# Patient Record
Sex: FEMALE | Race: WHITE | NOT HISPANIC OR LATINO | Employment: FULL TIME | URBAN - METROPOLITAN AREA
[De-identification: names, ages, dates, MRNs, and addresses within clinical notes are randomized per-mention and may not be internally consistent; named-entity substitution may affect disease eponyms.]

---

## 2017-01-09 DIAGNOSIS — F43.22 ADJUSTMENT DISORDER WITH ANXIOUS MOOD: Primary | ICD-10-CM

## 2017-01-10 DIAGNOSIS — F43.22 ADJUSTMENT DISORDER WITH ANXIOUS MOOD: Primary | ICD-10-CM

## 2017-01-10 NOTE — TELEPHONE ENCOUNTER
Reason for Call:  Medication or medication refill:    Do you use a [facility]?  Name of the pharmacy and phone number for the current request:  [facility]    Name of the medication requested: Lexapro    Other request: last fill 9/9/16 # 120 with 1 refill    Can we leave a detailed message on this number? YES    Phone number patient can be reached at: Other phone number:  [phone]    Best Time: any    Call taken on 1/10/2017 at 7:48 AM by Anna Mcfadden

## 2017-01-13 NOTE — TELEPHONE ENCOUNTER
Routing refill request to provider for review/approval because:  Dose increase when would you like to patient to follow up     Thank you  Dominique LI RN

## 2017-01-15 RX ORDER — ESCITALOPRAM OXALATE 10 MG/1
20 TABLET ORAL DAILY
Qty: 120 TABLET | Refills: 1 | Status: SHIPPED | OUTPATIENT
Start: 2017-01-15 | End: 2017-06-09

## 2017-03-26 ENCOUNTER — HOSPITAL ENCOUNTER (EMERGENCY)
Facility: CLINIC | Age: 22
Discharge: HOME | End: 2017-03-27
Attending: EMERGENCY MEDICINE | Admitting: EMERGENCY MEDICINE
Payer: COMMERCIAL

## 2017-03-26 VITALS
HEART RATE: 65 BPM | TEMPERATURE: 97.6 F | OXYGEN SATURATION: 100 % | SYSTOLIC BLOOD PRESSURE: 142 MMHG | RESPIRATION RATE: 18 BRPM | DIASTOLIC BLOOD PRESSURE: 89 MMHG

## 2017-03-26 DIAGNOSIS — F41.9 ANXIETY: ICD-10-CM
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
## 2017-03-26 PROCEDURE — 99285 EMERGENCY DEPT VISIT HI MDM: CPT | Mod: 25

## 2017-03-26 PROCEDURE — 99285 EMERGENCY DEPT VISIT HI MDM: CPT

## 2017-03-26 PROCEDURE — 51000050 ZZH FORENSIC EXAM

## 2017-03-26 PROCEDURE — 99284 EMERGENCY DEPT VISIT MOD MDM: CPT | Performed by: EMERGENCY MEDICINE

## 2017-03-26 NOTE — LETTER
[facility] EMERGENCY DEPARTMENT  [address] [address]  [phone]    2017    Antonia Marc  [address] [place] [address]  [phone] (home)     : 1995      To Whom it may concern:    Antonia Marc was seen in our Emergency Department today, 2017.  I expect her condition to improve over the next 2-3 days.  She may return to work when improved.    Sincerely,        Ramakrishnamarsha Guy
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
## 2017-03-26 NOTE — ED AVS SNAPSHOT
[facility] Emergency Department    [address] [address]    Phone:  [phone]    Fax:  [phone]                                       Antonia Marc   MRN: [id number]    Department:  [facility] Emergency Department   Date of Visit:  3/26/2017           After Visit Summary Signature Page     I have received my discharge instructions, and my questions have been answered. I have discussed any challenges I see with this plan with the nurse or doctor.    ..........................................................................................................................................  Patient/Patient Representative Signature      ..........................................................................................................................................  Patient Representative Print Name and Relationship to Patient    ..................................................               ................................................  Date                                            Time    ..........................................................................................................................................  Reviewed by Signature/Title    ...................................................              ..............................................  Date                                                            Time

## 2017-03-27 PROCEDURE — 25000132 ZZH RX MED GY IP 250 OP 250 PS 637: Performed by: EMERGENCY MEDICINE

## 2017-03-27 RX ORDER — LORAZEPAM 0.5 MG/1
0.5 TABLET ORAL EVERY 8 HOURS PRN
Qty: 20 TABLET | Refills: 0 | Status: SHIPPED | OUTPATIENT
Start: 2017-03-27 | End: 2017-05-23

## 2017-03-27 RX ORDER — LORAZEPAM 0.5 MG/1
0.5 TABLET ORAL ONCE
Status: COMPLETED | OUTPATIENT
Start: 2017-03-27 | End: 2017-03-27

## 2017-03-27 RX ADMIN — LORAZEPAM 0.5 MG: 0.5 TABLET ORAL at 00:24

## 2017-03-27 NOTE — DISCHARGE INSTRUCTIONS
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
## 2017-05-23 ENCOUNTER — OFFICE VISIT (OUTPATIENT)
Dept: FAMILY MEDICINE | Facility: CLINIC | Age: 22
End: 2017-05-23
Payer: COMMERCIAL

## 2017-05-23 VITALS
BODY MASS INDEX: 33.04 KG/M2 | HEIGHT: 68 IN | WEIGHT: 218 LBS | DIASTOLIC BLOOD PRESSURE: 69 MMHG | SYSTOLIC BLOOD PRESSURE: 107 MMHG | HEART RATE: 64 BPM

## 2017-05-23 DIAGNOSIS — Z30.013 ENCOUNTER FOR INITIAL PRESCRIPTION OF INJECTABLE CONTRACEPTIVE: ICD-10-CM

## 2017-05-23 DIAGNOSIS — F33.40 RECURRENT MAJOR DEPRESSIVE DISORDER, IN REMISSION (H): ICD-10-CM

## 2017-05-23 DIAGNOSIS — F41.9 ANXIETY: Primary | ICD-10-CM

## 2017-05-23 LAB — BETA HCG QUAL IFA URINE: NEGATIVE

## 2017-05-23 PROCEDURE — 87491 CHLMYD TRACH DNA AMP PROBE: CPT | Performed by: FAMILY MEDICINE

## 2017-05-23 PROCEDURE — 84703 CHORIONIC GONADOTROPIN ASSAY: CPT | Performed by: FAMILY MEDICINE

## 2017-05-23 PROCEDURE — 87591 N.GONORRHOEAE DNA AMP PROB: CPT | Performed by: FAMILY MEDICINE

## 2017-05-23 PROCEDURE — 99214 OFFICE O/P EST MOD 30 MIN: CPT | Performed by: FAMILY MEDICINE

## 2017-05-23 RX ORDER — MEDROXYPROGESTERONE ACETATE 150 MG/ML
150 INJECTION, SUSPENSION INTRAMUSCULAR
Qty: 1 ML | Refills: 0 | OUTPATIENT
Start: 2017-05-23 | End: 2018-05-22

## 2017-05-23 ASSESSMENT — ANXIETY QUESTIONNAIRES
2. NOT BEING ABLE TO STOP OR CONTROL WORRYING: NEARLY EVERY DAY
3. WORRYING TOO MUCH ABOUT DIFFERENT THINGS: NEARLY EVERY DAY
IF YOU CHECKED OFF ANY PROBLEMS ON THIS QUESTIONNAIRE, HOW DIFFICULT HAVE THESE PROBLEMS MADE IT FOR YOU TO DO YOUR WORK, TAKE CARE OF THINGS AT HOME, OR GET ALONG WITH OTHER PEOPLE: VERY DIFFICULT
7. FEELING AFRAID AS IF SOMETHING AWFUL MIGHT HAPPEN: SEVERAL DAYS
GAD7 TOTAL SCORE: 15
1. FEELING NERVOUS, ANXIOUS, OR ON EDGE: NEARLY EVERY DAY
6. BECOMING EASILY ANNOYED OR IRRITABLE: NEARLY EVERY DAY
5. BEING SO RESTLESS THAT IT IS HARD TO SIT STILL: SEVERAL DAYS

## 2017-05-23 ASSESSMENT — PATIENT HEALTH QUESTIONNAIRE - PHQ9: 5. POOR APPETITE OR OVEREATING: SEVERAL DAYS

## 2017-05-23 NOTE — MR AVS SNAPSHOT
"              After Visit Summary   2017    Antonia Marc    MRN: [id number]           Patient Information     Date Of Birth          1995        Visit Information        Provider Department      2017 1:00 PM Carly Griffin MD [facility]        Today's Diagnoses     Anxiety    -  1    Recurrent major depressive disorder, in remission (H)        Encounter for initial prescription of injectable contraceptive           Follow-ups after your visit        Who to contact     If you have questions or need follow up information about today's clinic visit or your schedule please contact [facility] directly at [phone].  Normal or non-critical lab and imaging results will be communicated to you by MyChart, letter or phone within 4 business days after the clinic has received the results. If you do not hear from us within 7 days, please contact the clinic through vushaperhart or phone. If you have a critical or abnormal lab result, we will notify you by phone as soon as possible.  Submit refill requests through Familio or call your pharmacy and they will forward the refill request to us. Please allow 3 business days for your refill to be completed.          Additional Information About Your Visit        MyChart Information     Familio lets you send messages to your doctor, view your test results, renew your prescriptions, schedule appointments and more. To sign up, go to [email or web address] . Click on \"Log in\" on the left side of the screen, which will take you to the Welcome page. Then click on \"Sign up Now\" on the right side of the page.     You will be asked to enter the access code listed below, as well as some personal information. Please follow the directions to create your username and password.     Your access code is: [email or web address]  Expires: 2017 12:15 AM     Your access code will  in 90 days. If you need help or a new code, please call your [facility] " "clinic or [phone].        Care EveryWhere ID     This is your Care EveryWhere ID. This could be used by other organizations to access your [facility] medical records  [id number]        Your Vitals Were     Pulse Height Last Period BMI (Body Mass Index)          64 5' 8\" (1.727 m) 05/09/2017 33.15 kg/m2         Blood Pressure from Last 3 Encounters:   05/23/17 107/69   03/26/17 142/89   09/07/16 130/68    Weight from Last 3 Encounters:   05/23/17 218 lb (98.9 kg)   09/07/16 173 lb (78.5 kg) (93 %)*   08/24/16 175 lb 6.4 oz (79.6 kg) (93 %)*     * Growth percentiles are based on [facility] 2-20 Years data.              We Performed the Following     Beta HCG qual IFA urine     CHLAMYDIA TRACHOMATIS PCR     NEISSERIA GONORRHOEA PCR          Today's Medication Changes          These changes are accurate as of: 5/23/17  1:35 PM.  If you have any questions, ask your nurse or doctor.               Start taking these medicines.        Dose/Directions    FLUoxetine 20 MG capsule   Commonly known as:  PROzac   Used for:  Anxiety, Recurrent major depressive disorder, in remission (H)   Started by:  Carly Griffin MD        Dose:  20 mg   Take 1 capsule (20 mg) by mouth daily   Quantity:  90 capsule   Refills:  3       medroxyPROGESTERone 150 MG/ML injection   Commonly known as:  DEPO-PROVERA   Used for:  Encounter for initial prescription of injectable contraceptive   Started by:  Carly Griffin MD        Dose:  150 mg   Inject 1 mL (150 mg) into the muscle every 3 months   Quantity:  1 mL   Refills:  0            Where to get your medicines      Some of these will need a paper prescription and others can be bought over the counter.  Ask your nurse if you have questions.     Bring a paper prescription for each of these medications     FLUoxetine 20 MG capsule       You don't need a prescription for these medications     medroxyPROGESTERone 150 MG/ML injection                Primary Care Provider Office Phone # Fax # "    Carly Griffin [phone]       [facility] MED [address] [facility] [address] [address]        Thank you!     Thank you for choosing [facility]  for your care. Our goal is always to provide you with excellent care. Hearing back from our patients is one way we can continue to improve our services. Please take a few minutes to complete the written survey that you may receive in the mail after your visit with us. Thank you!             Your Updated Medication List - Protect others around you: Learn how to safely use, store and throw away your medicines at www.disposemymeds.org.          This list is accurate as of: 5/23/17  1:35 PM.  Always use your most recent med list.                   Brand Name Dispense Instructions for use    escitalopram 10 MG tablet    LEXAPRO    120 tablet    Take 2 tablets (20 mg) by mouth daily       FLUoxetine 20 MG capsule    PROzac    90 capsule    Take 1 capsule (20 mg) by mouth daily       IBUPROFEN 200 200 MG Tabs      Reported on 5/23/2017       medroxyPROGESTERone 150 MG/ML injection    DEPO-PROVERA    1 mL    Inject 1 mL (150 mg) into the muscle every 3 months

## 2017-05-23 NOTE — PROGRESS NOTES
"a  SUBJECTIVE:                                                    Antonia Marc is 21 year old female   Chief Complaint   Patient presents with     Recheck Medication     recheck on lexapro - doesn't think that it's working     Contraception     discuss going on birth control       Depression and Anxiety Follow-Up    Status since last visit: Worsened - both depression and anxiety are worsened    Other associated symptoms:trouble sleeping, possibly overeating    Complicating factors:     Significant life event: No     Current substance abuse: None    PHQ-9 SCORE 8/24/2016 5/23/2017   Total Score 6 17     JAZZY-7 SCORE 8/24/2016 5/23/2017   Total Score 21 15        PHQ-9  English      PHQ-9   Any Language     GAD7       Amount of exercise or physical activity: 1 day/week for an average of 30-45 minutes    Problems taking medications regularly: No    Medication side effects:  Weight gain    Diet: regular (no restrictions)    Concern - Discuss contraception       Description:   Patient would like to possible go back on birth control pills. She was on an oral contraceptive in the past but stopped taking it. States that her menstrual cycle's are very irregular.    LMP was \"2 weeks ago\"            Problem list and histories reviewed & adjusted, as indicated.  Additional history: as documented    Patient Active Problem List   Diagnosis     Flat feet     Irregular menstrual bleeding     History reviewed. No pertinent surgical history.    Social History   Substance Use Topics     Smoking status: Never Smoker     Smokeless tobacco: Never Used      Comment: no exporure     Alcohol use No     Family History   Problem Relation Age of Onset     DIABETES Mother      Hypertension Mother      DIABETES Father      Hypertension Father      Hypertension Maternal Grandmother      Anxiety Disorder Maternal Grandmother      Hypertension Maternal Grandfather      DIABETES Maternal Grandfather      Anxiety Disorder Maternal Grandfather      " "    Current Outpatient Prescriptions   Medication Sig Dispense Refill     medroxyPROGESTERone (DEPO-PROVERA) 150 MG/ML injection Inject 1 mL (150 mg) into the muscle every 3 months 1 mL 0     FLUoxetine (PROZAC) 20 MG capsule Take 1 capsule (20 mg) by mouth daily 90 capsule 3     escitalopram (LEXAPRO) 10 MG tablet Take 2 tablets (20 mg) by mouth daily 120 tablet 1     IBUPROFEN 200 200 MG OR TABS Reported on 5/23/2017       No Known Allergies  Recent Labs   Lab Test  04/02/12   1509   ALT  12   CR  0.72   GFRESTIMATED  GFR not calculated, patient <16 years old.   GFRESTBLACK  GFR not calculated, patient <16 years old.   POTASSIUM  4.5   TSH  1.26      BP Readings from Last 3 Encounters:   05/23/17 107/69   03/26/17 142/89   09/07/16 130/68    Wt Readings from Last 3 Encounters:   05/23/17 218 lb (98.9 kg)   09/07/16 173 lb (78.5 kg) (93 %)*   08/24/16 175 lb 6.4 oz (79.6 kg) (93 %)*     * Growth percentiles are based on CDC 2-20 Years data.         ROS:  Constitutional, HEENT, cardiovascular, pulmonary, gi and gu systems are negative, except as otherwise noted.    OBJECTIVE:                                                    /69 (BP Location: Right arm, Patient Position: Chair, Cuff Size: Adult Large)  Pulse 64  Ht 5' 8\" (1.727 m)  Wt 218 lb (98.9 kg)  LMP 05/09/2017  BMI 33.15 kg/m2  GENERAL APPEARANCE ADULT: Alert, no acute distress  PSYCH: mentation appears normal., affect and mood normal  Diagnostic Test Results:  PHQ-9 Interpretation:  0-9  Not Major Depression  10-19  Mild/Moderate Depression monthly contacts, meds and therapy  20-27  Severe Depression, weekly contacts, meds and therapy  PHQ-9 SCORE 8/24/2016 5/23/2017   Total Score 6 17   GAD7 score   Interpretation:    0-5  mild anxiety,   6-10 moderate anxiety   11-15 severe anxiety  Last 3 GAD7 scores on record =  JAZZY-7 SCORE 8/24/2016 5/23/2017   Total Score 21 15                  ASSESSMENT/PLAN:                                                "     1. Anxiety  Severe, not better on zoloft, will change to prozac, recheck in 2 weeks  - FLUoxetine (PROZAC) 20 MG capsule; Take 1 capsule (20 mg) by mouth daily  Dispense: 90 capsule; Refill: 3    2. Recurrent major depressive disorder, in remission (H)  as listed above  - FLUoxetine (PROZAC) 20 MG capsule; Take 1 capsule (20 mg) by mouth daily  Dispense: 90 capsule; Refill: 3    3. Encounter for initial prescription of injectable contraceptive  - medroxyPROGESTERone (DEPO-PROVERA) 150 MG/ML injection; Inject 1 mL (150 mg) into the muscle every 3 months  Dispense: 1 mL; Refill: 0  - NEISSERIA GONORRHOEA PCR  - CHLAMYDIA TRACHOMATIS PCR  - Beta HCG qual IFA urine    Carly Griffin MD  [facility]

## 2017-05-23 NOTE — NURSING NOTE
"Chief Complaint   Patient presents with     Recheck Medication     recheck on lexapro - doesn't think that it's working     Contraception     discuss going on birth control       Initial /69 (BP Location: Right arm, Patient Position: Chair, Cuff Size: Adult Large)  Pulse 64  Ht 5' 8\" (1.727 m)  Wt 218 lb (98.9 kg)  LMP 05/09/2017  BMI 33.15 kg/m2 Estimated body mass index is 33.15 kg/(m^2) as calculated from the following:    Height as of this encounter: 5' 8\" (1.727 m).    Weight as of this encounter: 218 lb (98.9 kg).  Medication Reconciliation: complete  "

## 2017-05-24 LAB
C TRACH DNA SPEC QL NAA+PROBE: NORMAL
N GONORRHOEA DNA SPEC QL NAA+PROBE: NORMAL
SPECIMEN SOURCE: NORMAL
SPECIMEN SOURCE: NORMAL

## 2017-05-24 ASSESSMENT — PATIENT HEALTH QUESTIONNAIRE - PHQ9: SUM OF ALL RESPONSES TO PHQ QUESTIONS 1-9: 17

## 2017-05-24 ASSESSMENT — ANXIETY QUESTIONNAIRES: GAD7 TOTAL SCORE: 15

## 2017-05-29 ENCOUNTER — APPOINTMENT (OUTPATIENT)
Dept: GENERAL RADIOLOGY | Facility: CLINIC | Age: 22
End: 2017-05-29
Attending: EMERGENCY MEDICINE
Payer: COMMERCIAL

## 2017-05-29 ENCOUNTER — HOSPITAL ENCOUNTER (EMERGENCY)
Facility: CLINIC | Age: 22
Discharge: HOME | End: 2017-05-29
Attending: EMERGENCY MEDICINE | Admitting: EMERGENCY MEDICINE
Payer: COMMERCIAL

## 2017-05-29 VITALS
HEART RATE: 71 BPM | BODY MASS INDEX: 28.89 KG/M2 | OXYGEN SATURATION: 100 % | SYSTOLIC BLOOD PRESSURE: 142 MMHG | RESPIRATION RATE: 16 BRPM | DIASTOLIC BLOOD PRESSURE: 79 MMHG | TEMPERATURE: 98.4 F | WEIGHT: 190 LBS

## 2017-05-29 DIAGNOSIS — R19.7 DIARRHEA, UNSPECIFIED TYPE: ICD-10-CM

## 2017-05-29 DIAGNOSIS — R04.2 HEMOPTYSIS: ICD-10-CM

## 2017-05-29 DIAGNOSIS — R11.0 NAUSEA: ICD-10-CM

## 2017-05-29 DIAGNOSIS — T78.40XA ALLERGIC REACTION TO DRUG, INITIAL ENCOUNTER: ICD-10-CM

## 2017-05-29 LAB
ALBUMIN SERPL-MCNC: 3.5 G/DL (ref 3.4–5)
ALBUMIN SERPL-MCNC: NORMAL G/DL (ref 3.4–5)
ALBUMIN UR-MCNC: NEGATIVE MG/DL
ALP SERPL-CCNC: 55 U/L (ref 40–150)
ALP SERPL-CCNC: NORMAL U/L (ref 40–150)
ALT SERPL W P-5'-P-CCNC: 17 U/L (ref 0–50)
ALT SERPL W P-5'-P-CCNC: NORMAL U/L (ref 0–50)
ANION GAP SERPL CALCULATED.3IONS-SCNC: 8 MMOL/L (ref 3–14)
ANION GAP SERPL CALCULATED.3IONS-SCNC: NORMAL MMOL/L (ref 6–17)
APPEARANCE UR: CLEAR
APTT PPP: 31 SEC (ref 22–37)
APTT PPP: NORMAL SEC (ref 22–37)
AST SERPL W P-5'-P-CCNC: 16 U/L (ref 0–45)
AST SERPL W P-5'-P-CCNC: NORMAL U/L (ref 0–45)
BASOPHILS # BLD AUTO: 0 10E9/L (ref 0–0.2)
BASOPHILS NFR BLD AUTO: 0.4 %
BILIRUB SERPL-MCNC: 1.1 MG/DL (ref 0.2–1.3)
BILIRUB SERPL-MCNC: NORMAL MG/DL (ref 0.2–1.3)
BILIRUB UR QL STRIP: NEGATIVE
BUN SERPL-MCNC: 11 MG/DL (ref 7–30)
BUN SERPL-MCNC: NORMAL MG/DL (ref 7–30)
CALCIUM SERPL-MCNC: 8.2 MG/DL (ref 8.5–10.1)
CALCIUM SERPL-MCNC: NORMAL MG/DL (ref 8.5–10.1)
CHLORIDE SERPL-SCNC: 111 MMOL/L (ref 94–109)
CHLORIDE SERPL-SCNC: NORMAL MMOL/L (ref 94–109)
CO2 SERPL-SCNC: 22 MMOL/L (ref 20–32)
CO2 SERPL-SCNC: NORMAL MMOL/L (ref 20–32)
COLOR UR AUTO: YELLOW
CREAT SERPL-MCNC: 0.79 MG/DL (ref 0.52–1.04)
CREAT SERPL-MCNC: NORMAL MG/DL (ref 0.52–1.04)
DIFFERENTIAL METHOD BLD: NORMAL
EOSINOPHIL # BLD AUTO: 0.2 10E9/L (ref 0–0.7)
EOSINOPHIL NFR BLD AUTO: 2.4 %
ERYTHROCYTE [DISTWIDTH] IN BLOOD BY AUTOMATED COUNT: 12.3 % (ref 10–15)
GFR SERPL CREATININE-BSD FRML MDRD: ABNORMAL ML/MIN/1.7M2
GFR SERPL CREATININE-BSD FRML MDRD: NORMAL ML/MIN/1.7M2
GLUCOSE SERPL-MCNC: 82 MG/DL (ref 70–99)
GLUCOSE SERPL-MCNC: NORMAL MG/DL (ref 70–99)
GLUCOSE UR STRIP-MCNC: NEGATIVE MG/DL
HCG UR QL: NEGATIVE
HCT VFR BLD AUTO: 42.7 % (ref 35–47)
HGB BLD-MCNC: 14.8 G/DL (ref 11.7–15.7)
HGB UR QL STRIP: NEGATIVE
IMM GRANULOCYTES # BLD: 0 10E9/L (ref 0–0.4)
IMM GRANULOCYTES NFR BLD: 0.4 %
INR PPP: 1.14 (ref 0.86–1.14)
INR PPP: NORMAL (ref 0.86–1.14)
KETONES UR STRIP-MCNC: NEGATIVE MG/DL
LEUKOCYTE ESTERASE UR QL STRIP: NEGATIVE
LIPASE SERPL-CCNC: 94 U/L (ref 73–393)
LIPASE SERPL-CCNC: NORMAL U/L (ref 73–393)
LYMPHOCYTES # BLD AUTO: 2.2 10E9/L (ref 0.8–5.3)
LYMPHOCYTES NFR BLD AUTO: 29.9 %
MCH RBC QN AUTO: 29.1 PG (ref 26.5–33)
MCHC RBC AUTO-ENTMCNC: 34.7 G/DL (ref 31.5–36.5)
MCV RBC AUTO: 84 FL (ref 78–100)
MONOCYTES # BLD AUTO: 0.5 10E9/L (ref 0–1.3)
MONOCYTES NFR BLD AUTO: 6.1 %
NEUTROPHILS # BLD AUTO: 4.5 10E9/L (ref 1.6–8.3)
NEUTROPHILS NFR BLD AUTO: 60.8 %
NITRATE UR QL: NEGATIVE
PH UR STRIP: 5 PH (ref 5–7)
PLATELET # BLD AUTO: 302 10E9/L (ref 150–450)
POTASSIUM SERPL-SCNC: 3.9 MMOL/L (ref 3.4–5.3)
POTASSIUM SERPL-SCNC: NORMAL MMOL/L (ref 3.4–5.3)
PROT SERPL-MCNC: 6.4 G/DL (ref 6.8–8.8)
PROT SERPL-MCNC: NORMAL G/DL (ref 6.8–8.8)
RBC # BLD AUTO: 5.09 10E12/L (ref 3.8–5.2)
SODIUM SERPL-SCNC: 141 MMOL/L (ref 133–144)
SODIUM SERPL-SCNC: NORMAL MMOL/L (ref 133–144)
SP GR UR STRIP: 1.01 (ref 1–1.03)
URN SPEC COLLECT METH UR: NORMAL
UROBILINOGEN UR STRIP-MCNC: NORMAL MG/DL (ref 0–2)
WBC # BLD AUTO: 7.4 10E9/L (ref 4–11)

## 2017-05-29 PROCEDURE — 80053 COMPREHEN METABOLIC PANEL: CPT | Performed by: EMERGENCY MEDICINE

## 2017-05-29 PROCEDURE — 85025 COMPLETE CBC W/AUTO DIFF WBC: CPT | Performed by: EMERGENCY MEDICINE

## 2017-05-29 PROCEDURE — 85610 PROTHROMBIN TIME: CPT | Performed by: EMERGENCY MEDICINE

## 2017-05-29 PROCEDURE — 36415 COLL VENOUS BLD VENIPUNCTURE: CPT | Performed by: EMERGENCY MEDICINE

## 2017-05-29 PROCEDURE — 71020 XR CHEST 2 VW: CPT

## 2017-05-29 PROCEDURE — 99284 EMERGENCY DEPT VISIT MOD MDM: CPT | Performed by: EMERGENCY MEDICINE

## 2017-05-29 PROCEDURE — 25000128 H RX IP 250 OP 636: Performed by: EMERGENCY MEDICINE

## 2017-05-29 PROCEDURE — 85730 THROMBOPLASTIN TIME PARTIAL: CPT | Performed by: EMERGENCY MEDICINE

## 2017-05-29 PROCEDURE — 96361 HYDRATE IV INFUSION ADD-ON: CPT

## 2017-05-29 PROCEDURE — 81003 URINALYSIS AUTO W/O SCOPE: CPT | Performed by: EMERGENCY MEDICINE

## 2017-05-29 PROCEDURE — 81025 URINE PREGNANCY TEST: CPT | Performed by: EMERGENCY MEDICINE

## 2017-05-29 PROCEDURE — 96374 THER/PROPH/DIAG INJ IV PUSH: CPT

## 2017-05-29 PROCEDURE — 83690 ASSAY OF LIPASE: CPT | Performed by: EMERGENCY MEDICINE

## 2017-05-29 PROCEDURE — 99284 EMERGENCY DEPT VISIT MOD MDM: CPT | Mod: 25

## 2017-05-29 RX ORDER — ONDANSETRON 4 MG/1
8 TABLET, ORALLY DISINTEGRATING ORAL EVERY 8 HOURS PRN
Qty: 10 TABLET | Refills: 0 | Status: SHIPPED | OUTPATIENT
Start: 2017-05-29 | End: 2017-06-01

## 2017-05-29 RX ORDER — SODIUM CHLORIDE 9 MG/ML
1000 INJECTION, SOLUTION INTRAVENOUS CONTINUOUS
Status: DISCONTINUED | OUTPATIENT
Start: 2017-05-29 | End: 2017-05-29 | Stop reason: HOSPADM

## 2017-05-29 RX ORDER — ONDANSETRON 2 MG/ML
4 INJECTION INTRAMUSCULAR; INTRAVENOUS ONCE
Status: COMPLETED | OUTPATIENT
Start: 2017-05-29 | End: 2017-05-29

## 2017-05-29 RX ADMIN — SODIUM CHLORIDE 500 ML: 9 INJECTION, SOLUTION INTRAVENOUS at 17:45

## 2017-05-29 RX ADMIN — ONDANSETRON 4 MG: 2 INJECTION INTRAMUSCULAR; INTRAVENOUS at 17:45

## 2017-05-29 ASSESSMENT — ENCOUNTER SYMPTOMS
ACTIVITY CHANGE: 1
APPETITE CHANGE: 1
NAUSEA: 1
ABDOMINAL PAIN: 1
VOMITING: 0
CHILLS: 0
DIARRHEA: 1
COUGH: 1
UNEXPECTED WEIGHT CHANGE: 1
FATIGUE: 1
FEVER: 0

## 2017-05-29 NOTE — LETTER
[facility] EMERGENCY DEPARTMENT  [address] [facility] [address]  [phone]    May 29, 2017    Antonia Marc  [address] [place] [address]  [phone] (home)     : 1995      To Whom it may concern:    Antonia Marc was seen in our Emergency Department today, May 29, 2017. She should return to work on May 31, 2017.    Sincerely,        Ethan Ramesh

## 2017-05-29 NOTE — ED PROVIDER NOTES
"  History     Chief Complaint   Patient presents with     Medication Reaction     started Prozac, and now \"everytime I eat I am nausous.\" Denies suicidal or homicidal ideation.      Hemoptysis     coughing up blood today.      HPI     Antonia Marc is a 21 year old female who presents to the ED today for evaluation of hemoptysis, diarrhea and abdominal pain. The patient states that for the past several months she has been experiencing mild hemoptysis and rectal bleeding. She takes ibuprofen frequently for headaches and reports after she quit taking it her rectal bleeding stopped. She admits to recent weight gain, decreased appetite and constantly feeling weak and fatigued. The patient notes that she was on Lexapro for 8 months which caused her to gain 40 lbs so she started Prozac a week ago. Since starting Prozac the patient states she feels nauseous and experiences multiple episodes of watery diarrhea x 12 daily. Today she reports while at work she started violently coughing causing her to almost lose consciousness and needed assistance to get out of the bathroom. Admits to right mid abdominal pain that waxes and wanes. She took ibuprofen for the abdominal pain and rectal pain with no relief.  Denies a fever and chills. No recent long travels or car rides.       Patient Active Problem List   Diagnosis     Flat feet     Irregular menstrual bleeding     Current Outpatient Prescriptions   Medication Sig Dispense Refill     medroxyPROGESTERone (DEPO-PROVERA) 150 MG/ML injection Inject 1 mL (150 mg) into the muscle every 3 months 1 mL 0     FLUoxetine (PROZAC) 20 MG capsule Take 1 capsule (20 mg) by mouth daily 90 capsule 3     escitalopram (LEXAPRO) 10 MG tablet Take 2 tablets (20 mg) by mouth daily 120 tablet 1     IBUPROFEN 200 200 MG OR TABS Reported on 5/23/2017       No Known Allergies    I have reviewed the Medications, Allergies, Past Medical and Surgical History, and Social History in the Epic " system.    Review of Systems   Constitutional: Positive for activity change, appetite change, fatigue and unexpected weight change. Negative for chills and fever.   HENT: Negative for congestion, sore throat and trouble swallowing.    Respiratory: Positive for cough. Negative for chest tightness and shortness of breath.    Cardiovascular: Negative for leg swelling.   Gastrointestinal: Positive for abdominal pain, diarrhea and nausea. Negative for vomiting.   Genitourinary: Negative for decreased urine volume and dysuria.   Musculoskeletal: Negative for back pain.   Skin: Negative for rash.   Neurological: Positive for light-headedness. Negative for dizziness, seizures, weakness, numbness and headaches.   Hematological: Does not bruise/bleed easily.       Physical Exam   BP: 141/83  Pulse: 71  Temp: 98.4  F (36.9  C)  Resp: 16  Weight: 86.2 kg (190 lb)  SpO2: 99 %  Physical Exam   Constitutional: She appears well-developed and well-nourished. No distress.   Psychiatric: She has a normal mood and affect.   Nursing note and vitals reviewed.    HENT: Oral mucosa moist. No lesions. Posterior pharynx no erythema edema. No blood noted.   Neck: Supple  Pulmonary/Chest: Lungs are clear to auscultation bilaterally.  Cardiovascular: Heart is regular rate and rhythm. No murmur.  Abdomen: Soft, non-distended,Mild tenderness to palpation Right mid abdomen. No guarding no rebound. Bowel sounds positive.   Musculoskeletal: Moving all extremities well. No peripheral edema.   Neurological: Alert. No focal neurologic deficit.   Skin: No rash.    ED Course     ED Course     Procedures             Critical Care time:  none               Labs Ordered and Resulted from Time of ED Arrival Up to the Time of Departure from the ED   COMPREHENSIVE METABOLIC PANEL - Abnormal; Notable for the following:        Result Value    Chloride 111 (*)     Calcium 8.2 (*)     Protein Total 6.4 (*)     All other components within normal limits   CBC WITH  PLATELETS DIFFERENTIAL   COMPREHENSIVE METABOLIC PANEL   LIPASE   URINE MACROSCOPIC WITH REFLEX TO MICRO   HCG QUALITATIVE URINE   INR   PARTIAL THROMBOPLASTIN TIME   LIPASE   INR   PARTIAL THROMBOPLASTIN TIME   ORTHOSTATIC BLOOD PRESSURE AND PULSE         Medications   0.9% sodium chloride BOLUS (0 mLs Intravenous Stopped 5/29/17 1927)   ondansetron (ZOFRAN) injection 4 mg (4 mg Intravenous Given 5/29/17 1745)     Results for orders placed or performed during the hospital encounter of 05/29/17   Chest XR,  PA & LAT    Narrative    CHEST TWO VIEW   5/29/2017 5:35 PM     HISTORY: Hemoptysis    COMPARISON: None.    FINDINGS:  The lungs are clear. No pleural effusions or pneumothorax.  Heart size and pulmonary vascularity are within normal limits. No  acute fracture.      Impression    IMPRESSION: No evidence of acute cardiopulmonary disease is seen. No  etiology for patient's hemoptysis is seen.    JEFFERY SHERMAN MD       5:01 PM Patient assessed.       Assessments & Plan (with Medical Decision Making)records were reviewed.Patient was given IVf's and zofran. Labs were obtained. Due to history of hemoptysis a CXR was obtained. White count was not elevated. Hgb was 14.8. No left shift. Comprehensive metabolic panel without acute abnormality. Pt/PTT without significant abnormality. UA is unremarkable . Pregnancy test is negative. Patient was unable to provide a stool sample. CXR without abnormality. Patient was reexamined and had no abdominal tenderness at this time. No episodes of hemoptysis. Findings discussed with patient and family. I feel her symptoms may be due to her new medication. She should discuss with her primary care about changing this med. She has not had hemoptysis while in the ED and her Hgb and vital signs are stable. She is unable to provide a stool sample. With no abdominal tenderness at this time I do not think further imaging studies are warranted. Family with follow up with primary care tomorrow .  They will return if any hemoptysis or other symptoms present. She will bring in a stool sample for further evaluation and care.      I have reviewed the nursing notes.    I have reviewed the findings, diagnosis, plan and need for follow up with the patient.    Discharge Medication List as of 5/29/2017  7:27 PM      START taking these medications    Details   ondansetron (ZOFRAN ODT) 4 MG ODT tab Take 2 tablets (8 mg) by mouth every 8 hours as needed for nausea, Disp-10 tablet, R-0, Local Print             Final diagnoses:   Nausea   Hemoptysis   Diarrhea, unspecified type   Allergic reaction to drug, initial encounter     This document serves as a record of the services and decisions personally performed and made by Ethan Ramesh MD. It was created on HIS/HER behalf by Brenda Urbina, a trained medical scribe. The creation of this document is based the provider's statements to the medical scribe.  Brenda Urbina 5:01 PM 5/29/2017    Provider:   The information in this document, created by the medical scribe for me, accurately reflects the services I personally performed and the decisions made by me. I have reviewed and approved this document for accuracy prior to leaving the patient care area.  Ethan Ramesh MD 5:01 PM 5/29/2017 5/29/2017   [facility] EMERGENCY DEPARTMENT     Ethan Ramesh MD  05/30/17 2602

## 2017-05-29 NOTE — ED AVS SNAPSHOT
[facility] Emergency Department    [address] [address]    Phone:  [phone]    Fax:  [phone]                                       Antonia Marc   MRN: [id number]    Department:  [facility] Emergency Department   Date of Visit:  5/29/2017           After Visit Summary Signature Page     I have received my discharge instructions, and my questions have been answered. I have discussed any challenges I see with this plan with the nurse or doctor.    ..........................................................................................................................................  Patient/Patient Representative Signature      ..........................................................................................................................................  Patient Representative Print Name and Relationship to Patient    ..................................................               ................................................  Date                                            Time    ..........................................................................................................................................  Reviewed by Signature/Title    ...................................................              ..............................................  Date                                                            Time

## 2017-05-29 NOTE — ED NOTES
Ortho static vitals  completed.    Lying-b/p 130/74 HR 61  Sitting- b/p 136/84  Standing- b/p 139/80 HR 73

## 2017-05-30 ENCOUNTER — TELEPHONE (OUTPATIENT)
Dept: FAMILY MEDICINE | Facility: CLINIC | Age: 22
End: 2017-05-30

## 2017-05-30 ENCOUNTER — OFFICE VISIT (OUTPATIENT)
Dept: FAMILY MEDICINE | Facility: CLINIC | Age: 22
End: 2017-05-30
Payer: COMMERCIAL

## 2017-05-30 VITALS
HEART RATE: 77 BPM | TEMPERATURE: 98.3 F | BODY MASS INDEX: 32.89 KG/M2 | SYSTOLIC BLOOD PRESSURE: 138 MMHG | HEIGHT: 68 IN | WEIGHT: 217 LBS | DIASTOLIC BLOOD PRESSURE: 69 MMHG

## 2017-05-30 DIAGNOSIS — F33.0 MILD EPISODE OF RECURRENT MAJOR DEPRESSIVE DISORDER (H): Primary | ICD-10-CM

## 2017-05-30 DIAGNOSIS — F41.1 GAD (GENERALIZED ANXIETY DISORDER): ICD-10-CM

## 2017-05-30 DIAGNOSIS — R19.7 DIARRHEA, UNSPECIFIED TYPE: ICD-10-CM

## 2017-05-30 DIAGNOSIS — R04.2 HEMOPTYSIS: ICD-10-CM

## 2017-05-30 PROBLEM — F33.40 RECURRENT MAJOR DEPRESSIVE DISORDER, IN REMISSION (H): Status: ACTIVE | Noted: 2017-05-30

## 2017-05-30 PROBLEM — F41.9 ANXIETY: Status: ACTIVE | Noted: 2017-05-30

## 2017-05-30 PROCEDURE — 99214 OFFICE O/P EST MOD 30 MIN: CPT | Performed by: INTERNAL MEDICINE

## 2017-05-30 RX ORDER — VENLAFAXINE HYDROCHLORIDE 37.5 MG/1
CAPSULE, EXTENDED RELEASE ORAL
Qty: 60 CAPSULE | Refills: 1 | Status: SHIPPED | OUTPATIENT
Start: 2017-05-30 | End: 2017-06-09

## 2017-05-30 ASSESSMENT — ENCOUNTER SYMPTOMS
HEADACHES: 0
CHEST TIGHTNESS: 0
WEAKNESS: 0
SORE THROAT: 0
SHORTNESS OF BREATH: 0
TROUBLE SWALLOWING: 0
BRUISES/BLEEDS EASILY: 0
SEIZURES: 0
DIZZINESS: 0
NUMBNESS: 0
BACK PAIN: 0
LIGHT-HEADEDNESS: 1
DYSURIA: 0

## 2017-05-30 NOTE — DISCHARGE INSTRUCTIONS
return if symptoms worsen or new symptoms develop.  Follow-up with primary care physician tomorrow to review your medications..  Drink plenty of fluids.  Again a stool sample for analysis.  Take Zofran as directed.  Push fluids.  If any further hemoptysis or abdominal pain please return for recheck.  Uncertain Causes of Diarrhea (Adult)    Diarrhea is when stools are loose and watery. This can be caused by:    Viral infections    Bacterial infections    Food poisoning    Parasites    Irritable bowel syndrome (IBS)    Inflammatory bowel diseases such as ulcerative colitis, Crohn's disease, and celiac disease    Food intolerance, such as to lactose, the sugar found in milk and milk products    Reaction to medicines like antibiotics, laxatives, cancer drugs, and antacids  Along with diarrhea, you may also have:    Abdominal pain and cramping    Nausea and vomiting    Loss of bowel control    Fever and chills    Bloody stools  In some cases, antibiotics may help to treat diarrhea. You may have a stool sample test. This is done to see what is causing your diarrhea, and if antibiotics will help treat it. The results of a stool sample test may take up to 2 days. The healthcare provider may not give you antibiotics until he or she has the stool test results.  Diarrhea can cause dehydration. This is the loss of too much water and other fluids from the body. When this occurs, body fluid must be replaced. This can be done with oral rehydration solutions. Oral rehydration solutions are available at drugstores and grocery stores without a prescription.  Home care  Follow all instructions given by your healthcare provider. Rest at home for the next 24 hours, or until you feel better. Avoid caffeine, tobacco, and alcohol. These can make diarrhea, cramping, and pain worse.  If taking medicines:    Don t take over-the-counter diarrhea or nausea medicines unless your healthcare provider tells you to.    You may use acetaminophen or  NSAID medicines like ibuprofen or naproxen to reduce pain and fever. Don t use these if you have chronic liver or kidney disease, or ever had a stomach ulcer or gastrointestinal bleeding. Don't use NSAID medicines if you are already taking one for another condition (like arthritis) or are on daily aspirin therapy (such as for heart disease or after a stroke). Talk with your healthcare provider first.    If antibiotics were prescribed, be sure you take them until they are finished. Don t stop taking them even when you feel better. Antibiotics must be taken as a full course.  To prevent the spread of illness:    Remember that washing with soap and water and using alcohol-based  is the best way to prevent the spread of infection.    Clean the toilet after each use.    Wash your hands before eating.    Wash your hands before and after preparing food. Keep in mind that people with diarrhea or vomiting should not prepare food for others.    Wash your hands after using cutting boards, countertops, and knives that have been in contact with raw foods.    Wash and then peel fruits and vegetables.    Keep uncooked meats away from cooked and ready-to-eat foods.    Use a food thermometer when cooking. Cook poultry to at least 165 F (74 C). Cook ground meat (beef, veal, pork, lamb) to at least 160 F (71 C). Cook fresh beef, veal, lamb, and pork to at least 145 F (63 C).    Don t eat raw or undercooked eggs (poached or rosalinda side up), poultry, meat, or unpasteurized milk and juices.  Food and drinks  The main goal while treating vomiting or diarrhea is to prevent dehydration. This is done by taking small amounts of liquids often.    Keep in mind that liquids are more important than food right now.    Drink only small amounts of liquids at a time.    Don t force yourself to eat, especially if you are having cramping, vomiting, or diarrhea. Don t eat large amounts at a time, even if you are hungry.    If you eat, avoid  fatty, greasy, spicy, or fried foods.    Don t eat dairy foods or drink milk if you have diarrhea. These can make diarrhea worse.  During the first 24 hours you can try:    Oral rehydration solutions. Do not use sports drinks. They have too much sugar and not enough electrolytes.    Soft drinks without caffeine    Ginger ale    Water (plain or flavored)    Decaf tea or coffee    Clear broth, consommé, or bouillon    Gelatin, popsicles, or frozen fruit juice bars  The second 24 hours, if you are feeling better, you can add:    Hot cereal, plain toast, bread, rolls, or crackers    Plain noodles, rice, mashed potatoes, chicken noodle soup, or rice soup    Unsweetened canned fruit (no pineapple)    Bananas  As you recover:    Limit fat intake to less than 15 grams per day. Don t eat margarine, butter, oils, mayonnaise, sauces, gravies, fried foods, peanut butter, meat, poultry, or fish.    Limit fiber. Don t eat raw or cooked vegetables, fresh fruits except bananas, or bran cereals.    Limit caffeine and chocolate.    Limit dairy.    Don t use spices or seasonings except salt.    Go back to your normal diet over time, as you feel better and your symptoms improve.    If the symptoms come back, go back to a simple diet or clear liquids.  Follow-up care  Follow up with your healthcare provider, or as advised. If a stool sample was taken or cultures were done, call the healthcare provider for the results as instructed.  Call 911  Call 911 if you have any of these symptoms:    Trouble breathing    Confusion    Extreme drowsiness or trouble walking    Loss of consciousness    Rapid heart rate    Chest pain    Stiff neck    Seizure  When to seek medical advice  Call your healthcare provider right away if any of these occur:    Abdominal pain that gets worse    Constant lower right abdominal pain    Continued vomiting and inability to keep liquids down    Diarrhea more than 5 times a day    Blood in vomit or stool    Dark urine  or no urine for 8 hours, dry mouth and tongue, tiredness, weakness, or dizziness    Drowsiness    New rash    You don t get better in 2 to 3 days    Fever of 100.4 F (38 C) or higher that doesn t get lower with medicine    4797-2027 [employer]. [address] [place] [address]. All rights reserved. This information is not intended as a substitute for professional medical care. Always follow your healthcare professional's instructions.          Hemoptysis    Hemoptysis is the medical term for coughing up blood. There are many causes for this, including minor illnesses like bronchitis. Hemoptysis can also be an early sign of a more serious illness, like a blood clot in the lung (pulmonary embolism), cancer, tuberculosis, or pneumonia.  Less common causes of hemoptysis can be hard to diagnose in an emergency department or a clinic. More testing will be needed if the symptoms continue.  Home care    Stay away from cigarette smoke. Smoke irritates the bronchial passages.    Unless you are taking daily aspirin to prevent stroke or heart attack, do not take aspirin or products that contain aspirin. Aspirin affects how readily the blood clots. Medicines that prevent clotting may make hemoptysis worse.    If you have a lung infection, drinking extra fluid will help loosen secretions in the lungs.    Over-the-counter cough medicines that contain dextromethorphan may help reduce coughing. Check with a medical provider before taking dextromethorphan if you have a chronic illness, are pregnant, or take daily medications.    If you were prescribed an antibiotic, take it until it is all gone. Take it even if you are feeling better after only a few days.  Follow-up care  Follow up with your health care provider, or as advised.  When to seek medical advice  Call your healthcare provider right away if any of these occur:    Fever of 100.4 F (38 C) or higher  Call 911, or get immediate medical care  Call  "emergency services right away if any of these occur:    Coughing up an increased amount of blood    Trouble breathing, wheezing, or pain with breathing    Chest pain or chest pressure    Fainting or losing consciousness    Rapid heartbeat    Weakness or dizziness    7830-5468 [employer]. [address] [place] [address]. All rights reserved. This information is not intended as a substitute for professional medical care. Always follow your healthcare professional's instructions.          Drug Reaction: Allergic  You are having an allergic reaction to a drug you have taken. This causes an itchy rash and sometimes swelling of various parts of the body. It may also cause trouble swallowing or breathing. The rash may take a few hours or up to two weeks to go away. In the future, remember to tell your doctor about your allergy to this drug so that drugs of this type won't be used again.  Home Care:  1) Throw the drug away and do not take it again. The next reaction may be much worse.  2) Avoid tight clothing and anything that heats up your skin (hot showers/baths, direct sunlight) since heat will make itching worse.  3) An ice pack (ice cubes in a plastic bag, wrapped in a towel) will relieve local areas of intense itching and redness. Lanacaine cream or Solarcaine spray (or other product containing \"benzocaine\") will reduce the itching.  4) Avoid scratching which may worsen the reaction, damage your skin and lead to an infection.  5) Oral Benadryl (diphenhydramine) is an antihistamine available at drug and grocery stores. Unless a prescription antihistamine was given, Benadryl may be used to reduce itching if large areas of the skin are involved. Use lower doses during the daytime and higher doses at bedtime since the drug may make you sleepy. [NOTE: Do not use Benadryl if you have glaucoma or if you are a man with trouble urinating due to an enlarged prostate.] Claritin (loratadine) is an " antihistamine that causes less drowsiness and is a good alternative for daytime use.  Follow Up  with your doctor or this facility in the next two days if your symptoms do not continue to improve.  Get Prompt Medical Attention  if any of the following occur:  -- Wheezing, shortness of breath or difficulty swallowing  -- Increased swelling in the face, eyelids, mouth, lips, tongue or throat  -- Dizziness, weakness or fainting    6801-1497 [employer]. [address] [place] [address], [place] [address]. All rights reserved. This information is not intended as a substitute for professional medical care. Always follow your healthcare professional's instructions.

## 2017-05-30 NOTE — TELEPHONE ENCOUNTER
Reason for Call:  Other prescription    Detailed comments: pt mother is calling because the pt was in the ER last night and is having trouble with her Prozac  She was coughing up blood having GI issues and loose stools and mom is wanting to know what to do      Phone Number Patient can be reached at: Other phone number:  [phone] Britany     Best Time: any     Can we leave a detailed message on this number? YES    Call taken on 5/30/2017 at 8:25 AM by Chio Flores

## 2017-05-30 NOTE — PATIENT INSTRUCTIONS
Thank you for choosing [facility].  You may be receiving a survey in the mail from [employer] regarding your visit today.  Please take a few minutes to complete and return the survey to let us know how we are doing.      If you have questions or concerns, please contact us via [employer] or you can contact your care team at [phone].    Our Clinic hours are:  Monday 6:40 am  to 7:00 pm  Tuesday -Friday 6:40 am to 5:00 pm    The [place] outpatient lab hours are:  Monday - Friday 6:10 am to 4:45 pm  Saturdays 7:00 am to 11:00 am  Appointments are required, call [phone]    If you have clinical questions after hours or would like to schedule an appointment,  call the clinic at [phone].      [facility]      1.  Continue to monitor the coughing up blood.  Likely due to NSAID (ibuprofen) use and hard/excessive coughing.  If shortness of breath, chest pain, increase in coughing of blood, be seen  2. The GI illness may be due to Prozac.  As long as symptoms continue to improve, no further treatment needed.  Slowly advance diet  3. Ok to stop prozac.    4. Start Effexor, 1 pill daily x 1 day, then 2 pills daily  5. See Dr. Griffin in 4 weeks to recheck on medication.

## 2017-05-30 NOTE — PROGRESS NOTES
SUBJECTIVE:                                                    nAtonia Marc is a 21 year old female who presents to clinic today for the following health issues:      ED/UC Followup:    Facility:  [facility]  Date of visit: 05/29/17  Reason for visit: throwing up blood and not eating  Current Status: better      Chief Complaint   Patient presents with     Hospital F/U     diarrhea and wondering if due to prozac and was coughing up blood, and hard to eat.  Had toast this morning and soup last night.  Feeling very depressed and anxious.    GI illness:  Diarrhea started 1 day after starting Prozac.  She was seen in ER, note is not complete.  Labs were normal.  No fevers.  Has abdominal cramping associated with diarrhea, but not overt abdominal pain.  Last dose of prozac 5/28.  She stopped on recommendation by ER.  She is feeling significantly better since being off the medication.  No bowel movement yesterday or today - used Kaopectate.  Appetite is improving.  Is eating more, slowly advancing diet.   She also got Depo Provera the day before GI illness started.      Hemoptysis:  Occurred last night x 1 episode.  She had previous episode about 2 months ago.  ER provider thought it was due to aggressive coughing.  Mother shows picture on cell phone of tissue with bright red blood.  Patient agrees that both episodes were due to excessive coughing.  No shortness of breath, no chest pain.  Does report metal taste in mouth for several months.  No history of seasonal allergies, although mother states otherwise.      Anxiety/Depression:  Reports gained 45 lbs on lexapro.  It did help her mood.  Had GI illness as above with Prozac.  Has not been on other medications.   Has never seen counselor before.          Current Outpatient Prescriptions   Medication Sig Dispense Refill     medroxyPROGESTERone (DEPO-PROVERA) 150 MG/ML injection Inject 1 mL (150 mg) into the muscle every 3 months 1 mL 0     ondansetron (ZOFRAN  "ODT) 4 MG ODT tab Take 2 tablets (8 mg) by mouth every 8 hours as needed for nausea 10 tablet 0     FLUoxetine (PROZAC) 20 MG capsule Take 1 capsule (20 mg) by mouth daily (Patient not taking: Reported on 5/30/2017) 90 capsule 3     escitalopram (LEXAPRO) 10 MG tablet Take 2 tablets (20 mg) by mouth daily (Patient not taking: Reported on 5/30/2017) 120 tablet 1     IBUPROFEN 200 200 MG OR TABS Reported on 5/23/2017         Reviewed and updated as needed this visit by clinical staff  Tobacco  Allergies  Meds  Problems       Reviewed and updated as needed this visit by Provider  Allergies  Meds  Problems         ROS:  Constitutional, HEENT, cardiovascular, pulmonary, gi and gu systems are negative, except as otherwise noted.    OBJECTIVE:                                                    /69 (BP Location: Right arm, Patient Position: Chair, Cuff Size: Adult Large)  Pulse 77  Temp 98.3  F (36.8  C) (Tympanic)  Ht 5' 8.25\" (1.734 m)  Wt 217 lb (98.4 kg)  LMP 05/09/2017  BMI 32.75 kg/m2  Body mass index is 32.75 kg/(m^2).  GENERAL APPEARANCE: healthy, alert and no distress  HENT: oral mucous membranes moist and oropharynx clear  NECK: no adenopathy, no asymmetry, masses, or scars and thyroid normal to palpation  RESP: lungs clear to auscultation - no rales, rhonchi or wheezes  CV: regular rates and rhythm, normal S1 S2, no S3 or S4 and no murmur, click or rub  ABDOMEN: soft, nontender, without hepatosplenomegaly or masses and bowel sounds normal  PSYCH: mentation appears normal, anxious and worried    Diagnostic Test Results:  Results for orders placed or performed during the hospital encounter of 05/29/17   Chest XR,  PA & LAT    Narrative    CHEST TWO VIEW   5/29/2017 5:35 PM     HISTORY: Hemoptysis    COMPARISON: None.    FINDINGS:  The lungs are clear. No pleural effusions or pneumothorax.  Heart size and pulmonary vascularity are within normal limits. No  acute fracture.      Impression    " IMPRESSION: No evidence of acute cardiopulmonary disease is seen. No  etiology for patient's hemoptysis is seen.    JEFFERY SHERMAN MD   CBC with platelets, differential   Result Value Ref Range    WBC 7.4 4.0 - 11.0 10e9/L    RBC Count 5.09 3.8 - 5.2 10e12/L    Hemoglobin 14.8 11.7 - 15.7 g/dL    Hematocrit 42.7 35.0 - 47.0 %    MCV 84 78 - 100 fl    MCH 29.1 26.5 - 33.0 pg    MCHC 34.7 31.5 - 36.5 g/dL    RDW 12.3 10.0 - 15.0 %    Platelet Count 302 150 - 450 10e9/L    Diff Method Automated Method     % Neutrophils 60.8 %    % Lymphocytes 29.9 %    % Monocytes 6.1 %    % Eosinophils 2.4 %    % Basophils 0.4 %    % Immature Granulocytes 0.4 %    Absolute Neutrophil 4.5 1.6 - 8.3 10e9/L    Absolute Lymphocytes 2.2 0.8 - 5.3 10e9/L    Absolute Monocytes 0.5 0.0 - 1.3 10e9/L    Absolute Eosinophils 0.2 0.0 - 0.7 10e9/L    Absolute Basophils 0.0 0.0 - 0.2 10e9/L    Abs Immature Granulocytes 0.0 0 - 0.4 10e9/L   Comprehensive metabolic panel   Result Value Ref Range    Sodium  133 - 144 mmol/L     Canceled, Test credited   Unsatisfactory specimen - hemolyzed  Notified Mariya [facility] 5/29/17 1756       Potassium  3.4 - 5.3 mmol/L     Canceled, Test credited   Unsatisfactory specimen - hemolyzed  Notified Mariya [facility] 5/29/17 1756       Chloride  94 - 109 mmol/L     Canceled, Test credited   Unsatisfactory specimen - hemolyzed  Notified Mariya, [place] 5/29/17 1756       Carbon Dioxide  20 - 32 mmol/L     Canceled, Test credited   Unsatisfactory specimen - hemolyzed  Notified Mariya [place] 5/29/17 1756       Anion Gap  6 - 17 mmol/L     Canceled, Test credited   Unsatisfactory specimen - hemolyzed  Notified Mariya [place] 5/29/17 1756       Glucose  70 - 99 mg/dL     Canceled, Test credited   Unsatisfactory specimen - hemolyzed  Notified HERMAN Meraz 5/29/17 1756 SOTERO      Urea Nitrogen  7 - 30 mg/dL     Canceled, Test credited   Unsatisfactory specimen - hemolyzed  Notified HERMAN Meraz 5/29/17 1756 SOTERO      Creatinine  0.52 -  1.04 mg/dL     Canceled, Test credited   Unsatisfactory specimen - hemolyzed  Notified Mariya, [facility] 5/29/17 1756       GFR Estimate  >60 mL/min/1.7m2     Canceled, Test credited   Unsatisfactory specimen - hemolyzed  Notified Mariya, [place] 5/29/17 1756       GFR Estimate If Black  >60 mL/min/1.7m2     Canceled, Test credited   Unsatisfactory specimen - hemolyzed  Notified Mariya, [place] 5/29/17 1756       Calcium  8.5 - 10.1 mg/dL     Canceled, Test credited   Unsatisfactory specimen - hemolyzed  Notified Mariya, [place] 5/29/17 1756       Bilirubin Total  0.2 - 1.3 mg/dL     Canceled, Test credited   Unsatisfactory specimen - hemolyzed  Notified Mariya, [facility] 5/29/17 1756       Albumin  3.4 - 5.0 g/dL     Canceled, Test credited   Unsatisfactory specimen - hemolyzed  Notified Mariya, [place] 5/29/17 1756       Protein Total  6.8 - 8.8 g/dL     Canceled, Test credited   Unsatisfactory specimen - hemolyzed  Notified Mairya, [place] 5/29/17 1756       Alkaline Phosphatase  40 - 150 U/L     Canceled, Test credited   Unsatisfactory specimen - hemolyzed  Notified Mariya, [place] 5/29/17 1756       ALT  0 - 50 U/L     Canceled, Test credited   Unsatisfactory specimen - hemolyzed  Notified Mariya, [place] 5/29/17 1756       AST  0 - 45 U/L     Canceled, Test credited   Unsatisfactory specimen - hemolyzed  Notified Mariya, [place] 5/29/17 1756      Lipase   Result Value Ref Range    Lipase  73 - 393 U/L     Canceled, Test credited   Unsatisfactory specimen - hemolyzed  Notified Mariya, [facility] 5/29/17 1756      UA reflex to Microscopic   Result Value Ref Range    Color Urine Yellow     Appearance Urine Clear     Glucose Urine Negative NEG mg/dL    Bilirubin Urine Negative NEG    Ketones Urine Negative NEG mg/dL    Specific Gravity Urine 1.014 1.003 - 1.035    Blood Urine Negative NEG    pH Urine 5.0 5.0 - 7.0 pH    Protein Albumin Urine Negative NEG mg/dL    Urobilinogen mg/dL Normal 0.0 - 2.0 mg/dL    Nitrite Urine Negative NEG     Leukocyte Esterase Urine Negative NEG    Source Midstream Urine    HCG qualitative urine   Result Value Ref Range    HCG Qual Urine Negative NEG   INR   Result Value Ref Range    INR  0.86 - 1.14     Canceled, Test credited   Unsatisfactory specimen - hemolyzed  Notified HERMAN Meraz 5/29/17 1756      PTT   Result Value Ref Range    PTT  22 - 37 sec     Canceled, Test credited   Unsatisfactory specimen - hemolyzed  Notified HERMAN Meraz 5/29/17 1756      Lipase   Result Value Ref Range    Lipase 94 73 - 393 U/L   INR   Result Value Ref Range    INR 1.14 0.86 - 1.14   PTT   Result Value Ref Range    PTT 31 22 - 37 sec   Comprehensive metabolic panel   Result Value Ref Range    Sodium 141 133 - 144 mmol/L    Potassium 3.9 3.4 - 5.3 mmol/L    Chloride 111 (H) 94 - 109 mmol/L    Carbon Dioxide 22 20 - 32 mmol/L    Anion Gap 8 3 - 14 mmol/L    Glucose 82 70 - 99 mg/dL    Urea Nitrogen 11 7 - 30 mg/dL    Creatinine 0.79 0.52 - 1.04 mg/dL    GFR Estimate >90  Non  GFR Calc   >60 mL/min/1.7m2    GFR Estimate If Black >90   GFR Calc   >60 mL/min/1.7m2    Calcium 8.2 (L) 8.5 - 10.1 mg/dL    Bilirubin Total 1.1 0.2 - 1.3 mg/dL    Albumin 3.5 3.4 - 5.0 g/dL    Protein Total 6.4 (L) 6.8 - 8.8 g/dL    Alkaline Phosphatase 55 40 - 150 U/L    ALT 17 0 - 50 U/L    AST 16 0 - 45 U/L        ASSESSMENT/PLAN:                                                      1. Mild episode of recurrent major depressive disorder (H) - start new med.  discussed counseling over med, patient declined.  See PCP In 4 weeks to check on dose, need for increase  - venlafaxine (EFFEXOR-XR) 37.5 MG 24 hr capsule; Take 1 capsule daily for 2 days, then take 75 mg once daily.  Dispense: 60 capsule; Refill: 1    2. JAZZY (generalized anxiety disorder)  - venlafaxine (EFFEXOR-XR) 37.5 MG 24 hr capsule; Take 1 capsule daily for 2 days, then take 75 mg once daily.  Dispense: 60 capsule; Refill: 1    3. Diarrhea, unspecified type -  likely due to prozac.  Vs viral gastroenteritis.  Timing with starting prozac and improving off med.  Symptoms resolving, so monitor, no further intervention needed    4. Hemoptysis - due to excessive coughing and NSAID use.  No other pulmonary or heme symptoms.  monitor    1.  Continue to monitor the coughing up blood.  Likely due to NSAID (ibuprofen) use and hard/excessive coughing.  If shortness of breath, chest pain, increase in coughing of blood, be seen  2. The GI illness may be due to Prozac.  As long as symptoms continue to improve, no further treatment needed.  Slowly advance diet  3. Ok to stop prozac.    4. Start Effexor, 1 pill daily x 1 day, then 2 pills daily  5. See Dr. Griffin in 4 weeks to recheck on medication.     Jewell Mckeon,   [facility]

## 2017-05-30 NOTE — NURSING NOTE
"Chief Complaint   Patient presents with     Hospital F/U     diarrhea and wondering if due to prozac and was coughing up blood, and hard to eat.  Had toast this morning and soup last night.  Feeling very depressed and anxious.        Initial /69 (BP Location: Right arm, Patient Position: Chair, Cuff Size: Adult Large)  Pulse 77  Temp 98.3  F (36.8  C) (Tympanic)  Ht 5' 8.25\" (1.734 m)  Wt 217 lb (98.4 kg)  LMP 05/09/2017  BMI 32.75 kg/m2 Estimated body mass index is 32.75 kg/(m^2) as calculated from the following:    Height as of this encounter: 5' 8.25\" (1.734 m).    Weight as of this encounter: 217 lb (98.4 kg).  Medication Reconciliation: complete  "

## 2017-05-30 NOTE — MR AVS SNAPSHOT
After Visit Summary   5/30/2017    Antonia Marc    MRN: [id number]           Patient Information     Date Of Birth          1995        Visit Information        Provider Department      5/30/2017 2:40 PM Jewell Mckeon, DO [facility]        Today's Diagnoses     Mild episode of recurrent major depressive disorder (H)    -  1    JAZZY (generalized anxiety disorder)          Care Instructions          Thank you for choosing [facility].  You may be receiving a survey in the mail from [employer] regarding your visit today.  Please take a few minutes to complete and return the survey to let us know how we are doing.      If you have questions or concerns, please contact us via [employer] or you can contact your care team at [phone].    Our Clinic hours are:  Monday 6:40 am  to 7:00 pm  Tuesday -Friday 6:40 am to 5:00 pm    The [place] outpatient lab hours are:  Monday - Friday 6:10 am to 4:45 pm  Saturdays 7:00 am to 11:00 am  Appointments are required, call [phone]    If you have clinical questions after hours or would like to schedule an appointment,  call the clinic at [phone].      [facility]      1.  Continue to monitor the coughing up blood.  Likely due to NSAID (ibuprofen) use and hard/excessive coughing.  If shortness of breath, chest pain, increase in coughing of blood, be seen  2. The GI illness may be due to Prozac.  As long as symptoms continue to improve, no further treatment needed.  Slowly advance diet  3. Ok to stop prozac.    4. Start Effexor, 1 pill daily x 1 day, then 2 pills daily  5. See Dr. Griffin in 4 weeks to recheck on medication.           Follow-ups after your visit        Future tests that were ordered for you today     Open Future Orders        Priority Expected Expires Ordered    Enteric Bacteria and Virus Panel by SHANTAL Stool Routine  5/29/2018 5/29/2017            Who to contact     If you have questions or need follow up  "information about today's clinic visit or your schedule please contact [facility] directly at [phone].  Normal or non-critical lab and imaging results will be communicated to you by [employer], letter or phone within 4 business days after the clinic has received the results. If you do not hear from us within 7 days, please contact the clinic through [employer] or phone. If you have a critical or abnormal lab result, we will notify you by phone as soon as possible.  Submit refill requests through [employer] or call your pharmacy and they will forward the refill request to us. Please allow 3 business days for your refill to be completed.          Additional Information About Your Visit        [employer] Information     [employer] lets you send messages to your doctor, view your test results, renew your prescriptions, schedule appointments and more. To sign up, go to [email or web address] . Click on \"Log in\" on the left side of the screen, which will take you to the Welcome page. Then click on \"Sign up Now\" on the right side of the page.     You will be asked to enter the access code listed below, as well as some personal information. Please follow the directions to create your username and password.     Your access code is: [email or web address]  Expires: 2017 12:15 AM     Your access code will  in 90 days. If you need help or a new code, please call your [place] clinic or [phone].        Care EveryWhere ID     This is your Care EveryWhere ID. This could be used by other organizations to access your [place] medical records  [id number]        Your Vitals Were     Pulse Temperature Height Last Period BMI (Body Mass Index)       77 98.3  F (36.8  C) (Tympanic) 5' 8.25\" (1.734 m) 2017 32.75 kg/m2        Blood Pressure from Last 3 Encounters:   17 138/69   17 142/79   17 107/69    Weight from Last 3 Encounters:   17 217 lb (98.4 kg)   17 190 lb (86.2 kg)   17 218 " lb (98.9 kg)              Today, you had the following     No orders found for display         Today's Medication Changes          These changes are accurate as of: 5/30/17  3:06 PM.  If you have any questions, ask your nurse or doctor.               Start taking these medicines.        Dose/Directions    venlafaxine 37.5 MG 24 hr capsule   Commonly known as:  EFFEXOR-XR   Used for:  Mild episode of recurrent major depressive disorder (H), JAZZY (generalized anxiety disorder)   Started by:  Jewell Mckeon, DO        Take 1 capsule daily for 2 days, then take 75 mg once daily.   Quantity:  60 capsule   Refills:  1            Where to get your medicines      These medications were sent to [facility] [address]  [address] [facility] [address]     Phone:  [phone]     venlafaxine 37.5 MG 24 hr capsule                Primary Care Provider Office Phone # Fax #    Carly Griffin [phone]       [facility] [address] [address]        Thank you!     Thank you for choosing [facility]  for your care. Our goal is always to provide you with excellent care. Hearing back from our patients is one way we can continue to improve our services. Please take a few minutes to complete the written survey that you may receive in the mail after your visit with us. Thank you!             Your Updated Medication List - Protect others around you: Learn how to safely use, store and throw away your medicines at www.disposemymeds.org.          This list is accurate as of: 5/30/17  3:06 PM.  Always use your most recent med list.                   Brand Name Dispense Instructions for use    escitalopram 10 MG tablet    LEXAPRO    120 tablet    Take 2 tablets (20 mg) by mouth daily       FLUoxetine 20 MG capsule    PROzac    90 capsule    Take 1 capsule (20 mg) by mouth daily       IBUPROFEN 200 200 MG Tabs      Reported on 5/23/2017        medroxyPROGESTERone 150 MG/ML injection    DEPO-PROVERA    1 mL    Inject 1 mL (150 mg) into the muscle every 3 months       ondansetron 4 MG ODT tab    ZOFRAN ODT    10 tablet    Take 2 tablets (8 mg) by mouth every 8 hours as needed for nausea       venlafaxine 37.5 MG 24 hr capsule    EFFEXOR-XR    60 capsule    Take 1 capsule daily for 2 days, then take 75 mg once daily.

## 2017-05-30 NOTE — TELEPHONE ENCOUNTER
No signed release to talk to mom or dad. Spoke to patient. Patient states things are not well since starting Prozac. States that she stopped prozac all together. We scheduled her an appt today to follow up in clinic with a different provider    Sara Covarrubias RN

## 2017-05-31 ENCOUNTER — TELEPHONE (OUTPATIENT)
Dept: FAMILY MEDICINE | Facility: CLINIC | Age: 22
End: 2017-05-31

## 2017-05-31 NOTE — TELEPHONE ENCOUNTER
Reason for call:  Patient reporting a symptom    Symptom or request: diarrhea /stomach pain /  No appetite     Duration (how long have symptoms been present):  May 23  After appointment with Dr Griffin     Have you been treated for this before? No    Additional comments: patient was seen in clinic  05/23/17 Dr Griffin  And Dr Mckeon  05/31/17     And ED on 05/29/17  Still having pain and diarrhea / no appetite   Phone Number patient can be reached at:  Cell number on file:    Telephone Information:   Mobile [phone]       Best Time:  Any     Can we leave a detailed message on this number:  YES   Valentina Carlson- [facility]      Call taken on 5/31/2017 at 11:20 AM by Valentina Carlson

## 2017-05-31 NOTE — TELEPHONE ENCOUNTER
Diarrhea is not listed as side effect from depo.  She can try Imodium.  She should see DR. Griffin at her next available.

## 2017-06-09 ENCOUNTER — HOSPITAL ENCOUNTER (EMERGENCY)
Facility: CLINIC | Age: 22
Discharge: HOME | End: 2017-06-09
Attending: FAMILY MEDICINE | Admitting: FAMILY MEDICINE
Payer: COMMERCIAL

## 2017-06-09 VITALS
TEMPERATURE: 98.5 F | BODY MASS INDEX: 33.34 KG/M2 | HEIGHT: 68 IN | SYSTOLIC BLOOD PRESSURE: 146 MMHG | DIASTOLIC BLOOD PRESSURE: 89 MMHG | OXYGEN SATURATION: 98 % | WEIGHT: 220 LBS | RESPIRATION RATE: 16 BRPM | HEART RATE: 79 BPM

## 2017-06-09 DIAGNOSIS — F32.A DEPRESSION, UNSPECIFIED DEPRESSION TYPE: ICD-10-CM

## 2017-06-09 PROCEDURE — 99285 EMERGENCY DEPT VISIT HI MDM: CPT | Mod: 25

## 2017-06-09 PROCEDURE — 90791 PSYCH DIAGNOSTIC EVALUATION: CPT

## 2017-06-09 PROCEDURE — 99284 EMERGENCY DEPT VISIT MOD MDM: CPT | Performed by: FAMILY MEDICINE

## 2017-06-09 RX ORDER — BUPROPION HYDROCHLORIDE 100 MG/1
100 TABLET, EXTENDED RELEASE ORAL 2 TIMES DAILY
Qty: 60 TABLET | Refills: 0 | Status: SHIPPED | OUTPATIENT
Start: 2017-06-09 | End: 2017-09-14

## 2017-06-09 NOTE — LETTER
[facility] EMERGENCY DEPARTMENT  [address] [address]  [phone]    2017    Antonia Marc  [address] [place] [id number]  [phone] (home)     : 1995      To Whom it may concern:    Antonia Marc was seen in our Emergency Department today, 2017.  Please excuse her from work due to an acute medical problem.    Sincerely,        Jamison Escamilla MD

## 2017-06-09 NOTE — ED AVS SNAPSHOT
[facility] Emergency Department    [address] [address]    Phone:  [phone]    Fax:  [phone]                                       Antonia Marc   MRN: [id number]    Department:  [facility] Emergency Department   Date of Visit:  6/9/2017           Patient Information     Date Of Birth          1995        Your diagnoses for this visit were:     Depression, unspecified depression type        You were seen by Jamison Escamilla MD.      Follow-up Information     Follow up with Jewell Mckeon DO. Call in 2 weeks.    Specialty:  Internal Medicine    Contact information:    [facility]  [address] [id number] [phone]          Discharge Instructions       Schedule follow-up with Dr. Mckeon to report on response to medication in 2-3 weeks.  Discontinue Effexor.  Begin bupropion 100 mg.  Take initially once daily in the morning for 1 week and if tolerated then increased to twice daily.  Review response to treatment and further adjustments such her follow-up visit.  Follow-up Tuesday as planned for counseling.  Take vitamin D3 4000 units daily or het your level measured and get your level above 40 ng/dL.    24 Hour Appointment Hotline       To make an appointment at any [facility], call [phone] ([phone]). If you don't have a family doctor or clinic, we will help you find one. [facility] are conveniently located to serve the needs of you and your family.             Review of your medicines      START taking        Dose / Directions Last dose taken    buPROPion 100 MG 12 hr tablet   Commonly known as:  WELLBUTRIN SR   Dose:  100 mg   Quantity:  60 tablet        Take 1 tablet (100 mg) by mouth 2 times daily   Refills:  0          Our records show that you are taking the medicines listed below. If these are incorrect, please call your family doctor or clinic.        Dose / Directions Last dose taken    IBUPROFEN 200  200 MG Tabs        Reported on 5/23/2017   Refills:  0        medroxyPROGESTERone 150 MG/ML injection   Commonly known as:  DEPO-PROVERA   Dose:  150 mg   Quantity:  1 mL        Inject 1 mL (150 mg) into the muscle every 3 months   Refills:  0          STOP taking        Dose Reason for stopping Comments    escitalopram 10 MG tablet   Commonly known as:  LEXAPRO              FLUoxetine 20 MG capsule   Commonly known as:  PROzac              venlafaxine 37.5 MG 24 hr capsule   Commonly known as:  EFFEXOR-XR                      Prescriptions were sent or printed at these locations (1 Prescription)                   [facility] [place], [place] - [address]   [address] [facility] [address]    Telephone:  [phone]   Fax:  [phone]   Hours:                  E-Prescribed (1 of 1)         buPROPion (WELLBUTRIN SR) 100 MG 12 hr tablet                Orders Needing Specimen Collection     None      Pending Results     No orders found from 6/7/2017 to 6/10/2017.            Pending Culture Results     No orders found from 6/7/2017 to 6/10/2017.            Pending Results Instructions     If you had any lab results that were not finalized at the time of your Discharge, you can call the ED Lab Result RN at [phone]. You will be contacted by this team for any positive Lab results or changes in treatment. The nurses are available 7 days a week from 10A to 6:30P.  You can leave a message 24 hours per day and they will return your call.        Test Results From Your Hospital Stay               Thank you for choosing [facility]       Thank you for choosing [facility] for your care. Our goal is always to provide you with excellent care. Hearing back from our patients is one way we can continue to improve our services. Please take a few minutes to complete the written survey that you may receive in the mail after you visit with us. Thank you!        [employer] Information     [employer] lets you send  "messages to your doctor, view your test results, renew your prescriptions, schedule appointments and more. To sign up, go to [email or web address] . Click on \"Log in\" on the left side of the screen, which will take you to the Welcome page. Then click on \"Sign up Now\" on the right side of the page.     You will be asked to enter the access code listed below, as well as some personal information. Please follow the directions to create your username and password.     Your access code is: [email or web address]  Expires: 2017 12:15 AM     Your access code will  in 90 days. If you need help or a new code, please call your [facility] clinic or [phone].        Care EveryWhere ID     This is your Care EveryWhere ID. This could be used by other organizations to access your [facility] medical records  [id number]        After Visit Summary       This is your record. Keep this with you and show to your community pharmacist(s) and doctor(s) at your next visit.                  "

## 2017-06-09 NOTE — ED AVS SNAPSHOT
[facility] Emergency Department    [address] [address]    Phone:  [phone]    Fax:  [phone]                                       Antonia Marc   MRN: [id number]    Department:  [facility] Emergency Department   Date of Visit:  6/9/2017           After Visit Summary Signature Page     I have received my discharge instructions, and my questions have been answered. I have discussed any challenges I see with this plan with the nurse or doctor.    ..........................................................................................................................................  Patient/Patient Representative Signature      ..........................................................................................................................................  Patient Representative Print Name and Relationship to Patient    ..................................................               ................................................  Date                                            Time    ..........................................................................................................................................  Reviewed by Signature/Title    ...................................................              ..............................................  Date                                                            Time
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
## 2017-06-09 NOTE — DISCHARGE INSTRUCTIONS
Schedule follow-up with Dr. Mckeon to report on response to medication in 2-3 weeks.  Discontinue Effexor.  Begin bupropion 100 mg.  Take initially once daily in the morning for 1 week and if tolerated then increased to twice daily.  Review response to treatment and further adjustments such her follow-up visit.  Follow-up Tuesday as planned for counseling.  Take vitamin D3 4000 units daily or het your level measured and get your level above 40 ng/dL.

## 2017-06-09 NOTE — ED NOTES
Pt tearful, wants to be off Effexor (recently started). Pt avoiding eye contact during conversation, denies suicidal/homicidal thoughts upon questioning. Parents at bedside, concerned pt has stopped medication without proper weaning instructions.

## 2017-06-15 ENCOUNTER — CARE COORDINATION (OUTPATIENT)
Dept: CARE COORDINATION | Facility: CLINIC | Age: 22
End: 2017-06-15

## 2017-06-15 ENCOUNTER — TELEPHONE (OUTPATIENT)
Dept: OTHER | Facility: CLINIC | Age: 22
End: 2017-06-15

## 2017-06-15 DIAGNOSIS — F32.A DEPRESSION: Primary | ICD-10-CM

## 2017-06-15 RX ORDER — ESCITALOPRAM OXALATE 10 MG/1
10 TABLET ORAL DAILY
Qty: 90 TABLET | OUTPATIENT
Start: 2017-06-15

## 2017-06-15 NOTE — TELEPHONE ENCOUNTER
Lexapro     Last Written Prescription Date: 1/15/2017  Last Fill Quantity: 120, # refills: 1  Last Office Visit with [facility] primary care provider:  5/30/2017        Last PHQ-9 score on record=   PHQ-9 SCORE 5/23/2017   Total Score 17       She was taking Effexor and Wellbutrin.  She was having trouble with both of them.  She stopped taking them and now having with drawls.  She would like to go back on Lexapro.

## 2017-06-15 NOTE — LETTER
Health Care Home - Access Care Plan  About Me  Patient Name:  Antonia Marc  YOB: 1995  Age:                            21 year old   [place] MRN:         [id number] Telephone Information:     Home Phone [phone]   Mobile [phone]       [address]    [address] [facility] [address] Email address:  No e-mail address on record      Emergency Contact(s)  Name Relationship Lgl Grd Work Phone Home Phone Mobile Phone   1. ANTONIO KRAMER Mother   [phone]    2. NONE PER PATIE*             Health Maintenance: Routine Health maintenance Reviewed: Due/Overdue (HPV, Depression Action Plan)  My Access Plan  Medical Emergency 911   Questions or concerns during clinic hours Primary Clinic Line, I will call the clinic directly:     24 Hour Appointment Line [phone] or  [phone] [phone] ([phone])  (toll free)   24 Hour Nurse Line [phone] (toll free)   Questions or concerns outside clinic hours 24 Hour Appointment Line, I will call the after-hours on-call line:   [facility] [phone] or [phone] ([phone]) (toll-free)   Preferred Urgent Care     Preferred Hospital     Preferred Pharmacy [facility] [address] [facility] [address] Behavioral [facility] Crisis Line [facility], [phone] or [phone]

## 2017-06-15 NOTE — TELEPHONE ENCOUNTER
S-(situation): She was taking Effexor and Wellbutrin.  She was having trouble with both of them.  She stopped taking them and now having withdrawals.  She would like to go back on Lexapro.    B-(background): I have spoke to the pt's mother to collect needed information. Mother has been advised that we will need to speak to pt to return information. Mother became very upset over this information. Contact numbers are for mother. Pt's number is [phone].     A-(assessment): Pt had been on Lexapro, changed to Prozac then changed to Effexor during a 5/30/17 office visit and changed to Wellbutrin in the ED on 6/9/17. Mother now reports that pt is off all medications. Pt is reporting symptoms of abd pain, nausea with eating, headaches, flu-like symptoms. Mom reports that pt did see a counselor last Tuesday as advised in the ED visit. Mom states that the ED provider had okayed stopping the medications as she had not been on any of them too long.     R-(recommendations): I have advised the mother to make an office appt. She has agreed and is requesting that this request be routed to the provider. It appears that an appt has been made for tomorrow with Dr Owens.     Please review and advise.  Thank you,  Colleen Cavazos RN

## 2017-06-15 NOTE — PROGRESS NOTES
Received Population Health Management Referral:    Clinical Product Navigator RN reviewed chart; patient on payer product coverage.  Review results: Met referral criteria for Care Coordinator; referral to be sent.     Pt has had multiple ED visits, please assess for needs and follow up.      P: RN CC will contact patient.    Frankie REYES,RN- BC  Clinic Care Coordinator  [facility]  Phone: [phone]

## 2017-06-15 NOTE — LETTER
[facility] CARE COORDINATION  [address] [place] [address]        June 16, 2017      Antonia Marc  [address] [place] [address]    Dear Antonia,  I am a clinic care coordinator that works with your primary care provider's clinic. I have been trying to reach you to introduce clinic care coordination and see if there is anything I can assist you with.  Below is a description of Clinic Care Coordination and how I can help you.     Thecclinic care coordinator is a registered nurse and/or  who understand the health care system. The goal of clinic care coordination is to help you manage your health and improve access to the [facility] in the most efficient manner. The registered nurse can assist you in meeting your health care goals by providing education, coordinating services, and strengthening the communication among your providers. The  can assist you with financial, behavioral, psychosocial, and chemical dependency and counseling/psychiatric resources.    Please feel free to  contact me at [phone], with anyquestions or concerns. We at [facility] are focused on providing you with the highest-quality healthcare experience possible and that all starts with you.       Sincerely,     Frankie REYES,RN- BC  Clinic Care Coordinator  [facility]  Phone: [phone]      Enclosed: I have enclosed a copy of a 24 Hour Access Plan. This has helpful phone numbers for you to call when needed. Please keep this in an easy to access place to use as needed.

## 2017-06-15 NOTE — TELEPHONE ENCOUNTER
Clinical Product Navigator RN reviewed chart; patient on payer product coverage.  Review results: Met referral criteria for Care Coordinator; referral to be sent.    Pt has had multiple ED visits, please assess for needs and follow up.    Dalila Shipman RN/Clinical Product Navigator

## 2017-06-16 ENCOUNTER — OFFICE VISIT (OUTPATIENT)
Dept: FAMILY MEDICINE | Facility: CLINIC | Age: 22
End: 2017-06-16
Payer: COMMERCIAL

## 2017-06-16 VITALS
BODY MASS INDEX: 32.74 KG/M2 | TEMPERATURE: 98.4 F | HEART RATE: 67 BPM | SYSTOLIC BLOOD PRESSURE: 124 MMHG | DIASTOLIC BLOOD PRESSURE: 69 MMHG | WEIGHT: 216 LBS | HEIGHT: 68 IN

## 2017-06-16 DIAGNOSIS — F33.1 MAJOR DEPRESSIVE DISORDER, RECURRENT EPISODE, MODERATE (H): Primary | ICD-10-CM

## 2017-06-16 PROCEDURE — 99213 OFFICE O/P EST LOW 20 MIN: CPT | Performed by: FAMILY MEDICINE

## 2017-06-16 RX ORDER — ESCITALOPRAM OXALATE 20 MG/1
20 TABLET ORAL DAILY
Qty: 30 TABLET | Refills: 11 | Status: SHIPPED | OUTPATIENT
Start: 2017-06-16 | End: 2018-04-04

## 2017-06-16 RX ORDER — ESCITALOPRAM OXALATE 10 MG/1
10 TABLET ORAL DAILY
Qty: 30 TABLET | Refills: 11 | Status: SHIPPED | OUTPATIENT
Start: 2017-06-16 | End: 2018-05-22

## 2017-06-16 ASSESSMENT — ANXIETY QUESTIONNAIRES
1. FEELING NERVOUS, ANXIOUS, OR ON EDGE: NEARLY EVERY DAY
GAD7 TOTAL SCORE: 21
6. BECOMING EASILY ANNOYED OR IRRITABLE: NEARLY EVERY DAY
5. BEING SO RESTLESS THAT IT IS HARD TO SIT STILL: NEARLY EVERY DAY
2. NOT BEING ABLE TO STOP OR CONTROL WORRYING: NEARLY EVERY DAY
7. FEELING AFRAID AS IF SOMETHING AWFUL MIGHT HAPPEN: NEARLY EVERY DAY
3. WORRYING TOO MUCH ABOUT DIFFERENT THINGS: NEARLY EVERY DAY

## 2017-06-16 ASSESSMENT — PATIENT HEALTH QUESTIONNAIRE - PHQ9: 5. POOR APPETITE OR OVEREATING: NEARLY EVERY DAY

## 2017-06-16 NOTE — PROGRESS NOTES
SUBJECTIVE:                                                    Antonia Marc is a 21 year old female who presents to clinic today for the following health issues:        Depression and Anxiety Follow-Up    Status since last visit: Worsened Patient stopped medication due stomach pain     Other associated symptoms:None    Complicating factors:     Significant life event: Yes-  Patient has withdrawn from all activities      Current substance abuse: None    PHQ-9 SCORE 8/24/2016 5/23/2017   Total Score 6 17     JAZZY-7 SCORE 8/24/2016 5/23/2017   Total Score 21 15        PHQ-9  English      PHQ-9   Any Language     GAD7       Amount of exercise or physical activity: None    Problems taking medications regularly: No    Medication side effects: none    Diet: regular (no restrictions)      Patient here about her depression. She was on Lexapro for a few months and it appears to be working for her. Patient says after sometime it didn't seem effective . She was switched to Effexor  which gave her some difficult side effects . She was also on prozac  for sometime.She also had some side effects to the medication . She was seen in the ED for an episode of the side effects we talked at length about the options and decided that we should start her back on the lexapro but increase the dose to 30 mg.Also encouraged counseling .    Problem list and histories reviewed & adjusted, as indicated.  Additional history: as documented    Patient Active Problem List   Diagnosis     Flat feet     Irregular menstrual bleeding     Recurrent major depressive disorder, in remission (H)     Anxiety     JAZZY (generalized anxiety disorder)     Mild episode of recurrent major depressive disorder (H)     History reviewed. No pertinent surgical history.    Social History   Substance Use Topics     Smoking status: Never Smoker     Smokeless tobacco: Never Used      Comment: no exporure     Alcohol use No     Family History   Problem Relation Age of  "Onset     DIABETES Mother      Hypertension Mother      DIABETES Father      Hypertension Father      Hypertension Maternal Grandmother      Anxiety Disorder Maternal Grandmother      Hypertension Maternal Grandfather      DIABETES Maternal Grandfather      Anxiety Disorder Maternal Grandfather          Current Outpatient Prescriptions   Medication Sig Dispense Refill     escitalopram (LEXAPRO) 20 MG tablet Take 1 tablet (20 mg) by mouth daily 30 tablet 11     escitalopram (LEXAPRO) 10 MG tablet Take 1 tablet (10 mg) by mouth daily 30 tablet 11     medroxyPROGESTERone (DEPO-PROVERA) 150 MG/ML injection Inject 1 mL (150 mg) into the muscle every 3 months 1 mL 0     buPROPion (WELLBUTRIN SR) 100 MG 12 hr tablet Take 1 tablet (100 mg) by mouth 2 times daily (Patient not taking: Reported on 6/16/2017) 60 tablet 0     IBUPROFEN 200 200 MG OR TABS Reported on 5/23/2017       No Known Allergies  BP Readings from Last 3 Encounters:   06/16/17 124/69   06/09/17 146/89   05/30/17 138/69    Wt Readings from Last 3 Encounters:   06/16/17 216 lb (98 kg)   06/09/17 220 lb (99.8 kg)   05/30/17 217 lb (98.4 kg)                  Labs reviewed in EPIC    Reviewed and updated as needed this visit by clinical staff  Tobacco  Allergies  Med Hx  Surg Hx  Fam Hx  Soc Hx      Reviewed and updated as needed this visit by Provider         ROS:  Constitutional, HEENT, cardiovascular, pulmonary, gi and gu systems are negative, except as otherwise noted.    OBJECTIVE:                                                    /69 (BP Location: Left arm, Cuff Size: Adult Regular)  Pulse 67  Temp 98.4  F (36.9  C) (Tympanic)  Ht 5' 8\" (1.727 m)  Wt 216 lb (98 kg)  LMP 05/09/2017  BMI 32.84 kg/m2  Body mass index is 32.84 kg/(m^2).  GENERAL: healthy, alert and no distress  NECK: no adenopathy, no asymmetry, masses, or scars and thyroid normal to palpation  RESP: lungs clear to auscultation - no rales, rhonchi or wheezes  CV: regular rate " and rhythm, normal S1 S2, no S3 or S4, no murmur, click or rub, no peripheral edema and peripheral pulses strong  ABDOMEN: soft, nontender, no hepatosplenomegaly, no masses and bowel sounds normal  MS: no gross musculoskeletal defects noted, no edema  PSYCH: mentation appears normal, affect normal/bright    Diagnostic Test Results:  none      ASSESSMENT/PLAN:                                                        (F33.1) Major depressive disorder, recurrent episode, moderate (H)  (primary encounter diagnosis)  Comment: increased lexapro  Plan: escitalopram (LEXAPRO) 20 MG tablet,         escitalopram (LEXAPRO) 10 MG tablet          FUTURE APPOINTMENTS:       - Follow-up visit as needed    Agustin Owens MD  [facility]

## 2017-06-16 NOTE — MR AVS SNAPSHOT
After Visit Summary   6/16/2017    Antonia Marc    MRN: [id number]           Patient Information     Date Of Birth          1995        Visit Information        Provider Department      6/16/2017 1:20 PM Agustin Owens MD [facility]        Today's Diagnoses     Major depressive disorder, recurrent episode, moderate (H)    -  1      Care Instructions          Thank you for choosing [facility].  You may be receiving a survey in the mail from [facility] [employer] regarding your visit today.  Please take a few minutes to complete and return the survey to let us know how we are doing.      If you have questions or concerns, please contact us via [employer] or you can contact your care team at [phone].    Our Clinic hours are:  Monday 6:40 am  to 7:00 pm  Tuesday -Friday 6:40 am to 5:00 pm    The [place] outpatient lab hours are:  Monday - Friday 6:10 am to 4:45 pm  Saturdays 7:00 am to 11:00 am  Appointments are required, call [phone]    If you have clinical questions after hours or would like to schedule an appointment,  call the clinic at [phone].  Depression: Tips to Help Yourself  As your healthcare providers help treat your depression, you can also help yourself. Keep in mind that your illness affects you emotionally, physically, mentally, and socially. So full recovery will take time. Take care of your body and your soul, and be patient with yourself as you get better.    Self-care    Educate yourself. Read about treatment and medicine options. If you have the energy, attend local conferences or support groups. Keep a list of useful websites and helpful books and use them as needed. This illness is not your fault. Don t blame yourself for your depression.    Manage early symptoms. If you notice symptoms returning, experience triggers, or identify other factors that may lead to a depressive episode, get help as soon as possible. Ask trusted friends and  family to monitor your behavior and let you know if they see anything of concern.    Work with your provider. Find a provider you can trust. Communicate honestly with that person and share information on your treatment for depression and your reaction to medicines.    Be prepared for a crisis. Know what to do if you experience a crisis. Keep the phone number of a crisis hotline and know the location of your community's urgent care centers and the closest emergency department.    Hold off on big decisions. Depression can cloud your judgment. So wait until you feel better before making major life decisions, such as changing jobs, moving, or getting  or .    Be patient. Recovering from depression is a process. Don t be discouraged if it takes some time to feel better.    Keep it simple. Depression saps your energy and concentration. So you won t be able to do all the things you used to do. Set small goals and do what you can.    Be with others. Don t isolate yourself--you ll only feel worse. Try to be with other people. And take part in fun activities when you can. Go to a movie, Prylosgame, Jew service, or social event. Talk openly with people you can trust. And accept help when it s offered.  Take care of your body  People with depression often lose the desire to take care of themselves. That only makes their problems worse. During treatment and afterward, make a point to:    Exercise. It s a great way to take care of your body. And studies have shown that exercise helps fight depression.    Avoid drugs and alcohol. These may ease the pain in the short term. But they ll only make your problems worse in the long run.    Get relief from stress. Ask your healthcare provider for relaxation exercises and techniques to help relieve stress.    Eat right. A balanced and healthy diet helps keep your body healthy.  Date Last Reviewed: 1/1/2017 2000-2017 The "[employer] [address] [facility],  "LISE [place] [address]. All rights reserved. This information is not intended as a substitute for professional medical care. Always follow your healthcare professional's instructions.                Follow-ups after your visit        Who to contact     If you have questions or need follow up information about today's clinic visit or your schedule please contact [facility] directly at [phone].  Normal or non-critical lab and imaging results will be communicated to you by MyChart, letter or phone within 4 business days after the clinic has received the results. If you do not hear from us within 7 days, please contact the clinic through [employer] or phone. If you have a critical or abnormal lab result, we will notify you by phone as soon as possible.  Submit refill requests through [employer] or call your pharmacy and they will forward the refill request to us. Please allow 3 business days for your refill to be completed.          Additional Information About Your Visit        [employer] Information     [employer] lets you send messages to your doctor, view your test results, renew your prescriptions, schedule appointments and more. To sign up, go to [email or web address] . Click on \"Log in\" on the left side of the screen, which will take you to the Welcome page. Then click on \"Sign up Now\" on the right side of the page.     You will be asked to enter the access code listed below, as well as some personal information. Please follow the directions to create your username and password.     Your access code is: [email or web address]  Expires: 2017 12:15 AM     Your access code will  in 90 days. If you need help or a new code, please call your [facility] or [phone].        Care EveryWhere ID     This is your Care EveryWhere ID. This could be used by other organizations to access your [facility] medical records  [id number]        Your Vitals Were     Pulse Temperature Height Last Period BMI (Body Mass " "Index)       67 98.4  F (36.9  C) (Tympanic) 5' 8\" (1.727 m) 05/09/2017 32.84 kg/m2        Blood Pressure from Last 3 Encounters:   06/16/17 124/69   06/09/17 146/89   05/30/17 138/69    Weight from Last 3 Encounters:   06/16/17 216 lb (98 kg)   06/09/17 220 lb (99.8 kg)   05/30/17 217 lb (98.4 kg)              Today, you had the following     No orders found for display         Today's Medication Changes          These changes are accurate as of: 6/16/17  1:49 PM.  If you have any questions, ask your nurse or doctor.               Start taking these medicines.        Dose/Directions    * escitalopram 20 MG tablet   Commonly known as:  LEXAPRO   Used for:  Major depressive disorder, recurrent episode, moderate (H)   Started by:  Agustin Owens MD        Dose:  20 mg   Take 1 tablet (20 mg) by mouth daily   Quantity:  30 tablet   Refills:  11       * escitalopram 10 MG tablet   Commonly known as:  LEXAPRO   Used for:  Major depressive disorder, recurrent episode, moderate (H)   Started by:  Agustin Owens MD        Dose:  10 mg   Take 1 tablet (10 mg) by mouth daily   Quantity:  30 tablet   Refills:  11       * Notice:  This list has 2 medication(s) that are the same as other medications prescribed for you. Read the directions carefully, and ask your doctor or other care provider to review them with you.         Where to get your medicines      These medications were sent to [facility] [address]  [address] [address]     Phone:  [phone]     escitalopram 10 MG tablet    escitalopram 20 MG tablet                Primary Care Provider Office Phone # Fax #    Carly Griffin [phone]       [facility] [address] [address]        Thank you!     Thank you for choosing [facility]  for your care. Our goal is always to provide you with excellent care. Hearing back from our " patients is one way we can continue to improve our services. Please take a few minutes to complete the written survey that you may receive in the mail after your visit with us. Thank you!             Your Updated Medication List - Protect others around you: Learn how to safely use, store and throw away your medicines at www.disposemymeds.org.          This list is accurate as of: 6/16/17  1:49 PM.  Always use your most recent med list.                   Brand Name Dispense Instructions for use    buPROPion 100 MG 12 hr tablet    WELLBUTRIN SR    60 tablet    Take 1 tablet (100 mg) by mouth 2 times daily       * escitalopram 20 MG tablet    LEXAPRO    30 tablet    Take 1 tablet (20 mg) by mouth daily       * escitalopram 10 MG tablet    LEXAPRO    30 tablet    Take 1 tablet (10 mg) by mouth daily       IBUPROFEN 200 200 MG Tabs      Reported on 5/23/2017       medroxyPROGESTERone 150 MG/ML injection    DEPO-PROVERA    1 mL    Inject 1 mL (150 mg) into the muscle every 3 months       * Notice:  This list has 2 medication(s) that are the same as other medications prescribed for you. Read the directions carefully, and ask your doctor or other care provider to review them with you.

## 2017-06-16 NOTE — PROGRESS NOTES
Clinic Care Coordination Contact  [facility]/Unable to Leave VMVoicemail    Referral Source: [employer] Outreach (Population Health management)  Clinical Data: Care Coordinator Outreach  Has had 3 recent(in the last 3 mo) ED visits. None prior. Last visit on 6/9 was for depression. Per ED notes:  Schedule follow-up with Dr. Mckeon to report on response to medication in 2-3 weeks.  Discontinue Effexor.  Begin bupropion 100 mg.  Take initially once daily in the morning for 1 week and if tolerated then increased to twice daily.  Review response to treatment and further adjustments such her follow-up visit.  Follow-up Tuesday as planned for counseling.  Take vitamin D3 4000 units daily or het your level measured and get your level above 40 ng/dL.  Patient was scheduled to see therapist earlier this week. Unknown if she went. Has PCP f/u today.  Outreach attempted x 1.  Unable to leave message on voicemail as it was a business line.  Plan: Care Coordinator will mail out care coordination introduction letter with care coordinator contact information and explanation of care coordination services. Care Coordinator will try to reach patient again in 3-5 business days.  Frankie SANDERSN,RN- BC  Clinic Care Coordinator  [facility]  Phone: [phone]

## 2017-06-16 NOTE — PATIENT INSTRUCTIONS
Thank you for choosing [facility].  You may be receiving a survey in the mail from [employer] regarding your visit today.  Please take a few minutes to complete and return the survey to let us know how we are doing.      If you have questions or concerns, please contact us via [employer] or you can contact your care team at [phone].    Our Clinic hours are:  Monday 6:40 am  to 7:00 pm  Tuesday -Friday 6:40 am to 5:00 pm    The [place] outpatient lab hours are:  Monday - Friday 6:10 am to 4:45 pm  Saturdays 7:00 am to 11:00 am  Appointments are required, call [phone]    If you have clinical questions after hours or would like to schedule an appointment,  call the clinic at [phone].  Depression: Tips to Help Yourself  As your healthcare providers help treat your depression, you can also help yourself. Keep in mind that your illness affects you emotionally, physically, mentally, and socially. So full recovery will take time. Take care of your body and your soul, and be patient with yourself as you get better.    Self-care    Educate yourself. Read about treatment and medicine options. If you have the energy, attend local conferences or support groups. Keep a list of useful websites and helpful books and use them as needed. This illness is not your fault. Don t blame yourself for your depression.    Manage early symptoms. If you notice symptoms returning, experience triggers, or identify other factors that may lead to a depressive episode, get help as soon as possible. Ask trusted friends and family to monitor your behavior and let you know if they see anything of concern.    Work with your provider. Find a provider you can trust. Communicate honestly with that person and share information on your treatment for depression and your reaction to medicines.    Be prepared for a crisis. Know what to do if you experience a crisis. Keep the phone number of a crisis hotline and know the location of your  [facility] urgent care centers and the closest emergency department.    Hold off on big decisions. Depression can cloud your judgment. So wait until you feel better before making major life decisions, such as changing jobs, moving, or getting  or .    Be patient. Recovering from depression is a process. Don t be discouraged if it takes some time to feel better.    Keep it simple. Depression saps your energy and concentration. So you won t be able to do all the things you used to do. Set small goals and do what you can.    Be with others. Don t isolate yourself--you ll only feel worse. Try to be with other people. And take part in fun activities when you can. Go to a movie, ballgame, Nondenominational service, or social event. Talk openly with people you can trust. And accept help when it s offered.  Take care of your body  People with depression often lose the desire to take care of themselves. That only makes their problems worse. During treatment and afterward, make a point to:    Exercise. It s a great way to take care of your body. And studies have shown that exercise helps fight depression.    Avoid drugs and alcohol. These may ease the pain in the short term. But they ll only make your problems worse in the long run.    Get relief from stress. Ask your healthcare provider for relaxation exercises and techniques to help relieve stress.    Eat right. A balanced and healthy diet helps keep your body healthy.  Date Last Reviewed: 1/1/2017 2000-2017 [employer]. [address] [place] [address], [place] [address]. All rights reserved. This information is not intended as a substitute for professional medical care. Always follow your healthcare professional's instructions.

## 2017-06-17 ASSESSMENT — ANXIETY QUESTIONNAIRES: GAD7 TOTAL SCORE: 21

## 2017-06-17 ASSESSMENT — PATIENT HEALTH QUESTIONNAIRE - PHQ9: SUM OF ALL RESPONSES TO PHQ QUESTIONS 1-9: 22

## 2017-06-19 NOTE — PROGRESS NOTES
Clinic Care Coordination Contact  [facility]/ Unable to leave Voicemail    Referral Source: Pro-Active Outreach (Population Health management)  Clinical Data: Care Coordinator Outreach  See previous note for referral information  Outreach attempted x 2.  Left message on voicemail with call back information and requested return call.  Plan: Care Coordinator mailed out care coordination introduction letter on 6/16/17. Care Coordinator will try to reach patient again in 3-5 business days.  Frankie REYES,RN- BC  Clinic Care Coordinator  [facility]  Phone: [phone]

## 2017-06-22 NOTE — PROGRESS NOTES
Clinic Care Coordination Contact  [facility]/ Unable to leave Voicemail    Referral Source: Pro-Active Outreach (Population Health management)  Clinical Data: Care Coordinator Outreach  Outreach attempted x 3. Unable to leave message on voicemail as phone rings to a business ([employer]??) so unable to leave information. No other number on file.  Plan: Care Coordinator mailed out care coordination introduction letter on 6/16/17. Care Coordinator will do no further outreaches at this time.  Frankie REYES,RN- BC  Clinic Care Coordinator  [facility]  Phone: [phone]

## 2017-08-22 ENCOUNTER — ALLIED HEALTH/NURSE VISIT (OUTPATIENT)
Dept: FAMILY MEDICINE | Facility: CLINIC | Age: 22
End: 2017-08-22
Payer: COMMERCIAL

## 2017-08-22 VITALS — BODY MASS INDEX: 36.01 KG/M2 | DIASTOLIC BLOOD PRESSURE: 68 MMHG | WEIGHT: 236.8 LBS | SYSTOLIC BLOOD PRESSURE: 133 MMHG

## 2017-08-22 PROCEDURE — 99207 ZZC NO CHARGE NURSE ONLY: CPT

## 2017-08-22 PROCEDURE — 96372 THER/PROPH/DIAG INJ SC/IM: CPT

## 2017-09-14 ENCOUNTER — OFFICE VISIT (OUTPATIENT)
Dept: FAMILY MEDICINE | Facility: CLINIC | Age: 22
End: 2017-09-14

## 2017-09-14 VITALS
SYSTOLIC BLOOD PRESSURE: 120 MMHG | HEART RATE: 62 BPM | TEMPERATURE: 98.5 F | DIASTOLIC BLOOD PRESSURE: 72 MMHG | BODY MASS INDEX: 35.97 KG/M2 | WEIGHT: 237.3 LBS | HEIGHT: 68 IN

## 2017-09-14 DIAGNOSIS — R63.5 WEIGHT GAIN: Primary | ICD-10-CM

## 2017-09-14 DIAGNOSIS — Z30.9 ENCOUNTER FOR CONTRACEPTIVE MANAGEMENT, UNSPECIFIED TYPE: ICD-10-CM

## 2017-09-14 LAB
GLUCOSE SERPL-MCNC: 86 MG/DL (ref 70–99)
TSH SERPL DL<=0.005 MIU/L-ACNC: 1.48 MU/L (ref 0.4–4)

## 2017-09-14 PROCEDURE — 84443 ASSAY THYROID STIM HORMONE: CPT | Performed by: NURSE PRACTITIONER

## 2017-09-14 PROCEDURE — 36415 COLL VENOUS BLD VENIPUNCTURE: CPT | Performed by: NURSE PRACTITIONER

## 2017-09-14 PROCEDURE — 82947 ASSAY GLUCOSE BLOOD QUANT: CPT | Performed by: NURSE PRACTITIONER

## 2017-09-14 PROCEDURE — 99214 OFFICE O/P EST MOD 30 MIN: CPT | Performed by: NURSE PRACTITIONER

## 2017-09-14 NOTE — PROGRESS NOTES
"  SUBJECTIVE:   Antonia Marc is a 21 year old female who presents to clinic today for the following health issues:   complains she gained almost 50 lbs since starting antidepressant medications and Depo shots. She was on oral BC last year, but was forgetting to take pills daily and switched to Depo Provera, which she started about 4 months ago, last injection 2 weeks ago. She states weight gain makes her feel more depressed.  She tried multiple medications for her depression including, Prozac, Effexor-which she did not tolerate, developed upset stomach, nausea. She started Lexapro recently and was taking 30 mg daily, due to weight gain she cut down to 20 mg daily. The patient interested completely stopping all antidepressant medications and try counseling therapy.      Depression and Anxiety Follow-Up    Status since last visit: No change    Other associated symptoms:None    Complicating factors:     Significant life event: No     Current substance abuse: None    PHQ-9 SCORE 8/24/2016 5/23/2017 6/16/2017   Total Score 6 17 22     JAZZY-7 SCORE 8/24/2016 5/23/2017 6/16/2017   Total Score 21 15 21       PHQ-9  English  PHQ-9   Any Language  GAD7      Amount of exercise or physical activity: 4-5 days/week for an average of greater than 60 minutes    Problems taking medications regularly: Yes,  Weight gain     Medication side effects: none  Diet: regular (no restrictions)    Problem list and histories reviewed & adjusted, as indicated.  Additional history: as documented    Labs reviewed in EPIC    Reviewed and updated as needed this visit by clinical staffTobacco  Allergies  Med Hx  Surg Hx  Fam Hx  Soc Hx      Reviewed and updated as needed this visit by Provider         ROS:  Constitutional, HEENT, cardiovascular, pulmonary, gi and gu systems are negative, except as otherwise noted.      OBJECTIVE:   /72  Pulse 62  Temp 98.5  F (36.9  C) (Tympanic)  Ht 5' 8\" (1.727 m)  Wt 237 lb 4.8 oz (107.6 kg)  " BMI 36.08 kg/m2  Body mass index is 36.08 kg/(m^2).  GENERAL: healthy, alert and no distress  PSYCH: mentation appears normal, affect normal/bright    Diagnostic Test Results:  pending    ASSESSMENT/PLAN:     1. Weight gain  -likely due to Depo and antidepressant medications, also possible binge eating to compensate with depression state, but patient reported that she is not eating much and eating healthy.    -patient would like to taper off Lexapro and try counseling therapy for depression   -will also evaluate for possible endocrine pathology, hypothyroidism, diabetes   - TSH with free T4 reflex  - Glucose  -Decrease Lexapro to 10 mg daily for 2 weeks, then decrease to 5 mg daily for 1 week and stop   -for weight loss, recommended exercise, eat healthy, she can Orlistat  3 times daily with meals, informed about possible side effects   -discussed weight loss strategies, calories count, calculate BMR and daily exercise      2. Encounter for contraceptive management, unspecified type  -interested in IUD for contraception  -recommended to schedule with GYN   - OB/GYN REFERRAL    See Patient Instructions    ROS [facility]

## 2017-09-14 NOTE — PATIENT INSTRUCTIONS
Taper off Lexapro, take 10 mg daily for 2 weeks, then 5 mg daily for 1 week and sto[    Schedule with Angela    Stop Depo    Schedule with GYN for IUD    Will check thyroid and screen for diabetes    Try Cornell over the counter, take 1 capsule with each meal containing fat    Exercise  Calculate daily BMR, don't eat less then your BMR

## 2017-09-14 NOTE — MR AVS SNAPSHOT
"              After Visit Summary   9/14/2017    Antonia Marc    MRN: [id number]           Patient Information     Date Of Birth          1995        Visit Information        Provider Department      9/14/2017 2:20 PM Roma Ye APRN CNP [facility]        Today's Diagnoses     Weight gain    -  1      Care Instructions    Taper off Lexapro, take 10 mg daily for 2 weeks, then 5 mg daily for 1 week and sto[    Schedule with Angela    Stop Depo    Schedule with GYN for IUD    Will check thyroid and screen for diabetes    Try Cornell over the counter, take 1 capsule with each meal containing fat    Exercise  Calculate daily BMR, don't eat less then your BMR                  Follow-ups after your visit        Who to contact     If you have questions or need follow up information about today's clinic visit or your schedule please contact [facility] directly at [phone].  Normal or non-critical lab and imaging results will be communicated to you by Orthodatahart, letter or phone within 4 business days after the clinic has received the results. If you do not hear from us within 7 days, please contact the clinic through Orthodatahart or phone. If you have a critical or abnormal lab result, we will notify you by phone as soon as possible.  Submit refill requests through [employer] Express or call your pharmacy and they will forward the refill request to us. Please allow 3 business days for your refill to be completed.          Additional Information About Your Visit        MyChart Information     [employer] Express lets you send messages to your doctor, view your test results, renew your prescriptions, schedule appointments and more. To sign up, go to [email or web address] . Click on \"Log in\" on the left side of the screen, which will take you to the Welcome page. Then click on \"Sign up Now\" on the right side of the page.     You will be asked to enter the access code listed below, as well as some personal " "information. Please follow the directions to create your username and password.     Your access code is: [email or web address]  Expires: 2017  4:16 PM     Your access code will  in 90 days. If you need help or a new code, please call your [place] clinic or [phone].        Care EveryWhere ID     This is your Care EveryWhere ID. This could be used by other organizations to access your [place] medical records  [id number]        Your Vitals Were     Pulse Temperature Height BMI (Body Mass Index)          62 98.5  F (36.9  C) (Tympanic) 5' 8\" (1.727 m) 36.08 kg/m2         Blood Pressure from Last 3 Encounters:   17 120/72   17 133/68   17 124/69    Weight from Last 3 Encounters:   17 237 lb 4.8 oz (107.6 kg)   17 236 lb 12.8 oz (107.4 kg)   17 216 lb (98 kg)              We Performed the Following     Glucose     TSH with free T4 reflex        Primary Care Provider Office Phone # Fax #    Carly Kelli Griffin [phone] [address] [facility] [address]        Equal Access to Services     BANG WAGNER AH: Hadii laila lerma hadasho Soomaali, waaxda luqadaha, qaybta kaalmada adeegyada, lavonne ken. So [facility] [phone].    ATENCIÓN: Si habla español, tiene a ma disposición servicios gratuitos de asistencia lingüística. Llame al [phone].    We comply with applicable federal civil rights laws and [place] laws. We do not discriminate on the basis of race, color, national origin, age, disability sex, sexual orientation or gender identity.            Thank you!     Thank you for choosing [facility]  for your care. Our goal is always to provide you with excellent care. Hearing back from our patients is one way we can continue to improve our services. Please take a few minutes to complete the written survey that you may receive in the mail after your visit with us. Thank you!             Your Updated Medication " List - Protect others around you: Learn how to safely use, store and throw away your medicines at www.disposemymeds.org.          This list is accurate as of: 9/14/17  2:37 PM.  Always use your most recent med list.                   Brand Name Dispense Instructions for use Diagnosis    buPROPion 100 MG 12 hr tablet    WELLBUTRIN SR    60 tablet    Take 1 tablet (100 mg) by mouth 2 times daily        * escitalopram 20 MG tablet    LEXAPRO    30 tablet    Take 1 tablet (20 mg) by mouth daily    Major depressive disorder, recurrent episode, moderate (H)       * escitalopram 10 MG tablet    LEXAPRO    30 tablet    Take 1 tablet (10 mg) by mouth daily    Major depressive disorder, recurrent episode, moderate (H)       IBUPROFEN 200 200 MG Tabs      Reported on 5/23/2017        medroxyPROGESTERone 150 MG/ML injection    DEPO-PROVERA    1 mL    Inject 1 mL (150 mg) into the muscle every 3 months    Encounter for initial prescription of injectable contraceptive       * Notice:  This list has 2 medication(s) that are the same as other medications prescribed for you. Read the directions carefully, and ask your doctor or other care provider to review them with you.

## 2017-09-14 NOTE — NURSING NOTE
"Chief Complaint   Patient presents with     Weight Problem     States her weight has been going up weekly, thinks its due to the depo and Lexapro      Contraception     Would like to go over other options for birth control        Initial /72  Pulse 62  Temp 98.5  F (36.9  C) (Tympanic)  Ht 5' 8\" (1.727 m)  Wt 237 lb 4.8 oz (107.6 kg)  BMI 36.08 kg/m2 Estimated body mass index is 36.08 kg/(m^2) as calculated from the following:    Height as of this encounter: 5' 8\" (1.727 m).    Weight as of this encounter: 237 lb 4.8 oz (107.6 kg).  Medication Reconciliation: complete  "

## 2017-09-14 NOTE — LETTER
[facility]  [address] [place]  [address]  [phone]      9/15/2017     Antonia Marc  [address] [place] [phone]      Dear Antonia:    Thank you for allowing me to participate in your care. Your recent test results were reviewed and listed below.      Your results are provided below for your review  Results for orders placed or performed in visit on 09/14/17 (from the past 48 hour(s))   TSH with free T4 reflex   Result Value Ref Range    TSH 1.48 0.40 - 4.00 mU/L   Glucose   Result Value Ref Range    Glucose 86 70 - 99 mg/dL                 Thank you for choosing [place]. As a result, please continue with the treatment plan discussed in the office. Return as discussed or sooner if symptoms worsen or fail to improve. If you have any further questions or concerns, please do not hesitate to contact us.      Sincerely,        Roma Ye CNP/herman

## 2017-11-17 ENCOUNTER — TELEPHONE (OUTPATIENT)
Dept: FAMILY MEDICINE | Facility: CLINIC | Age: 22
End: 2017-11-17

## 2017-11-17 NOTE — LETTER
December 6, 2017      Antonia Marc  [address] [place] [phone]        Dear Antonia,     We have been unable to reach you by phone Your [place] Care Team works hard to make sure that you and your family receive exceptional care.     Enclosed you will find a copy of the phq-9/gad7 depression form  that our clinic uses to monitor and manage your Depression/anxiety  This test is an assessment tool that we can use to determine how well you Depression  is controlled.     Please fill out and mail back the enclosed  form.   Enclosed is a stamped addressed envelope for you to mail the form back to the clinic.          Sincerely,        Carly Griffin MD/moonf

## 2017-11-17 NOTE — TELEPHONE ENCOUNTER
PHQ9 Due by:12/23/17    Please contact patient to complete follow up PHQ9 before their DUE DATE.     This is important feedback for your care team to monitor how you are doing while taking LEXAPRO .     You completed this same questionnaire   PHQ-9 SCORE 6/16/2017   Total Score 22       MA STAFF: If upon calling patient and PHQ9 score is higher that 10 route to the provider. You may also seek an RN for review.

## 2017-12-06 NOTE — TELEPHONE ENCOUNTER
Phone number voice mail does not identify as Antonia, it sounds like a business number.  Mailed the PHQ9.  ANYI Yanez ([facility])

## 2018-02-01 ENCOUNTER — OFFICE VISIT (OUTPATIENT)
Dept: FAMILY MEDICINE | Facility: CLINIC | Age: 23
End: 2018-02-01

## 2018-02-01 VITALS
TEMPERATURE: 99.5 F | WEIGHT: 231.8 LBS | HEIGHT: 68 IN | RESPIRATION RATE: 16 BRPM | DIASTOLIC BLOOD PRESSURE: 71 MMHG | BODY MASS INDEX: 35.13 KG/M2 | SYSTOLIC BLOOD PRESSURE: 127 MMHG | HEART RATE: 71 BPM

## 2018-02-01 DIAGNOSIS — J03.91 RECURRENT TONSILLITIS: Primary | ICD-10-CM

## 2018-02-01 PROCEDURE — 99213 OFFICE O/P EST LOW 20 MIN: CPT | Performed by: FAMILY MEDICINE

## 2018-02-01 NOTE — NURSING NOTE
"Chief Complaint   Patient presents with     Throat Problem     Pt has a hx of tonsil stones with swelling        Initial /71 (BP Location: Left arm, Patient Position: Chair, Cuff Size: Adult Large)  Pulse 71  Temp 99.5  F (37.5  C) (Tympanic)  Resp 16  Ht 5' 8.11\" (1.73 m)  Wt 231 lb 12.8 oz (105.1 kg)  BMI 35.13 kg/m2 Estimated body mass index is 35.13 kg/(m^2) as calculated from the following:    Height as of this encounter: 5' 8.11\" (1.73 m).    Weight as of this encounter: 231 lb 12.8 oz (105.1 kg).  BP completed using cuff size: cortez Atwood CMA     "

## 2018-02-01 NOTE — LETTER
[facility]  [address] [facility] [address]  Phone: [phone]    February 1, 2018      Antonia Marc  [address] [place] [phone]      Dear Ms. Marc    For medical reasons you should be excused from work today.       Sincerely,    / Ethan Sullivan MD

## 2018-02-01 NOTE — PROGRESS NOTES
"  SUBJECTIVE:   Antonia Marc is a 22 year old female who presents to clinic today for the following health issues:  Chief Complaint   Patient presents with     Throat Problem     Pt has a hx of tonsil stones with swelling          Concern - Tonsil stones  Onset: 3 years    Description:   Tonsil stones with swelling, trouble swallowing     Intensity: moderate    Progression of Symptoms:  worsening    Accompanying Signs & Symptoms:  Swelling, trouble swallowing     Previous history of similar problem:   Yes     Precipitating factors:   Worsened by: None     Alleviating factors:  Improved by: Removal, pt can sometimes use a QTip to pop out the stones     Therapies Tried and outcome: Removal       Current Outpatient Prescriptions:      medroxyPROGESTERone (DEPO-PROVERA) 150 MG/ML injection, Inject 1 mL (150 mg) into the muscle every 3 months, Disp: 1 mL, Rfl: 0     IBUPROFEN 200 200 MG OR TABS, Reported on 5/23/2017, Disp: , Rfl:      escitalopram (LEXAPRO) 20 MG tablet, Take 1 tablet (20 mg) by mouth daily (Patient not taking: Reported on 2/1/2018), Disp: 30 tablet, Rfl: 11     escitalopram (LEXAPRO) 10 MG tablet, Take 1 tablet (10 mg) by mouth daily (Patient not taking: Reported on 2/1/2018), Disp: 30 tablet, Rfl: 11    Patient Active Problem List   Diagnosis     Flat feet     Irregular menstrual bleeding     Recurrent major depressive disorder, in remission (H)     Anxiety     JAZZY (generalized anxiety disorder)     Mild episode of recurrent major depressive disorder (H)       Blood pressure 127/71, pulse 71, temperature 99.5  F (37.5  C), temperature source Tympanic, resp. rate 16, height 5' 8.11\" (1.73 m), weight 231 lb 12.8 oz (105.1 kg), not currently breastfeeding.    Exam:  GENERAL APPEARANCE: healthy, alert and no distress  EYES: EOMI,  PERRL  HENT: ear canals and TM's normal, nose and mouth without ulcers or lesions, tonsillar hypertrophy and tonsillar erythema  NECK: no adenopathy, no asymmetry, masses, " or scars and thyroid normal to palpation  RESP: lungs clear to auscultation - no rales, rhonchi or wheezes  CV: regular rates and rhythm, normal S1 S2, no S3 or S4 and no murmur, click or rub -  SKIN: no suspicious lesions or rashes  PSYCH: mentation appears normal and affect normal/bright      (J03.91) Recurrent tonsillitis  (primary encounter diagnosis)  Comment:   Plan: OTOLARYNGOLOGY REFERRAL        We discussed the infection and this is a virus so no antibiotic are needed. Use the symptomatic therapy such as fluids and cool things and soft food.   Avoid contagious exposures. Avoid picking at it. Avoid aspirin. Tylenol is OK. Use humidity at 30% or higher. Elevate the head of the bed and use Braxton's in the nose.   We referred you to ENT for discussion of the options for therapy. Gargling gently is OK. Use good hygiene to prevent contagious exposures.     Call our RN at [phone] when better for the flu shot.       Ethan Sullivan

## 2018-02-01 NOTE — MR AVS SNAPSHOT
After Visit Summary   2/1/2018    Antonia Marc    MRN: [id number]           Patient Information     Date Of Birth          1995        Visit Information        Provider Department      2/1/2018 11:20 AM Ethan Sullivan MD [facility]        Today's Diagnoses     Recurrent tonsillitis    -  1      Care Instructions    .Thank you for choosing [facility]  You may be receiving a survey in the mail from [facility] regarding your visit today.  Please take a few minutes to complete and return the survey to let us know how we are doing.      If you have questions or concerns, please contact us via 7digital or you can contact your care team at [phone].    Our Clinic hours are:  Monday 6:40 am  to 7:00 pm  Tuesday -Friday 6:40 am to 5:00 pm    The [place] outpatient lab hours are:  Monday - Friday 6:10 am to 4:45 pm  Saturdays 7:00 am to 11:00 am  Appointments are required, call [phone]    If you have clinical questions after hours or would like to schedule an appointment,  call the clinic at [phone].    (J03.91) Recurrent tonsillitis  (primary encounter diagnosis)  Comment:   Plan: OTOLARYNGOLOGY REFERRAL        We discussed the infection and this is a virus so no antibiotic are needed. Use the symptomatic therapy such as fluids and cool things and soft food.   Avoid contagious exposures. Avoid picking at it. Avoid aspirin. Tylenol is OK. Use humidity at 30% or higher. Elevate the head of the bed and use Braxton's in the nose.   We referred you to ENT for discussion of the options for therapy. Gargling gently is OK. Use good hygiene to prevent contagious exposures.     Call our RN at [phone] when better for the flu shot.           Follow-ups after your visit        Additional Services     OTOLARYNGOLOGY REFERRAL       Your provider has referred you to: PURNIMA: [facility] [phone]   [email or web address]    Please be  "aware that coverage of these services is subject to the terms and limitations of your health insurance plan.  Call member services at your health plan with any benefit or coverage questions.      Please bring the following with you to your appointment:    (1) Any X-Rays, CTs or MRIs which have been performed.  Contact the facility where they were done to arrange for  prior to your scheduled appointment.   (2) List of current medications  (3) This referral request   (4) Any documents/labs given to you for this referral                  Who to contact     If you have questions or need follow up information about today's clinic visit or your schedule please contact [facility] directly at [phone].  Normal or non-critical lab and imaging results will be communicated to you by MyChart, letter or phone within 4 business days after the clinic has received the results. If you do not hear from us within 7 days, please contact the clinic through MyChart or phone. If you have a critical or abnormal lab result, we will notify you by phone as soon as possible.  Submit refill requests through [employer]ize or call your pharmacy and they will forward the refill request to us. Please allow 3 business days for your refill to be completed.          Additional Information About Your Visit        MyChart Information     [employer]ize lets you send messages to your doctor, view your test results, renew your prescriptions, schedule appointments and more. To sign up, go to [email or web address] . Click on \"Log in\" on the left side of the screen, which will take you to the Welcome page. Then click on \"Sign up Now\" on the right side of the page.     You will be asked to enter the access code listed below, as well as some personal information. Please follow the directions to create your username and password.     Your access code is: [email or web address]  Expires: 2018 12:12 PM     Your access code will  in 90 days. If you " "need help or a new code, please call your [place] clinic or [phone].        Care EveryWhere ID     This is your Care EveryWhere ID. This could be used by other organizations to access your [place] medical records  [id number]        Your Vitals Were     Pulse Temperature Respirations Height BMI (Body Mass Index)       71 99.5  F (37.5  C) (Tympanic) 16 5' 8.11\" (1.73 m) 35.13 kg/m2        Blood Pressure from Last 3 Encounters:   02/01/18 127/71   09/14/17 120/72   08/22/17 133/68    Weight from Last 3 Encounters:   02/01/18 231 lb 12.8 oz (105.1 kg)   09/14/17 237 lb 4.8 oz (107.6 kg)   08/22/17 236 lb 12.8 oz (107.4 kg)              We Performed the Following     OTOLARYNGOLOGY REFERRAL        Primary Care Provider Office Phone # Fax #    Carly Kelli Griffin [phone] [address] [facility] [address]        Equal Access to Services     [facility] Hospital[facility] : Hadii laila viera Sochelsy, waaxda luqmargaux, qaybta gildaalabhi lal, lavonne pereira . So [facility] [phone].    ATENCIÓN: Si habla español, tiene a ma disposición servicios gratuitos de asistencia lingüística. Mann al [phone].    We comply with applicable federal civil rights laws and [place] laws. We do not discriminate on the basis of race, color, national origin, age, disability, sex, sexual orientation, or gender identity.            Thank you!     Thank you for choosing [facility]  for your care. Our goal is always to provide you with excellent care. Hearing back from our patients is one way we can continue to improve our services. Please take a few minutes to complete the written survey that you may receive in the mail after your visit with us. Thank you!             Your Updated Medication List - Protect others around you: Learn how to safely use, store and throw away your medicines at www.disposemymeds.org.          This list is accurate as of 2/1/18 12:12 PM.  Always use " your most recent med list.                   Brand Name Dispense Instructions for use Diagnosis    * escitalopram 20 MG tablet    LEXAPRO    30 tablet    Take 1 tablet (20 mg) by mouth daily    Major depressive disorder, recurrent episode, moderate (H)       * escitalopram 10 MG tablet    LEXAPRO    30 tablet    Take 1 tablet (10 mg) by mouth daily    Major depressive disorder, recurrent episode, moderate (H)       IBUPROFEN 200 200 MG Tabs      Reported on 5/23/2017        medroxyPROGESTERone 150 MG/ML injection    DEPO-PROVERA    1 mL    Inject 1 mL (150 mg) into the muscle every 3 months    Encounter for initial prescription of injectable contraceptive       * Notice:  This list has 2 medication(s) that are the same as other medications prescribed for you. Read the directions carefully, and ask your doctor or other care provider to review them with you.

## 2018-02-01 NOTE — PATIENT INSTRUCTIONS
.Thank you for choosing [facility].  You may be receiving a survey in the mail from [facility] regarding your visit today.  Please take a few minutes to complete and return the survey to let us know how we are doing.      If you have questions or concerns, please contact us via EarlyTracks or you can contact your care team at [phone].    Our Clinic hours are:  Monday 6:40 am  to 7:00 pm  Tuesday -Friday 6:40 am to 5:00 pm    The [place] outpatient lab hours are:  Monday - Friday 6:10 am to 4:45 pm  Saturdays 7:00 am to 11:00 am  Appointments are required, call [phone]    If you have clinical questions after hours or would like to schedule an appointment,  call the clinic at [phone].    (J03.91) Recurrent tonsillitis  (primary encounter diagnosis)  Comment:   Plan: OTOLARYNGOLOGY REFERRAL        We discussed the infection and this is a virus so no antibiotic are needed. Use the symptomatic therapy such as fluids and cool things and soft food.   Avoid contagious exposures. Avoid picking at it. Avoid aspirin. Tylenol is OK. Use humidity at 30% or higher. Elevate the head of the bed and use Braxton's in the nose.   We referred you to ENT for discussion of the options for therapy. Gargling gently is OK. Use good hygiene to prevent contagious exposures.     Call our RN at [phone] when better for the flu shot.

## 2018-02-06 ENCOUNTER — HOSPITAL ENCOUNTER (EMERGENCY)
Facility: CLINIC | Age: 23
Discharge: HOME | End: 2018-02-06
Attending: PHYSICIAN ASSISTANT | Admitting: PHYSICIAN ASSISTANT

## 2018-02-06 VITALS
WEIGHT: 228.5 LBS | HEART RATE: 68 BPM | DIASTOLIC BLOOD PRESSURE: 77 MMHG | TEMPERATURE: 97.9 F | SYSTOLIC BLOOD PRESSURE: 142 MMHG | BODY MASS INDEX: 34.63 KG/M2 | OXYGEN SATURATION: 100 %

## 2018-02-06 DIAGNOSIS — J02.9 VIRAL PHARYNGITIS: ICD-10-CM

## 2018-02-06 DIAGNOSIS — A08.4 VIRAL GASTROENTERITIS: ICD-10-CM

## 2018-02-06 LAB
INTERNAL QC OK POCT: YES
S PYO AG THROAT QL IA.RAPID: NEGATIVE

## 2018-02-06 PROCEDURE — G0463 HOSPITAL OUTPT CLINIC VISIT: HCPCS

## 2018-02-06 PROCEDURE — 99213 OFFICE O/P EST LOW 20 MIN: CPT | Performed by: PHYSICIAN ASSISTANT

## 2018-02-06 PROCEDURE — 25000125 ZZHC RX 250: Performed by: PHYSICIAN ASSISTANT

## 2018-02-06 PROCEDURE — 87081 CULTURE SCREEN ONLY: CPT | Performed by: PHYSICIAN ASSISTANT

## 2018-02-06 PROCEDURE — 87880 STREP A ASSAY W/OPTIC: CPT | Performed by: PHYSICIAN ASSISTANT

## 2018-02-06 RX ORDER — DEXAMETHASONE SODIUM PHOSPHATE 4 MG/ML
10 VIAL (ML) INJECTION ONCE
Status: COMPLETED | OUTPATIENT
Start: 2018-02-06 | End: 2018-02-06

## 2018-02-06 RX ORDER — DIPHENHYDRAMINE HYDROCHLORIDE AND LIDOCAINE HYDROCHLORIDE AND ALUMINUM HYDROXIDE AND MAGNESIUM HYDRO
5 KIT EVERY 6 HOURS PRN
Qty: 237 ML | Refills: 0 | Status: SHIPPED | OUTPATIENT
Start: 2018-02-06 | End: 2018-05-22

## 2018-02-06 RX ADMIN — DEXAMETHASONE SODIUM PHOSPHATE 10 MG: 4 INJECTION, SOLUTION INTRA-ARTICULAR; INTRALESIONAL; INTRAMUSCULAR; INTRAVENOUS; SOFT TISSUE at 18:13

## 2018-02-06 NOTE — ED AVS SNAPSHOT
[facility]    [address] [facility] [address]    Phone:  [phone]    Fax:  [phone]                                       Antonia Marc   MRN: [id number]    Department:  [facility] Emergency Department   Date of Visit:  2/6/2018           After Visit Summary Signature Page     I have received my discharge instructions, and my questions have been answered. I have discussed any challenges I see with this plan with the nurse or doctor.    ..........................................................................................................................................  Patient/Patient Representative Signature      ..........................................................................................................................................  Patient Representative Print Name and Relationship to Patient    ..................................................               ................................................  Date                                            Time    ..........................................................................................................................................  Reviewed by Signature/Title    ...................................................              ..............................................  Date                                                            Time

## 2018-02-06 NOTE — ED PROVIDER NOTES
Chief Complaint    Chief Complaint   Patient presents with     Pharyngitis     sore throat and diarrhea whenever pt eats       HPI  Antonia Marc is an 22 year old female. presents for evaluation and treatment of sore throat.  Patient was in to her PCP 5 days ago for this.  She has a history of tonsil stones.  She was told that this is viral, and advised to follow up with ENT. She has had diarrhea for the past 5 days.  This tends to worsen more when she eats.  She did have 1 episode of vomiting when this started, but her nausea and vomiting have resolved.  She denies any abdominal pain.  No bloody stool, or dark tarry stool.  No fever.  No urinary frequency or urgency.  No cough, shortness of breath or chest pain.         Patient is eating well with no problems swallowing.  Patient is urinating regularly.     ROS:  Further problem focused system review was otherwise negative.     Respiratory History  no history of pneumonia or bronchitis     Problem history  Patient Active Problem List   Diagnosis     Flat feet     Irregular menstrual bleeding     Recurrent major depressive disorder, in remission (H)     Anxiety     JAZZY (generalized anxiety disorder)     Mild episode of recurrent major depressive disorder (H)     Recurrent tonsillitis        Allergies  No Known Allergies     Smoking History  History   Smoking Status     Never Smoker   Smokeless Tobacco     Never Used     Comment: no exporure        Current Meds  Medications reviewed in EMR    OBJECTIVE     Vital signs noted and reviewed by Misael White  /77  Pulse 68  Temp 97.9  F (36.6  C) (Oral)  Wt 103.6 kg (228 lb 8 oz)  SpO2 100%  BMI 34.63 kg/m2     PEFR:  General appearance: healthy, alert and no distress  Ears: R TM - normal: no effusions, no erythema, and normal landmarks, L TM - normal: no effusions, no erythema, and normal landmarks  Eyes: R EOM's intact and PERRLA, L EOM's intact and PERRLA  Nose: normal  Oropharynx: mild erythema, no  tonsillar hypertrophy and no exudates present  Neck: supple and no adenopathy  Lungs: normal and clear to auscultation  Heart: S1, S2 normal, no murmur, click, rub or gallop, regular rate and rhythm  Abdomen: Abdomen soft, non-tender without masses or organomegaly        Labs:     Results for orders placed or performed during the hospital encounter of 02/06/18   Rapid strep group A screen POCT   Result Value Ref Range    Rapid Strep A Screen NEGATIVE neg    Internal QC OK Yes         ASSESSMENT:     (A08.4) Viral gastroenteritis    (J02.9) Viral pharyngitis  Plan: magic mouthwash (FIRST-MOUTHWASH BLM)         suspension     PLAN:    Strep screen was negative and communicated to patient and mother.  We will call with culture results if positive.  Patient is currently doing well.  She is afebrile, with benign abdominal exam.  Advised patient that this is most likely a viral gastroenteritis.  Discussed BRAT diet. Discussed stool culture, and patient declined at this time.   She was given 10 Mg Decadron PO for sore throat in clinic today.  Rx for magic mouthwash sent in.  Symptomatic treatment with fluids, vaporizer, acetaminophen,salt water gargles, and ibuprofen.  Follow up with PCP as needed for persistence, worsening, appearance of new symptoms.  Contagion reviewed.  Out of work/school until feeling better, or no fever without antipyretics for 24 hours.  Patient verbalized understanding and agreed with this plan.        Misael White  2/6/2018, 5:38 PM       Misael White PA-C  02/06/18 1807

## 2018-02-06 NOTE — ED AVS SNAPSHOT
[facility] Emergency Department    [address] [address]    Phone:  [phone]    Fax:  [phone]                                       Antonia Marc   MRN: [id number]    Department:  [facility] Emergency Department   Date of Visit:  2/6/2018           Patient Information     Date Of Birth          1995        Your diagnoses for this visit were:     Viral gastroenteritis     Viral pharyngitis        You were seen by Misael White PA-C.        Discharge Instructions         Viral Gastroenteritis (Adult)    Gastroenteritis is commonly called the stomach flu. It is most often caused by a virus that affects the stomach and intestinal tract and usually lasts from 2 to 7 days. Common viruses causing gastroenteritis include norovirus, rotavirus, and hepatitis A. Non-viral causes of gastroenteritis include bacteria, parasites, and toxins.  The danger from repeated vomiting or diarrhea is dehydration. This is the loss of too much fluid from the body. When this occurs, body fluids must be replaced. Antibiotics do not help with this illness because it is usually viral.Simple home treatment will be helpful.  Symptoms of viral gastroenteritis may include:    Watery, loose stools    Stomach pain or abdominal cramps    Fever and chills    Nausea and vomiting    Loss of bowel control    Headache  Home care  Gastroenteritis is transmitted by contact with the stool or vomit of an infected person. This can occur from person to person or from contact with a contaminated surface.  Follow these guidelines when caring for yourself at home:    If symptoms are severe, rest at home for the next 24 hours or until you are feeling better.    Wash your hands with soap and water or use alcohol-based  to prevent the spread of infection. Wash your hands after touching anyone who is sick.    Wash your hands or use alcohol-based  after using the toilet and before meals. Clean the  toilet after each use.  Remember these tips when preparing food:    People with diarrhea should not prepare or serve food to others. When preparing foods, wash your hands before and after.    Wash your hands after using cutting boards, countertops, knives, or utensils that have been in contact with raw food.    Keep uncooked meats away from cooked and ready-to-eat foods.  Medicine  You may use acetaminophen or NSAID medicines like ibuprofen or naproxen to control fever unless another medicine was given. If you have chronic liver or kidney disease, talk with your healthcare provider before using these medicines. Also talk with your provider if you've had a stomach ulcer or gastrointestinal bleeding. Don't give aspirin to anyone under 18 years of age who is ill with a fever. It may cause severe liver damage. Don't use NSAIDS is you are already taking one for another condition (like arthritis) or are on aspirin (such as for heart disease or after a stroke).  If medicine for vomiting or diarrhea are prescribed, take these only as directed. Do not take over-the-counter medicines for vomiting or diarrhea unless instructed by your healthcare provider.  Diet  Follow these guidelines for food:    Water and liquids are important so you don't get dehydrated. Drink a small amount at a time or suck on ice chips if you are vomiting.    If you eat, avoid fatty, greasy, spicy, or fried foods.    Don't eat dairy if you have diarrhea. This can make diarrhea worse.    Avoid tobacco, alcohol, and caffeine which may worsen symptoms.  During the first 24 hours (the first full day), follow the diet below:    Beverages. Sports drinks, soft drinks without caffeine, ginger ale, mineral water (plain or flavored), decaffeinated tea and coffee. If you are very dehydrated, sports drinks aren't a good choice. They have too much sugar and not enough electrolytes. In this case, commercially available products called oral rehydration solutions, are  best.    Soups. Eat clear broth, consommé, and bouillon.    Desserts. Eat gelatin, popsicles, and fruit juice bars.  During the next 24 hours (the second day), you may add the following to the above:    Hot cereal, plain toast, bread, rolls, and crackers    Plain noodles, rice, mashed potatoes, chicken noodle or rice soup    Unsweetened canned fruit (avoid pineapple), bananas    Limit fat intake to less than 15 grams per day. Do this by avoiding margarine, butter, oils, mayonnaise, sauces, gravies, fried foods, peanut butter, meat, poultry, and fish.    Limit fiber and avoid raw or cooked vegetables, fresh fruits (except bananas), and bran cereals.    Limit caffeine and chocolate. Don't use spices or seasonings other than salt.    Limit dairy products.    Avoid alcohol.  During the next 24 hours:    Gradually resume a normal diet as you feel better and your symptoms improve.    If at any time it starts getting worse again, go back to clear liquids until you feel better.  Follow-up care  Follow up with your healthcare provider, or as advised. Call your provider if you don't get better within 24 hours or if diarrhea lasts more than a week. Also follow up if you are unable to keep down liquids and get dehydrated. If a stool (diarrhea) sample was taken, call as directed for the results.  Call 911  Call 911 if any of these occur:    Trouble breathing    Chest pain    Confused    Severe drowsiness or trouble awakening    Fainting or loss of consciousness    Rapid heart rate    Seizure    Stiff neck  When to seek medical advice  Call your healthcare provider right away if any of these occur:    Abdominal pain that gets worse    Continued vomiting (unable to keep liquids down)    Frequent diarrhea (more than 5 times a day)    Blood in vomit or stool (black or red color)    Dark urine, reduced urine output, or extreme thirst    Weakness or dizziness    Drowsiness    Fever of 100.4 F (38 C) oral or higher that does not get  better with fever medicine    New rash  Date Last Reviewed: 1/3/2016    4545-5614 [employer]. [address] [place] [address], [place] [address]. All rights reserved. This information is not intended as a substitute for professional medical care. Always follow your healthcare professional's instructions.          Self-Care for Sore Throats    Sore throats happen for many reasons, such as colds, allergies, and infections caused by viruses or bacteria. In any case, your throat becomes red and sore. Your goal for self-care is to reduce your discomfort while giving your throat a chance to heal.  Moisten and soothe your throat  Tips include the following:    Try a sip of water first thing after waking up.    Keep your throat moist by drinking 6 or more glasses of clear liquids every day.    Run a cool-air humidifier in your room overnight.    Avoid cigarette smoke.     Suck on throat lozenges, cough drops, hard candy, ice chips, or frozen fruit-juice bars. Use the sugar-free versions if your diet or medical condition requires them.  Gargle to ease irritation  Gargling every hour or 2 can ease irritation. Try gargling with 1 of these solutions:    1/4 teaspoon of salt in 1/2 cup of warm water    An over-the-counter anesthetic gargle  Use medicine for more relief  Over-the-counter medicine can reduce sore throat symptoms. Ask your pharmacist if you have questions about which medicine to use:    Ease pain with anesthetic sprays. Aspirin or an aspirin substitute also helps. Remember, never give aspirin to anyone 18 or younger, or if you are already taking blood thinners.     For sore throats caused by allergies, try antihistamines to block the allergic reaction.    Remember: unless a sore throat is caused by a bacterial infection, antibiotics won t help you.  Prevent future sore throats  Prevention tips include the following:    Stop smoking or reduce contact with secondhand smoke. Smoke irritates the tender throat  lining.    Limit contact with pets and with allergy-causing substances, such as pollen and mold.    When you re around someone with a sore throat or cold, wash your hands often to keep viruses or bacteria from spreading.    Don t strain your vocal cords.  Call your healthcare provider  Contact your healthcare provider if you have:    A temperature over 101 F (38.3 C)    White spots on the throat    Great difficulty swallowing    Trouble breathing    A skin rash    Recent exposure to someone else with strep bacteria    Severe hoarseness and swollen glands in the neck or jaw   Date Last Reviewed: 8/1/2016 2000-2017 [employer]. [address] [place] [address]. All rights reserved. This information is not intended as a substitute for professional medical care. Always follow your healthcare professional's instructions.          24 Hour Appointment Hotline       To make an appointment at any [facility], call [phone] ([phone]). If you don't have a family doctor or clinic, we will help you find one. [place] clinics are conveniently located to serve the needs of you and your family.             Review of your medicines      START taking        Dose / Directions Last dose taken    magic mouthwash suspension (diphenhydrAMINE, lidocaine, aluminum-magnesium & simethicone) Susp compounding kit   Dose:  5 mL   Quantity:  237 mL        Swish and swallow 5 mLs in mouth every 6 hours as needed for mouth sores   Refills:  0          Our records show that you are taking the medicines listed below. If these are incorrect, please call your family doctor or clinic.        Dose / Directions Last dose taken    * escitalopram 20 MG tablet   Commonly known as:  LEXAPRO   Dose:  20 mg   Quantity:  30 tablet        Take 1 tablet (20 mg) by mouth daily   Refills:  11        * escitalopram 10 MG tablet   Commonly known as:  LEXAPRO   Dose:  10 mg   Quantity:  30 tablet        Take 1 tablet (10 mg)  by mouth daily   Refills:  11        IBUPROFEN 200 200 MG Tabs        Reported on 5/23/2017   Refills:  0        medroxyPROGESTERone 150 MG/ML injection   Commonly known as:  DEPO-PROVERA   Dose:  150 mg   Quantity:  1 mL        Inject 1 mL (150 mg) into the muscle every 3 months   Refills:  0        * Notice:  This list has 2 medication(s) that are the same as other medications prescribed for you. Read the directions carefully, and ask your doctor or other care provider to review them with you.            Prescriptions were sent or printed at these locations (1 Prescription)                   [facility] [address] [facility]   [address] [facility] [address]    Telephone:  [phone]   Fax:  [phone]   Hours:                  E-Prescribed (1 of 1)         magic mouthwash (FIRST-MOUTHWASH BLM) suspension                Procedures and tests performed during your visit     Beta strep group A r/o culture    Rapid strep group A screen POCT      Orders Needing Specimen Collection     None      Pending Results     No orders found from 2/4/2018 to 2/7/2018.            Pending Culture Results     No orders found from 2/4/2018 to 2/7/2018.            Pending Results Instructions     If you had any lab results that were not finalized at the time of your Discharge, you can call the ED Lab Result RN at [phone]. You will be contacted by this team for any positive Lab results or changes in treatment. The nurses are available 7 days a week from 10A to 6:30P.  You can leave a message 24 hours per day and they will return your call.        Test Results From Your Hospital Stay        2/6/2018  5:53 PM      Component Results     Component Value Ref Range & Units Status    Rapid Strep A Screen NEGATIVE neg Final    Internal QC OK Yes  Final                Thank you for choosing [facility]       Thank you for choosing [facility] for your care. Our goal is always to provide you with excellent care.  "Hearing back from our patients is one way we can continue to improve our services. Please take a few minutes to complete the written survey that you may receive in the mail after you visit with us. Thank you!        [employer] Information     [employer] lets you send messages to your doctor, view your test results, renew your prescriptions, schedule appointments and more. To sign up, go to [email or web address] . Click on \"Log in\" on the left side of the screen, which will take you to the Welcome page. Then click on \"Sign up Now\" on the right side of the page.     You will be asked to enter the access code listed below, as well as some personal information. Please follow the directions to create your username and password.     Your access code is: [email or web address]  Expires: 2018 12:12 PM     Your access code will  in 90 days. If you need help or a new code, please call your [facility] clinic or [phone].        Care EveryWhere ID     This is your Care EveryWhere ID. This could be used by other organizations to access your [facility] medical records  [id number]        Equal Access to Services     TIFFANIE WAGNER : Hadeber Rendon, ana todd, shira lal, lavonne pereira . [facility] [phone].    ATENCIÓN: Si habla español, tiene a ma disposición servicios gratuitos de asistencia lingüística. Mann al [phone].    We comply with applicable federal civil rights laws and [place] laws. We do not discriminate on the basis of race, color, national origin, age, disability, sex, sexual orientation, or gender identity.            After Visit Summary       This is your record. Keep this with you and show to your community pharmacist(s) and doctor(s) at your next visit.                  "

## 2018-02-07 NOTE — DISCHARGE INSTRUCTIONS
Viral Gastroenteritis (Adult)    Gastroenteritis is commonly called the stomach flu. It is most often caused by a virus that affects the stomach and intestinal tract and usually lasts from 2 to 7 days. Common viruses causing gastroenteritis include norovirus, rotavirus, and hepatitis A. Non-viral causes of gastroenteritis include bacteria, parasites, and toxins.  The danger from repeated vomiting or diarrhea is dehydration. This is the loss of too much fluid from the body. When this occurs, body fluids must be replaced. Antibiotics do not help with this illness because it is usually viral.Simple home treatment will be helpful.  Symptoms of viral gastroenteritis may include:    Watery, loose stools    Stomach pain or abdominal cramps    Fever and chills    Nausea and vomiting    Loss of bowel control    Headache  Home care  Gastroenteritis is transmitted by contact with the stool or vomit of an infected person. This can occur from person to person or from contact with a contaminated surface.  Follow these guidelines when caring for yourself at home:    If symptoms are severe, rest at home for the next 24 hours or until you are feeling better.    Wash your hands with soap and water or use alcohol-based  to prevent the spread of infection. Wash your hands after touching anyone who is sick.    Wash your hands or use alcohol-based  after using the toilet and before meals. Clean the toilet after each use.  Remember these tips when preparing food:    People with diarrhea should not prepare or serve food to others. When preparing foods, wash your hands before and after.    Wash your hands after using cutting boards, countertops, knives, or utensils that have been in contact with raw food.    Keep uncooked meats away from cooked and ready-to-eat foods.  Medicine  You may use acetaminophen or NSAID medicines like ibuprofen or naproxen to control fever unless another medicine was given. If you have chronic  liver or kidney disease, talk with your healthcare provider before using these medicines. Also talk with your provider if you've had a stomach ulcer or gastrointestinal bleeding. Don't give aspirin to anyone under 18 years of age who is ill with a fever. It may cause severe liver damage. Don't use NSAIDS is you are already taking one for another condition (like arthritis) or are on aspirin (such as for heart disease or after a stroke).  If medicine for vomiting or diarrhea are prescribed, take these only as directed. Do not take over-the-counter medicines for vomiting or diarrhea unless instructed by your healthcare provider.  Diet  Follow these guidelines for food:    Water and liquids are important so you don't get dehydrated. Drink a small amount at a time or suck on ice chips if you are vomiting.    If you eat, avoid fatty, greasy, spicy, or fried foods.    Don't eat dairy if you have diarrhea. This can make diarrhea worse.    Avoid tobacco, alcohol, and caffeine which may worsen symptoms.  During the first 24 hours (the first full day), follow the diet below:    Beverages. Sports drinks, soft drinks without caffeine, ginger ale, mineral water (plain or flavored), decaffeinated tea and coffee. If you are very dehydrated, sports drinks aren't a good choice. They have too much sugar and not enough electrolytes. In this case, commercially available products called oral rehydration solutions, are best.    Soups. Eat clear broth, consommé, and bouillon.    Desserts. Eat gelatin, popsicles, and fruit juice bars.  During the next 24 hours (the second day), you may add the following to the above:    Hot cereal, plain toast, bread, rolls, and crackers    Plain noodles, rice, mashed potatoes, chicken noodle or rice soup    Unsweetened canned fruit (avoid pineapple), bananas    Limit fat intake to less than 15 grams per day. Do this by avoiding margarine, butter, oils, mayonnaise, sauces, gravies, fried foods, peanut  butter, meat, poultry, and fish.    Limit fiber and avoid raw or cooked vegetables, fresh fruits (except bananas), and bran cereals.    Limit caffeine and chocolate. Don't use spices or seasonings other than salt.    Limit dairy products.    Avoid alcohol.  During the next 24 hours:    Gradually resume a normal diet as you feel better and your symptoms improve.    If at any time it starts getting worse again, go back to clear liquids until you feel better.  Follow-up care  Follow up with your healthcare provider, or as advised. Call your provider if you don't get better within 24 hours or if diarrhea lasts more than a week. Also follow up if you are unable to keep down liquids and get dehydrated. If a stool (diarrhea) sample was taken, call as directed for the results.  Call 911  Call 911 if any of these occur:    Trouble breathing    Chest pain    Confused    Severe drowsiness or trouble awakening    Fainting or loss of consciousness    Rapid heart rate    Seizure    Stiff neck  When to seek medical advice  Call your healthcare provider right away if any of these occur:    Abdominal pain that gets worse    Continued vomiting (unable to keep liquids down)    Frequent diarrhea (more than 5 times a day)    Blood in vomit or stool (black or red color)    Dark urine, reduced urine output, or extreme thirst    Weakness or dizziness    Drowsiness    Fever of 100.4 F (38 C) oral or higher that does not get better with fever medicine    New rash  Date Last Reviewed: 1/3/2016    6402-9425 [employer]. [address] [place] [address], [place] [id number]. All rights reserved. This information is not intended as a substitute for professional medical care. Always follow your healthcare professional's instructions.          Self-Care for Sore Throats    Sore throats happen for many reasons, such as colds, allergies, and infections caused by viruses or bacteria. In any case, your throat becomes red and sore. Your goal for  self-care is to reduce your discomfort while giving your throat a chance to heal.  Moisten and soothe your throat  Tips include the following:    Try a sip of water first thing after waking up.    Keep your throat moist by drinking 6 or more glasses of clear liquids every day.    Run a cool-air humidifier in your room overnight.    Avoid cigarette smoke.     Suck on throat lozenges, cough drops, hard candy, ice chips, or frozen fruit-juice bars. Use the sugar-free versions if your diet or medical condition requires them.  Gargle to ease irritation  Gargling every hour or 2 can ease irritation. Try gargling with 1 of these solutions:    1/4 teaspoon of salt in 1/2 cup of warm water    An over-the-counter anesthetic gargle  Use medicine for more relief  Over-the-counter medicine can reduce sore throat symptoms. Ask your pharmacist if you have questions about which medicine to use:    Ease pain with anesthetic sprays. Aspirin or an aspirin substitute also helps. Remember, never give aspirin to anyone 18 or younger, or if you are already taking blood thinners.     For sore throats caused by allergies, try antihistamines to block the allergic reaction.    Remember: unless a sore throat is caused by a bacterial infection, antibiotics won t help you.  Prevent future sore throats  Prevention tips include the following:    Stop smoking or reduce contact with secondhand smoke. Smoke irritates the tender throat lining.    Limit contact with pets and with allergy-causing substances, such as pollen and mold.    When you re around someone with a sore throat or cold, wash your hands often to keep viruses or bacteria from spreading.    Don t strain your vocal cords.  Call your healthcare provider  Contact your healthcare provider if you have:    A temperature over 101 F (38.3 C)    White spots on the throat    Great difficulty swallowing    Trouble breathing    A skin rash    Recent exposure to someone else with strep  bacteria    Severe hoarseness and swollen glands in the neck or jaw   Date Last Reviewed: 8/1/2016 2000-2017 [employer]. [address] [place] [address], [place] [address]. All rights reserved. This information is not intended as a substitute for professional medical care. Always follow your healthcare professional's instructions.

## 2018-02-08 LAB
BACTERIA SPEC CULT: NORMAL
SPECIMEN SOURCE: NORMAL

## 2018-02-24 ENCOUNTER — HEALTH MAINTENANCE LETTER (OUTPATIENT)
Age: 23
End: 2018-02-24

## 2018-03-16 ENCOUNTER — APPOINTMENT (OUTPATIENT)
Dept: CT IMAGING | Facility: CLINIC | Age: 23
End: 2018-03-16
Attending: EMERGENCY MEDICINE

## 2018-03-16 ENCOUNTER — HOSPITAL ENCOUNTER (EMERGENCY)
Facility: CLINIC | Age: 23
Discharge: HOME | End: 2018-03-17
Attending: EMERGENCY MEDICINE | Admitting: EMERGENCY MEDICINE

## 2018-03-16 DIAGNOSIS — E87.6 HYPOKALEMIA: ICD-10-CM

## 2018-03-16 DIAGNOSIS — R19.7 DIARRHEA, UNSPECIFIED TYPE: ICD-10-CM

## 2018-03-16 LAB
ALBUMIN SERPL-MCNC: 4 G/DL (ref 3.4–5)
ALBUMIN UR-MCNC: NEGATIVE MG/DL
ALP SERPL-CCNC: 60 U/L (ref 40–150)
ALT SERPL W P-5'-P-CCNC: 21 U/L (ref 0–50)
ANION GAP SERPL CALCULATED.3IONS-SCNC: 9 MMOL/L (ref 3–14)
APPEARANCE UR: CLEAR
AST SERPL W P-5'-P-CCNC: 16 U/L (ref 0–45)
BACTERIA #/AREA URNS HPF: ABNORMAL /HPF
BASOPHILS # BLD AUTO: 0 10E9/L (ref 0–0.2)
BASOPHILS NFR BLD AUTO: 0.5 %
BILIRUB SERPL-MCNC: 1.3 MG/DL (ref 0.2–1.3)
BILIRUB UR QL STRIP: NEGATIVE
BUN SERPL-MCNC: 8 MG/DL (ref 7–30)
CALCIUM SERPL-MCNC: 9.1 MG/DL (ref 8.5–10.1)
CHLORIDE SERPL-SCNC: 104 MMOL/L (ref 94–109)
CO2 SERPL-SCNC: 26 MMOL/L (ref 20–32)
COLOR UR AUTO: ABNORMAL
CREAT SERPL-MCNC: 0.77 MG/DL (ref 0.52–1.04)
CRP SERPL-MCNC: <2.9 MG/L (ref 0–8)
DIFFERENTIAL METHOD BLD: NORMAL
EOSINOPHIL # BLD AUTO: 0.2 10E9/L (ref 0–0.7)
EOSINOPHIL NFR BLD AUTO: 3.2 %
ERYTHROCYTE [DISTWIDTH] IN BLOOD BY AUTOMATED COUNT: 12.5 % (ref 10–15)
GFR SERPL CREATININE-BSD FRML MDRD: >90 ML/MIN/1.7M2
GLUCOSE SERPL-MCNC: 90 MG/DL (ref 70–99)
GLUCOSE UR STRIP-MCNC: NEGATIVE MG/DL
HCG UR QL: NEGATIVE
HCT VFR BLD AUTO: 39.8 % (ref 35–47)
HGB BLD-MCNC: 14.2 G/DL (ref 11.7–15.7)
HGB UR QL STRIP: ABNORMAL
IMM GRANULOCYTES # BLD: 0 10E9/L (ref 0–0.4)
IMM GRANULOCYTES NFR BLD: 0.2 %
KETONES UR STRIP-MCNC: NEGATIVE MG/DL
LEUKOCYTE ESTERASE UR QL STRIP: NEGATIVE
LYMPHOCYTES # BLD AUTO: 2.8 10E9/L (ref 0.8–5.3)
LYMPHOCYTES NFR BLD AUTO: 42.4 %
MCH RBC QN AUTO: 29.3 PG (ref 26.5–33)
MCHC RBC AUTO-ENTMCNC: 35.7 G/DL (ref 31.5–36.5)
MCV RBC AUTO: 82 FL (ref 78–100)
MONOCYTES # BLD AUTO: 0.4 10E9/L (ref 0–1.3)
MONOCYTES NFR BLD AUTO: 6.2 %
MUCOUS THREADS #/AREA URNS LPF: PRESENT /LPF
NEUTROPHILS # BLD AUTO: 3.1 10E9/L (ref 1.6–8.3)
NEUTROPHILS NFR BLD AUTO: 47.5 %
NITRATE UR QL: NEGATIVE
PH UR STRIP: 7 PH (ref 5–7)
PLATELET # BLD AUTO: 291 10E9/L (ref 150–450)
POTASSIUM SERPL-SCNC: 3.2 MMOL/L (ref 3.4–5.3)
PROT SERPL-MCNC: 7 G/DL (ref 6.8–8.8)
RBC # BLD AUTO: 4.84 10E12/L (ref 3.8–5.2)
RBC #/AREA URNS AUTO: 1 /HPF (ref 0–2)
SODIUM SERPL-SCNC: 139 MMOL/L (ref 133–144)
SOURCE: ABNORMAL
SP GR UR STRIP: 1 (ref 1–1.03)
SQUAMOUS #/AREA URNS AUTO: <1 /HPF (ref 0–1)
UROBILINOGEN UR STRIP-MCNC: 0 MG/DL (ref 0–2)
WBC # BLD AUTO: 6.5 10E9/L (ref 4–11)
WBC #/AREA URNS AUTO: 1 /HPF (ref 0–5)

## 2018-03-16 PROCEDURE — 25000128 H RX IP 250 OP 636: Performed by: EMERGENCY MEDICINE

## 2018-03-16 PROCEDURE — 81025 URINE PREGNANCY TEST: CPT | Performed by: EMERGENCY MEDICINE

## 2018-03-16 PROCEDURE — 74177 CT ABD & PELVIS W/CONTRAST: CPT

## 2018-03-16 PROCEDURE — 85025 COMPLETE CBC W/AUTO DIFF WBC: CPT | Performed by: EMERGENCY MEDICINE

## 2018-03-16 PROCEDURE — 85652 RBC SED RATE AUTOMATED: CPT | Performed by: EMERGENCY MEDICINE

## 2018-03-16 PROCEDURE — 96361 HYDRATE IV INFUSION ADD-ON: CPT | Performed by: EMERGENCY MEDICINE

## 2018-03-16 PROCEDURE — 80053 COMPREHEN METABOLIC PANEL: CPT | Performed by: EMERGENCY MEDICINE

## 2018-03-16 PROCEDURE — 99284 EMERGENCY DEPT VISIT MOD MDM: CPT | Mod: Z6 | Performed by: EMERGENCY MEDICINE

## 2018-03-16 PROCEDURE — 99285 EMERGENCY DEPT VISIT HI MDM: CPT | Mod: 25 | Performed by: EMERGENCY MEDICINE

## 2018-03-16 PROCEDURE — 86140 C-REACTIVE PROTEIN: CPT | Performed by: EMERGENCY MEDICINE

## 2018-03-16 PROCEDURE — 96374 THER/PROPH/DIAG INJ IV PUSH: CPT | Mod: 59 | Performed by: EMERGENCY MEDICINE

## 2018-03-16 PROCEDURE — 25000125 ZZHC RX 250: Performed by: EMERGENCY MEDICINE

## 2018-03-16 PROCEDURE — 81001 URINALYSIS AUTO W/SCOPE: CPT | Performed by: EMERGENCY MEDICINE

## 2018-03-16 RX ORDER — IOPAMIDOL 755 MG/ML
100 INJECTION, SOLUTION INTRAVASCULAR ONCE
Status: COMPLETED | OUTPATIENT
Start: 2018-03-16 | End: 2018-03-16

## 2018-03-16 RX ORDER — ONDANSETRON 2 MG/ML
4 INJECTION INTRAMUSCULAR; INTRAVENOUS EVERY 30 MIN PRN
Status: DISCONTINUED | OUTPATIENT
Start: 2018-03-16 | End: 2018-03-17 | Stop reason: HOSPADM

## 2018-03-16 RX ADMIN — SODIUM CHLORIDE, POTASSIUM CHLORIDE, SODIUM LACTATE AND CALCIUM CHLORIDE 1000 ML: 600; 310; 30; 20 INJECTION, SOLUTION INTRAVENOUS at 23:03

## 2018-03-16 RX ADMIN — IOPAMIDOL 100 ML: 755 INJECTION, SOLUTION INTRAVENOUS at 23:12

## 2018-03-16 RX ADMIN — SODIUM CHLORIDE 60 ML: 9 INJECTION, SOLUTION INTRAVENOUS at 23:13

## 2018-03-16 RX ADMIN — ONDANSETRON 4 MG: 2 INJECTION INTRAMUSCULAR; INTRAVENOUS at 23:03

## 2018-03-16 NOTE — LETTER
March 17, 2018      To Whom It May Concern:      Antonia Marc was seen in our Emergency Department today, 03/17/18.  I expect her condition to improve over the next 2-3 days.  Please excuse her from work today and tomorrow. She may return to work on 3/19/2018    Sincerely,        Magdiel Arriola MD

## 2018-03-16 NOTE — ED AVS SNAPSHOT
[facility] Emergency Department    [address] [facility] [address]    Phone:  [phone]    Fax:  [phone]                                       Antonia Marc   MRN: [id number]    Department:  [facility] Emergency Department   Date of Visit:  3/16/2018           Patient Information     Date Of Birth          1995        Your diagnoses for this visit were:     Diarrhea, unspecified type     Hypokalemia        You were seen by Magdiel Arriola MD.      Follow-up Information     Schedule an appointment as soon as possible for a visit with [place] [facility].    Why:  For follow up    Contact information:    [email or web address]    [phone]        Follow up with Carly Griffin MD. Schedule an appointment as soon as possible for a visit in 3 days.    Specialty:  Family Practice    Why:  For follow up, especially if you have difficulty getting timely follow up with GI    Contact information:    [address] [facility] [address]  [phone]          Discharge Instructions         Treating Diarrhea    Diarrhea happens when you have loose, watery, or frequent bowel movements. It is a common problem with many causes. Most cases of diarrhea clear up on their own. But certain cases may need treatment. Be sure to see your healthcare provider if your symptoms do not improve within a few days.  Getting relief  Treatment of diarrhea depends on its cause. Diarrhea caused by bacterial or parasite infection is often treated with antibiotics. Diarrhea caused by other factors, such as a stomach virus, often improves with simple home treatment. The tips below may also help relieve your symptoms.    Drink plenty of fluids. This helps prevent too much fluid loss (dehydration). Water, clear soups, and electrolyte solutions are good choices. Avoid alcohol, coffee, tea, and milk. These can irritate your intestines and make symptoms worse.    Suck on ice chips if drinking makes  you queasy.    Return to your normal diet slowly. You may want to eat bland foods at first, such as rice and toast. Also, you may need to avoid certain foods for a while, such as dairy products. These can make symptoms worse. Ask your healthcare provider if there are any other foods you should avoid.    If you were prescribed antibiotics, take them as directed.    Do not take anti-diarrhea medicines without asking your healthcare provider first.  Call your healthcare provider   Call your healthcare provider if you have any of the following:     A fever of 100.4 F (38.0 C) or higher, or as directed by your healthcare provider    Severe pain    Worsening diarrhea or diarrhea for more than 2 days    Bloody vomit or stool    Signs of dehydration (dizziness, dry mouth and tongue, rapid pulse, dark urine)  Date Last Reviewed: 7/1/2016 2000-2017 [employer]. [address] [place] [address]. All rights reserved. This information is not intended as a substitute for professional medical care. Always follow your healthcare professional's instructions.          Uncertain Causes of Diarrhea (Adult)    Diarrhea is when stools are loose and watery. This can be caused by:    Viral infections    Bacterial infections    Food poisoning    Parasites    Irritable bowel syndrome (IBS)    Inflammatory bowel diseases such as ulcerative colitis, Crohn's disease, and celiac disease    Food intolerance, such as to lactose, the sugar found in milk and milk products    Reaction to medicines like antibiotics, laxatives, cancer drugs, and antacids  Along with diarrhea, you may also have:    Abdominal pain and cramping    Nausea and vomiting    Loss of bowel control    Fever and chills    Bloody stools  In some cases, antibiotics may help to treat diarrhea. You may have a stool sample test. This is done to see what is causing your diarrhea, and if antibiotics will help treat it. The results of a stool sample test may  take up to 2 days. The healthcare provider may not give you antibiotics until he or she has the stool test results.  Diarrhea can cause dehydration. This is the loss of too much water and other fluids from the body. When this occurs, body fluid must be replaced. This can be done with oral rehydration solutions. Oral rehydration solutions are available at drugstores and grocery stores without a prescription.  Home care  Follow all instructions given by your healthcare provider. Rest at home for the next 24 hours, or until you feel better. Avoid caffeine, tobacco, and alcohol. These can make diarrhea, cramping, and pain worse.  If taking medicines:    Don t take over-the-counter diarrhea or nausea medicines unless your healthcare provider tells you to.    You may use acetaminophen or NSAID medicines like ibuprofen or naproxen to reduce pain and fever. Don t use these if you have chronic liver or kidney disease, or ever had a stomach ulcer or gastrointestinal bleeding. Don't use NSAID medicines if you are already taking one for another condition (like arthritis) or are on daily aspirin therapy (such as for heart disease or after a stroke). Talk with your healthcare provider first.    If antibiotics were prescribed, be sure you take them until they are finished. Don t stop taking them even when you feel better. Antibiotics must be taken as a full course.  To prevent the spread of illness:    Remember that washing with soap and water and using alcohol-based  is the best way to prevent the spread of infection.    Clean the toilet after each use.    Wash your hands before eating.    Wash your hands before and after preparing food. Keep in mind that people with diarrhea or vomiting should not prepare food for others.    Wash your hands after using cutting boards, countertops, and knives that have been in contact with raw foods.    Wash and then peel fruits and vegetables.    Keep uncooked meats away from cooked and  ready-to-eat foods.    Use a food thermometer when cooking. Cook poultry to at least 165 F (74 C). Cook ground meat (beef, veal, pork, lamb) to at least 160 F (71 C). Cook fresh beef, veal, lamb, and pork to at least 145 F (63 C).    Don t eat raw or undercooked eggs (poached or rosalinda side up), poultry, meat, or unpasteurized milk and juices.  Food and drinks  The main goal while treating vomiting or diarrhea is to prevent dehydration. This is done by taking small amounts of liquids often.    Keep in mind that liquids are more important than food right now.    Drink only small amounts of liquids at a time.    Don t force yourself to eat, especially if you are having cramping, vomiting, or diarrhea. Don t eat large amounts at a time, even if you are hungry.    If you eat, avoid fatty, greasy, spicy, or fried foods.    Don t eat dairy foods or drink milk if you have diarrhea. These can make diarrhea worse.  During the first 24 hours you can try:    Oral rehydration solutions. Do not use sports drinks. They have too much sugar and not enough electrolytes.    Soft drinks without caffeine    Ginger ale    Water (plain or flavored)    Decaf tea or coffee    Clear broth, consommé, or bouillon    Gelatin, popsicles, or frozen fruit juice bars  The second 24 hours, if you are feeling better, you can add:    Hot cereal, plain toast, bread, rolls, or crackers    Plain noodles, rice, mashed potatoes, chicken noodle soup, or rice soup    Unsweetened canned fruit (no pineapple)    Bananas  As you recover:    Limit fat intake to less than 15 grams per day. Don t eat margarine, butter, oils, mayonnaise, sauces, gravies, fried foods, peanut butter, meat, poultry, or fish.    Limit fiber. Don t eat raw or cooked vegetables, fresh fruits except bananas, or bran cereals.    Limit caffeine and chocolate.    Limit dairy.    Don t use spices or seasonings except salt.    Go back to your normal diet over time, as you feel better and your  symptoms improve.    If the symptoms come back, go back to a simple diet or clear liquids.  Follow-up care  Follow up with your healthcare provider, or as advised. If a stool sample was taken or cultures were done, call the healthcare provider for the results as instructed.  Call 911  Call 911 if you have any of these symptoms:    Trouble breathing    Confusion    Extreme drowsiness or trouble walking    Loss of consciousness    Rapid heart rate    Chest pain    Stiff neck    Seizure  When to seek medical advice  Call your healthcare provider right away if any of these occur:    Abdominal pain that gets worse    Constant lower right abdominal pain    Continued vomiting and inability to keep liquids down    Diarrhea more than 5 times a day    Blood in vomit or stool    Dark urine or no urine for 8 hours, dry mouth and tongue, tiredness, weakness, or dizziness    Drowsiness    New rash    You don t get better in 2 to 3 days    Fever of 100.4 F (38 C) or higher that doesn t get lower with medicine  Date Last Reviewed: 1/3/2016    3059-3137 [employer]. [address] [place] [address], [place] [address]. All rights reserved. This information is not intended as a substitute for professional medical care. Always follow your healthcare professional's instructions.          Discharge References/Attachments     HYPOKALEMIA, DISCHARGE INSTRUCTIONS (ENGLISH)      24 Hour Appointment Hotline       To make an appointment at any [facility], call [phone] ([phone]). If you don't have a family doctor or clinic, we will help you find one. [facility] are conveniently located to serve the needs of you and your family.             Review of your medicines      Our records show that you are taking the medicines listed below. If these are incorrect, please call your family doctor or clinic.        Dose / Directions Last dose taken    * escitalopram 20 MG tablet   Commonly known as:  LEXAPRO   Dose:  20 mg    Quantity:  30 tablet        Take 1 tablet (20 mg) by mouth daily   Refills:  11        * escitalopram 10 MG tablet   Commonly known as:  LEXAPRO   Dose:  10 mg   Quantity:  30 tablet        Take 1 tablet (10 mg) by mouth daily   Refills:  11        IBUPROFEN 200 200 MG Tabs        Reported on 5/23/2017   Refills:  0        magic mouthwash suspension (diphenhydrAMINE, lidocaine, aluminum-magnesium & simethicone) Susp compounding kit   Dose:  5 mL   Quantity:  237 mL        Swish and swallow 5 mLs in mouth every 6 hours as needed for mouth sores   Refills:  0        medroxyPROGESTERone 150 MG/ML injection   Commonly known as:  DEPO-PROVERA   Dose:  150 mg   Quantity:  1 mL        Inject 1 mL (150 mg) into the muscle every 3 months   Refills:  0        * Notice:  This list has 2 medication(s) that are the same as other medications prescribed for you. Read the directions carefully, and ask your doctor or other care provider to review them with you.            Procedures and tests performed during your visit     CBC with platelets differential    CRP Inflammation    CT Abdomen Pelvis w Contrast    Comprehensive metabolic panel    Erythrocyte sedimentation rate auto    HCG qualitative urine (UPT)    UA reflex to Microscopic      Orders Needing Specimen Collection     None      Pending Results     No orders found for last 3 day(s).            Pending Culture Results     No orders found for last 3 day(s).            Pending Results Instructions     If you had any lab results that were not finalized at the time of your Discharge, you can call the ED Lab Result RN at [phone]. You will be contacted by this team for any positive Lab results or changes in treatment. The nurses are available 7 days a week from 10A to 6:30P.  You can leave a message 24 hours per day and they will return your call.        Test Results From Your Hospital Stay        3/16/2018 10:42 PM      Component Results     Component Value Ref Range &  Units Status    Color Urine Straw  Final    Appearance Urine Clear  Final    Glucose Urine Negative NEG^Negative mg/dL Final    Bilirubin Urine Negative NEG^Negative Final    Ketones Urine Negative NEG^Negative mg/dL Final    Specific Gravity Urine 1.000 (L) 1.003 - 1.035 Final    Blood Urine Small (A) NEG^Negative Final    pH Urine 7.0 5.0 - 7.0 pH Final    Protein Albumin Urine Negative NEG^Negative mg/dL Final    Urobilinogen mg/dL 0.0 0.0 - 2.0 mg/dL Final    Nitrite Urine Negative NEG^Negative Final    Leukocyte Esterase Urine Negative NEG^Negative Final    Source Midstream Urine  Final    RBC Urine 1 0 - 2 /HPF Final    WBC Urine 1 0 - 5 /HPF Final    Bacteria Urine Few (A) NEG^Negative /HPF Final    Squamous Epithelial /HPF Urine <1 0 - 1 /HPF Final    Mucous Urine Present (A) NEG^Negative /LPF Final         3/16/2018 10:52 PM      Component Results     Component Value Ref Range & Units Status    HCG Qual Urine Negative NEG^Negative Final    This test is for screening purposes.  Results should be interpreted along with   the clinical picture.  Confirmation testing is available if warranted by   ordering [id number], HCG Quantitative Pregnancy.           3/16/2018 11:34 PM      Component Results     Component Value Ref Range & Units Status    WBC 6.5 4.0 - 11.0 10e9/L Final    RBC Count 4.84 3.8 - 5.2 10e12/L Final    Hemoglobin 14.2 11.7 - 15.7 g/dL Final    Hematocrit 39.8 35.0 - 47.0 % Final    MCV 82 78 - 100 fl Final    MCH 29.3 26.5 - 33.0 pg Final    MCHC 35.7 31.5 - 36.5 g/dL Final    RDW 12.5 10.0 - 15.0 % Final    Platelet Count 291 150 - 450 10e9/L Final    Diff Method Automated Method  Final    % Neutrophils 47.5 % Final    % Lymphocytes 42.4 % Final    % Monocytes 6.2 % Final    % Eosinophils 3.2 % Final    % Basophils 0.5 % Final    % Immature Granulocytes 0.2 % Final    Absolute Neutrophil 3.1 1.6 - 8.3 10e9/L Final    Absolute Lymphocytes 2.8 0.8 - 5.3 10e9/L Final    Absolute Monocytes 0.4 0.0 -  1.3 10e9/L Final    Absolute Eosinophils 0.2 0.0 - 0.7 10e9/L Final    Absolute Basophils 0.0 0.0 - 0.2 10e9/L Final    Abs Immature Granulocytes 0.0 0 - 0.4 10e9/L Final         3/16/2018 11:51 PM      Component Results     Component Value Ref Range & Units Status    Sodium 139 133 - 144 mmol/L Final    Potassium 3.2 (L) 3.4 - 5.3 mmol/L Final    Chloride 104 94 - 109 mmol/L Final    Carbon Dioxide 26 20 - 32 mmol/L Final    Anion Gap 9 3 - 14 mmol/L Final    Glucose 90 70 - 99 mg/dL Final    Urea Nitrogen 8 7 - 30 mg/dL Final    Creatinine 0.77 0.52 - 1.04 mg/dL Final    GFR Estimate >90 >60 mL/min/1.7m2 Final    Non  GFR Calc    GFR Estimate If Black >90 >60 mL/min/1.7m2 Final    African American GFR Calc    Calcium 9.1 8.5 - 10.1 mg/dL Final    Bilirubin Total 1.3 0.2 - 1.3 mg/dL Final    Albumin 4.0 3.4 - 5.0 g/dL Final    Protein Total 7.0 6.8 - 8.8 g/dL Final    Alkaline Phosphatase 60 40 - 150 U/L Final    ALT 21 0 - 50 U/L Final    AST 16 0 - 45 U/L Final         3/17/2018 12:00 AM      Component Results     Component Value Ref Range & Units Status    Sed Rate 4 0 - 20 mm/h Final         3/16/2018 11:51 PM      Component Results     Component Value Ref Range & Units Status    CRP Inflammation <2.9 0.0 - 8.0 mg/L Final         3/16/2018 11:43 PM      Narrative     CT ABDOMEN PELVIS W CONTRAST  3/16/2018 11:15 PM     HISTORY: Abdominal pain, diarrhea, family history of Crohn's.     TECHNIQUE: Volumetric acquisition through abdomen and pelvis with IV  contrast. 100 mL Isovue-370. Radiation dose for this scan was reduced  using automated exposure control, adjustment of the mA and/or kV  according to patient size, or iterative reconstruction technique.    COMPARISON: None.    FINDINGS: The liver, gallbladder, spleen, pancreas, adrenal glands and  kidneys demonstrate no worrisome findings. Visualized lung bases are  clear.    Portions of the colon appear slightly thick-walled, most prominent  "in  the ascending and descending colon. These segments are not well  distended, but mild colitis is suspected.    Uterus is present. 4 cm left adnexal/ovarian cyst. No free fluid. No  obvious small bowel abnormality. The terminal ileum is unremarkable.  No bowel obstruction, free air or other acute findings.        Impression     IMPRESSION:  1. Probable mild nonspecific colitis.  2. 4 cm left adnexal/ovarian cyst.    MARIAN GAVIN MD                Thank you for choosing [facility]       Thank you for choosing [facility] for your care. Our goal is always to provide you with excellent care. Hearing back from our patients is one way we can continue to improve our services. Please take a few minutes to complete the written survey that you may receive in the mail after you visit with us. Thank you!        [employer] Information     [employer] lets you send messages to your doctor, view your test results, renew your prescriptions, schedule appointments and more. To sign up, go to [email or web address] . Click on \"Log in\" on the left side of the screen, which will take you to the Welcome page. Then click on \"Sign up Now\" on the right side of the page.     You will be asked to enter the access code listed below, as well as some personal information. Please follow the directions to create your username and password.     Your access code is: [email or web address]  Expires: 2018  1:12 PM     Your access code will  in 90 days. If you need help or a new code, please call your [facility] clinic or [phone].        Care EveryWhere ID     This is your Care EveryWhere ID. This could be used by other organizations to access your [facility] medical records  [id number]        Equal Access to Services     [facility]: Hadeber Rendon, waaxda luqadaha, qaybta kaalmada lukasz, lavonne ken. [facility] [phone].    ATENCIÓN: Si habla español, tiene a ma disposición servicios gratuitos de " asistencia lingüística. Mann al [phone].    We comply with applicable federal civil rights laws and [place] laws. We do not discriminate on the basis of race, color, national origin, age, disability, sex, sexual orientation, or gender identity.            After Visit Summary       This is your record. Keep this with you and show to your community pharmacist(s) and doctor(s) at your next visit.

## 2018-03-16 NOTE — ED AVS SNAPSHOT
[facility] Emergency Department    [address] [facility] [address]    Phone:  [phone]    Fax:  [phone]                                       Antonia Marc   MRN: [id number]    Department:  [facility] Emergency Department   Date of Visit:  3/16/2018           After Visit Summary Signature Page     I have received my discharge instructions, and my questions have been answered. I have discussed any challenges I see with this plan with the nurse or doctor.    ..........................................................................................................................................  Patient/Patient Representative Signature      ..........................................................................................................................................  Patient Representative Print Name and Relationship to Patient    ..................................................               ................................................  Date                                            Time    ..........................................................................................................................................  Reviewed by Signature/Title    ...................................................              ..............................................  Date                                                            Time

## 2018-03-17 VITALS
TEMPERATURE: 98.6 F | HEART RATE: 73 BPM | HEIGHT: 68 IN | OXYGEN SATURATION: 100 % | SYSTOLIC BLOOD PRESSURE: 125 MMHG | RESPIRATION RATE: 16 BRPM | DIASTOLIC BLOOD PRESSURE: 66 MMHG

## 2018-03-17 LAB — ERYTHROCYTE [SEDIMENTATION RATE] IN BLOOD BY WESTERGREN METHOD: 4 MM/H (ref 0–20)

## 2018-03-17 PROCEDURE — 25000132 ZZH RX MED GY IP 250 OP 250 PS 637: Performed by: EMERGENCY MEDICINE

## 2018-03-17 RX ORDER — POTASSIUM CHLORIDE 1500 MG/1
40 TABLET, EXTENDED RELEASE ORAL ONCE
Status: COMPLETED | OUTPATIENT
Start: 2018-03-17 | End: 2018-03-17

## 2018-03-17 RX ADMIN — POTASSIUM CHLORIDE 40 MEQ: 1500 TABLET, EXTENDED RELEASE ORAL at 00:58

## 2018-03-17 ASSESSMENT — ENCOUNTER SYMPTOMS
FEVER: 0
NAUSEA: 1
NECK PAIN: 0
CONSTIPATION: 0
LIGHT-HEADEDNESS: 0
HEADACHES: 0
BACK PAIN: 0
FATIGUE: 0
DYSURIA: 0
VOMITING: 0
APPETITE CHANGE: 1
CHEST TIGHTNESS: 0
ABDOMINAL DISTENTION: 1
SHORTNESS OF BREATH: 0
DIARRHEA: 1
ABDOMINAL PAIN: 1

## 2018-03-17 NOTE — ED PROVIDER NOTES
History     Chief Complaint   Patient presents with     Abdominal Pain     for the past week     Diarrhea     HPI  Antonia Marc is a 22 year old female with no significant diagnosed past medical history presenting for evaluation of intermittent episodes of abdominal cramping and diarrhea.  Symptoms have been waxing and waning over the past roughly 4-6 weeks with episodes lasting 5-7 days at a time.  She reports diffuse sharp cramping throughout her entire abdomen associated with nausea, decreased appetite, and watery diarrhea.  Denies any bloody diarrhea.  Denies any recent travel, camping, or bad food exposure.  No other sick contacts.  Father reports he has a history of Crohn's disease but patient has never been tested.  Denies associated fever or chills.  Reports that currently eating or drinking anything seemed to trigger abdominal cramping with worsening pain and nausea.    Problem List:    Patient Active Problem List    Diagnosis Date Noted     Recurrent tonsillitis 02/01/2018     Priority: Medium     Recurrent major depressive disorder, in remission (H) 05/30/2017     Priority: Medium     Anxiety 05/30/2017     Priority: Medium     JAZZY (generalized anxiety disorder) 05/30/2017     Priority: Medium     Mild episode of recurrent major depressive disorder (H) 05/30/2017     Priority: Medium     Irregular menstrual bleeding 04/02/2012     Priority: Medium     Flat feet 09/02/2011     Priority: Medium        Past Medical History:    Past Medical History:   Diagnosis Date     Closed fracture of unspecified part of radius with ulna(813.83)      Other diseases of trachea and bronchus, not elsewhere classified        Past Surgical History:    No past surgical history on file.    Family History:    Family History   Problem Relation Age of Onset     DIABETES Mother      Hypertension Mother      DIABETES Father      Hypertension Father      Hypertension Maternal Grandmother      Anxiety Disorder Maternal  "Grandmother      Hypertension Maternal Grandfather      DIABETES Maternal Grandfather      Anxiety Disorder Maternal Grandfather        Social History:  Marital Status:  Single [1]  Social History   Substance Use Topics     Smoking status: Never Smoker     Smokeless tobacco: Never Used      Comment: no exporure     Alcohol use No        Medications:      magic mouthwash (FIRST-MOUTHWASH BLM) suspension   escitalopram (LEXAPRO) 20 MG tablet   escitalopram (LEXAPRO) 10 MG tablet   medroxyPROGESTERone (DEPO-PROVERA) 150 MG/ML injection   IBUPROFEN 200 200 MG OR TABS         Review of Systems   Constitutional: Positive for appetite change (hungry, but afriad to eat due to it worsening her pain). Negative for fatigue and fever.   HENT: Negative for congestion.    Respiratory: Negative for chest tightness and shortness of breath.    Cardiovascular: Negative for chest pain.   Gastrointestinal: Positive for abdominal distention, abdominal pain, diarrhea and nausea. Negative for constipation and vomiting.   Genitourinary: Positive for menstrual problem (irregular menses - came off depo several months ago and has been irregular since). Negative for dysuria.   Musculoskeletal: Negative for back pain and neck pain.   Skin: Negative for rash.   Neurological: Negative for light-headedness and headaches.   All other systems reviewed and are negative.      Physical Exam   BP: 143/84  Pulse: 73  Temp: 98.6  F (37  C)  Resp: 16  Height: 172.7 cm (5' 8\")  SpO2: 100 %      Physical Exam   Constitutional: She is oriented to person, place, and time. She appears well-developed and well-nourished. No distress.   HENT:   Head: Normocephalic and atraumatic.   Eyes: Conjunctivae are normal.   Cardiovascular: Normal rate and regular rhythm.    Pulmonary/Chest: Effort normal.   Abdominal: Soft. She exhibits no distension. Bowel sounds are increased. There is tenderness (mild diffuse. ). There is no rebound and no guarding.   Musculoskeletal: " Normal range of motion.   Neurological: She is alert and oriented to person, place, and time.   Skin: Skin is warm and dry. She is not diaphoretic.   Psychiatric: She has a normal mood and affect.   Nursing note and vitals reviewed.      ED Course     ED Course     Procedures                 Results for orders placed or performed during the hospital encounter of 03/16/18 (from the past 24 hour(s))   UA reflex to Microscopic   Result Value Ref Range    Color Urine Straw     Appearance Urine Clear     Glucose Urine Negative NEG^Negative mg/dL    Bilirubin Urine Negative NEG^Negative    Ketones Urine Negative NEG^Negative mg/dL    Specific Gravity Urine 1.000 (L) 1.003 - 1.035    Blood Urine Small (A) NEG^Negative    pH Urine 7.0 5.0 - 7.0 pH    Protein Albumin Urine Negative NEG^Negative mg/dL    Urobilinogen mg/dL 0.0 0.0 - 2.0 mg/dL    Nitrite Urine Negative NEG^Negative    Leukocyte Esterase Urine Negative NEG^Negative    Source Midstream Urine     RBC Urine 1 0 - 2 /HPF    WBC Urine 1 0 - 5 /HPF    Bacteria Urine Few (A) NEG^Negative /HPF    Squamous Epithelial /HPF Urine <1 0 - 1 /HPF    Mucous Urine Present (A) NEG^Negative /LPF   HCG qualitative urine (UPT)   Result Value Ref Range    HCG Qual Urine Negative NEG^Negative   CBC with platelets differential   Result Value Ref Range    WBC 6.5 4.0 - 11.0 10e9/L    RBC Count 4.84 3.8 - 5.2 10e12/L    Hemoglobin 14.2 11.7 - 15.7 g/dL    Hematocrit 39.8 35.0 - 47.0 %    MCV 82 78 - 100 fl    MCH 29.3 26.5 - 33.0 pg    MCHC 35.7 31.5 - 36.5 g/dL    RDW 12.5 10.0 - 15.0 %    Platelet Count 291 150 - 450 10e9/L    Diff Method Automated Method     % Neutrophils 47.5 %    % Lymphocytes 42.4 %    % Monocytes 6.2 %    % Eosinophils 3.2 %    % Basophils 0.5 %    % Immature Granulocytes 0.2 %    Absolute Neutrophil 3.1 1.6 - 8.3 10e9/L    Absolute Lymphocytes 2.8 0.8 - 5.3 10e9/L    Absolute Monocytes 0.4 0.0 - 1.3 10e9/L    Absolute Eosinophils 0.2 0.0 - 0.7 10e9/L     Absolute Basophils 0.0 0.0 - 0.2 10e9/L    Abs Immature Granulocytes 0.0 0 - 0.4 10e9/L   Comprehensive metabolic panel   Result Value Ref Range    Sodium 139 133 - 144 mmol/L    Potassium 3.2 (L) 3.4 - 5.3 mmol/L    Chloride 104 94 - 109 mmol/L    Carbon Dioxide 26 20 - 32 mmol/L    Anion Gap 9 3 - 14 mmol/L    Glucose 90 70 - 99 mg/dL    Urea Nitrogen 8 7 - 30 mg/dL    Creatinine 0.77 0.52 - 1.04 mg/dL    GFR Estimate >90 >60 mL/min/1.7m2    GFR Estimate If Black >90 >60 mL/min/1.7m2    Calcium 9.1 8.5 - 10.1 mg/dL    Bilirubin Total 1.3 0.2 - 1.3 mg/dL    Albumin 4.0 3.4 - 5.0 g/dL    Protein Total 7.0 6.8 - 8.8 g/dL    Alkaline Phosphatase 60 40 - 150 U/L    ALT 21 0 - 50 U/L    AST 16 0 - 45 U/L   Erythrocyte sedimentation rate auto   Result Value Ref Range    Sed Rate 4 0 - 20 mm/h   CRP Inflammation   Result Value Ref Range    CRP Inflammation <2.9 0.0 - 8.0 mg/L   CT Abdomen Pelvis w Contrast    Narrative    CT ABDOMEN PELVIS W CONTRAST  3/16/2018 11:15 PM     HISTORY: Abdominal pain, diarrhea, family history of Crohn's.     TECHNIQUE: Volumetric acquisition through abdomen and pelvis with IV  contrast. 100 mL Isovue-370. Radiation dose for this scan was reduced  using automated exposure control, adjustment of the mA and/or kV  according to patient size, or iterative reconstruction technique.    COMPARISON: None.    FINDINGS: The liver, gallbladder, spleen, pancreas, adrenal glands and  kidneys demonstrate no worrisome findings. Visualized lung bases are  clear.    Portions of the colon appear slightly thick-walled, most prominent in  the ascending and descending colon. These segments are not well  distended, but mild colitis is suspected.    Uterus is present. 4 cm left adnexal/ovarian cyst. No free fluid. No  obvious small bowel abnormality. The terminal ileum is unremarkable.  No bowel obstruction, free air or other acute findings.      Impression    IMPRESSION:  1. Probable mild nonspecific colitis.  2.  4 cm left adnexal/ovarian cyst.    MARIAN GAVIN MD       Medications   lactated ringers BOLUS 1,000 mL (0 mLs Intravenous Stopped 3/17/18 0134)   iopamidol (ISOVUE-370) solution 100 mL (100 mLs Intravenous Given 3/16/18 2312)   sodium chloride 0.9 % bag 500mL for CT scan flush use (60 mLs As instructed Given 3/16/18 2313)   potassium chloride SA (K-DUR/KLOR-CON M) CR tablet 40 mEq (40 mEq Oral Given 3/17/18 0058)     1:02-1:16 AM: Discussed results with patient and family.    Assessments & Plan (with Medical Decision Making)  22-year-old female with a history of intermittent recurrent diarrhea over the past 4-6 weeks.  No clear cause for her diarrhea including no recent antibiotic use, recent travel, or other sick contacts.  Father does have a history of Crohn's disease so CT imaging was obtained to evaluate for evidence of acute colitis.  CT showed a nonspecific colitis.  Blood work was reassuring with a normal white count with no left shift.  Electrolytes showed a mildly low potassium which was replaced orally.  Screening ESR and CRP were normal making Crohn's disease less likely.  Urinalysis showed no evidence of infection.  Exact etiology of her recurrent episodes of diarrhea unclear.  She does report significant stress in her life with poor sleep habits and heavy use of highly caffeinated beverages.  Patient was counseled that these could be strongly contributing to her GI distress.  Given the nonspecific colitis, Crohn's disease cannot be entirely excluded however, so she was advised to follow-up with GI as well as primary care.     I have reviewed the nursing notes.    I have reviewed the findings, diagnosis, plan and need for follow up with the patient.       Discharge Medication List as of 3/17/2018  1:35 AM          Final diagnoses:   Diarrhea, unspecified type   Hypokalemia       3/16/2018   [facility] EMERGENCY DEPARTMENT     Arriola, Magdiel Hernandez MD  03/17/18 0422

## 2018-03-17 NOTE — ED NOTES
Pt here with complaints of diarrhea. The patient has had 3 episodes of several days of diarrhea and abdominal pain. These episodes each last 4-7 days and she is on the third episode of the month. Diffuse abdominal pain and occasional nausea. She states she has diarrhea every time she eats something.  Father has crohn's disease.

## 2018-03-17 NOTE — DISCHARGE INSTRUCTIONS
Treating Diarrhea    Diarrhea happens when you have loose, watery, or frequent bowel movements. It is a common problem with many causes. Most cases of diarrhea clear up on their own. But certain cases may need treatment. Be sure to see your healthcare provider if your symptoms do not improve within a few days.  Getting relief  Treatment of diarrhea depends on its cause. Diarrhea caused by bacterial or parasite infection is often treated with antibiotics. Diarrhea caused by other factors, such as a stomach virus, often improves with simple home treatment. The tips below may also help relieve your symptoms.    Drink plenty of fluids. This helps prevent too much fluid loss (dehydration). Water, clear soups, and electrolyte solutions are good choices. Avoid alcohol, coffee, tea, and milk. These can irritate your intestines and make symptoms worse.    Suck on ice chips if drinking makes you queasy.    Return to your normal diet slowly. You may want to eat bland foods at first, such as rice and toast. Also, you may need to avoid certain foods for a while, such as dairy products. These can make symptoms worse. Ask your healthcare provider if there are any other foods you should avoid.    If you were prescribed antibiotics, take them as directed.    Do not take anti-diarrhea medicines without asking your healthcare provider first.  Call your healthcare provider   Call your healthcare provider if you have any of the following:     A fever of 100.4 F (38.0 C) or higher, or as directed by your healthcare provider    Severe pain    Worsening diarrhea or diarrhea for more than 2 days    Bloody vomit or stool    Signs of dehydration (dizziness, dry mouth and tongue, rapid pulse, dark urine)  Date Last Reviewed: 7/1/2016 2000-2017 [employer]. [address] [place] [address], [place] [address]. All rights reserved. This information is not intended as a substitute for professional medical care. Always follow your  healthcare professional's instructions.          Uncertain Causes of Diarrhea (Adult)    Diarrhea is when stools are loose and watery. This can be caused by:    Viral infections    Bacterial infections    Food poisoning    Parasites    Irritable bowel syndrome (IBS)    Inflammatory bowel diseases such as ulcerative colitis, Crohn's disease, and celiac disease    Food intolerance, such as to lactose, the sugar found in milk and milk products    Reaction to medicines like antibiotics, laxatives, cancer drugs, and antacids  Along with diarrhea, you may also have:    Abdominal pain and cramping    Nausea and vomiting    Loss of bowel control    Fever and chills    Bloody stools  In some cases, antibiotics may help to treat diarrhea. You may have a stool sample test. This is done to see what is causing your diarrhea, and if antibiotics will help treat it. The results of a stool sample test may take up to 2 days. The healthcare provider may not give you antibiotics until he or she has the stool test results.  Diarrhea can cause dehydration. This is the loss of too much water and other fluids from the body. When this occurs, body fluid must be replaced. This can be done with oral rehydration solutions. Oral rehydration solutions are available at drugstores and grocery stores without a prescription.  Home care  Follow all instructions given by your healthcare provider. Rest at home for the next 24 hours, or until you feel better. Avoid caffeine, tobacco, and alcohol. These can make diarrhea, cramping, and pain worse.  If taking medicines:    Don t take over-the-counter diarrhea or nausea medicines unless your healthcare provider tells you to.    You may use acetaminophen or NSAID medicines like ibuprofen or naproxen to reduce pain and fever. Don t use these if you have chronic liver or kidney disease, or ever had a stomach ulcer or gastrointestinal bleeding. Don't use NSAID medicines if you are already taking one for  another condition (like arthritis) or are on daily aspirin therapy (such as for heart disease or after a stroke). Talk with your healthcare provider first.    If antibiotics were prescribed, be sure you take them until they are finished. Don t stop taking them even when you feel better. Antibiotics must be taken as a full course.  To prevent the spread of illness:    Remember that washing with soap and water and using alcohol-based  is the best way to prevent the spread of infection.    Clean the toilet after each use.    Wash your hands before eating.    Wash your hands before and after preparing food. Keep in mind that people with diarrhea or vomiting should not prepare food for others.    Wash your hands after using cutting boards, countertops, and knives that have been in contact with raw foods.    Wash and then peel fruits and vegetables.    Keep uncooked meats away from cooked and ready-to-eat foods.    Use a food thermometer when cooking. Cook poultry to at least 165 F (74 C). Cook ground meat (beef, veal, pork, lamb) to at least 160 F (71 C). Cook fresh beef, veal, lamb, and pork to at least 145 F (63 C).    Don t eat raw or undercooked eggs (poached or rosalinda side up), poultry, meat, or unpasteurized milk and juices.  Food and drinks  The main goal while treating vomiting or diarrhea is to prevent dehydration. This is done by taking small amounts of liquids often.    Keep in mind that liquids are more important than food right now.    Drink only small amounts of liquids at a time.    Don t force yourself to eat, especially if you are having cramping, vomiting, or diarrhea. Don t eat large amounts at a time, even if you are hungry.    If you eat, avoid fatty, greasy, spicy, or fried foods.    Don t eat dairy foods or drink milk if you have diarrhea. These can make diarrhea worse.  During the first 24 hours you can try:    Oral rehydration solutions. Do not use sports drinks. They have too much sugar  and not enough electrolytes.    Soft drinks without caffeine    Ginger ale    Water (plain or flavored)    Decaf tea or coffee    Clear broth, consommé, or bouillon    Gelatin, popsicles, or frozen fruit juice bars  The second 24 hours, if you are feeling better, you can add:    Hot cereal, plain toast, bread, rolls, or crackers    Plain noodles, rice, mashed potatoes, chicken noodle soup, or rice soup    Unsweetened canned fruit (no pineapple)    Bananas  As you recover:    Limit fat intake to less than 15 grams per day. Don t eat margarine, butter, oils, mayonnaise, sauces, gravies, fried foods, peanut butter, meat, poultry, or fish.    Limit fiber. Don t eat raw or cooked vegetables, fresh fruits except bananas, or bran cereals.    Limit caffeine and chocolate.    Limit dairy.    Don t use spices or seasonings except salt.    Go back to your normal diet over time, as you feel better and your symptoms improve.    If the symptoms come back, go back to a simple diet or clear liquids.  Follow-up care  Follow up with your healthcare provider, or as advised. If a stool sample was taken or cultures were done, call the healthcare provider for the results as instructed.  Call 911  Call 911 if you have any of these symptoms:    Trouble breathing    Confusion    Extreme drowsiness or trouble walking    Loss of consciousness    Rapid heart rate    Chest pain    Stiff neck    Seizure  When to seek medical advice  Call your healthcare provider right away if any of these occur:    Abdominal pain that gets worse    Constant lower right abdominal pain    Continued vomiting and inability to keep liquids down    Diarrhea more than 5 times a day    Blood in vomit or stool    Dark urine or no urine for 8 hours, dry mouth and tongue, tiredness, weakness, or dizziness    Drowsiness    New rash    You don t get better in 2 to 3 days    Fever of 100.4 F (38 C) or higher that doesn t get lower with medicine  Date Last Reviewed:  1/3/2016    4343-8463 [employer]. [address] [place] [address], [place] [address]. All rights reserved. This information is not intended as a substitute for professional medical care. Always follow your healthcare professional's instructions.

## 2018-04-04 ENCOUNTER — OFFICE VISIT (OUTPATIENT)
Dept: FAMILY MEDICINE | Facility: CLINIC | Age: 23
End: 2018-04-04
Payer: COMMERCIAL

## 2018-04-04 VITALS
HEART RATE: 67 BPM | DIASTOLIC BLOOD PRESSURE: 72 MMHG | WEIGHT: 212.4 LBS | HEIGHT: 68 IN | TEMPERATURE: 98.5 F | SYSTOLIC BLOOD PRESSURE: 115 MMHG | BODY MASS INDEX: 32.19 KG/M2

## 2018-04-04 DIAGNOSIS — R10.13 ABDOMINAL PAIN, EPIGASTRIC: Primary | ICD-10-CM

## 2018-04-04 DIAGNOSIS — F33.0 MILD EPISODE OF RECURRENT MAJOR DEPRESSIVE DISORDER (H): ICD-10-CM

## 2018-04-04 DIAGNOSIS — M54.50 CHRONIC BILATERAL LOW BACK PAIN WITHOUT SCIATICA: ICD-10-CM

## 2018-04-04 DIAGNOSIS — G89.29 CHRONIC BILATERAL LOW BACK PAIN WITHOUT SCIATICA: ICD-10-CM

## 2018-04-04 LAB
ALBUMIN SERPL-MCNC: 3.9 G/DL (ref 3.4–5)
ALP SERPL-CCNC: 57 U/L (ref 40–150)
ALT SERPL W P-5'-P-CCNC: 16 U/L (ref 0–50)
AST SERPL W P-5'-P-CCNC: 13 U/L (ref 0–45)
BILIRUB DIRECT SERPL-MCNC: 0.2 MG/DL (ref 0–0.2)
BILIRUB SERPL-MCNC: 1.2 MG/DL (ref 0.2–1.3)
LIPASE SERPL-CCNC: 144 U/L (ref 73–393)
PROT SERPL-MCNC: 7 G/DL (ref 6.8–8.8)

## 2018-04-04 PROCEDURE — 99214 OFFICE O/P EST MOD 30 MIN: CPT | Performed by: NURSE PRACTITIONER

## 2018-04-04 PROCEDURE — 83690 ASSAY OF LIPASE: CPT | Performed by: NURSE PRACTITIONER

## 2018-04-04 PROCEDURE — 36415 COLL VENOUS BLD VENIPUNCTURE: CPT | Performed by: NURSE PRACTITIONER

## 2018-04-04 PROCEDURE — 80076 HEPATIC FUNCTION PANEL: CPT | Performed by: NURSE PRACTITIONER

## 2018-04-04 RX ORDER — ONDANSETRON 4 MG/1
4 TABLET, ORALLY DISINTEGRATING ORAL EVERY 8 HOURS PRN
Qty: 15 TABLET | Refills: 1 | Status: SHIPPED | OUTPATIENT
Start: 2018-04-04 | End: 2018-05-22

## 2018-04-04 RX ORDER — ESCITALOPRAM OXALATE 20 MG/1
20 TABLET ORAL DAILY
Qty: 30 TABLET | Refills: 5 | Status: SHIPPED | OUTPATIENT
Start: 2018-04-04 | End: 2018-09-24

## 2018-04-04 RX ORDER — PANTOPRAZOLE SODIUM 20 MG/1
TABLET, DELAYED RELEASE ORAL
Qty: 30 TABLET | Refills: 1 | Status: SHIPPED | OUTPATIENT
Start: 2018-04-04 | End: 2018-05-22

## 2018-04-04 RX ORDER — CYCLOBENZAPRINE HCL 10 MG
5-10 TABLET ORAL 3 TIMES DAILY PRN
Qty: 30 TABLET | Refills: 2 | Status: SHIPPED | OUTPATIENT
Start: 2018-04-04 | End: 2018-05-22

## 2018-04-04 NOTE — PROGRESS NOTES
SUBJECTIVE:   Antonia Marc is a 22 year old female who presents to clinic today for the following health issues:  Upper abdominal pain in epigastric and LUQ area on and off for 3 months. The patient reports chronic daily use of Ibuprofen prior abdominal pain started. She was advised by her parents to avoid Ibuprofen and she started using Tylenol as needed for back pain and headaches.  The patient states that abdominal pain is usually worse after she eats spicy, fried and fatty meals, when she is under stress, or having anxiety.     ABDOMINAL PAIN     Onset: on and off for 2-3 months     Description:   Character: Stabbing  Location: epigastric region- some foods she eats she hears a gargling noise then has instant diarrhea   Radiation: lower back    Intensity: moderate    Progression of Symptoms:  worsening    Accompanying Signs & Symptoms:  Fever/Chills?: no   Gas/Bloating: YES, both   Nausea: YES  Vomitting: no   Diarrhea?: YES  Constipation:no   Dysuria or Hematuria: no    History:   Trauma: no   Previous similar pain: YES   Previous tests done: CT    Precipitating factors:   Does the pain change with:     Food: YES- when she eats food that's when her abdominal pain flares up      BM: YES- has some relief     Urination: no     Alleviating factors:  None     Therapies Tried and outcome: pepto     LMP:  Used to be on the depo, spotting since January    - Follow up on tonsillitis, still has white stuff in the back of her throat that she can peel off     Problem list and histories reviewed & adjusted, as indicated.  Additional history: as documented    Labs reviewed in EPIC    Reviewed and updated as needed this visit by clinical staff  Tobacco  Allergies  Med Hx  Surg Hx  Fam Hx  Soc Hx      Reviewed and updated as needed this visit by Provider         ROS:  Constitutional, HEENT, cardiovascular, pulmonary, gi and gu systems are negative, except as otherwise noted.    OBJECTIVE:     /72  Pulse 67   "Temp 98.5  F (36.9  C) (Tympanic)  Ht 5' 8\" (1.727 m)  Wt 212 lb 6.4 oz (96.3 kg)  BMI 32.3 kg/m2  Body mass index is 32.3 kg/(m^2).  GENERAL: healthy, alert and no distress  HENT: ear canals and TM's normal, nose and mouth without ulcers or lesions  NECK: no adenopathy, no asymmetry, masses, or scars and thyroid normal to palpation  ABDOMEN: tenderness epigastric and LUQ, no organomegaly or masses, liver span normal to percussion and bowel sounds normal  MS: no gross musculoskeletal defects noted, no edema  PSYCH: mentation appears normal, affect normal/bright    Diagnostic Test Results:  Pending   ASSESSMENT/PLAN:     1. Abdominal pain, epigastric  -likely NSAID's induced gastritis   - H Pylori antigen stool; Future  - Hepatic panel  - Lipase  - pantoprazole (PROTONIX) 20 MG EC tablet; Take by mouth 30-60 minutes before a meal.  Dispense: 30 tablet; Refill: 1  - ondansetron (ZOFRAN-ODT) 4 MG ODT tab; Take 1 tablet (4 mg) by mouth every 8 hours as needed for nausea or vomiting  Dispense: 15 tablet; Refill: 1  -discussed GERD diet   -if no improvement follow up in 1 month    2. Mild episode of recurrent major depressive disorder (H)  - escitalopram (LEXAPRO) 20 MG tablet; Take 1 tablet (20 mg) by mouth daily  Dispense: 30 tablet; Refill: 5  -recommended to start Lexapro when abdominal pain and nausea improve     3. Chronic bilateral low back pain without sciatica  - cyclobenzaprine (FLEXERIL) 10 MG tablet; Take 0.5-1 tablets (5-10 mg) by mouth 3 times daily as needed for muscle spasms  Dispense: 30 tablet; Refill: 2    See Patient Instructions    ROS [facility]  "

## 2018-04-04 NOTE — NURSING NOTE
"Chief Complaint   Patient presents with     Abdominal Pain     on and off for 2-3 months comes on very sudden      Diarrhea     On and off for 2-3 months      Health Maintenance     Due for pap      Depression     Would like to get back on her Lexapro        Initial /72  Pulse 67  Temp 98.5  F (36.9  C) (Tympanic)  Ht 5' 8\" (1.727 m)  Wt 212 lb 6.4 oz (96.3 kg)  BMI 32.3 kg/m2 Estimated body mass index is 32.3 kg/(m^2) as calculated from the following:    Height as of this encounter: 5' 8\" (1.727 m).    Weight as of this encounter: 212 lb 6.4 oz (96.3 kg).  Medication Reconciliation: complete  "

## 2018-04-04 NOTE — PATIENT INSTRUCTIONS
Tylenol 500 mg 4 times daily as needed for back pain  Flexeril 0.5-1 tablet 3 times daily as needed for back pain    Symptoms are due to gastritis    Start Protonix 20 mg daily for 1 month, if still have symptoms take it for another month    Zofran for nausea    Labs: liver, lipase and H. Pylori

## 2018-04-04 NOTE — MR AVS SNAPSHOT
After Visit Summary   4/4/2018    Antonia Marc    MRN: [id number]           Patient Information     Date Of Birth          1995        Visit Information        Provider Department      4/4/2018 10:40 AM Roma Ye APRN CNP [facility]        Today's Diagnoses     Abdominal pain, epigastric    -  1    Mild episode of recurrent major depressive disorder (H)        Chronic bilateral low back pain without sciatica          Care Instructions    Tylenol 500 mg 4 times daily as needed for back pain  Flexeril 0.5-1 tablet 3 times daily as needed for back pain    Symptoms are due to gastritis    Start Protonix 20 mg daily for 1 month, if still have symptoms take it for another month    Zofran for nausea    Labs: liver, lipase and H. Pylori               Follow-ups after your visit        Future tests that were ordered for you today     Open Future Orders        Priority Expected Expires Ordered    H Pylori antigen stool Routine  5/4/2018 4/4/2018            Who to contact     If you have questions or need follow up information about today's clinic visit or your schedule please contact [facility] directly at [phone].  Normal or non-critical lab and imaging results will be communicated to you by MOOIhart, letter or phone within 4 business days after the clinic has received the results. If you do not hear from us within 7 days, please contact the clinic through MOOIhart or phone. If you have a critical or abnormal lab result, we will notify you by phone as soon as possible.  Submit refill requests through Campaign Monitor or call your pharmacy and they will forward the refill request to us. Please allow 3 business days for your refill to be completed.          Additional Information About Your Visit        MyChart Information     Camp[employer] Monitor lets you send messages to your doctor, view your test results, renew your prescriptions, schedule appointments and more. To sign up, go  "to [email or web address] . Click on \"Log in\" on the left side of the screen, which will take you to the Welcome page. Then click on \"Sign up Now\" on the right side of the page.     You will be asked to enter the access code listed below, as well as some personal information. Please follow the directions to create your username and password.     Your access code is: [email or web address]  Expires: 2018  1:12 PM     Your access code will  in 90 days. If you need help or a new code, please call your [place] clinic or [phone].        Care EveryWhere ID     This is your Care EveryWhere ID. This could be used by other organizations to access your [place] medical records  [id number]        Your Vitals Were     Pulse Temperature Height BMI (Body Mass Index)          67 98.5  F (36.9  C) (Tympanic) 5' 8\" (1.727 m) 32.3 kg/m2         Blood Pressure from Last 3 Encounters:   18 115/72   18 125/66   18 142/77    Weight from Last 3 Encounters:   18 212 lb 6.4 oz (96.3 kg)   18 228 lb 8 oz (103.6 kg)   18 231 lb 12.8 oz (105.1 kg)              We Performed the Following     Hepatic panel     Lipase          Today's Medication Changes          These changes are accurate as of 18 11:30 AM.  If you have any questions, ask your nurse or doctor.               Start taking these medicines.        Dose/Directions    cyclobenzaprine 10 MG tablet   Commonly known as:  FLEXERIL   Used for:  Chronic bilateral low back pain without sciatica   Started by:  FormogeRoma park Karrie, APRN CNP        Dose:  5-10 mg   Take 0.5-1 tablets (5-10 mg) by mouth 3 times daily as needed for muscle spasms   Quantity:  30 tablet   Refills:  2       ondansetron 4 MG ODT tab   Commonly known as:  ZOFRAN-ODT   Used for:  Abdominal pain, epigastric   Started by:  Formogey, Roma Karrie, APRN CNP        Dose:  4 mg   Take 1 tablet (4 mg) by mouth every 8 hours as needed for nausea or vomiting   Quantity:  15 " tablet   Refills:  1       pantoprazole 20 MG EC tablet   Commonly known as:  PROTONIX   Used for:  Abdominal pain, epigastric   Started by:  Roma Ye APRN CNP        Take by mouth 30-60 minutes before a meal.   Quantity:  30 tablet   Refills:  1            Where to get your medicines      These medications were sent [place] [facility] [place] - [address]  [address] [address]     Phone:  [phone]     cyclobenzaprine 10 MG tablet    escitalopram 20 MG tablet    ondansetron 4 MG ODT tab    pantoprazole 20 MG EC tablet                Primary Care Provider Office Phone # Fax #    Carly Kelli Griffin [phone]       [address] [address]        Equal Access to Services     BANG WAGNER : Manolo harto Sochelsy, waaxda luqadaha, qaybta kaalmada adeegyada, lavonne pereira . So [facility] [phone].    ATENCIÓN: Si habla español, tiene a ma disposición servicios gratuitos de asistencia lingüística. Llame al [phone].    We comply with applicable federal civil rights laws and [place] laws. We do not discriminate on the basis of race, color, national origin, age, disability, sex, sexual orientation, or gender identity.            Thank you!     Thank you for choosing [facility]  for your care. Our goal is always to provide you with excellent care. Hearing back from our patients is one way we can continue to improve our services. Please take a few minutes to complete the written survey that you may receive in the mail after your visit with us. Thank you!             Your Updated Medication List - Protect others around you: Learn how to safely use, store and throw away your medicines at www.disposemymeds.org.          This list is accurate as of 4/4/18 11:30 AM.  Always use your most recent med list.                   Brand Name Dispense Instructions for use Diagnosis    cyclobenzaprine  10 MG tablet    FLEXERIL    30 tablet    Take 0.5-1 tablets (5-10 mg) by mouth 3 times daily as needed for muscle spasms    Chronic bilateral low back pain without sciatica       * escitalopram 10 MG tablet    LEXAPRO    30 tablet    Take 1 tablet (10 mg) by mouth daily    Major depressive disorder, recurrent episode, moderate (H)       * escitalopram 20 MG tablet    LEXAPRO    30 tablet    Take 1 tablet (20 mg) by mouth daily    Mild episode of recurrent major depressive disorder (H)       IBUPROFEN 200 200 MG Tabs      Reported on 5/23/2017        magic mouthwash suspension (diphenhydrAMINE, lidocaine, aluminum-magnesium & simethicone) Susp compounding kit     237 mL    Swish and swallow 5 mLs in mouth every 6 hours as needed for mouth sores    Viral pharyngitis       medroxyPROGESTERone 150 MG/ML injection    DEPO-PROVERA    1 mL    Inject 1 mL (150 mg) into the muscle every 3 months    Encounter for initial prescription of injectable contraceptive       ondansetron 4 MG ODT tab    ZOFRAN-ODT    15 tablet    Take 1 tablet (4 mg) by mouth every 8 hours as needed for nausea or vomiting    Abdominal pain, epigastric       pantoprazole 20 MG EC tablet    PROTONIX    30 tablet    Take by mouth 30-60 minutes before a meal.    Abdominal pain, epigastric       * Notice:  This list has 2 medication(s) that are the same as other medications prescribed for you. Read the directions carefully, and ask your doctor or other care provider to review them with you.

## 2018-04-06 ENCOUNTER — HOSPITAL ENCOUNTER (EMERGENCY)
Facility: CLINIC | Age: 23
Discharge: HOME | End: 2018-04-07
Attending: EMERGENCY MEDICINE | Admitting: EMERGENCY MEDICINE
Payer: COMMERCIAL

## 2018-04-06 DIAGNOSIS — K29.00 ACUTE GASTRITIS WITHOUT HEMORRHAGE, UNSPECIFIED GASTRITIS TYPE: ICD-10-CM

## 2018-04-06 PROCEDURE — 93005 ELECTROCARDIOGRAM TRACING: CPT

## 2018-04-06 PROCEDURE — 99285 EMERGENCY DEPT VISIT HI MDM: CPT | Mod: 25 | Performed by: EMERGENCY MEDICINE

## 2018-04-06 PROCEDURE — 99285 EMERGENCY DEPT VISIT HI MDM: CPT

## 2018-04-06 PROCEDURE — 93010 ELECTROCARDIOGRAM REPORT: CPT | Mod: Z6 | Performed by: EMERGENCY MEDICINE

## 2018-04-06 NOTE — ED AVS SNAPSHOT
[facility] Emergency Department    [address] [address]    Phone:  [phone]    Fax:  [phone]                                       Antonia Marc   MRN: [id number]    Department:  [facility] Emergency Department   Date of Visit:  4/6/2018           Patient Information     Date Of Birth          1995        Your diagnoses for this visit were:     Acute gastritis without hemorrhage, unspecified gastritis type        You were seen by Silver Gomez MD.        Discharge Instructions       Continue to take the Protonix as prescribed.  When having severe pain you can try Maalox or ranitidine to try when the pain is really bad.  Return to the emergency department if you start having worsening symptoms, difficulty breathing, fevers, repeated vomiting, or other concerns.  Otherwise follow-up in clinic.    24 Hour Appointment Hotline       To make an appointment at any [facility], call [phone] ([phone]). If you don't have a family doctor or clinic, we will help you find one. [facility] are conveniently located to serve the needs of you and your family.             Review of your medicines      Our records show that you are taking the medicines listed below. If these are incorrect, please call your family doctor or clinic.        Dose / Directions Last dose taken    cyclobenzaprine 10 MG tablet   Commonly known as:  FLEXERIL   Dose:  5-10 mg   Quantity:  30 tablet        Take 0.5-1 tablets (5-10 mg) by mouth 3 times daily as needed for muscle spasms   Refills:  2        * escitalopram 10 MG tablet   Commonly known as:  LEXAPRO   Dose:  10 mg   Quantity:  30 tablet        Take 1 tablet (10 mg) by mouth daily   Refills:  11        * escitalopram 20 MG tablet   Commonly known as:  LEXAPRO   Dose:  20 mg   Quantity:  30 tablet        Take 1 tablet (20 mg) by mouth daily   Refills:  5        IBUPROFEN 200 200 MG Tabs        Reported on 5/23/2017    Refills:  0        magic mouthwash suspension (diphenhydrAMINE, lidocaine, aluminum-magnesium & simethicone) Susp compounding kit   Dose:  5 mL   Quantity:  237 mL        Swish and swallow 5 mLs in mouth every 6 hours as needed for mouth sores   Refills:  0        medroxyPROGESTERone 150 MG/ML injection   Commonly known as:  DEPO-PROVERA   Dose:  150 mg   Quantity:  1 mL        Inject 1 mL (150 mg) into the muscle every 3 months   Refills:  0        ondansetron 4 MG ODT tab   Commonly known as:  ZOFRAN-ODT   Dose:  4 mg   Quantity:  15 tablet        Take 1 tablet (4 mg) by mouth every 8 hours as needed for nausea or vomiting   Refills:  1        pantoprazole 20 MG EC tablet   Commonly known as:  PROTONIX   Quantity:  30 tablet        Take by mouth 30-60 minutes before a meal.   Refills:  1        * Notice:  This list has 2 medication(s) that are the same as other medications prescribed for you. Read the directions carefully, and ask your doctor or other care provider to review them with you.            Procedures and tests performed during your visit     EKG 12-lead, tracing only    HCG qualitative urine    XR Chest 2 Views      Orders Needing Specimen Collection     None      Pending Results     Date and Time Order Name Status Description    4/7/2018 0133 XR Chest 2 Views In process             Pending Culture Results     No orders found for last 3 day(s).            Pending Results Instructions     If you had any lab results that were not finalized at the time of your Discharge, you can call the ED Lab Result RN at [phone]. You will be contacted by this team for any positive Lab results or changes in treatment. The nurses are available 7 days a week from 10A to 6:30P.  You can leave a message 24 hours per day and they will return your call.        Test Results From Your Hospital Stay        4/7/2018 12:51 AM      Component Results     Component Value Ref Range & Units Status    HCG Qual Urine Negative  "NEG^Negative Final    This test is for screening purposes.  Results should be interpreted along with   the clinical picture.  Confirmation testing is available if warranted by   ordering WPA056, HCG Quantitative Pregnancy.           2018  1:41 AM      Result not yet available     Exam Ended                Thank you for choosing [facility]       Thank you for choosing [facility] for your care. Our goal is always to provide you with excellent care. Hearing back from our patients is one way we can continue to improve our services. Please take a few minutes to complete the written survey that you may receive in the mail after you visit with us. Thank you!        Si[employer] Processing Information     [employer] Processing lets you send messages to your doctor, view your test results, renew your prescriptions, schedule appointments and more. To sign up, go to [email or web address] . Click on \"Log in\" on the left side of the screen, which will take you to the Welcome page. Then click on \"Sign up Now\" on the right side of the page.     You will be asked to enter the access code listed below, as well as some personal information. Please follow the directions to create your username and password.     Your access code is: [email or web address]  Expires: 2018  1:12 PM     Your access code will  in 90 days. If you need help or a new code, please call your [facility] clinic or [phone].        Care EveryWhere ID     This is your Care EveryWhere ID. This could be used by other organizations to access your [facility] medical records  [id number]        Equal Access to Services     BANG WAGNER AH: Hadii laila Rendon, waaxda luedwinadaha, qaybta kaalmada landryyaarchana, lavonne ken. So [facility] [phone].    ATENCIÓN: Si habla español, tiene a ma disposición servicios gratuitos de asistencia lingüística. Llame al [phone].    We comply with applicable federal civil rights laws and [place] laws. We do not discriminate on " the basis of race, color, national origin, age, disability, sex, sexual orientation, or gender identity.            After Visit Summary       This is your record. Keep this with you and show to your community pharmacist(s) and doctor(s) at your next visit.

## 2018-04-07 ENCOUNTER — APPOINTMENT (OUTPATIENT)
Dept: GENERAL RADIOLOGY | Facility: CLINIC | Age: 23
End: 2018-04-07
Attending: EMERGENCY MEDICINE
Payer: COMMERCIAL

## 2018-04-07 VITALS
DIASTOLIC BLOOD PRESSURE: 76 MMHG | RESPIRATION RATE: 20 BRPM | SYSTOLIC BLOOD PRESSURE: 140 MMHG | OXYGEN SATURATION: 100 %

## 2018-04-07 LAB — HCG UR QL: NEGATIVE

## 2018-04-07 PROCEDURE — 25000132 ZZH RX MED GY IP 250 OP 250 PS 637: Performed by: EMERGENCY MEDICINE

## 2018-04-07 PROCEDURE — 81025 URINE PREGNANCY TEST: CPT | Performed by: EMERGENCY MEDICINE

## 2018-04-07 PROCEDURE — 71046 X-RAY EXAM CHEST 2 VIEWS: CPT

## 2018-04-07 PROCEDURE — 25000125 ZZHC RX 250: Performed by: EMERGENCY MEDICINE

## 2018-04-07 RX ADMIN — RANITIDINE 300 MG: 150 TABLET ORAL at 00:43

## 2018-04-07 RX ADMIN — LIDOCAINE HYDROCHLORIDE 30 ML: 20 SOLUTION ORAL; TOPICAL at 00:15

## 2018-04-07 ASSESSMENT — PAIN DESCRIPTION - DESCRIPTORS
DESCRIPTORS: BURNING

## 2018-04-07 NOTE — ED PROVIDER NOTES
History   Chest pain    HPI  Antonia Marc is a 22 year old female who presents for chest pain.  Symptoms started about 1.5 hours prior to arrival.  Pain is located in the center of the chest, described as heavy, rated as severe.  She is taking aspirin with some mild improvement.  She feels somewhat short of breath.  It came on very suddenly while she was driving home work.  No hemoptysis, nausea, vomiting, diarrhea, abdominal pain, dysuria, rash.  No recent travel or immobilization or surgeries.  No recent pain or swelling of the lower extremities.  She is not on birth control currently.    Problem List:    Patient Active Problem List    Diagnosis Date Noted     Recurrent tonsillitis 02/01/2018     Priority: Medium     Recurrent major depressive disorder, in remission (H) 05/30/2017     Priority: Medium     Anxiety 05/30/2017     Priority: Medium     JAZZY (generalized anxiety disorder) 05/30/2017     Priority: Medium     Mild episode of recurrent major depressive disorder (H) 05/30/2017     Priority: Medium     Irregular menstrual bleeding 04/02/2012     Priority: Medium     Flat feet 09/02/2011     Priority: Medium        Past Medical History:    Past Medical History:   Diagnosis Date     Closed fracture of unspecified part of radius with ulna(813.83)      Other diseases of trachea and bronchus, not elsewhere classified        Past Surgical History:    No past surgical history on file.    Family History:    Family History   Problem Relation Age of Onset     DIABETES Mother      Hypertension Mother      DIABETES Father      Hypertension Father      Crohn Disease Father      Hypertension Maternal Grandmother      Anxiety Disorder Maternal Grandmother      Hypertension Maternal Grandfather      DIABETES Maternal Grandfather      Anxiety Disorder Maternal Grandfather        Social History:  Marital Status:  Single [1]  Social History   Substance Use Topics     Smoking status: Never Smoker     Smokeless tobacco:  Never Used      Comment: no exporure     Alcohol use No        Medications:      escitalopram (LEXAPRO) 20 MG tablet   cyclobenzaprine (FLEXERIL) 10 MG tablet   pantoprazole (PROTONIX) 20 MG EC tablet   ondansetron (ZOFRAN-ODT) 4 MG ODT tab   magic mouthwash (FIRST-MOUTHWASH BLM) suspension   escitalopram (LEXAPRO) 10 MG tablet   medroxyPROGESTERone (DEPO-PROVERA) 150 MG/ML injection   IBUPROFEN 200 200 MG OR TABS         Review of Systems  Pertinent positives and negatives listed in the HPI, all other systems reviewed and are negative.    Physical Exam   BP: 142/77  Heart Rate: 80  Resp: 18  SpO2: 99 %      Physical Exam   Constitutional: She is oriented to person, place, and time. She appears well-developed and well-nourished. She appears distressed.   HENT:   Head: Normocephalic and atraumatic.   Right Ear: External ear normal.   Left Ear: External ear normal.   Nose: Nose normal.   Eyes: Conjunctivae are normal. No scleral icterus.   Neck: Normal range of motion.   Cardiovascular: Normal rate and regular rhythm.    No murmur heard.  Pulmonary/Chest: Effort normal. No stridor. No respiratory distress. She has no wheezes. She has no rales.   Abdominal: Soft. She exhibits no distension. There is no tenderness. There is no rebound.   Musculoskeletal: She exhibits no edema or tenderness.   Neurological: She is alert and oriented to person, place, and time.   Skin: Skin is warm and dry. She is not diaphoretic.   Psychiatric: She has a normal mood and affect. Her behavior is normal.   Nursing note and vitals reviewed.      ED Course     ED Course     Procedures               EKG Interpretation:      Interpreted by Silver Gomez  Time reviewed: 0005  Symptoms at time of EKG: Chest pain   Rhythm: normal sinus   Rate: normal  Axis: normal  Ectopy: none  Conduction: normal  ST Segments/ T Waves: No ST-T wave changes  Q Waves: none  Comparison to prior: No old EKG available    Clinical Impression: normal  EKG    Critical Care time:  none               Results for orders placed or performed during the hospital encounter of 04/06/18 (from the past 24 hour(s))   HCG qualitative urine   Result Value Ref Range    HCG Qual Urine Negative NEG^Negative   XR Chest 2 Views    Narrative    CHEST 2 VIEWS  4/7/2018 1:46 AM     HISTORY: Chest pain.    COMPARISON: 5/29/2017.    FINDINGS: The lungs are clear. Normal-sized cardiac silhouette.      Impression    IMPRESSION: No evidence of active cardiopulmonary disease.       Medications   lidocaine (viscous) (XYLOCAINE) 2 % 15 mL, alum & mag hydroxide-simethicone (MYLANTA ES/MAALOX  ES) 15 mL GI Cocktail (30 mLs Oral Given 4/7/18 0015)   ranitidine (ZANTAC) tablet 300 mg (300 mg Oral Given 4/7/18 0043)       Assessments & Plan (with Medical Decision Making)   22-year-old female presents for chest pain.  Heart rate 90, SPO2 is 100% on room air. Patient reports no personal history of DVT or PE, no recent trauma or surgery, hemoptysis, hormone use, or leg swelling. Therefore there is no further indication for work up for pulmonary embolism.  EKG sinus rhythm without signs of ischemia.  GI cocktail helped her symptoms some she was given ranitidine and on recheck after this symptoms were essentially resolved.  Chest x-ray obtained, images reviewed independently as well as radiology read reviewed, no signs of pneumothorax or pneumonia.  The patient is safe to discharge home.  She is instructed to continue taking the pantoprazole and to take Maalox or ranitidine with symptoms get worse like this.  Return if she has worsening symptoms, otherwise follow-up in clinic.  The patient is in agreement with this plan.    I have reviewed the nursing notes.    I have reviewed the findings, diagnosis, plan and need for follow up with the patient.       Discharge Medication List as of 4/7/2018  2:12 AM          Final diagnoses:   Acute gastritis without hemorrhage, unspecified gastritis type        4/6/2018   [facility] EMERGENCY DEPARTMENT     Silver Gomez MD  04/07/18 0222

## 2018-04-07 NOTE — ED NOTES
I will assume care of PT at this time.   Entered PT room and found PT lying on gurney in position of comfort with family at bedside. PT states at approximately 2215 last night while leaving work she began having substernal chest pain, non-radiating. PT states it is burning in nature. PT is A&OX4, appears to be in pain at this time. All needs are being assessed and will be met and all comfort measures are being addressed.

## 2018-04-07 NOTE — ED NOTES
PT states after receiving the GI Cocktail she began having relief. States that the relief had only lasted a brief moment. At this time PT states she is still having pain. No further changes in PT condition. All needs are being assessed and will be met and all comfort measures are being addressed. Awaiting MD malagon and orders at this time. I will continue to assess and monitor PT.

## 2018-04-07 NOTE — DISCHARGE INSTRUCTIONS
Continue to take the Protonix as prescribed.  When having severe pain you can try Maalox or ranitidine to try when the pain is really bad.  Return to the emergency department if you start having worsening symptoms, difficulty breathing, fevers, repeated vomiting, or other concerns.  Otherwise follow-up in clinic.

## 2018-04-07 NOTE — ED NOTES
Pt had an episode of chest pain after work shift tonight. She states it started at 2220. She states that she had a sudden onset. She states she took in a deep breath and the pain started. She states Garza is substernal and had numbness in her extremities. She states pain with deep breath. Sob as well. No pain or swelling in legs. No and pain. Recently prescribed Flexeril . Has gastritis.

## 2018-05-22 ENCOUNTER — OFFICE VISIT (OUTPATIENT)
Dept: OTOLARYNGOLOGY | Facility: CLINIC | Age: 23
End: 2018-05-22
Payer: COMMERCIAL

## 2018-05-22 ENCOUNTER — TELEPHONE (OUTPATIENT)
Dept: OTOLARYNGOLOGY | Facility: CLINIC | Age: 23
End: 2018-05-22

## 2018-05-22 VITALS
BODY MASS INDEX: 34.16 KG/M2 | HEIGHT: 68 IN | HEART RATE: 86 BPM | RESPIRATION RATE: 18 BRPM | SYSTOLIC BLOOD PRESSURE: 128 MMHG | WEIGHT: 225.4 LBS | TEMPERATURE: 98.3 F | DIASTOLIC BLOOD PRESSURE: 70 MMHG

## 2018-05-22 DIAGNOSIS — J35.01 CHRONIC TONSILLITIS: Primary | ICD-10-CM

## 2018-05-22 PROCEDURE — 99244 OFF/OP CNSLTJ NEW/EST MOD 40: CPT | Performed by: OTOLARYNGOLOGY

## 2018-05-22 NOTE — TELEPHONE ENCOUNTER
Type of surgery: Bilateral tonsillectomy  Location of surgery: [place] OR  Date and time of surgery: 7-9-18 @ [facility]  Surgeon: Latisha  Pre-Op Appt Date: to be scheduled  Post-Op Appt Date: 4 weeks   Packet sent out: Yes  Pre-cert/Authorization completed:  Financial securing  Date: 5-22-18

## 2018-05-22 NOTE — LETTER
5/22/2018         RE: Antonia Marc  [address] [place] [id number]        Dear Colleague,    Thank you for referring your patient, Antonia Marc, to the [facility]. Please see a copy of my visit note below.        History of Present Illness - Antonia Marc is a 22 year old female seen in consultation at the request of Dr. Rivera for recurrent tonsillitis.  She reports frequent production of tonsil stones which are foul-smelling and sometimes get discomfort in her throat.  This is been ongoing for several months.  She denies any frequent strep throat.  She has tried clearing them on her own manually with a Q-tip but is not always successful.    Past Medical History -   Patient Active Problem List   Diagnosis     Flat feet     Irregular menstrual bleeding     Recurrent major depressive disorder, in remission (H)     Anxiety     JAZYZ (generalized anxiety disorder)     Mild episode of recurrent major depressive disorder (H)     Recurrent tonsillitis       Current Medications -   Current Outpatient Prescriptions:      escitalopram (LEXAPRO) 20 MG tablet, Take 1 tablet (20 mg) by mouth daily, Disp: 30 tablet, Rfl: 5     [DISCONTINUED] escitalopram (LEXAPRO) 10 MG tablet, Take 1 tablet (10 mg) by mouth daily, Disp: 30 tablet, Rfl: 11    Allergies - No Known Allergies    Social History -   Social History     Social History     Marital status: Single     Spouse name: N/A     Number of children: N/A     Years of education: N/A     Social History Main Topics     Smoking status: Never Smoker     Smokeless tobacco: Never Used      Comment: no exporure     Alcohol use No     Drug use: No     Sexual activity: No     Other Topics Concern     Not on file     Social History Narrative       Family History -   Family History   Problem Relation Age of Onset     DIABETES Mother      Hypertension Mother      DIABETES Father      Hypertension Father      Crohn Disease Father      Hypertension  "Maternal Grandmother      Anxiety Disorder Maternal Grandmother      Hypertension Maternal Grandfather      DIABETES Maternal Grandfather      Anxiety Disorder Maternal Grandfather        Review of Systems - As per HPI and PMHx, otherwise 7 system review of the head and neck negative. 10+ system review negative.    Physical Exam  /70 (BP Location: Right arm, Patient Position: Chair, Cuff Size: Adult Large)  Pulse 86  Temp 98.3  F (36.8  C) (Tympanic)  Resp 18  Ht 1.727 m (5' 8\")  Wt 102.2 kg (225 lb 6.4 oz)  BMI 34.27 kg/m2  General - The patient is well nourished and well developed, and appears to have good nutritional status.  Alert and oriented to person and place, answers questions and cooperates with examination appropriately.   Head and Face - Normocephalic and atraumatic, with no gross asymmetry noted of the contour of the facial features.  The facial nerve is intact, with strong symmetric movements.  Voice and Breathing - The patient was breathing comfortably without the use of accessory muscles. There was no wheezing, stridor, or stertor.  The patients voice was clear and strong, and had appropriate pitch and quality.  Ears - Bilateral pinna and EACs with normal appearing overlying skin. Tympanic membrane intact with good mobility on pneumatic otoscopy bilaterally. Bony landmarks of the ossicular chain are normal. The tympanic membranes are normal in appearance. No retraction, perforation, or masses.  No fluid or purulence was seen in the external canal or the middle ear.   Eyes - Extraocular movements intact.  Sclera were not icteric or injected, conjunctiva were pink and moist.  Mouth - Examination of the oral cavity showed pink, healthy oral mucosa. No lesions or ulcerations noted.  The tongue was mobile and midline, and the dentition were in good condition.    Throat - The walls of the oropharynx were smooth, pink, moist, symmetric, and had no lesions or ulcerations.  The tonsillar pillars " and soft palate were symmetric.  The uvula was midline on elevation.  Neck - Normal midline excursion of the laryngotracheal complex during swallowing.  Full range of motion on passive movement.  Palpation of the occipital, submental, submandibular, internal jugular chain, and supraclavicular nodes did not demonstrate any abnormal lymph nodes or masses.  The carotid pulse was palpable bilaterally.  Palpation of the thyroid was soft and smooth, with no nodules or goiter appreciated.  The trachea was mobile and midline.  Nose - External contour is symmetric, no gross deflection or scars.  Nasal mucosa is pink and moist with no abnormal mucus.  The septum was midline and non-obstructive, turbinates of normal size and position.  No polyps, masses, or purulence noted on examination.  Heart:  Regular rate and rhythm, no murmurs.  Lungs:  Chest clear to auscultation bilaterally          Assessment - Antonia Marc is a 22 year old female with chronic tonsillitis with tonsil stones. Based on the physical exam and history, my recommendation is for tonsillectomy (without adenoidectomy).  The remainder of the visit was spent discussing the risks and benefits of tonsillectomy.  These included:  The risks of general anesthesia, bleeding, infection, possible need for emergency surgery to control bleeding, possible alteration of speech and swallowing, and even the possibility of continued throat problems following surgery.  They understood and wished to call in and schedule.         Dr. Galilea Good MD  Otolaryngology  [facility]        Again, thank you for allowing me to participate in the care of your patient.        Sincerely,        Galilea Good MD

## 2018-05-22 NOTE — MR AVS SNAPSHOT
"              After Visit Summary   5/22/2018    Antonia Marc    MRN: [id number]           Patient Information     Date Of Birth          1995        Visit Information        Provider Department      5/22/2018 2:30 PM Galilea Good MD [facility]        Today's Diagnoses     Chronic tonsillitis    -  1       Follow-ups after your visit        Your next 10 appointments already scheduled     Jul 09, 2018   Procedure with Galilea Good MD   [facility] (--)    [address] [facility] [id number] [phone]           The medical center is located at [address] [facility]. (between [address] in [place], [place] miles north of [place]).              Who to contact     If you have questions or need follow up information about today's clinic visit or your schedule please contact [facility] directly at [phone].  Normal or non-critical lab and imaging results will be communicated to you by MyChart, letter or phone within 4 business days after the clinic has received the results. If you do not hear from us within 7 days, please contact the clinic through PacerProhart or phone. If you have a critical or abnormal lab result, we will notify you by phone as soon as possible.  Submit refill requests through Tagoodies or call your pharmacy and they will forward the refill request to us. Please allow 3 business days for your refill to be completed.          Additional Information About Your Visit        PacerProharModern Armory Information     Tagoodies lets you send messages to your doctor, view your test results, renew your prescriptions, schedule appointments and more. To sign up, go to [email or web address] . Click on \"Log in\" on the left side of the screen, which will take you to the Welcome page. Then click on \"Sign up Now\" on the right side of the page.     You will be asked to enter the access code listed below, as well as some personal information. Please follow the " "directions to create your username and password.     Your access code is: [email or web address]  Expires: 2018  6:59 PM     Your access code will  in 90 days. If you need help or a new code, please call your [facility] clinic or [phone].        Care EveryWhere ID     This is your Care EveryWhere ID. This could be used by other organizations to access your [facility] medical records  [id number]        Your Vitals Were     Pulse Temperature Respirations Height BMI (Body Mass Index)       86 98.3  F (36.8  C) (Tympanic) 18 1.727 m (5' 8\") 34.27 kg/m2        Blood Pressure from Last 3 Encounters:   18 128/70   18 140/76   18 115/72    Weight from Last 3 Encounters:   18 102.2 kg (225 lb 6.4 oz)   18 96.3 kg (212 lb 6.4 oz)   18 103.6 kg (228 lb 8 oz)              We Performed the Following     Crystal-Operative Worksheet ENT Adult Default Surgery Request        Primary Care Provider Office Phone # Fax #    Roma Karrie Ye, ROS [phone] [address] [facility] [address]        Equal Access to Services     BANG WAGNER : Hadii aad ku hadasho Soomaali, waaxda luqadaha, qaybta kaalmada adeegyada, waxay idiin hayaan landry pereira . So [facility] [phone].    ATENCIÓN: Si habla español, tiene a ma disposición servicios gratuitos de asistencia lingüística. Llame al [phone].    We comply with applicable federal civil rights laws and [place] laws. We do not discriminate on the basis of race, color, national origin, age, disability, sex, sexual orientation, or gender identity.            Thank you!     Thank you for choosing [facility]  for your care. Our goal is always to provide you with excellent care. Hearing back from our patients is one way we can continue to improve our services. Please take a few minutes to complete the written survey that you may receive in the mail after your visit with us. Thank you!             Your " Updated Medication List - Protect others around you: Learn how to safely use, store and throw away your medicines at www.disposemymeds.org.          This list is accurate as of 5/22/18  6:59 PM.  Always use your most recent med list.                   Brand Name Dispense Instructions for use Diagnosis    escitalopram 20 MG tablet    LEXAPRO    30 tablet    Take 1 tablet (20 mg) by mouth daily    Mild episode of recurrent major depressive disorder (H)

## 2018-05-22 NOTE — NURSING NOTE
"Chief Complaint   Patient presents with     Throat Problem     recurrent tonsillitis       Initial /70 (BP Location: Right arm, Patient Position: Chair, Cuff Size: Adult Large)  Pulse 86  Temp 98.3  F (36.8  C) (Tympanic)  Resp 18  Ht 1.727 m (5' 8\")  Wt 102.2 kg (225 lb 6.4 oz)  BMI 34.27 kg/m2 Estimated body mass index is 34.27 kg/(m^2) as calculated from the following:    Height as of this encounter: 1.727 m (5' 8\").    Weight as of this encounter: 102.2 kg (225 lb 6.4 oz).  Medications and allergies reviewed.    Sherine MEZA, BAIRON    "

## 2018-05-22 NOTE — PROGRESS NOTES
History of Present Illness - Antonia Marc is a 22 year old female seen in consultation at the request of Dr. Rivera for recurrent tonsillitis.  She reports frequent production of tonsil stones which are foul-smelling and sometimes get discomfort in her throat.  This is been ongoing for several months.  She denies any frequent strep throat.  She has tried clearing them on her own manually with a Q-tip but is not always successful.    Past Medical History -   Patient Active Problem List   Diagnosis     Flat feet     Irregular menstrual bleeding     Recurrent major depressive disorder, in remission (H)     Anxiety     JAZZY (generalized anxiety disorder)     Mild episode of recurrent major depressive disorder (H)     Recurrent tonsillitis       Current Medications -   Current Outpatient Prescriptions:      escitalopram (LEXAPRO) 20 MG tablet, Take 1 tablet (20 mg) by mouth daily, Disp: 30 tablet, Rfl: 5     [DISCONTINUED] escitalopram (LEXAPRO) 10 MG tablet, Take 1 tablet (10 mg) by mouth daily, Disp: 30 tablet, Rfl: 11    Allergies - No Known Allergies    Social History -   Social History     Social History     Marital status: Single     Spouse name: N/A     Number of children: N/A     Years of education: N/A     Social History Main Topics     Smoking status: Never Smoker     Smokeless tobacco: Never Used      Comment: no exporure     Alcohol use No     Drug use: No     Sexual activity: No     Other Topics Concern     Not on file     Social History Narrative       Family History -   Family History   Problem Relation Age of Onset     DIABETES Mother      Hypertension Mother      DIABETES Father      Hypertension Father      Crohn Disease Father      Hypertension Maternal Grandmother      Anxiety Disorder Maternal Grandmother      Hypertension Maternal Grandfather      DIABETES Maternal Grandfather      Anxiety Disorder Maternal Grandfather        Review of Systems - As per HPI and PMHx, otherwise 7 system  "review of the head and neck negative. 10+ system review negative.    Physical Exam  /70 (BP Location: Right arm, Patient Position: Chair, Cuff Size: Adult Large)  Pulse 86  Temp 98.3  F (36.8  C) (Tympanic)  Resp 18  Ht 1.727 m (5' 8\")  Wt 102.2 kg (225 lb 6.4 oz)  BMI 34.27 kg/m2  General - The patient is well nourished and well developed, and appears to have good nutritional status.  Alert and oriented to person and place, answers questions and cooperates with examination appropriately.   Head and Face - Normocephalic and atraumatic, with no gross asymmetry noted of the contour of the facial features.  The facial nerve is intact, with strong symmetric movements.  Voice and Breathing - The patient was breathing comfortably without the use of accessory muscles. There was no wheezing, stridor, or stertor.  The patients voice was clear and strong, and had appropriate pitch and quality.  Ears - Bilateral pinna and EACs with normal appearing overlying skin. Tympanic membrane intact with good mobility on pneumatic otoscopy bilaterally. Bony landmarks of the ossicular chain are normal. The tympanic membranes are normal in appearance. No retraction, perforation, or masses.  No fluid or purulence was seen in the external canal or the middle ear.   Eyes - Extraocular movements intact.  Sclera were not icteric or injected, conjunctiva were pink and moist.  Mouth - Examination of the oral cavity showed pink, healthy oral mucosa. No lesions or ulcerations noted.  The tongue was mobile and midline, and the dentition were in good condition.    Throat - The walls of the oropharynx were smooth, pink, moist, symmetric, and had no lesions or ulcerations.  The tonsillar pillars and soft palate were symmetric.  The uvula was midline on elevation.  Neck - Normal midline excursion of the laryngotracheal complex during swallowing.  Full range of motion on passive movement.  Palpation of the occipital, submental, submandibular, " internal jugular chain, and supraclavicular nodes did not demonstrate any abnormal lymph nodes or masses.  The carotid pulse was palpable bilaterally.  Palpation of the thyroid was soft and smooth, with no nodules or goiter appreciated.  The trachea was mobile and midline.  Nose - External contour is symmetric, no gross deflection or scars.  Nasal mucosa is pink and moist with no abnormal mucus.  The septum was midline and non-obstructive, turbinates of normal size and position.  No polyps, masses, or purulence noted on examination.  Heart:  Regular rate and rhythm, no murmurs.  Lungs:  Chest clear to auscultation bilaterally          Assessment - Antonia Marc is a 22 year old female with chronic tonsillitis with tonsil stones. Based on the physical exam and history, my recommendation is for tonsillectomy (without adenoidectomy).  The remainder of the visit was spent discussing the risks and benefits of tonsillectomy.  These included:  The risks of general anesthesia, bleeding, infection, possible need for emergency surgery to control bleeding, possible alteration of speech and swallowing, and even the possibility of continued throat problems following surgery.  They understood and wished to call in and schedule.         Dr. Galilea Good MD  Otolaryngology  [facility]

## 2018-06-27 ENCOUNTER — OFFICE VISIT (OUTPATIENT)
Dept: FAMILY MEDICINE | Facility: CLINIC | Age: 23
End: 2018-06-27
Payer: COMMERCIAL

## 2018-06-27 VITALS
DIASTOLIC BLOOD PRESSURE: 78 MMHG | TEMPERATURE: 98.9 F | HEIGHT: 68 IN | BODY MASS INDEX: 35.03 KG/M2 | SYSTOLIC BLOOD PRESSURE: 128 MMHG | WEIGHT: 231.1 LBS | HEART RATE: 80 BPM | OXYGEN SATURATION: 98 %

## 2018-06-27 DIAGNOSIS — J03.91 ACUTE RECURRENT TONSILLITIS: Primary | ICD-10-CM

## 2018-06-27 LAB
DEPRECATED S PYO AG THROAT QL EIA: NORMAL
SPECIMEN SOURCE: NORMAL

## 2018-06-27 PROCEDURE — 99213 OFFICE O/P EST LOW 20 MIN: CPT | Performed by: NURSE PRACTITIONER

## 2018-06-27 PROCEDURE — 87081 CULTURE SCREEN ONLY: CPT | Performed by: NURSE PRACTITIONER

## 2018-06-27 PROCEDURE — 87880 STREP A ASSAY W/OPTIC: CPT | Performed by: NURSE PRACTITIONER

## 2018-06-27 RX ORDER — PREDNISONE 20 MG/1
20 TABLET ORAL DAILY
Qty: 5 TABLET | Refills: 0 | Status: SHIPPED | OUTPATIENT
Start: 2018-06-27 | End: 2018-07-03

## 2018-06-27 NOTE — PATIENT INSTRUCTIONS
Reschedule pre-op for next week    Your rapid strep test was negative.  We'll call you if the confirmatory culture shows anything different.    This looks like a viral infection causing your sore throat.  Unfortunately there's not much we can do to get this better faster, it's mostly letting it run its course.    What we can do is work on symptomatic measures including increased fluids, rest, appropriate use of tylenol and ibuprofen, throat drops/lozenges, increased fluids, and licorice/mint teas.    We'll contact you by phone if your confirmatory throat culture is positive.    Follow-up if symptoms persist without improvement for more than 1 week or if symptoms worsening or changing notably in the meantime.

## 2018-06-27 NOTE — PROGRESS NOTES
"  SUBJECTIVE:   Antonia Marc is a 22 year old female who presents to clinic today for the following health issues: the patient initially scheduled for pre-op evaluation due to upcoming tonsillectomy, but due to complains of sore throat, cough, ear pain I recommended patient to reschedule pre-op since I cannot clear her for surgery due to URI.    The patient has history of chronic tonsillitis and tonsil stones.     ENT Symptoms             Symptoms: cc Present Absent Comment   Fever/Chills   x    Fatigue  x     Muscle Aches  x     Eye Irritation  x     Sneezing  x     Nasal Spencer/Drg  x     Sinus Pressure/Pain  x     Loss of smell   x    Dental pain   x    Sore Throat  x     Swollen Glands  x     Ear Pain/Fullness  x  Both ears off and on    Cough  x  Coughing up blood when she coughs too hard    Wheeze   x    Chest Pain   x    Shortness of breath   x    Rash   x    Other  x  Tonsil stones      Symptom duration: 4 days    Symptom severity:  moderate   Treatments tried: Mucinex, tylenol    Contacts: None      Problem list and histories reviewed & adjusted, as indicated.  Additional history: as documented    Labs reviewed in EPIC    Reviewed and updated as needed this visit by clinical staff  Allergies       Reviewed and updated as needed this visit by Provider         ROS:  Constitutional, HEENT, cardiovascular, pulmonary, gi and gu systems are negative, except as otherwise noted.    OBJECTIVE:     /78  Pulse 80  Temp 98.9  F (37.2  C) (Tympanic)  Ht 5' 8\" (1.727 m)  Wt 231 lb 1.6 oz (104.8 kg)  SpO2 98%  BMI 35.14 kg/m2  Body mass index is 35.14 kg/(m^2).  GENERAL: healthy, alert and no distress  EYES: Eyes grossly normal to inspection, PERRL and conjunctivae and sclerae normal  HENT: normal cephalic/atraumatic, ear canals and TM's normal, nasal mucosa edematous , tonsillar hypertrophy, tonsillar erythema and sinuses: not tender  NECK: bilateral anterior cervical adenopathy  RESP: lungs clear to " auscultation - no rales, rhonchi or wheezes  CV: regular rate and rhythm, normal S1 S2, no S3 or S4, no murmur, click or rub, no peripheral edema and peripheral pulses strong  PSYCH: mentation appears normal, affect normal/bright    Diagnostic Test Results:  Strep screen - Negative    ASSESSMENT/PLAN:     1. Acute recurrent tonsillitis  - Strep, Rapid Screen-negative  - Beta strep group A culture-pending  -discussed symptomatic treatment, see AVS  - predniSONE (DELTASONE) 20 MG tablet; Take 1 tablet (20 mg) by mouth daily  Dispense: 5 tablet; Refill: 0  -reschedule pre-op for next week     See Patient Instructions    ROS [facility]

## 2018-06-27 NOTE — MR AVS SNAPSHOT
After Visit Summary   6/27/2018    Antonia Marc    MRN: [id number]           Patient Information     Date Of Birth          1995        Visit Information        Provider Department      6/27/2018 1:40 PM Roma Ye APRN CNP [facility]        Today's Diagnoses     Acute recurrent tonsillitis    -  1      Care Instructions    Reschedule pre-op for next week    Your rapid strep test was negative.  We'll call you if the confirmatory culture shows anything different.    This looks like a viral infection causing your sore throat.  Unfortunately there's not much we can do to get this better faster, it's mostly letting it run its course.    What we can do is work on symptomatic measures including increased fluids, rest, appropriate use of tylenol and ibuprofen, throat drops/lozenges, increased fluids, and licorice/mint teas.    We'll contact you by phone if your confirmatory throat culture is positive.    Follow-up if symptoms persist without improvement for more than 1 week or if symptoms worsening or changing notably in the meantime.                  Follow-ups after your visit        Your next 10 appointments already scheduled     Jul 09, 2018   Procedure with Galilea Good MD   [facility] (--)    [address] [facility] [address] [phone]           The medical center is located at [address] [facility]. (between [address] in [place], four miles north of [place]).              Who to contact     If you have questions or need follow up information about today's clinic visit or your schedule please contact [facility] directly at [phone].  Normal or non-critical lab and imaging results will be communicated to you by MyChart, letter or phone within 4 business days after the clinic has received the results. If you do not hear from us within 7 days, please contact the clinic through MyChart or phone. If you have  "a critical or abnormal lab result, we will notify you by phone as soon as possible.  Submit refill requests through Bontera or call your pharmacy and they will forward the refill request to us. Please allow 3 business days for your refill to be completed.          Additional Information About Your Visit        Care EveryWhere ID     This is your Care EveryWhere ID. This could be used by other organizations to access your [facility] medical records  [id number]        Your Vitals Were     Pulse Temperature Height Pulse Oximetry BMI (Body Mass Index)       80 98.9  F (37.2  C) (Tympanic) 5' 8\" (1.727 m) 98% 35.14 kg/m2        Blood Pressure from Last 3 Encounters:   06/27/18 128/78   05/22/18 128/70   04/07/18 140/76    Weight from Last 3 Encounters:   06/27/18 231 lb 1.6 oz (104.8 kg)   05/22/18 225 lb 6.4 oz (102.2 kg)   04/04/18 212 lb 6.4 oz (96.3 kg)              We Performed the Following     Beta strep group A culture     Strep, Rapid Screen        Primary Care Provider Office Phone # Fax #    Roma Yoon Cassi, ROS [phone] [address] [facility] [address]        Equal Access to Services     BANG WAGNER : Hadii laila ku hadasho Soomaali, waaxda luqadaha, qaybta kaalmada adeegyada, waxay idiin haydeeptin landry pereiar . So [facility] [phone].    ATENCIÓN: Si habla español, tiene a ma disposición servicios gratuitos de asistencia lingüística. Llame al [phone].    We comply with applicable federal civil rights laws and [place] laws. We do not discriminate on the basis of race, color, national origin, age, disability, sex, sexual orientation, or gender identity.            Thank you!     Thank you for choosing [facility]  for your care. Our goal is always to provide you with excellent care. Hearing back from our patients is one way we can continue to improve our services. Please take a few minutes to complete the written survey that you may receive in the " mail after your visit with us. Thank you!             Your Updated Medication List - Protect others around you: Learn how to safely use, store and throw away your medicines at www.disposemymeds.org.          This list is accurate as of 6/27/18  2:23 PM.  Always use your most recent med list.                   Brand Name Dispense Instructions for use Diagnosis    escitalopram 20 MG tablet    LEXAPRO    30 tablet    Take 1 tablet (20 mg) by mouth daily    Mild episode of recurrent major depressive disorder (H)

## 2018-06-28 LAB
BACTERIA SPEC CULT: NORMAL
SPECIMEN SOURCE: NORMAL

## 2018-07-01 ENCOUNTER — ANESTHESIA EVENT (OUTPATIENT)
Dept: SURGERY | Facility: CLINIC | Age: 23
End: 2018-07-01
Payer: COMMERCIAL

## 2018-07-05 ENCOUNTER — OFFICE VISIT (OUTPATIENT)
Dept: FAMILY MEDICINE | Facility: CLINIC | Age: 23
End: 2018-07-05
Payer: COMMERCIAL

## 2018-07-05 VITALS
DIASTOLIC BLOOD PRESSURE: 80 MMHG | HEART RATE: 72 BPM | HEIGHT: 68 IN | TEMPERATURE: 99.7 F | WEIGHT: 235 LBS | BODY MASS INDEX: 35.61 KG/M2 | SYSTOLIC BLOOD PRESSURE: 120 MMHG

## 2018-07-05 DIAGNOSIS — Z01.818 PREOP GENERAL PHYSICAL EXAM: Primary | ICD-10-CM

## 2018-07-05 DIAGNOSIS — J03.90 TONSILLITIS: ICD-10-CM

## 2018-07-05 PROCEDURE — 99214 OFFICE O/P EST MOD 30 MIN: CPT | Performed by: NURSE PRACTITIONER

## 2018-07-05 RX ORDER — TRAMADOL HYDROCHLORIDE 50 MG/1
50 TABLET ORAL EVERY 6 HOURS PRN
COMMUNITY
End: 2018-07-24

## 2018-07-05 RX ORDER — LIDOCAINE 40 MG/G
CREAM TOPICAL
Status: CANCELLED | OUTPATIENT
Start: 2018-07-05

## 2018-07-05 RX ORDER — SODIUM CHLORIDE, SODIUM LACTATE, POTASSIUM CHLORIDE, CALCIUM CHLORIDE 600; 310; 30; 20 MG/100ML; MG/100ML; MG/100ML; MG/100ML
INJECTION, SOLUTION INTRAVENOUS CONTINUOUS
Status: CANCELLED | OUTPATIENT
Start: 2018-07-05

## 2018-07-05 NOTE — PROGRESS NOTES
"  [facility]  [address] [address]  [phone]  Dept: [phone]    PRE-OP EVALUATION:  Today's date: 2018    Antonia Marc (: 1995) presents for pre-operative evaluation assessment as requested by Dr. Good.  She requires evaluation and anesthesia risk assessment prior to undergoing surgery/procedure for treatment of recurrent tonsillitis .    Proposed Surgery/ Procedure: bilateral Tonsillectomy  Date of Surgery/ Procedure: 2018  Time of Surgery/ Procedure: 820  Hospital/Surgical Facility: [facility]    Primary Physician: Roma Ye  Type of Anesthesia Anticipated: General    Patient has a Health Care Directive or Living Will:  {YES/NO:315484::\"NO\"}    {PREOP QUESTIONNAIRE OPTIONS(by MA):670818::\"1. NO - Do you have a history of heart attack, stroke, stent, bypass or surgery on an artery in the head, neck, heart or legs?\",\"2. NO - Do you ever have any pain or discomfort in your chest?\",\"3. NO - Do you have a history of  Heart Failure?\",\"4. NO - Are you troubled by shortness of breath when: walking on the level, up a slight hill or at night?\",\"5. NO - Do you currently have a cold, bronchitis or other respiratory infection?\",\"6. NO - Do you have a cough, shortness of breath or wheezing?\",\"7. NO - Do you sometimes get pains in the calves of your legs when you walk?\",\"8. NO - Do you or anyone in your family have previous history of blood clots?\",\"9. NO - Do you or does anyone in your family have a serious bleeding problem such as prolonged bleeding following surgeries or cuts?\",\"10. NO - Have you ever had problems with anemia or been told to take iron pills?\",\"11. NO - Have you had any abnormal blood loss such as black, tarry or bloody stools, or abnormal vaginal bleeding?\",\"12. NO - Have you ever had a blood transfusion?\",\"13. NO - Have you or any of your relatives ever had problems with anesthesia?\",\"14. NO - Do you have sleep apnea, excessive " "snoring or daytime drowsiness?\",\"15. NO - Do you have any prosthetic heart valves?\",\"16. NO - Do you have prosthetic joints?\",\"17. NO - Is there any chance that you may be pregnant?\"}      HPI:     HPI related to upcoming procedure: ***      {. Problems:758840}    MEDICAL HISTORY:     Patient Active Problem List    Diagnosis Date Noted     Recurrent tonsillitis 2018     Priority: Medium     Recurrent major depressive disorder, in remission (H) 2017     Priority: Medium     Anxiety 2017     Priority: Medium     JAZZY (generalized anxiety disorder) 2017     Priority: Medium     Mild episode of recurrent major depressive disorder (H) 2017     Priority: Medium     Irregular menstrual bleeding 2012     Priority: Medium     Flat feet 2011     Priority: Medium      Past Medical History:   Diagnosis Date     Closed fracture of unspecified part of radius with ulna(813.83)      Other diseases of trachea and bronchus, not elsewhere classified     tracheomalacia as a       No past surgical history on file.  Current Outpatient Prescriptions   Medication Sig Dispense Refill     escitalopram (LEXAPRO) 20 MG tablet Take 1 tablet (20 mg) by mouth daily 30 tablet 5     OTC products: {OTC ANALGESICS:027660}    No Known Allergies   Latex Allergy: {YES/NO WITH DEFAULT:444135::\"NO\"}    Social History   Substance Use Topics     Smoking status: Never Smoker     Smokeless tobacco: Never Used      Comment: no exporure     Alcohol use No     History   Drug Use No       REVIEW OF SYSTEMS:   {ROS Preop Choices:244729}    EXAM:   There were no vitals taken for this visit.  {EXAM Preop Choices:632734}    DIAGNOSTICS:   {DIAGNOSTIC FOR PREOP:446054}    Recent Labs   Lab Test  18   2255  17   1820  17   1740   HGB  14.2   --   14.8   PLT  291   --   302   INR   --   1.14  Canceled, Test credited   Unsatisfactory specimen - hemolyzed  Notified HERMAN Meraz 17 8471      NA  139  " "141  Canceled, Test credited   Unsatisfactory specimen - hemolyzed  Notified ERICA MerazED 5/29/17 1756 JM     POTASSIUM  3.2*  3.9  Canceled, Test credited   Unsatisfactory specimen - hemolyzed  Notified ERICA MerazED 5/29/17 1756 JM     CR  0.77  0.79  Canceled, Test credited   Unsatisfactory specimen - hemolyzed  Notified ERICA MerazED 5/29/17 1756 JM          IMPRESSION:   {PREOP REASONS:089153::\"Reason for surgery/procedure: ***\",\"Diagnosis/reason for consult: ***\"}    The proposed surgical procedure is considered {HIGH=major cardiovascular or procedures requiring prolonged anesthesia >4 hours or large fluid shifts;    INTERMEDIATE=abdominal, most orthopedic and intrathoracic surgery; LOW= endoscopy, cataract and breast surgery:665795} risk.    REVISED CARDIAC RISK INDEX  The patient has the following serious cardiovascular risks for perioperative complications such as (MI, PE, VFib and 3  AV Block):  {PREOP REVISED CARDIAC INDEX (RCI):157043:p:\"No serious cardiac risks\"}  INTERPRETATION: {REVISED CARDIAC RISK INTERPRETATION:866060}    The patient has the following additional risks for perioperative complications:  {Additional perioperative risks:982222:p:\"No identified additional risks\"}      ICD-10-CM    1. Preop general physical exam Z01.818        RECOMMENDATIONS:     {IMPORTANT - Conditions - complete carefully!!:927285}    {IMPORTANT - Medications:306166::\"--Patient is to take all scheduled medications on the day of surgery EXCEPT for modifications listed below.\"}    {IMPORTANT - Approval:438634:p:\"APPROVAL GIVEN to proceed with proposed procedure, without further diagnostic evaluation\"}       Signed Electronically by: ROS Hansen CNP    Copy of this evaluation report is provided to requesting physician.    [facility] Preop Guidelines    Revised Cardiac Risk Index  "

## 2018-07-05 NOTE — MR AVS SNAPSHOT
After Visit Summary   7/5/2018    Antonia Marc    MRN: [id number]           Patient Information     Date Of Birth          1995        Visit Information        Provider Department      7/5/2018 3:40 PM Roma Ye APRN [facility]        Today's Diagnoses     Preop general physical exam    -  1    Tonsillitis          Care Instructions      Before Your Surgery  Augmentin 1 tablet twice daily for 7 days   Avoid NSAID's, will have to stop ALL NSAID's 5-7 days prior surgery  Nothing to eat and drink for 8 hrs       Call your surgeon if there is any change in your health. This includes signs of a cold or flu (such as a sore throat, runny nose, cough, rash or fever).    Do not smoke, drink alcohol or take over the counter medicine (unless your surgeon or primary care doctor tells you to) for the 24 hours before and after surgery.    If you take prescribed drugs: Follow your doctor s orders about which medicines to take and which to stop until after surgery.    Eating and drinking prior to surgery: follow the instructions from your surgeon    Take a shower or bath the night before surgery. Use the soap your surgeon gave you to gently clean your skin. If you do not have soap from your surgeon, use your regular soap. Do not shave or scrub the surgery site.  Wear clean pajamas and have clean sheets on your bed.     Before Your Surgery      Call your surgeon if there is any change in your health. This includes signs of a cold or flu (such as a sore throat, runny nose, cough, rash or fever).    Do not smoke, drink alcohol or take over the counter medicine (unless your surgeon or primary care doctor tells you to) for the 24 hours before and after surgery.    If you take prescribed drugs: Follow your doctor s orders about which medicines to take and which to stop until after surgery.    Eating and drinking prior to surgery: follow the instructions from your surgeon    Take a  "shower or bath the night before surgery. Use the soap your surgeon gave you to gently clean your skin. If you do not have soap from your surgeon, use your regular soap. Do not shave or scrub the surgery site.  Wear clean pajamas and have clean sheets on your bed.           Follow-ups after your visit        Your next 10 appointments already scheduled     Jul 09, 2018   Procedure with Galilea Good MD   [facility] (--)    [address] [address] [phone]           The medical center is located at [address]. (between [address] in [place], [place] north of [place]).              Who to contact     If you have questions or need follow up information about today's clinic visit or your schedule please contact [facility] directly at [phone].  Normal or non-critical lab and imaging results will be communicated to you by MyChart, letter or phone within 4 business days after the clinic has received the results. If you do not hear from us within 7 days, please contact the clinic through MyChart or phone. If you have a critical or abnormal lab result, we will notify you by phone as soon as possible.  Submit refill requests through [employer] or call your pharmacy and they will forward the refill request to us. Please allow 3 business days for your refill to be completed.          Additional Information About Your Visit        Care EveryWhere ID     This is your Care EveryWhere ID. This could be used by other organizations to access your [place] medical records  [id number]        Your Vitals Were     Pulse Temperature Height BMI (Body Mass Index)          72 99.7  F (37.6  C) (Tympanic) 5' 8\" (1.727 m) 35.73 kg/m2         Blood Pressure from Last 3 Encounters:   07/05/18 120/80   06/27/18 128/78   05/22/18 128/70    Weight from Last 3 Encounters:   07/05/18 235 lb (106.6 kg)   06/27/18 231 lb 1.6 oz (104.8 kg)   05/22/18 225 lb 6.4 oz " (102.2 kg)              Today, you had the following     No orders found for display         Today's Medication Changes          These changes are accurate as of 7/5/18  4:35 PM.  If you have any questions, ask your nurse or doctor.               Start taking these medicines.        Dose/Directions    amoxicillin-clavulanate 875-125 MG per tablet   Commonly known as:  AUGMENTIN   Used for:  Tonsillitis   Started by:  Roma Ye APRN CNP        Dose:  1 tablet   Take 1 tablet by mouth 2 times daily for 7 days   Quantity:  14 tablet   Refills:  0            Where to get your medicines      These medications were sent to [facility] [place], [place] - [address]  [address] [address]     Phone:  [phone]     amoxicillin-clavulanate 875-125 MG per tablet               Information about OPIOIDS     PRESCRIPTION OPIOIDS: WHAT YOU NEED TO KNOW   We gave you an opioid (narcotic) pain medicine. It is important to manage your pain, but opioids are not always the best choice. You should first try all the other options your care team gave you. Take this medicine for as short a time (and as few doses) as possible.     These medicines have risks:    DO NOT drive when on new or higher doses of pain medicine. These medicines can affect your alertness and reaction times, and you could be arrested for driving under the influence (DUI). If you need to use opioids long-term, talk to your care team about driving.    DO NOT operate heave machinery    DO NOT do any other dangerous activities while taking these medicines.     DO NOT drink any alcohol while taking these medicines.      If the opioid prescribed includes acetaminophen, DO NOT take with any other medicines that contain acetaminophen. Read all labels carefully. Look for the word  acetaminophen  or  Tylenol.  Ask your pharmacist if you have questions or are unsure.    You can get addicted to pain medicines, especially if you  have a history of addiction (chemical, alcohol or substance dependence). Talk to your care team about ways to reduce this risk.    Store your pills in a secure place, locked if possible. We will not replace any lost or stolen medicine. If you don t finish your medicine, please throw away (dispose) as directed by your pharmacist. The [place] Pollution Control Agency has more information about safe disposal: https://www.pca.Replaced by [facility].[place]/living-green/managing-unwanted-medications.     All opioids tend to cause constipation. Drink plenty of water and eat foods that have a lot of fiber, such as fruits, vegetables, prune juice, apple juice and high-fiber cereal. Take a laxative (Miralax, milk of magnesia, Colace, Senna) if you don t move your bowels at least every other day.          Primary Care Provider Office Phone # Fax #    Roma Yoon ROS Ye [phone] [address] [facility] [address] [address]        Equal Access to Services     BANG WAGNER : Hadii aad ku hadasho Soomaali, waaxda luqadaha, qaybta kaalmada adeegyada, waxay cedric pereira . So [facility] [phone].    ATENCIÓN: Si habla español, tiene a ma disposición servicios gratuitos de asistencia lingüística. Joaquiname al [phone].    We comply with applicable federal civil rights laws and [place] laws. We do not discriminate on the basis of race, color, national origin, age, disability, sex, sexual orientation, or gender identity.            Thank you!     Thank you for choosing [facility]  for your care. Our goal is always to provide you with excellent care. Hearing back from our patients is one way we can continue to improve our services. Please take a few minutes to complete the written survey that you may receive in the mail after your visit with us. Thank you!             Your Updated Medication List - Protect others around you: Learn how to safely use, store and throw away your medicines at  www.disposemymeds.org.          This list is accurate as of 7/5/18  4:35 PM.  Always use your most recent med list.                   Brand Name Dispense Instructions for use Diagnosis    amoxicillin-clavulanate 875-125 MG per tablet    AUGMENTIN    14 tablet    Take 1 tablet by mouth 2 times daily for 7 days    Tonsillitis       escitalopram 20 MG tablet    LEXAPRO    30 tablet    Take 1 tablet (20 mg) by mouth daily    Mild episode of recurrent major depressive disorder (H)       traMADol 50 MG tablet    ULTRAM     Take 50 mg by mouth every 6 hours as needed for severe pain

## 2018-07-05 NOTE — PROGRESS NOTES
[facility]  [address] [place] [address]  [phone]  Dept: [phone]    PRE-OP EVALUATION:  Today's date: 2018    Antonia Marc (: 1995) presents for pre-operative evaluation assessment as requested by Dr. Good.  She requires evaluation and anesthesia risk assessment prior to undergoing surgery/procedure for treatment of chronic tonsillitis .    Proposed Surgery/ Procedure: bilateral tonsillectomy  Date of Surgery/ Procedure: 2018  Time of Surgery/ Procedure: 820  Hospital/Surgical Facility: [facility]    Primary Physician: Roma Ye  Type of Anesthesia Anticipated: General    Patient has a Health Care Directive or Living Will:  NO    1. NO - Do you have a history of heart attack, stroke, stent, bypass or surgery on an artery in the head, neck, heart or legs?  2. NO - Do you ever have any pain or discomfort in your chest?  3. NO - Do you have a history of  Heart Failure?  4. NO - Are you troubled by shortness of breath when: walking on the level, up a slight hill or at night?  5. YES - DO YOU CURRENTLY HAVE A COLD, BRONCHITIS OR OTHER RESPIRATORY INFECTION? Still have sore throat, tested negative for strep last week, has history of chronic tonsillitis  6. YES - DO YOU HAVE A COUGH, SHORTNESS OF BREATH OR WHEEZING? Mild postinfectious cough, improved.   7. NO - Do you sometimes get pains in the calves of your legs when you walk?  8. NO - Do you or anyone in your family have previous history of blood clots?  9. NO - Do you or does anyone in your family have a serious bleeding problem such as prolonged bleeding following surgeries or cuts?  10. NO - Have you ever had problems with anemia or been told to take iron pills?  11. NO - Have you had any abnormal blood loss such as black, tarry or bloody stools, or abnormal vaginal bleeding?  12. NO - Have you ever had a blood transfusion?  13. NO - Have you or any of your relatives ever had problems with  anesthesia?  14. NO - Do you have sleep apnea, excessive snoring or daytime drowsiness?  15. NO - Do you have any prosthetic heart valves?  16. NO - Do you have prosthetic joints?  17. NO - Is there any chance that you may be pregnant?      HPI:     HPI related to upcoming procedure: history of chronic tonsillitis, Dr. Good recommended tonsillectomy   The patient is feeling well today, she is still recovering after recent URI, still have rare mild cough, complains of sore throat. Denies fever, chills, diaphoresis, productive cough, wheezing and shortness of breath.    See problem list for active medical problems.  Problems all longstanding and stable, except as noted/documented.  See ROS for pertinent symptoms related to these conditions.                                                                                                                                                          .    MEDICAL HISTORY:     Patient Active Problem List    Diagnosis Date Noted     Recurrent tonsillitis 2018     Priority: Medium     Recurrent major depressive disorder, in remission (H) 2017     Priority: Medium     Anxiety 2017     Priority: Medium     JAZZY (generalized anxiety disorder) 2017     Priority: Medium     Mild episode of recurrent major depressive disorder (H) 2017     Priority: Medium     Irregular menstrual bleeding 2012     Priority: Medium     Flat feet 2011     Priority: Medium      Past Medical History:   Diagnosis Date     Closed fracture of unspecified part of radius with ulna(813.83)      Other diseases of trachea and bronchus, not elsewhere classified     tracheomalacia as a       No past surgical history on file.  Current Outpatient Prescriptions   Medication Sig Dispense Refill     amoxicillin-clavulanate (AUGMENTIN) 875-125 MG per tablet Take 1 tablet by mouth 2 times daily for 7 days 14 tablet 0     escitalopram (LEXAPRO) 20 MG tablet Take 1 tablet (20  "mg) by mouth daily 30 tablet 5     traMADol (ULTRAM) 50 MG tablet Take 50 mg by mouth every 6 hours as needed for severe pain       OTC products:    No Known Allergies   Latex Allergy: NO    Social History   Substance Use Topics     Smoking status: Never Smoker     Smokeless tobacco: Never Used      Comment: no exporure     Alcohol use No     History   Drug Use No       REVIEW OF SYSTEMS:   CONSTITUTIONAL: NEGATIVE for fever, chills, change in weight  INTEGUMENTARY/SKIN: NEGATIVE for worrisome rashes, moles or lesions  EYES: NEGATIVE for vision changes or irritation  ENT/MOUTH: POSITIVE for chronic sore throat, history of chronic tonsillitis   RESP:NEGATIVE for significant productive cough or SOB and POSITIVE for mild cough  CV: NEGATIVE for chest pain, palpitations or peripheral edema  GI: NEGATIVE for nausea, abdominal pain, heartburn, or change in bowel habits  : NEGATIVE for frequency, dysuria, or hematuria  MUSCULOSKELETAL: NEGATIVE for significant arthralgias or myalgia  NEURO: NEGATIVE for weakness, dizziness or paresthesias  ENDOCRINE: NEGATIVE for temperature intolerance, skin/hair changes  HEME: NEGATIVE for bleeding problems  PSYCHIATRIC: NEGATIVE for changes in mood or affect    EXAM:   /80 (BP Location: Left arm)  Pulse 72  Temp 99.7  F (37.6  C) (Tympanic)  Ht 5' 8\" (1.727 m)  Wt 235 lb (106.6 kg)  BMI 35.73 kg/m2    GENERAL APPEARANCE: healthy, alert and no distress     EYES: EOMI, PERRL     HENT: ear canals and TM's normal and nose and mouth without ulcers or lesions     NECK: no adenopathy, no asymmetry, masses, or scars and thyroid normal to palpation     RESP: lungs clear to auscultation - no rales, rhonchi or wheezes     CV: regular rates and rhythm, normal S1 S2, no S3 or S4 and no murmur, click or rub     CV: regular rates and rhythm, normal S1 S2, no S3 or S4, no murmur, click or rub, no irregular beats and peripheral pulses strong     ABDOMEN:  soft, nontender, no HSM or masses " and bowel sounds normal     MS: extremities normal- no gross deformities noted, no evidence of inflammation in joints, FROM in all extremities.     SKIN: no suspicious lesions or rashes     NEURO: Normal strength and tone, sensory exam grossly normal, mentation intact and speech normal     PSYCH: mentation appears normal. and affect normal/bright     LYMPHATICS: No cervical adenopathy    DIAGNOSTICS:   No labs or EKG required for low risk surgery (cataract, skin procedure, breast biopsy, etc)    Recent Labs   Lab Test  03/16/18   2255  05/29/17   1820  05/29/17   1740   HGB  14.2   --   14.8   PLT  291   --   302   INR   --   1.14  Canceled, Test credited   Unsatisfactory specimen - hemolyzed  Notified Mariya, [facility] 5/29/17 1756 JM     NA  139  141  Canceled, Test credited   Unsatisfactory specimen - hemolyzed  Notified Mariya, [facility] 5/29/17 1756 JM     POTASSIUM  3.2*  3.9  Canceled, Test credited   Unsatisfactory specimen - hemolyzed  Notified Mariya, [facility] 5/29/17 1756 JM     CR  0.77  0.79  Canceled, Test credited   Unsatisfactory specimen - hemolyzed  Notified Mariya, [facility] 5/29/17 1756 JM          IMPRESSION:   Reason for surgery/procedure: chronic tonsillitis   Diagnosis/reason for consult: pre-op evaluation     The proposed surgical procedure is considered INTERMEDIATE risk.    REVISED CARDIAC RISK INDEX  The patient has the following serious cardiovascular risks for perioperative complications such as (MI, PE, VFib and 3  AV Block):  No serious cardiac risks  INTERPRETATION: 0 risks: Class I (very low risk - 0.4% complication rate)    The patient has the following additional risks for perioperative complications:  No identified additional risks    (Z01.818) Preop general physical exam  (primary encounter diagnosis)  Comment: no history of cervical, neck problems, bleeding disorders, diabetes, hypertension or other risk factors for tonsillectomy.  She is not sexually active, so pregnancy screening was deferred     Currently taking NSAID's due to wrist work related injury, strongly advised to stop all NSAID's 5-7 days prior surgery. Due to wrist injury patient is planning to reschedule surgery in 2 weeks from today, I told patient that pre-op evaluation is good for 30 days, if she decide to post-pone surgery for over 30 days she will need to have another pre-op     (J03.90) Tonsillitis  Comment: due to recent URI and since patient still have symptoms of tonsillitis ongoing for over 10 days recommended antibiotic treatment prior surgery  Plan: amoxicillin-clavulanate (AUGMENTIN) 875-125 MG         per tablet twice daily for 7 days           RECOMMENDATIONS:     --Patient is to take all scheduled medications on the day of surgery EXCEPT for modifications listed below. Stop all NSAID's. Okay to continue Lexapro.     APPROVAL GIVEN to proceed with proposed procedure, without further diagnostic evaluation       Signed Electronically by: ROS Hansen CNP    Copy of this evaluation report is provided to requesting physician.    [facility] Preop Guidelines    Revised Cardiac Risk Index

## 2018-07-05 NOTE — PATIENT INSTRUCTIONS
Before Your Surgery  Augmentin 1 tablet twice daily for 7 days   Avoid NSAID's, will have to stop ALL NSAID's 5-7 days prior surgery  Nothing to eat and drink for 8 hrs       Call your surgeon if there is any change in your health. This includes signs of a cold or flu (such as a sore throat, runny nose, cough, rash or fever).    Do not smoke, drink alcohol or take over the counter medicine (unless your surgeon or primary care doctor tells you to) for the 24 hours before and after surgery.    If you take prescribed drugs: Follow your doctor s orders about which medicines to take and which to stop until after surgery.    Eating and drinking prior to surgery: follow the instructions from your surgeon    Take a shower or bath the night before surgery. Use the soap your surgeon gave you to gently clean your skin. If you do not have soap from your surgeon, use your regular soap. Do not shave or scrub the surgery site.  Wear clean pajamas and have clean sheets on your bed.     Before Your Surgery      Call your surgeon if there is any change in your health. This includes signs of a cold or flu (such as a sore throat, runny nose, cough, rash or fever).    Do not smoke, drink alcohol or take over the counter medicine (unless your surgeon or primary care doctor tells you to) for the 24 hours before and after surgery.    If you take prescribed drugs: Follow your doctor s orders about which medicines to take and which to stop until after surgery.    Eating and drinking prior to surgery: follow the instructions from your surgeon    Take a shower or bath the night before surgery. Use the soap your surgeon gave you to gently clean your skin. If you do not have soap from your surgeon, use your regular soap. Do not shave or scrub the surgery site.  Wear clean pajamas and have clean sheets on your bed.

## 2018-07-06 ENCOUNTER — TELEPHONE (OUTPATIENT)
Dept: OTOLARYNGOLOGY | Facility: CLINIC | Age: 23
End: 2018-07-06

## 2018-07-06 NOTE — TELEPHONE ENCOUNTER
Reason for Call:  Other appointment    Detailed comments: pt father calling stating needs to cancel 7/9 surgery and r/s     Phone Number Patient can be reached at: Home number on file [phone] (home)    Best Time: any     Can we leave a detailed message on this number? YES    Call taken on 7/6/2018 at 9:44 AM by Amanda Castro

## 2018-07-09 ENCOUNTER — ANESTHESIA (OUTPATIENT)
Dept: SURGERY | Facility: CLINIC | Age: 23
End: 2018-07-09
Payer: COMMERCIAL

## 2018-07-09 NOTE — TELEPHONE ENCOUNTER
Late Entry from 07/06/2018; spoke with the pt to reschedule surgery for 07/23/2018.  SDS notified.    Caitlyn WINSTON CMA

## 2018-07-17 ENCOUNTER — TRANSFERRED RECORDS (OUTPATIENT)
Dept: HEALTH INFORMATION MANAGEMENT | Facility: CLINIC | Age: 23
End: 2018-07-17

## 2018-07-23 ENCOUNTER — HOSPITAL ENCOUNTER (OUTPATIENT)
Facility: CLINIC | Age: 23
Discharge: HOME | End: 2018-07-23
Attending: OTOLARYNGOLOGY | Admitting: OTOLARYNGOLOGY
Payer: COMMERCIAL

## 2018-07-23 VITALS
BODY MASS INDEX: 34.86 KG/M2 | SYSTOLIC BLOOD PRESSURE: 120 MMHG | RESPIRATION RATE: 16 BRPM | TEMPERATURE: 98.6 F | HEIGHT: 68 IN | DIASTOLIC BLOOD PRESSURE: 65 MMHG | HEART RATE: 93 BPM | OXYGEN SATURATION: 95 % | WEIGHT: 230 LBS

## 2018-07-23 DIAGNOSIS — J03.91 RECURRENT TONSILLITIS: Primary | ICD-10-CM

## 2018-07-23 LAB — HCG UR QL: NEGATIVE

## 2018-07-23 PROCEDURE — 71000027 ZZH RECOVERY PHASE 2 EACH 15 MINS: Performed by: OTOLARYNGOLOGY

## 2018-07-23 PROCEDURE — 71000012 ZZH RECOVERY PHASE 1 LEVEL 1 FIRST HR: Performed by: OTOLARYNGOLOGY

## 2018-07-23 PROCEDURE — 25000128 H RX IP 250 OP 636: Performed by: NURSE ANESTHETIST, CERTIFIED REGISTERED

## 2018-07-23 PROCEDURE — 81025 URINE PREGNANCY TEST: CPT | Performed by: NURSE ANESTHETIST, CERTIFIED REGISTERED

## 2018-07-23 PROCEDURE — 42826 REMOVAL OF TONSILS: CPT | Performed by: OTOLARYNGOLOGY

## 2018-07-23 PROCEDURE — 36000050 ZZH SURGERY LEVEL 2 1ST 30 MIN: Performed by: OTOLARYNGOLOGY

## 2018-07-23 PROCEDURE — 37000008 ZZH ANESTHESIA TECHNICAL FEE, 1ST 30 MIN: Performed by: OTOLARYNGOLOGY

## 2018-07-23 PROCEDURE — 88304 TISSUE EXAM BY PATHOLOGIST: CPT | Mod: 26 | Performed by: OTOLARYNGOLOGY

## 2018-07-23 PROCEDURE — 25000564 ZZH DESFLURANE, EA 15 MIN: Performed by: OTOLARYNGOLOGY

## 2018-07-23 PROCEDURE — 25000125 ZZHC RX 250: Performed by: NURSE ANESTHETIST, CERTIFIED REGISTERED

## 2018-07-23 PROCEDURE — 88304 TISSUE EXAM BY PATHOLOGIST: CPT | Performed by: OTOLARYNGOLOGY

## 2018-07-23 PROCEDURE — 40000306 ZZH STATISTIC PRE PROC ASSESS II: Performed by: OTOLARYNGOLOGY

## 2018-07-23 PROCEDURE — 36000052 ZZH SURGERY LEVEL 2 EA 15 ADDTL MIN: Performed by: OTOLARYNGOLOGY

## 2018-07-23 PROCEDURE — 37000009 ZZH ANESTHESIA TECHNICAL FEE, EACH ADDTL 15 MIN: Performed by: OTOLARYNGOLOGY

## 2018-07-23 RX ORDER — SODIUM CHLORIDE, SODIUM LACTATE, POTASSIUM CHLORIDE, CALCIUM CHLORIDE 600; 310; 30; 20 MG/100ML; MG/100ML; MG/100ML; MG/100ML
INJECTION, SOLUTION INTRAVENOUS CONTINUOUS
Status: DISCONTINUED | OUTPATIENT
Start: 2018-07-23 | End: 2018-07-23 | Stop reason: HOSPADM

## 2018-07-23 RX ORDER — MEPERIDINE HYDROCHLORIDE 50 MG/ML
12.5 INJECTION INTRAMUSCULAR; INTRAVENOUS; SUBCUTANEOUS
Status: DISCONTINUED | OUTPATIENT
Start: 2018-07-23 | End: 2018-07-23 | Stop reason: HOSPADM

## 2018-07-23 RX ORDER — OXYCODONE HCL 5 MG/5 ML
15 SOLUTION, ORAL ORAL EVERY 4 HOURS PRN
Qty: 473 ML | Refills: 0 | Status: SHIPPED | OUTPATIENT
Start: 2018-07-23 | End: 2018-07-24 | Stop reason: ALTCHOICE

## 2018-07-23 RX ORDER — NALOXONE HYDROCHLORIDE 0.4 MG/ML
.1-.4 INJECTION, SOLUTION INTRAMUSCULAR; INTRAVENOUS; SUBCUTANEOUS
Status: DISCONTINUED | OUTPATIENT
Start: 2018-07-23 | End: 2018-07-23 | Stop reason: HOSPADM

## 2018-07-23 RX ORDER — DEXAMETHASONE SODIUM PHOSPHATE 4 MG/ML
INJECTION, SOLUTION INTRA-ARTICULAR; INTRALESIONAL; INTRAMUSCULAR; INTRAVENOUS; SOFT TISSUE PRN
Status: DISCONTINUED | OUTPATIENT
Start: 2018-07-23 | End: 2018-07-23

## 2018-07-23 RX ORDER — ONDANSETRON 4 MG/1
4 TABLET, ORALLY DISINTEGRATING ORAL EVERY 30 MIN PRN
Status: DISCONTINUED | OUTPATIENT
Start: 2018-07-23 | End: 2018-07-23 | Stop reason: HOSPADM

## 2018-07-23 RX ORDER — FENTANYL CITRATE 50 UG/ML
25-50 INJECTION, SOLUTION INTRAMUSCULAR; INTRAVENOUS
Status: DISCONTINUED | OUTPATIENT
Start: 2018-07-23 | End: 2018-07-23 | Stop reason: HOSPADM

## 2018-07-23 RX ORDER — LIDOCAINE 40 MG/G
CREAM TOPICAL
Status: DISCONTINUED | OUTPATIENT
Start: 2018-07-23 | End: 2018-07-23 | Stop reason: HOSPADM

## 2018-07-23 RX ORDER — ONDANSETRON 2 MG/ML
4 INJECTION INTRAMUSCULAR; INTRAVENOUS EVERY 30 MIN PRN
Status: DISCONTINUED | OUTPATIENT
Start: 2018-07-23 | End: 2018-07-23 | Stop reason: HOSPADM

## 2018-07-23 RX ORDER — NEOSTIGMINE METHYLSULFATE 1 MG/ML
VIAL (ML) INJECTION PRN
Status: DISCONTINUED | OUTPATIENT
Start: 2018-07-23 | End: 2018-07-23

## 2018-07-23 RX ORDER — ONDANSETRON 2 MG/ML
INJECTION INTRAMUSCULAR; INTRAVENOUS PRN
Status: DISCONTINUED | OUTPATIENT
Start: 2018-07-23 | End: 2018-07-23

## 2018-07-23 RX ORDER — HYDROMORPHONE HYDROCHLORIDE 1 MG/ML
.3-.5 INJECTION, SOLUTION INTRAMUSCULAR; INTRAVENOUS; SUBCUTANEOUS EVERY 10 MIN PRN
Status: DISCONTINUED | OUTPATIENT
Start: 2018-07-23 | End: 2018-07-23 | Stop reason: HOSPADM

## 2018-07-23 RX ORDER — PROPOFOL 10 MG/ML
INJECTION, EMULSION INTRAVENOUS PRN
Status: DISCONTINUED | OUTPATIENT
Start: 2018-07-23 | End: 2018-07-23

## 2018-07-23 RX ORDER — HYDROCODONE BITARTRATE AND ACETAMINOPHEN 7.5; 325 MG/15ML; MG/15ML
0.1 SOLUTION ORAL EVERY 4 HOURS PRN
Status: DISCONTINUED | OUTPATIENT
Start: 2018-07-23 | End: 2018-07-23 | Stop reason: HOSPADM

## 2018-07-23 RX ORDER — GLYCOPYRROLATE 0.2 MG/ML
INJECTION, SOLUTION INTRAMUSCULAR; INTRAVENOUS PRN
Status: DISCONTINUED | OUTPATIENT
Start: 2018-07-23 | End: 2018-07-23

## 2018-07-23 RX ORDER — FENTANYL CITRATE 50 UG/ML
INJECTION, SOLUTION INTRAMUSCULAR; INTRAVENOUS PRN
Status: DISCONTINUED | OUTPATIENT
Start: 2018-07-23 | End: 2018-07-23

## 2018-07-23 RX ADMIN — GLYCOPYRROLATE 1 MG: 0.2 INJECTION, SOLUTION INTRAMUSCULAR; INTRAVENOUS at 09:02

## 2018-07-23 RX ADMIN — FENTANYL CITRATE 100 MCG: 50 INJECTION, SOLUTION INTRAMUSCULAR; INTRAVENOUS at 08:34

## 2018-07-23 RX ADMIN — PROPOFOL 180 MG: 10 INJECTION, EMULSION INTRAVENOUS at 08:40

## 2018-07-23 RX ADMIN — HYDROMORPHONE HYDROCHLORIDE 0.5 MG: 1 INJECTION, SOLUTION INTRAMUSCULAR; INTRAVENOUS; SUBCUTANEOUS at 09:40

## 2018-07-23 RX ADMIN — ROCURONIUM BROMIDE 30 MG: 10 INJECTION INTRAVENOUS at 08:40

## 2018-07-23 RX ADMIN — FENTANYL CITRATE 50 MCG: 50 INJECTION, SOLUTION INTRAMUSCULAR; INTRAVENOUS at 08:51

## 2018-07-23 RX ADMIN — GLYCOPYRROLATE 0.2 MG: 0.2 INJECTION, SOLUTION INTRAMUSCULAR; INTRAVENOUS at 08:37

## 2018-07-23 RX ADMIN — DEXAMETHASONE SODIUM PHOSPHATE 4 MG: 4 INJECTION, SOLUTION INTRA-ARTICULAR; INTRALESIONAL; INTRAMUSCULAR; INTRAVENOUS; SOFT TISSUE at 08:37

## 2018-07-23 RX ADMIN — ONDANSETRON 4 MG: 2 INJECTION INTRAMUSCULAR; INTRAVENOUS at 08:38

## 2018-07-23 RX ADMIN — LIDOCAINE HYDROCHLORIDE 1 ML: 10 INJECTION, SOLUTION EPIDURAL; INFILTRATION; INTRACAUDAL; PERINEURAL at 07:40

## 2018-07-23 RX ADMIN — MIDAZOLAM 2 MG: 1 INJECTION INTRAMUSCULAR; INTRAVENOUS at 08:34

## 2018-07-23 RX ADMIN — FENTANYL CITRATE 50 MCG: 50 INJECTION, SOLUTION INTRAMUSCULAR; INTRAVENOUS at 08:45

## 2018-07-23 RX ADMIN — SODIUM CHLORIDE, POTASSIUM CHLORIDE, SODIUM LACTATE AND CALCIUM CHLORIDE: 600; 310; 30; 20 INJECTION, SOLUTION INTRAVENOUS at 07:40

## 2018-07-23 RX ADMIN — Medication 5 MG: at 09:02

## 2018-07-23 RX ADMIN — SODIUM CHLORIDE, POTASSIUM CHLORIDE, SODIUM LACTATE AND CALCIUM CHLORIDE: 600; 310; 30; 20 INJECTION, SOLUTION INTRAVENOUS at 09:40

## 2018-07-23 RX ADMIN — HYDROMORPHONE HYDROCHLORIDE 0.5 MG: 1 INJECTION, SOLUTION INTRAMUSCULAR; INTRAVENOUS; SUBCUTANEOUS at 09:20

## 2018-07-23 RX ADMIN — FENTANYL CITRATE 50 MCG: 50 INJECTION, SOLUTION INTRAMUSCULAR; INTRAVENOUS at 08:39

## 2018-07-23 RX ADMIN — ONDANSETRON HYDROCHLORIDE 4 MG: 2 INJECTION, SOLUTION INTRAMUSCULAR; INTRAVENOUS at 09:40

## 2018-07-23 NOTE — OP NOTE
PREOPERATIVE DIAGNOSES:   1. Chronic Tonsillitis  POSTOPERATIVE DIAGNOSES:   1. Chronic Tonsillitis  PROCEDURE PERFORMED: Bilateral Tonsillectomy   SURGEON: Galilea Good MD   BLOOD LOSS: 5 mL.   COMPLICATIONS: None.   SPECIMENS: None.   ANESTHESIA: GETA.   INDICATIONS: Antonia Marc presented to me with chronic sore throats and tonsillith production, which did not resolve with non-surgical means. I therefore recommended tonsillectomy.  OPERATIVE PROCEDURE: After being taken to the operating room and induction of general endotracheal tube anesthesia, the bed was rotated 90 degrees and a shoulder roll and head turban were placed. I suspended the patient from the Oliva stand using a Kaushal-Aryan mouthgag, and I grasped the right tonsil with an Allis forceps and retracted medially and performed subcapsular dissection utilizing monopolar cautery, and the right tonsil came out very smoothly. I then turned my attention to the left side, once again using an Allis forceps to grasp it and retract it medially, and then I performed subcapsular dissection, and the left tonsil also came out very smoothly. I released the mouthgag for 2 minutes to allow recirculation of blood to the tongue.   I resuspended the patient from the Oliva stand using a Kaushal-Aryan mouthgag, and then ensured that there was good hemostasis using a bipolar. I then removed the mouthgag and the bed was rotated 90 degrees after I removed the shoulder roll and head turban. The patient was awakened, extubated and sent to the recovery room in good condition.     Dr. Galilea Good  [facility]

## 2018-07-23 NOTE — IP AVS SNAPSHOT
[facility] PreOP/Phase II    [address] [address]    Phone:  [phone]    Fax:  [phone]                                       After Visit Summary   7/23/2018    Antonia Marc    MRN: [id number]           After Visit Summary Signature Page     I have received my discharge instructions, and my questions have been answered. I have discussed any challenges I see with this plan with the nurse or doctor.    ..........................................................................................................................................  Patient/Patient Representative Signature      ..........................................................................................................................................  Patient Representative Print Name and Relationship to Patient    ..................................................               ................................................  Date                                            Time    ..........................................................................................................................................  Reviewed by Signature/Title    ...................................................              ..............................................  Date                                                            Time

## 2018-07-23 NOTE — ANESTHESIA PREPROCEDURE EVALUATION
Anesthesia Evaluation     . Pt has not had prior anesthetic            ROS/MED HX    ENT/Pulmonary: Comment: Chronic tonsillitis  Tracheomalacia as infant  Recent sore throat and cough      Neurologic:  - neg neurologic ROS     Cardiovascular:  - neg cardiovascular ROS   (+) ----. : . . . :. . Previous cardiac testing date:results:date: results:ECG reviewed date:4-7-18 results:Sinus Rhythm -Short RI syndrome - occasional PAC     Elisa = 89   # PACs = 1.  -Abnormal precordial QRS contours -nondiagnostic for this age.     ABNORMAL date: results:          METS/Exercise Tolerance:  >4 METS   Hematologic:  - neg hematologic  ROS       Musculoskeletal: Comment: Flat feet        GI/Hepatic:  - neg GI/hepatic ROS      (-) liver disease   Renal/Genitourinary:  - ROS Renal section negative       Endo:     (+) Obesity, .      Psychiatric:     (+) psychiatric history anxiety and depression      Infectious Disease:  - neg infectious disease ROS       Malignancy:      - no malignancy   Other: Comment: Irregular menstrual bleeding                    Physical Exam  Normal systems: cardiovascular, pulmonary and dental    Airway   Mallampati: II    Dental     Cardiovascular       Pulmonary                     Anesthesia Plan      History & Physical Review  History and physical reviewed and following examination; no interval change.    ASA Status:  2 .    NPO Status:  > 6 hours    Plan for General with Intravenous and Propofol induction. Maintenance will be Inhalation and Balanced.    PONV prophylaxis:  Ondansetron (or other 5HT-3) and Dexamethasone or Solumedrol  Additional equipment: Videolaryngoscope      Postoperative Care  Postoperative pain management:  IV analgesics.      Consents  Anesthetic plan, risks, benefits and alternatives discussed with:  Patient..                          .

## 2018-07-23 NOTE — ANESTHESIA CARE TRANSFER NOTE
Patient: nAtonia Marc    Procedure(s):  Bilateral Tonsillectomy - Wound Class: II-Clean Contaminated    Diagnosis: chronic tonsillitis  Diagnosis Additional Information: No value filed.    Anesthesia Type:   General     Note:  Airway :Face Mask  Patient transferred to:PACU  Handoff Report: Identifed the Patient, Identified the Reponsible Provider, Reviewed the pertinent medical history, Discussed the surgical course, Reviewed Intra-OP anesthesia mangement and issues during anesthesia, Set expectations for post-procedure period and Allowed opportunity for questions and acknowledgement of understanding      Vitals: (Last set prior to Anesthesia Care Transfer)    CRNA VITALS  7/23/2018 0842 - 7/23/2018 0912      7/23/2018             Resp Rate (observed): (!)  2                Electronically Signed By: Sacha Griffith CRNA, APRN CRNA  July 23, 2018  9:12 AM

## 2018-07-23 NOTE — ANESTHESIA POSTPROCEDURE EVALUATION
Patient: Antonia Marc    Procedure(s):  Bilateral Tonsillectomy - Wound Class: II-Clean Contaminated    Diagnosis:chronic tonsillitis  Diagnosis Additional Information: No value filed.    Anesthesia Type:  General    Note:  Anesthesia Post Evaluation    Patient location during evaluation: PACU and Bedside  Patient participation: Able to fully participate in evaluation  Level of consciousness: awake  Pain management: adequate  Airway patency: patent  Cardiovascular status: acceptable  Respiratory status: acceptable  Hydration status: acceptable  PONV: none     Anesthetic complications: None          Last vitals:  Vitals:    07/23/18 0930 07/23/18 0945 07/23/18 1000   BP: 122/63 121/71 126/71   Pulse: 84 80 93   Resp: 16 16 16   Temp:      SpO2: 100% 100% 100%         Electronically Signed By: Sacha Griffith CRNA, APRN CRNA  July 23, 2018  10:11 AM

## 2018-07-23 NOTE — H&P (VIEW-ONLY)
[facility]  [address] [address]  [phone]  Dept: [phone]    PRE-OP EVALUATION:  Today's date: 2018    Antonia Marc (: 1995) presents for pre-operative evaluation assessment as requested by Dr. Good.  She requires evaluation and anesthesia risk assessment prior to undergoing surgery/procedure for treatment of chronic tonsillitis .    Proposed Surgery/ Procedure: bilateral tonsillectomy  Date of Surgery/ Procedure: 2018  Time of Surgery/ Procedure: 820  Hospital/Surgical Facility: [facility]    Primary Physician: Roma Ye  Type of Anesthesia Anticipated: General    Patient has a Health Care Directive or Living Will:  NO    1. NO - Do you have a history of heart attack, stroke, stent, bypass or surgery on an artery in the head, neck, heart or legs?  2. NO - Do you ever have any pain or discomfort in your chest?  3. NO - Do you have a history of  Heart Failure?  4. NO - Are you troubled by shortness of breath when: walking on the level, up a slight hill or at night?  5. YES - DO YOU CURRENTLY HAVE A COLD, BRONCHITIS OR OTHER RESPIRATORY INFECTION? Still have sore throat, tested negative for strep last week, has history of chronic tonsillitis  6. YES - DO YOU HAVE A COUGH, SHORTNESS OF BREATH OR WHEEZING? Mild postinfectious cough, improved.   7. NO - Do you sometimes get pains in the calves of your legs when you walk?  8. NO - Do you or anyone in your family have previous history of blood clots?  9. NO - Do you or does anyone in your family have a serious bleeding problem such as prolonged bleeding following surgeries or cuts?  10. NO - Have you ever had problems with anemia or been told to take iron pills?  11. NO - Have you had any abnormal blood loss such as black, tarry or bloody stools, or abnormal vaginal bleeding?  12. NO - Have you ever had a blood transfusion?  13. NO - Have you or any of your relatives ever had problems with  anesthesia?  14. NO - Do you have sleep apnea, excessive snoring or daytime drowsiness?  15. NO - Do you have any prosthetic heart valves?  16. NO - Do you have prosthetic joints?  17. NO - Is there any chance that you may be pregnant?      HPI:     HPI related to upcoming procedure: history of chronic tonsillitis, Dr. Good recommended tonsillectomy   The patient is feeling well today, she is still recovering after recent URI, still have rare mild cough, complains of sore throat. Denies fever, chills, diaphoresis, productive cough, wheezing and shortness of breath.    See problem list for active medical problems.  Problems all longstanding and stable, except as noted/documented.  See ROS for pertinent symptoms related to these conditions.                                                                                                                                                          .    MEDICAL HISTORY:     Patient Active Problem List    Diagnosis Date Noted     Recurrent tonsillitis 2018     Priority: Medium     Recurrent major depressive disorder, in remission (H) 2017     Priority: Medium     Anxiety 2017     Priority: Medium     JAZZY (generalized anxiety disorder) 2017     Priority: Medium     Mild episode of recurrent major depressive disorder (H) 2017     Priority: Medium     Irregular menstrual bleeding 2012     Priority: Medium     Flat feet 2011     Priority: Medium      Past Medical History:   Diagnosis Date     Closed fracture of unspecified part of radius with ulna(813.83)      Other diseases of trachea and bronchus, not elsewhere classified     tracheomalacia as a       No past surgical history on file.  Current Outpatient Prescriptions   Medication Sig Dispense Refill     amoxicillin-clavulanate (AUGMENTIN) 875-125 MG per tablet Take 1 tablet by mouth 2 times daily for 7 days 14 tablet 0     escitalopram (LEXAPRO) 20 MG tablet Take 1 tablet (20  "mg) by mouth daily 30 tablet 5     traMADol (ULTRAM) 50 MG tablet Take 50 mg by mouth every 6 hours as needed for severe pain       OTC products:    No Known Allergies   Latex Allergy: NO    Social History   Substance Use Topics     Smoking status: Never Smoker     Smokeless tobacco: Never Used      Comment: no exporure     Alcohol use No     History   Drug Use No       REVIEW OF SYSTEMS:   CONSTITUTIONAL: NEGATIVE for fever, chills, change in weight  INTEGUMENTARY/SKIN: NEGATIVE for worrisome rashes, moles or lesions  EYES: NEGATIVE for vision changes or irritation  ENT/MOUTH: POSITIVE for chronic sore throat, history of chronic tonsillitis   RESP:NEGATIVE for significant productive cough or SOB and POSITIVE for mild cough  CV: NEGATIVE for chest pain, palpitations or peripheral edema  GI: NEGATIVE for nausea, abdominal pain, heartburn, or change in bowel habits  : NEGATIVE for frequency, dysuria, or hematuria  MUSCULOSKELETAL: NEGATIVE for significant arthralgias or myalgia  NEURO: NEGATIVE for weakness, dizziness or paresthesias  ENDOCRINE: NEGATIVE for temperature intolerance, skin/hair changes  HEME: NEGATIVE for bleeding problems  PSYCHIATRIC: NEGATIVE for changes in mood or affect    EXAM:   /80 (BP Location: Left arm)  Pulse 72  Temp 99.7  F (37.6  C) (Tympanic)  Ht 5' 8\" (1.727 m)  Wt 235 lb (106.6 kg)  BMI 35.73 kg/m2    GENERAL APPEARANCE: healthy, alert and no distress     EYES: EOMI, PERRL     HENT: ear canals and TM's normal and nose and mouth without ulcers or lesions     NECK: no adenopathy, no asymmetry, masses, or scars and thyroid normal to palpation     RESP: lungs clear to auscultation - no rales, rhonchi or wheezes     CV: regular rates and rhythm, normal S1 S2, no S3 or S4 and no murmur, click or rub     CV: regular rates and rhythm, normal S1 S2, no S3 or S4, no murmur, click or rub, no irregular beats and peripheral pulses strong     ABDOMEN:  soft, nontender, no HSM or masses " and bowel sounds normal     MS: extremities normal- no gross deformities noted, no evidence of inflammation in joints, FROM in all extremities.     SKIN: no suspicious lesions or rashes     NEURO: Normal strength and tone, sensory exam grossly normal, mentation intact and speech normal     PSYCH: mentation appears normal. and affect normal/bright     LYMPHATICS: No cervical adenopathy    DIAGNOSTICS:   No labs or EKG required for low risk surgery (cataract, skin procedure, breast biopsy, etc)    Recent Labs   Lab Test  03/16/18   2255  05/29/17   1820  05/29/17   1740   HGB  14.2   --   14.8   PLT  291   --   302   INR   --   1.14  Canceled, Test credited   Unsatisfactory specimen - hemolyzed  Notified Mari[place], [place] 5/29/17 1756 JM     NA  139  141  Canceled, Test credited   Unsatisfactory specimen - hemolyzed  Notified [place]ya, [facility] 5/29/17 1756 JM     POTASSIUM  3.2*  3.9  Canceled, Test credited   Unsatisfactory specimen - hemolyzed  Notified Mariya, [facility] 5/29/17 1756 JM     CR  0.77  0.79  Canceled, Test credited   Unsatisfactory specimen - hemolyzed  Notified Mariya, [facility] 5/29/17 1756 JM          IMPRESSION:   Reason for surgery/procedure: chronic tonsillitis   Diagnosis/reason for consult: pre-op evaluation     The proposed surgical procedure is considered INTERMEDIATE risk.    REVISED CARDIAC RISK INDEX  The patient has the following serious cardiovascular risks for perioperative complications such as (MI, PE, VFib and 3  AV Block):  No serious cardiac risks  INTERPRETATION: 0 risks: Class I (very low risk - 0.4% complication rate)    The patient has the following additional risks for perioperative complications:  No identified additional risks    (Z01.818) Preop general physical exam  (primary encounter diagnosis)  Comment: no history of cervical, neck problems, bleeding disorders, diabetes, hypertension or other risk factors for tonsillectomy.  She is not sexually active, so pregnancy screening was deferred     Currently taking NSAID's due to wrist work related injury, strongly advised to stop all NSAID's 5-7 days prior surgery. Due to wrist injury patient is planning to reschedule surgery in 2 weeks from today, I told patient that pre-op evaluation is good for 30 days, if she decide to post-pone surgery for over 30 days she will need to have another pre-op     (J03.90) Tonsillitis  Comment: due to recent URI and since patient still have symptoms of tonsillitis ongoing for over 10 days recommended antibiotic treatment prior surgery  Plan: amoxicillin-clavulanate (AUGMENTIN) 875-125 MG         per tablet twice daily for 7 days           RECOMMENDATIONS:     --Patient is to take all scheduled medications on the day of surgery EXCEPT for modifications listed below. Stop all NSAID's. Okay to continue Lexapro.     APPROVAL GIVEN to proceed with proposed procedure, without further diagnostic evaluation       Signed Electronically by: ROS Hansen CNP    Copy of this evaluation report is provided to requesting physician.    [facility] Preop Guidelines    Revised Cardiac Risk Index

## 2018-07-23 NOTE — IP AVS SNAPSHOT
MRN:[id number]                      After Visit Summary   7/23/2018    Antonia Marc    MRN: [id number]           Thank you!     Thank you for choosing [facility] for your care. Our goal is always to provide you with excellent care. Hearing back from our patients is one way we can continue to improve our services. Please take a few minutes to complete the written survey that you may receive in the mail after you visit with us. Thank you!        Patient Information     Date Of Birth          1995        About your hospital stay     You were admitted on:  July 23, 2018 You last received care in the:  [facility] PreOP/Phase II    You were discharged on:  July 23, 2018       Who to Call     For medical emergencies, please call 911.  For non-urgent questions about your medical care, please call your primary care provider or clinic, [phone]  For questions related to your surgery, please call your surgery clinic        Attending Provider     Provider Specialty    Galilea Good MD Otolaryngology       Primary Care Provider Office Phone # Fax #    [facility] [phone]      After Care Instructions     Discharge Instructions        Return to clinic as instructed by Physician                  Further instructions from your care team       Postoperative Care for Tonsillectomy (with or without adenoidectomy)    Recovery - There are a handful of issues that routinely occur during recover that should be anticipated during your recovery.    1. The pain and swelling almost always gets worse before it gets better, this is normal.  Usually it peaks 3 to 5 days after the surgery, and then begins improving at 7 to 8 days after surgery.  Of course, this is variable from person to person.  2. The only dietary restriction is avoidance of hard or crunchy things for the first 2 weeks.  If it makes a noise when you bite it, it is too hard.  Although it is good to begin  eating again from day one, it is not unusual to not eat for several days after the procedure.  The most important thing is staying hydrated.  Drink fluids with electrolytes if possible, such as dilute sports drinks.  3. If you were sent home with a narcotic pain medication, this can make some people very nauseated.  To minimize this, avoid taking it on an empty stomach, or take smaller does with greater frequency.  For example if your dose is 2 teaspoons every four hours, try taking one teaspoon every two hours, etc. You may also try to take it with food.  4. Try to stay ahead of the pain.  In other words, do not wait for pain medication to completely wear off before taking more pain medicine.  Instead, take the medication every 4 to 6 hours, even if it requires setting an alarm clock at night.  This is especially helpful during the first 5 days. You may also add ibuprofen to help with pain if the prescribed medication is not sufficient.  5. The uvula ( the small hanging object in the back of your mouth) frequently swells up after tonsillectomy, but will go back to normal.  This swelling can temporarily cause the sensation of something being stuck in your throat, it will go away with recovery.  Also, because of the arrangement of nerves under where the tonsils were, sharp ear pain is very common during recovery, and will also go away with recovery.      Activity - Avoid heavy lifting (greater than 15 pounds), strenuous exercise, or extremely cold environments until the follow up appointment.  Also, try to sleep with your head elevated.  An irritated cough from the breathing tube is fairly normal after surgery.    Medications - Except blood thinners, almost all medication can be re-started after tonsillectomy.      Complications - Bleeding is by far the most common serious complication after tonsillectomy.  If there are a few small drops or streaks of blood in the saliva that then goes away, this can be  conservatively watched.  Gentle gargling with the ice water can also help stop this minor bleeding.  However, if the bleeding is persistent, or heavy bleeding occurs, do not hesitate.  Go to the emergency room to be evaluated.    Follow up - I like to see my patient 4 weeks after the procedure to make sure that everything has healed appropriately.  Occasionally, there can be some longer - lasting side effects of surgery such as abnormal tongue sensations, or unusual swallowing.  However, if everything is healed well, the 4 week postoperative visit is all that will be necessary.    If there are any questions or issues with the above, or if there are other issues that concern you, always feel free to call the clinic and I am happy to speak with you as soon as I can.    Galilea Good MD #[phone]       Otolaryngology  [facility]                      Same Day Surgery Discharge Instructions  Special Precautions After Surgery - Adult    1. It is not unusual to feel lightheaded or faint, up to 24 hours after surgery or while taking pain medication.  If you have these symptoms; sit for a few minutes before standing and have someone assist you when getting up.  2. You should rest and relax for the next 24 hours and must have someone stay with you for at least 24 hours after your discharge.  3. DO NOT DRIVE any vehicle or operate mechanical equipment for 24 hours following the end of your surgery.  DO NOT DRIVE while taking narcotic pain medications that have been prescribed by your physician.  If you had a limb operated on, you must be able to use it fully to drive.  4. DO NOT drink alcoholic beverages for 24 hours following surgery or while taking prescription pain medication.  5. Drink clear liquids (apple juice, ginger ale, broth, 7-Up, etc.).  Progress to your regular diet as you feel able.  6. Any questions call your physician and do not make important decisions for 24 hours.    Same Day Surgery  "[phone], Monday thru Friday 6am-9pm.    Break through Bleeding  As instructed per Surgeon or Nurse.    Post Op Infection  Be alert for signs of infection: redness, swelling, heat, drainage of pus, and/or elevated temperature.  Contact your surgeon if these occur.    Nausea   If post op nausea occurs, at first rest your stomach for a few hours by eating nothing solid and sipping only clear liquids.  Call your Surgeon if nausea does not resolve in 24 hours.        Pending Results     Date and Time Order Name Status Description    7/23/2018 0854 Surgical pathology exam In process             Admission Information     Date & Time Provider Department Dept. Phone    7/23/2018 Galilea Good MD [facility] PreOP/Phase [phone]      Your Vitals Were     Blood Pressure Pulse Temperature Respirations Height Weight    129/58 93 98.6  F (37  C) (Oral) 16 1.727 m (5' 8\") 104.3 kg (230 lb)    Last Period Pulse Oximetry BMI (Body Mass Index)             07/15/2018 94% 34.97 kg/m2         Care EveryWhere ID     This is your Care EveryWhere ID. This could be used by other organizations to access your [facility] medical records  [id number]        Equal Access to Services     BANG WAGNER AH: Hadii laila Rendon, washaradda lueloisa, qaybta kaalmada adefeliciayada, lavonne ken. So [facility] [phone].    ATENCIÓN: Si habla español, tiene a ma disposición servicios gratuitos de asistencia lingüística. Llame al [phone].    We comply with applicable federal civil rights laws and [place] laws. We do not discriminate on the basis of race, color, national origin, age, disability, sex, sexual orientation, or gender identity.               Review of your medicines      START taking        Dose / Directions    oxyCODONE 5 MG/5ML solution   Commonly known as:  ROXICODONE   Used for:  Recurrent tonsillitis   Notes to Patient:  Start when needed.        Dose:  15 mg   Take 15 mLs (15 mg) by mouth " every 4 hours as needed for severe pain   Quantity:  473 mL   Refills:  0         CONTINUE these medicines which have NOT CHANGED        Dose / Directions    escitalopram 20 MG tablet   Commonly known as:  LEXAPRO   Used for:  Mild episode of recurrent major depressive disorder (H)        Dose:  20 mg   Take 1 tablet (20 mg) by mouth daily   Quantity:  30 tablet   Refills:  5       traMADol 50 MG tablet   Commonly known as:  ULTRAM        Dose:  50 mg   Take 50 mg by mouth every 6 hours as needed for severe pain   Refills:  0            Where to get your medicines      Some of these will need a paper prescription and others can be bought over the counter. Ask your nurse if you have questions.     Bring a paper prescription for each of these medications     oxyCODONE 5 MG/5ML solution                Protect others around you: Learn how to safely use, store and throw away your medicines at www.disposemymeds.org.        Information about OPIOIDS     PRESCRIPTION OPIOIDS: WHAT YOU NEED TO KNOW   We gave you an opioid (narcotic) pain medicine. It is important to manage your pain, but opioids are not always the best choice. You should first try all the other options your care team gave you. Take this medicine for as short a time (and as few doses) as possible.     These medicines have risks:    DO NOT drive when on new or higher doses of pain medicine. These medicines can affect your alertness and reaction times, and you could be arrested for driving under the influence (DUI). If you need to use opioids long-term, talk to your care team about driving.    DO NOT operate heave machinery    DO NOT do any other dangerous activities while taking these medicines.     DO NOT drink any alcohol while taking these medicines.      If the opioid prescribed includes acetaminophen, DO NOT take with any other medicines that contain acetaminophen. Read all labels carefully. Look for the word  acetaminophen  or  Tylenol.  Ask your  pharmacist if you have questions or are unsure.    You can get addicted to pain medicines, especially if you have a history of addiction (chemical, alcohol or substance dependence). Talk to your care team about ways to reduce this risk.    Store your pills in a secure place, locked if possible. We will not replace any lost or stolen medicine. If you don t finish your medicine, please throw away (dispose) as directed by your pharmacist. The [place] [employer] has more information about safe disposal: https://www.pca.[facility].[place].us/living-green/managing-unwanted-medications.     All opioids tend to cause constipation. Drink plenty of water and eat foods that have a lot of fiber, such as fruits, vegetables, prune juice, apple juice and high-fiber cereal. Take a laxative (Miralax, milk of magnesia, Colace, Senna) if you don t move your bowels at least every other day.              Medication List: This is a list of all your medications and when to take them. Check marks below indicate your daily home schedule. Keep this list as a reference.      Medications           Morning Afternoon Evening Bedtime As Needed    escitalopram 20 MG tablet   Commonly known as:  LEXAPRO   Take 1 tablet (20 mg) by mouth daily                                oxyCODONE 5 MG/5ML solution   Commonly known as:  ROXICODONE   Take 15 mLs (15 mg) by mouth every 4 hours as needed for severe pain   Notes to Patient:  Start when needed.                                traMADol 50 MG tablet   Commonly known as:  ULTRAM   Take 50 mg by mouth every 6 hours as needed for severe pain

## 2018-07-23 NOTE — DISCHARGE INSTRUCTIONS
Postoperative Care for Tonsillectomy (with or without adenoidectomy)    Recovery - There are a handful of issues that routinely occur during recover that should be anticipated during your recovery.    1. The pain and swelling almost always gets worse before it gets better, this is normal.  Usually it peaks 3 to 5 days after the surgery, and then begins improving at 7 to 8 days after surgery.  Of course, this is variable from person to person.  2. The only dietary restriction is avoidance of hard or crunchy things for the first 2 weeks.  If it makes a noise when you bite it, it is too hard.  Although it is good to begin eating again from day one, it is not unusual to not eat for several days after the procedure.  The most important thing is staying hydrated.  Drink fluids with electrolytes if possible, such as dilute sports drinks.  3. If you were sent home with a narcotic pain medication, this can make some people very nauseated.  To minimize this, avoid taking it on an empty stomach, or take smaller does with greater frequency.  For example if your dose is 2 teaspoons every four hours, try taking one teaspoon every two hours, etc. You may also try to take it with food.  4. Try to stay ahead of the pain.  In other words, do not wait for pain medication to completely wear off before taking more pain medicine.  Instead, take the medication every 4 to 6 hours, even if it requires setting an alarm clock at night.  This is especially helpful during the first 5 days. You may also add ibuprofen to help with pain if the prescribed medication is not sufficient.  5. The uvula ( the small hanging object in the back of your mouth) frequently swells up after tonsillectomy, but will go back to normal.  This swelling can temporarily cause the sensation of something being stuck in your throat, it will go away with recovery.  Also, because of the arrangement of nerves under where the tonsils were, sharp ear pain is very common  during recovery, and will also go away with recovery.      Activity - Avoid heavy lifting (greater than 15 pounds), strenuous exercise, or extremely cold environments until the follow up appointment.  Also, try to sleep with your head elevated.  An irritated cough from the breathing tube is fairly normal after surgery.    Medications - Except blood thinners, almost all medication can be re-started after tonsillectomy.      Complications - Bleeding is by far the most common serious complication after tonsillectomy.  If there are a few small drops or streaks of blood in the saliva that then goes away, this can be conservatively watched.  Gentle gargling with the ice water can also help stop this minor bleeding.  However, if the bleeding is persistent, or heavy bleeding occurs, do not hesitate.  Go to the emergency room to be evaluated.    Follow up - I like to see my patient 4 weeks after the procedure to make sure that everything has healed appropriately.  Occasionally, there can be some longer - lasting side effects of surgery such as abnormal tongue sensations, or unusual swallowing.  However, if everything is healed well, the 4 week postoperative visit is all that will be necessary.    If there are any questions or issues with the above, or if there are other issues that concern you, always feel free to call the clinic and I am happy to speak with you as soon as I can.    Galilea Good MD #[phone]       Otolaryngology  [facility]                      Same Day Surgery Discharge Instructions  Special Precautions After Surgery - Adult    1. It is not unusual to feel lightheaded or faint, up to 24 hours after surgery or while taking pain medication.  If you have these symptoms; sit for a few minutes before standing and have someone assist you when getting up.  2. You should rest and relax for the next 24 hours and must have someone stay with you for at least 24 hours after your discharge.  3. DO NOT  DRIVE any vehicle or operate mechanical equipment for 24 hours following the end of your surgery.  DO NOT DRIVE while taking narcotic pain medications that have been prescribed by your physician.  If you had a limb operated on, you must be able to use it fully to drive.  4. DO NOT drink alcoholic beverages for 24 hours following surgery or while taking prescription pain medication.  5. Drink clear liquids (apple juice, ginger ale, broth, 7-Up, etc.).  Progress to your regular diet as you feel able.  6. Any questions call your physician and do not make important decisions for 24 hours.    Same Day Surgery [phone], Monday thru Friday 6am-9pm.    Break through Bleeding  As instructed per Surgeon or Nurse.    Post Op Infection  Be alert for signs of infection: redness, swelling, heat, drainage of pus, and/or elevated temperature.  Contact your surgeon if these occur.    Nausea   If post op nausea occurs, at first rest your stomach for a few hours by eating nothing solid and sipping only clear liquids.  Call your Surgeon if nausea does not resolve in 24 hours.

## 2018-07-24 ENCOUNTER — HOSPITAL ENCOUNTER (EMERGENCY)
Facility: CLINIC | Age: 23
Discharge: HOME | End: 2018-07-24
Attending: NURSE PRACTITIONER | Admitting: NURSE PRACTITIONER
Payer: COMMERCIAL

## 2018-07-24 ENCOUNTER — TELEPHONE (OUTPATIENT)
Dept: OTOLARYNGOLOGY | Facility: CLINIC | Age: 23
End: 2018-07-24

## 2018-07-24 VITALS
SYSTOLIC BLOOD PRESSURE: 131 MMHG | OXYGEN SATURATION: 99 % | RESPIRATION RATE: 20 BRPM | DIASTOLIC BLOOD PRESSURE: 83 MMHG | TEMPERATURE: 99.3 F

## 2018-07-24 DIAGNOSIS — G89.18 POST-TONSILLECTOMY PAIN: ICD-10-CM

## 2018-07-24 DIAGNOSIS — Z90.89 POST-TONSILLECTOMY PAIN: ICD-10-CM

## 2018-07-24 DIAGNOSIS — R11.2 POST-OPERATIVE NAUSEA AND VOMITING: Primary | ICD-10-CM

## 2018-07-24 DIAGNOSIS — Z98.890 POST-OPERATIVE NAUSEA AND VOMITING: Primary | ICD-10-CM

## 2018-07-24 LAB
ANION GAP SERPL CALCULATED.3IONS-SCNC: 7 MMOL/L (ref 3–14)
BUN SERPL-MCNC: 11 MG/DL (ref 7–30)
CALCIUM SERPL-MCNC: 8.4 MG/DL (ref 8.5–10.1)
CHLORIDE SERPL-SCNC: 104 MMOL/L (ref 94–109)
CO2 SERPL-SCNC: 27 MMOL/L (ref 20–32)
CREAT SERPL-MCNC: 0.66 MG/DL (ref 0.52–1.04)
GFR SERPL CREATININE-BSD FRML MDRD: >90 ML/MIN/1.7M2
GLUCOSE SERPL-MCNC: 102 MG/DL (ref 70–99)
HCT VFR BLD AUTO: 43.1 % (ref 35–47)
HGB BLD-MCNC: 14.4 G/DL (ref 11.7–15.7)
POTASSIUM SERPL-SCNC: 3.8 MMOL/L (ref 3.4–5.3)
SODIUM SERPL-SCNC: 138 MMOL/L (ref 133–144)

## 2018-07-24 PROCEDURE — 25000128 H RX IP 250 OP 636

## 2018-07-24 PROCEDURE — 99284 EMERGENCY DEPT VISIT MOD MDM: CPT | Mod: Z6 | Performed by: NURSE PRACTITIONER

## 2018-07-24 PROCEDURE — 99284 EMERGENCY DEPT VISIT MOD MDM: CPT | Mod: 25 | Performed by: NURSE PRACTITIONER

## 2018-07-24 PROCEDURE — 96374 THER/PROPH/DIAG INJ IV PUSH: CPT | Performed by: NURSE PRACTITIONER

## 2018-07-24 PROCEDURE — 96375 TX/PRO/DX INJ NEW DRUG ADDON: CPT | Performed by: NURSE PRACTITIONER

## 2018-07-24 PROCEDURE — 25000128 H RX IP 250 OP 636: Performed by: NURSE PRACTITIONER

## 2018-07-24 PROCEDURE — 85014 HEMATOCRIT: CPT | Performed by: NURSE PRACTITIONER

## 2018-07-24 PROCEDURE — 85018 HEMOGLOBIN: CPT | Performed by: NURSE PRACTITIONER

## 2018-07-24 PROCEDURE — 80048 BASIC METABOLIC PNL TOTAL CA: CPT | Performed by: NURSE PRACTITIONER

## 2018-07-24 RX ORDER — KETOROLAC TROMETHAMINE 30 MG/ML
30 INJECTION, SOLUTION INTRAMUSCULAR; INTRAVENOUS ONCE
Status: COMPLETED | OUTPATIENT
Start: 2018-07-24 | End: 2018-07-24

## 2018-07-24 RX ORDER — ONDANSETRON 2 MG/ML
INJECTION INTRAMUSCULAR; INTRAVENOUS
Status: COMPLETED
Start: 2018-07-24 | End: 2018-07-24

## 2018-07-24 RX ORDER — PROCHLORPERAZINE MALEATE 5 MG
5 TABLET ORAL EVERY 6 HOURS PRN
Qty: 10 TABLET | Refills: 0 | Status: SHIPPED | OUTPATIENT
Start: 2018-07-24 | End: 2018-07-26

## 2018-07-24 RX ORDER — ASPIRIN 81 MG
TABLET, DELAYED RELEASE (ENTERIC COATED) ORAL
Qty: 60 TABLET | Refills: 0 | Status: SHIPPED | OUTPATIENT
Start: 2018-07-24 | End: 2018-10-16

## 2018-07-24 RX ORDER — ACETAMINOPHEN AND CODEINE PHOSPHATE 300; 30 MG/1; MG/1
1-2 TABLET ORAL EVERY 6 HOURS PRN
Qty: 12 TABLET | Refills: 0 | Status: SHIPPED | OUTPATIENT
Start: 2018-07-24 | End: 2018-07-26

## 2018-07-24 RX ADMIN — KETOROLAC TROMETHAMINE 30 MG: 30 INJECTION, SOLUTION INTRAMUSCULAR at 13:56

## 2018-07-24 RX ADMIN — ONDANSETRON 4 MG: 2 INJECTION, SOLUTION INTRAMUSCULAR; INTRAVENOUS at 13:50

## 2018-07-24 RX ADMIN — PROCHLORPERAZINE EDISYLATE 10 MG: 5 INJECTION INTRAMUSCULAR; INTRAVENOUS at 16:07

## 2018-07-24 ASSESSMENT — ENCOUNTER SYMPTOMS
VOMITING: 0
DIARRHEA: 0
DIAPHORESIS: 0
WHEEZING: 0
FEVER: 0
APPETITE CHANGE: 1
NAUSEA: 1
SINUS PRESSURE: 0
ACTIVITY CHANGE: 1
EYE PAIN: 0
FATIGUE: 0
SHORTNESS OF BREATH: 0
COUGH: 1
TROUBLE SWALLOWING: 1
SORE THROAT: 1
CONSTIPATION: 0
SINUS PAIN: 0
CHILLS: 0
STRIDOR: 0

## 2018-07-24 NOTE — DISCHARGE INSTRUCTIONS
You were given Compazine in the emergency room today with good relief.  I recommend that you take 2 extra strength Tylenol every 6 hours as needed for pain.  Also every 6 hours instead of 1 of the extra strength Tylenol you can replace that with the Tylenol with codeine.  The Tylenol with codeine may cause constipation.  I recommend that you take Colace 1-4 capsules daily.  In addition to that we talked about gastrointestinal upset and I have prescribed Zantac 150 mg by mouth twice daily.  Follow-up with your surgeon as previously scheduled.  Return if change or worsening in symptoms that you are concerned about.

## 2018-07-24 NOTE — ED AVS SNAPSHOT
[facility] Emergency Department    [address] [address]    Phone:  [phone]    Fax:  [phone]                                       Antonia Marc   MRN: [id number]    Department:  [facility] Emergency Department   Date of Visit:  7/24/2018           After Visit Summary Signature Page     I have received my discharge instructions, and my questions have been answered. I have discussed any challenges I see with this plan with the nurse or doctor.    ..........................................................................................................................................  Patient/Patient Representative Signature      ..........................................................................................................................................  Patient Representative Print Name and Relationship to Patient    ..................................................               ................................................  Date                                            Time    ..........................................................................................................................................  Reviewed by Signature/Title    ...................................................              ..............................................  Date                                                            Time

## 2018-07-24 NOTE — TELEPHONE ENCOUNTER
FYI to Provider=  Called and patient currently been in the BR for the last hour with dry heaves.  Advised it is best to consider going in to Emergency room to check hydration and if any anti-emetics are needed.  Cannot keep anything down for pain control etc.    Mom agrees.    Alicja Alcala RN  [facility]

## 2018-07-24 NOTE — ED NOTES
Pt requesting toothbrush and toothpaste. This RN called med/surg to obtain them. Provided for the patient. The patient brushed her teeth.

## 2018-07-24 NOTE — TELEPHONE ENCOUNTER
Reason for Call:  Other     Detailed comments: Pt's mom, Britany, calling (consent not on file) - Pt had tonsillectomy 07/23. Since getting home at 11:30 am yesterday she has been throwing up, cannot keep anything down. Pt has not urinated since returning home. - Please advise    PHARMACY:  [facility] [place]    Phone Number Patient can be reached at: [phone] (mom's cell - Britany)    Best Time: Any    Can we leave a detailed message on this number? YES    Call taken on 7/24/2018 at 9:01 AM by Denise Behrendt

## 2018-07-24 NOTE — ED AVS SNAPSHOT
[facility] Emergency Department    [address] [address]    Phone:  [phone]    Fax:  [phone]                                       Antonia Marc   MRN: [id number]    Department:  [facility] Emergency Department   Date of Visit:  7/24/2018           Patient Information     Date Of Birth          1995        Your diagnoses for this visit were:     Post-operative nausea and vomiting     Post-tonsillectomy pain        You were seen by Jewell Kaplan APRN CNP.      Follow-up Information     Follow up with Roma Ye APRN CNP In 1 week.    Specialty:  Nurse Practitioner - Gerontology    Why:  If symptoms worsen, As needed    Contact information:    [address] [address]  [phone]          Discharge Instructions       You were given Compazine in the emergency room today with good relief.  I recommend that you take 2 extra strength Tylenol every 6 hours as needed for pain.  Also every 6 hours instead of 1 of the extra strength Tylenol you can replace that with the Tylenol with codeine.  The Tylenol with codeine may cause constipation.  I recommend that you take Colace 1-4 capsules daily.  In addition to that we talked about gastrointestinal upset and I have prescribed Zantac 150 mg by mouth twice daily.  Follow-up with your surgeon as previously scheduled.  Return if change or worsening in symptoms that you are concerned about.    24 Hour Appointment Hotline       To make an appointment at any [facility], call [phone] ([phone]). If you don't have a family doctor or clinic, we will help you find one. [facility] are conveniently located to serve the needs of you and your family.             Review of your medicines      START taking        Dose / Directions Last dose taken    acetaminophen-codeine 300-30 MG per tablet   Commonly known as:  TYLENOL WITH CODEINE #3   Dose:  1-2 tablet   Quantity:  12 tablet         Take 1-2 tablets by mouth every 6 hours as needed for pain   Refills:  0        docusate sodium 100 MG tablet   Commonly known as:  COLACE   Quantity:  60 tablet        Take one to four capsules daily   Refills:  0        prochlorperazine 5 MG tablet   Commonly known as:  COMPAZINE   Dose:  5 mg   Quantity:  10 tablet        Take 1 tablet (5 mg) by mouth every 6 hours as needed for nausea or vomiting   Refills:  0        ranitidine 150 MG tablet   Commonly known as:  ZANTAC   Dose:  150 mg   Quantity:  30 tablet        Take 1 tablet (150 mg) by mouth 2 times daily for 15 days   Refills:  0          Our records show that you are taking the medicines listed below. If these are incorrect, please call your family doctor or clinic.        Dose / Directions Last dose taken    escitalopram 20 MG tablet   Commonly known as:  LEXAPRO   Dose:  20 mg   Quantity:  30 tablet        Take 1 tablet (20 mg) by mouth daily   Refills:  5        oxyCODONE 5 MG/5ML solution   Commonly known as:  ROXICODONE   Dose:  15 mg   Quantity:  473 mL        Take 15 mLs (15 mg) by mouth every 4 hours as needed for severe pain   Refills:  0                Information about OPIOIDS     PRESCRIPTION OPIOIDS: WHAT YOU NEED TO KNOW   We gave you an opioid (narcotic) pain medicine. It is important to manage your pain, but opioids are not always the best choice. You should first try all the other options your care team gave you. Take this medicine for as short a time (and as few doses) as possible.     These medicines have risks:    DO NOT drive when on new or higher doses of pain medicine. These medicines can affect your alertness and reaction times, and you could be arrested for driving under the influence (DUI). If you need to use opioids long-term, talk to your care team about driving.    DO NOT operate heave machinery    DO NOT do any other dangerous activities while taking these medicines.     DO NOT drink any alcohol while taking these medicines.       If the opioid prescribed includes acetaminophen, DO NOT take with any other medicines that contain acetaminophen. Read all labels carefully. Look for the word  acetaminophen  or  Tylenol.  Ask your pharmacist if you have questions or are unsure.    You can get addicted to pain medicines, especially if you have a history of addiction (chemical, alcohol or substance dependence). Talk to your care team about ways to reduce this risk.    Store your pills in a secure place, locked if possible. We will not replace any lost or stolen medicine. If you don t finish your medicine, please throw away (dispose) as directed by your pharmacist. The [place] [employer]ution [employer] has more information about safe disposal: https://www.pca.[facility].[place].us/living-green/managing-unwanted-medications.     All opioids tend to cause constipation. Drink plenty of water and eat foods that have a lot of fiber, such as fruits, vegetables, prune juice, apple juice and high-fiber cereal. Take a laxative (Miralax, milk of magnesia, Colace, Senna) if you don t move your bowels at least every other day.         Prescriptions were sent or printed at these locations (4 Prescriptions)                   [facility] [place], [place] - [address]   [address] [address]    Telephone:  [phone]   Fax:  [phone]   Hours:                  E-Prescribed (3 of 4)         docusate sodium (COLACE) 100 MG tablet               prochlorperazine (COMPAZINE) 5 MG tablet               ranitidine (ZANTAC) 150 MG tablet                 Printed at Department/Unit printer (1 of 4)         acetaminophen-codeine (TYLENOL WITH CODEINE #3) 300-30 MG per tablet                Procedures and tests performed during your visit     Basic metabolic panel    Hemoglobin and hematocrit      Orders Needing Specimen Collection     None      Pending Results     Date and Time Order Name Status Description    7/23/2018 0854 Surgical pathology  exam In process             Pending Culture Results     Date and Time Order Name Status Description    7/23/2018 0854 Surgical pathology exam In process             Pending Results Instructions     If you had any lab results that were not finalized at the time of your Discharge, you can call the ED Lab Result RN at [phone]. You will be contacted by this team for any positive Lab results or changes in treatment. The nurses are available 7 days a week from 10A to 6:30P.  You can leave a message 24 hours per day and they will return your call.        Test Results From Your Hospital Stay        7/24/2018  3:18 PM      Component Results     Component Value Ref Range & Units Status    Hemoglobin 14.4 11.7 - 15.7 g/dL Final    Hematocrit 43.1 35.0 - 47.0 % Final         7/24/2018  3:59 PM      Component Results     Component Value Ref Range & Units Status    Sodium 138 133 - 144 mmol/L Final    Potassium 3.8 3.4 - 5.3 mmol/L Final    Chloride 104 94 - 109 mmol/L Final    Carbon Dioxide 27 20 - 32 mmol/L Final    Anion Gap 7 3 - 14 mmol/L Final    Glucose 102 (H) 70 - 99 mg/dL Final    Urea Nitrogen 11 7 - 30 mg/dL Final    Creatinine 0.66 0.52 - 1.04 mg/dL Final    GFR Estimate >90 >60 mL/min/1.7m2 Final    Non  GFR Calc    GFR Estimate If Black >90 >60 mL/min/1.7m2 Final    African American GFR Calc    Calcium 8.4 (L) 8.5 - 10.1 mg/dL Final                Thank you for choosing [facility]       Thank you for choosing [facility] for your care. Our goal is always to provide you with excellent care. Hearing back from our patients is one way we can continue to improve our services. Please take a few minutes to complete the written survey that you may receive in the mail after you visit with us. Thank you!        Care EveryWhere ID     This is your Care EveryWhere ID. This could be used by other organizations to access your [facility] medical records  [id number]        Equal Access to Services     BANG WAGNER  AH: Manolo Rendon, waalirio luedwinadaha, qakieranta kalavonne angel. So [facility] [phone].    ATENCIÓN: Si habla español, tiene a ma disposición servicios gratuitos de asistencia lingüística. Llame al [phone].    We comply with applicable federal civil rights laws and [place] laws. We do not discriminate on the basis of race, color, national origin, age, disability, sex, sexual orientation, or gender identity.            After Visit Summary       This is your record. Keep this with you and show to your community pharmacist(s) and doctor(s) at your next visit.

## 2018-07-24 NOTE — ED PROVIDER NOTES
History     Chief Complaint   Patient presents with     Post-op Problem     tonsils out yesterday, vomiting from pain meds, no nausea meds given for home use     HPI  Antonia Marc is a 22 year old female who urgency department with nausea, vomiting, pain post tonsillectomy yesterday.  Patient states that she was discharged yesterday and was having difficulty with nausea and vomiting and was advised that she was not eligible for any nausea medication and therefore was discharged without anything for nausea.  Patient states that she has been trying to take the hydrocodone for pain in liquid form and has been unable to her has had the response of vomiting within 30 minutes after taking it.  Patient denies any fever or aches or chills.  Patient reports that she feels like she is having blood go down the back of her throat.  Patient reports difficulty with swallowing but reports that she is able to swallow.  Patient reports it is extremely painful to swallow.  Patient denies any hematemesis.    Problem List:    Patient Active Problem List    Diagnosis Date Noted     Recurrent tonsillitis 02/01/2018     Priority: Medium     Recurrent major depressive disorder, in remission (H) 05/30/2017     Priority: Medium     Anxiety 05/30/2017     Priority: Medium     JAZZY (generalized anxiety disorder) 05/30/2017     Priority: Medium     Mild episode of recurrent major depressive disorder (H) 05/30/2017     Priority: Medium     Irregular menstrual bleeding 04/02/2012     Priority: Medium     Flat feet 09/02/2011     Priority: Medium        Past Medical History:    Past Medical History:   Diagnosis Date     Closed fracture of unspecified part of radius with ulna(813.83)      Other diseases of trachea and bronchus, not elsewhere classified        Past Surgical History:    Past Surgical History:   Procedure Laterality Date     TONSILLECTOMY ADULT Bilateral 7/23/2018    Procedure: TONSILLECTOMY ADULT;  Bilateral Tonsillectomy;   Surgeon: Galilea Good MD;  Location: [facility] OR       Family History:    Family History   Problem Relation Age of Onset     Diabetes Mother      Hypertension Mother      Diabetes Father      Hypertension Father      Crohn Disease Father      Hypertension Maternal Grandmother      Anxiety Disorder Maternal Grandmother      Hypertension Maternal Grandfather      Diabetes Maternal Grandfather      Anxiety Disorder Maternal Grandfather        Social History:  Marital Status:  Single [1]  Social History   Substance Use Topics     Smoking status: Never Smoker     Smokeless tobacco: Never Used      Comment: no exporure     Alcohol use No        Medications:      acetaminophen-codeine (TYLENOL WITH CODEINE #3) 300-30 MG per tablet   docusate sodium (COLACE) 100 MG tablet   escitalopram (LEXAPRO) 20 MG tablet   prochlorperazine (COMPAZINE) 5 MG tablet   ranitidine (ZANTAC) 150 MG tablet       Review of Systems   Constitutional: Positive for activity change and appetite change. Negative for chills, diaphoresis, fatigue and fever.   HENT: Positive for sore throat and trouble swallowing (painful). Negative for dental problem, ear pain, sinus pain and sinus pressure.    Eyes: Negative for pain.   Respiratory: Positive for cough (non productive, intermittent). Negative for shortness of breath, wheezing and stridor.    Cardiovascular: Negative for chest pain.   Gastrointestinal: Positive for nausea. Negative for constipation, diarrhea and vomiting.   All other systems reviewed and are negative.      Physical Exam   BP: 131/83  Heart Rate: 93  Temp: 99.3  F (37.4  C)  Resp: 20  SpO2: 98 %      Physical Exam   Constitutional: She appears well-developed and well-nourished. She appears distressed.   HENT:   Head: Normocephalic and atraumatic.   Right Ear: Tympanic membrane, external ear and ear canal normal.   Left Ear: Tympanic membrane, external ear and ear canal normal.   Nose: Nose normal.   Mouth/Throat: Uvula is midline. Uvula  swelling (mild but midline) present. No dental abscesses.       Eyes: Conjunctivae are normal. Right eye exhibits no discharge. Left eye exhibits no discharge.   Neck: Neck supple.   Cardiovascular: Normal rate, regular rhythm and normal heart sounds.  Exam reveals no gallop and no friction rub.    No murmur heard.  Pulmonary/Chest: Effort normal and breath sounds normal. No respiratory distress. She has no wheezes. She has no rales. She exhibits no tenderness.   Neurological: She is alert.   Skin: Skin is warm and dry. No rash noted. She is not diaphoretic. No erythema. No pallor.   Psychiatric: She has a normal mood and affect.   Nursing note and vitals reviewed.      ED Course     ED Course   1450: Reassess patient.  Patient states that she is improving and pain.  Patient reports her previous pain on arrival was 8 out of 10 and now it is 6 out of 10.  Patient states that the nausea is improved but not completely gone after the Zofran.  Patient interested in trying orange popsicle and this was given.    Procedures    Results for orders placed or performed during the hospital encounter of 07/24/18 (from the past 24 hour(s))   Hemoglobin and hematocrit   Result Value Ref Range    Hemoglobin 14.4 11.7 - 15.7 g/dL    Hematocrit 43.1 35.0 - 47.0 %   Basic metabolic panel   Result Value Ref Range    Sodium 138 133 - 144 mmol/L    Potassium 3.8 3.4 - 5.3 mmol/L    Chloride 104 94 - 109 mmol/L    Carbon Dioxide 27 20 - 32 mmol/L    Anion Gap 7 3 - 14 mmol/L    Glucose 102 (H) 70 - 99 mg/dL    Urea Nitrogen 11 7 - 30 mg/dL    Creatinine 0.66 0.52 - 1.04 mg/dL    GFR Estimate >90 >60 mL/min/1.7m2    GFR Estimate If Black >90 >60 mL/min/1.7m2    Calcium 8.4 (L) 8.5 - 10.1 mg/dL       Medications   pantoprazole (PROTONIX) 40 mg IV push injection (40 mg Intravenous Not Given 7/24/18 1613)   ondansetron (ZOFRAN) 2 MG/ML injection (4 mg  Given 7/24/18 1350)   ketorolac (TORADOL) injection 30 mg (30 mg Intravenous Given 7/24/18  2701)   prochlorperazine (COMPAZINE) injection 10 mg (10 mg Intravenous Given 7/24/18 6682)       Assessments & Plan (with Medical Decision Making)     I have reviewed the nursing notes.    I have reviewed the findings, diagnosis, plan and need for follow up with the patient.  This is a 22-year-old female who presents to the emergency department for acute pain, nausea and vomiting status post tonsillectomy yesterday.  Patient has been taking hydrocodone for pain per family.  According to discharge instructions it looks like she was given liquid oxycodone and this is what she is not tolerating.  Upon arrival to the emergency department patient appears to be in moderate pain and had not voided since prior to surgery.  She was given 2 L of fluids for hydration observation of her posterior pharynx reveals the cauterization marks and also no evidence of active bleeding presently uvula is midline no deviation no trismus no evidence of abscess.  Patient was given Zofran and was partially responsive to this but still was reporting nausea.  Patient given IV Toradol and reports this brought her pain level down to a 5-6 from 8 and therefore was responsive to that.  Patient subsequently given Compazine and felt that this completely resolved her nausea.  Hemoglobin hematocrit obtained and is stable.  Basic metabolic obtained and is stable.  Without remarkable abnormalities a slightly elevated glucose.  Patient discharged in stable condition and was given Tylenol with codeine Colace and Compazine and Zantac.  Zantac was because the patient has a history of gastritis and has been taking Pepto-Bismol for this and recommend avoid this at this point in time.  Compazine as for nausea.  the Colace is for constipation or narcotic induced constipation potential.  Patient to return if there are other complications.  Discharge Medication List as of 7/24/2018  4:52 PM      START taking these medications    Details   acetaminophen-codeine  (TYLENOL WITH CODEINE #3) 300-30 MG per tablet Take 1-2 tablets by mouth every 6 hours as needed for pain, Disp-12 tablet, R-0, Local Print      docusate sodium (COLACE) 100 MG tablet Take one to four capsules daily, Disp-60 tablet, R-0, E-Prescribe      prochlorperazine (COMPAZINE) 5 MG tablet Take 1 tablet (5 mg) by mouth every 6 hours as needed for nausea or vomiting, Disp-10 tablet, R-0, E-Prescribe      ranitidine (ZANTAC) 150 MG tablet Take 1 tablet (150 mg) by mouth 2 times daily for 15 days, Disp-30 tablet, R-0, E-Prescribe             Final diagnoses:   Post-operative nausea and vomiting   Post-tonsillectomy pain       7/24/2018   [facility] EMERGENCY DEPARTMENT     Jewell Kaplan, ROS CNP  07/24/18 8802

## 2018-07-26 DIAGNOSIS — Z90.89 POST-TONSILLECTOMY PAIN: ICD-10-CM

## 2018-07-26 DIAGNOSIS — R11.2 POST-OPERATIVE NAUSEA AND VOMITING: ICD-10-CM

## 2018-07-26 DIAGNOSIS — Z98.890 POST-OPERATIVE NAUSEA AND VOMITING: ICD-10-CM

## 2018-07-26 DIAGNOSIS — G89.18 POST-TONSILLECTOMY PAIN: ICD-10-CM

## 2018-07-26 RX ORDER — PROCHLORPERAZINE MALEATE 5 MG
5 TABLET ORAL DAILY PRN
Qty: 10 TABLET | Refills: 0 | Status: SHIPPED | OUTPATIENT
Start: 2018-07-26 | End: 2018-08-22

## 2018-07-26 RX ORDER — ASPIRIN 81 MG
TABLET, DELAYED RELEASE (ENTERIC COATED) ORAL
Qty: 60 TABLET | Refills: 0 | Status: CANCELLED | OUTPATIENT
Start: 2018-07-26

## 2018-07-26 RX ORDER — ACETAMINOPHEN AND CODEINE PHOSPHATE 300; 30 MG/1; MG/1
2 TABLET ORAL EVERY 6 HOURS PRN
Qty: 40 TABLET | Refills: 0 | Status: SHIPPED | OUTPATIENT
Start: 2018-07-26 | End: 2018-08-22

## 2018-07-26 NOTE — TELEPHONE ENCOUNTER
RANJITB per Dr. Good to refill Compazine and pt to only take one tablet daily as needed. Pt notified of script sent to pharmacy.  Alexia BAE RN BSN PHN  [facility]

## 2018-07-26 NOTE — TELEPHONE ENCOUNTER
Reason for Call:  Medication or medication refill:    Do you use a [facility]?  Name of the pharmacy and phone number for the current request:  [facility] [phone]    Name of the medication requested: acetaminophen-codeine    Other request: pt mother calling stating pt had tonsils out on Monday, she was in the ER on 7/24 to get fluids and for vomiting. They changed her pain meds and she is needing a refill.     Can we leave a detailed message on this number? YES    Phone number patient can be reached at: Other phone number:  [phone] - Marie    Best Time: any     Call taken on 7/26/2018 at 8:41 AM by Amanda Castro

## 2018-07-26 NOTE — TELEPHONE ENCOUNTER
Will you consider providing more Tylenol with Codeine?  Alicja Alcala RN  [facility] [place]

## 2018-07-26 NOTE — TELEPHONE ENCOUNTER
Reason for Call:  Medication or medication refill:    Do you use a [facility]?  Name of the pharmacy and phone number for the current request:  [facility] [phone]    Name of the medication requested: prochlorperazine    Other request: pt needing refill on anti nausea med while taking the pain meds.     Can we leave a detailed message on this number? YES    Phone number patient can be reached at: Other phone number:  [phone]*    Best Time: any     Call taken on 7/26/2018 at 12:25 PM by Amanda Castro

## 2018-07-27 ENCOUNTER — NURSE TRIAGE (OUTPATIENT)
Dept: NURSING | Facility: CLINIC | Age: 23
End: 2018-07-27

## 2018-07-27 ENCOUNTER — HOSPITAL ENCOUNTER (EMERGENCY)
Facility: CLINIC | Age: 23
Discharge: HOME | End: 2018-07-27
Attending: EMERGENCY MEDICINE | Admitting: EMERGENCY MEDICINE
Payer: COMMERCIAL

## 2018-07-27 VITALS
DIASTOLIC BLOOD PRESSURE: 86 MMHG | HEIGHT: 68 IN | OXYGEN SATURATION: 98 % | RESPIRATION RATE: 18 BRPM | TEMPERATURE: 98.4 F | SYSTOLIC BLOOD PRESSURE: 138 MMHG | HEART RATE: 75 BPM

## 2018-07-27 DIAGNOSIS — Z90.89 POST-TONSILLECTOMY PAIN: ICD-10-CM

## 2018-07-27 DIAGNOSIS — G89.18 POST-TONSILLECTOMY PAIN: ICD-10-CM

## 2018-07-27 LAB — COPATH REPORT: NORMAL

## 2018-07-27 PROCEDURE — 99284 EMERGENCY DEPT VISIT MOD MDM: CPT | Mod: Z6 | Performed by: EMERGENCY MEDICINE

## 2018-07-27 PROCEDURE — 25000128 H RX IP 250 OP 636: Performed by: EMERGENCY MEDICINE

## 2018-07-27 PROCEDURE — 96372 THER/PROPH/DIAG INJ SC/IM: CPT | Performed by: EMERGENCY MEDICINE

## 2018-07-27 PROCEDURE — 99284 EMERGENCY DEPT VISIT MOD MDM: CPT | Performed by: EMERGENCY MEDICINE

## 2018-07-27 RX ORDER — KETOROLAC TROMETHAMINE 30 MG/ML
30 INJECTION, SOLUTION INTRAMUSCULAR; INTRAVENOUS ONCE
Status: COMPLETED | OUTPATIENT
Start: 2018-07-27 | End: 2018-07-27

## 2018-07-27 RX ADMIN — KETOROLAC TROMETHAMINE 30 MG: 30 INJECTION, SOLUTION INTRAMUSCULAR at 23:24

## 2018-07-27 NOTE — ED AVS SNAPSHOT
[facility] Emergency Department    [address] [address]    Phone:  [phone]    Fax:  [phone]                                       Antonia Marc   MRN: [id number]    Department:  [facility] Emergency Department   Date of Visit:  7/27/2018           After Visit Summary Signature Page     I have received my discharge instructions, and my questions have been answered. I have discussed any challenges I see with this plan with the nurse or doctor.    ..........................................................................................................................................  Patient/Patient Representative Signature      ..........................................................................................................................................  Patient Representative Print Name and Relationship to Patient    ..................................................               ................................................  Date                                            Time    ..........................................................................................................................................  Reviewed by Signature/Title    ...................................................              ..............................................  Date                                                            Time

## 2018-07-27 NOTE — ED AVS SNAPSHOT
[facility] Emergency Department    [address] [address]    Phone:  [phone]    Fax:  [phone]                                       Antonia Marc   MRN: [id number]    Department:  [facility] Emergency Department   Date of Visit:  7/27/2018           Patient Information     Date Of Birth          1995        Your diagnoses for this visit were:     Post-tonsillectomy pain        You were seen by Ethan Hernández MD.        Discharge Instructions       Continue home medications.       Tonsillectomy, Post-Op Bleeding  You have had surgery to remove your tonsils. Bleeding at the surgery site can happen after surgery. The healthcare provider can often control it using direct pressure or applying medicine to shrink the blood vessels. If this fails, you will need to return to the operating room for further treatment. Notify your healthcare provider at once or return to this hospital if there are signs of any further bleeding.  Home Care    Your throat will be sore. Throat pain after surgery improves over the first 3-5 days. It is normal to have more pain at the end of the first week before it finally goes away.    Soft foods will be easier to swallow.    Drink plenty of fluids to prevent dehydration. You must continue to drink even though your throat hurts.    For pain relief, suck on ice cubes or frozen fruit pops, eat ice cream or sherbet, and drink cold liquids. You may also use liquid acetaminophen. Don't take ibuprofen or aspirin. These may increase bleeding risk. If your healthcare provider has prescribed pain medicine, take this as directed.     If antibiotics were prescribed, take these as directed until they are gone.    Do not overheat your home since this dries the air and may irritate throat tissues, especially at night. A cool-mist humidifier in the bedroom may help.    Don t have contact with people with colds or the flu during the recovery period. You may  be more likely to get ill during this time.    Smoking irritates the surgery site. If you smoke, this is a good time to stop.    Don't do heavy exercise, lifting, or straining for the next 10 days.  Follow-up care  Follow up with your healthcare provider or this facility as advised.  When to see medical advice  Call your healthcare provider right away if any of the following occur:    Trouble speaking, swallowing, or breathing    Nausea and vomiting    Bleeding from the mouth    Fever over 100.4 F (38.3 C)    Increasing pain not controlled by pain medicines    Signs of dehydration: Very dark urine, less urine, dry mouth, weakness  Date Last Reviewed: 10/1/2017    0502-9721 [employer]. [address] [place] [address], [place] [address]. All rights reserved. This information is not intended as a substitute for professional medical care. Always follow your healthcare professional's instructions.          24 Hour Appointment Hotline       To make an appointment at any [facility], call [phone] ([phone]). If you don't have a family doctor or clinic, we will help you find one. [place] clinics are conveniently located to serve the needs of you and your family.             Review of your medicines      Our records show that you are taking the medicines listed below. If these are incorrect, please call your family doctor or clinic.        Dose / Directions Last dose taken    acetaminophen-codeine 300-30 MG per tablet   Commonly known as:  TYLENOL WITH CODEINE #3   Dose:  2 tablet   Quantity:  40 tablet        Take 2 tablets by mouth every 6 hours as needed for pain   Refills:  0        docusate sodium 100 MG tablet   Commonly known as:  COLACE   Quantity:  60 tablet        Take one to four capsules daily   Refills:  0        escitalopram 20 MG tablet   Commonly known as:  LEXAPRO   Dose:  20 mg   Quantity:  30 tablet        Take 1 tablet (20 mg) by mouth daily   Refills:  5        prochlorperazine 5  MG tablet   Commonly known as:  COMPAZINE   Dose:  5 mg   Quantity:  10 tablet        Take 1 tablet (5 mg) by mouth daily as needed for nausea or vomiting   Refills:  0        ranitidine 150 MG tablet   Commonly known as:  ZANTAC   Dose:  150 mg   Quantity:  30 tablet        Take 1 tablet (150 mg) by mouth 2 times daily for 15 days   Refills:  0                Orders Needing Specimen Collection     None      Pending Results     No orders found from 7/25/2018 to 7/28/2018.            Pending Culture Results     No orders found from 7/25/2018 to 7/28/2018.            Pending Results Instructions     If you had any lab results that were not finalized at the time of your Discharge, you can call the ED Lab Result RN at [phone]. You will be contacted by this team for any positive Lab results or changes in treatment. The nurses are available 7 days a week from 10A to 6:30P.  You can leave a message 24 hours per day and they will return your call.        Test Results From Your Hospital Stay               Thank you for choosing [facility]       Thank you for choosing [facility] for your care. Our goal is always to provide you with excellent care. Hearing back from our patients is one way we can continue to improve our services. Please take a few minutes to complete the written survey that you may receive in the mail after you visit with us. Thank you!        Care EveryWhere ID     This is your Care EveryWhere ID. This could be used by other organizations to access your [facility] medical records  [id number]        Equal Access to Services     [employer] AH: Manolo Rendon, waalirio todd, qaybta kaallavonne centeno. So [facility] [phone].    ATENCIÓN: Si habla español, tiene a ma disposición servicios gratuitos de asistencia lingüística. Llame al [phone].    We comply with applicable federal civil rights laws and [place] laws. We do not discriminate on the  basis of race, color, national origin, age, disability, sex, sexual orientation, or gender identity.            After Visit Summary       This is your record. Keep this with you and show to your community pharmacist(s) and doctor(s) at your next visit.

## 2018-07-28 ASSESSMENT — ENCOUNTER SYMPTOMS
ABDOMINAL PAIN: 0
FEVER: 0
SHORTNESS OF BREATH: 0

## 2018-07-28 NOTE — TELEPHONE ENCOUNTER
"  Reason for Disposition    Caller has URGENT question and triager unable to answer question    Additional Information    Negative: Sounds like a life-threatening emergency to the triager    Negative: Chest pain    Negative: Difficulty breathing    Negative: Surgical incision symptoms and questions    Negative: [1] Discomfort (pain, burning or stinging) when passing urine AND [2] male    Negative: [1] Discomfort (pain, burning or stinging) when passing urine AND [2] female    Negative: Constipation    Negative: Calf pain    Negative: Dizziness is severe, or persists > 24 hours after surgery    Negative: Pain, redness, swelling, or pus at IV Site    Negative: Symptoms arising from use of a urinary catheter ([place]o or Coude)    Negative: Cast problems or questions    Negative: Medication question    Negative: [1] Widespread rash AND [2] bright red, sunburn-like    Negative: [1] SEVERE headache AND [2] after spinal (epidural) anesthesia    Negative: [1] Vomiting AND [2] persists > 4 hours    Negative: [1] Vomiting AND [2] abdomen looks much more swollen than usual    Negative: [1] Drinking very little AND [2] dehydration suspected (e.g., no urine > 12 hours, very dry mouth, very lightheaded)    Negative: Patient sounds very sick or weak to the triager    Negative: Sounds like a serious complication to the triager    Negative: Fever > 100.5 F (38.1 C)    Negative: [1] SEVERE post-op pain (e.g., excruciating, pain scale 8-10) AND [2] not controlled with pain medications    Answer Assessment - Initial Assessment Questions  1. SYMPTOM: \"What's the main symptom you're concerned about?\" (e.g., pain, fever, vomiting)      Bleeding in throat   2. ONSET: \"When did ________  start?\"      2 days ago  3. SURGERY: \"What surgery was performed?\"      tosilectomy   4. DATE of SURGERY: \"When was surgery performed?\"       7-23  5. ANESTHESIA: \" What type of anesthesia did you have?\" (e.g., general, spinal, epidural, local)      " "general  6. PAIN: \"Is there any pain?\" If so, ask: \"How bad is it?\"  (Scale 1-10; or mild, moderate, severe)      moderate  7. FEVER: \"Do you have a fever?\" If so, ask: \"What is your temperature, how was it measured, and when did it start?\"      no  8. VOMITING: \"Is there any vomiting?\" If yes, ask: \"How many times?\"      no  9. BLEEDING: \"Is there any bleeding?\" If so, ask: \"How much?\" and \"Where?\"      Yes, pocket of blood in mouth by tonsil   10. OTHER SYMPTOMS: \"Do you have any other symptoms?\" (e.g., drainage from wound, painful urination, constipation)        *No Answer*    Protocols used: POST-OP SYMPTOMS AND QUESTIONS-ADULT-    "

## 2018-07-28 NOTE — ED NOTES
Pt has had toradol in the past and never had a rxn, they would like to DC home now.  They are encouraged to call or return with concerns.

## 2018-07-28 NOTE — DISCHARGE INSTRUCTIONS
Continue home medications.       Tonsillectomy, Post-Op Bleeding  You have had surgery to remove your tonsils. Bleeding at the surgery site can happen after surgery. The healthcare provider can often control it using direct pressure or applying medicine to shrink the blood vessels. If this fails, you will need to return to the operating room for further treatment. Notify your healthcare provider at once or return to this hospital if there are signs of any further bleeding.  Home Care    Your throat will be sore. Throat pain after surgery improves over the first 3-5 days. It is normal to have more pain at the end of the first week before it finally goes away.    Soft foods will be easier to swallow.    Drink plenty of fluids to prevent dehydration. You must continue to drink even though your throat hurts.    For pain relief, suck on ice cubes or frozen fruit pops, eat ice cream or sherbet, and drink cold liquids. You may also use liquid acetaminophen. Don't take ibuprofen or aspirin. These may increase bleeding risk. If your healthcare provider has prescribed pain medicine, take this as directed.     If antibiotics were prescribed, take these as directed until they are gone.    Do not overheat your home since this dries the air and may irritate throat tissues, especially at night. A cool-mist humidifier in the bedroom may help.    Don t have contact with people with colds or the flu during the recovery period. You may be more likely to get ill during this time.    Smoking irritates the surgery site. If you smoke, this is a good time to stop.    Don't do heavy exercise, lifting, or straining for the next 10 days.  Follow-up care  Follow up with your healthcare provider or this facility as advised.  When to see medical advice  Call your healthcare provider right away if any of the following occur:    Trouble speaking, swallowing, or breathing    Nausea and vomiting    Bleeding from the mouth    Fever over 100.4 F  (38.3 C)    Increasing pain not controlled by pain medicines    Signs of dehydration: Very dark urine, less urine, dry mouth, weakness  Date Last Reviewed: 10/1/2017    9209-0058 [employer]. [address] [place] [address], [place] [address]. All rights reserved. This information is not intended as a substitute for professional medical care. Always follow your healthcare professional's instructions.

## 2018-07-28 NOTE — ED PROVIDER NOTES
History     Chief Complaint   Patient presents with     Post-op Problem     had tonsils out on Monday. was seen on Tuesday for nausea and vomiting. now tasting blood and having a lot of pain     HPI  Antonia Marc is a 22 year old female who has past medical history significant for tonsillectomy that was performed on July 23, 2018.  Patient had been sent home on oxycodone, and subsequently was seen in the emergency department for pain, with nausea, and vomiting on Tuesday, July 24.  Patient presents to the emergency department today because of complaints of increased amounts of pain, however her main complaint is that she has been able to taste blood, and noticed a darkened area near the tonsil this evening.  Patient has been taking Tylenol with codeine for the pain.  There is been no fever.  No lightheadedness.  No blood in the stool, with no black or bright red stool.    Problem List:    Patient Active Problem List    Diagnosis Date Noted     Recurrent tonsillitis 02/01/2018     Priority: Medium     Recurrent major depressive disorder, in remission (H) 05/30/2017     Priority: Medium     Anxiety 05/30/2017     Priority: Medium     JAZZY (generalized anxiety disorder) 05/30/2017     Priority: Medium     Mild episode of recurrent major depressive disorder (H) 05/30/2017     Priority: Medium     Irregular menstrual bleeding 04/02/2012     Priority: Medium     Flat feet 09/02/2011     Priority: Medium        Past Medical History:    Past Medical History:   Diagnosis Date     Closed fracture of unspecified part of radius with ulna(813.83)      Other diseases of trachea and bronchus, not elsewhere classified        Past Surgical History:    Past Surgical History:   Procedure Laterality Date     TONSILLECTOMY ADULT Bilateral 7/23/2018    Procedure: TONSILLECTOMY ADULT;  Bilateral Tonsillectomy;  Surgeon: Galilea Good MD;  Location: [facility] OR       Family History:    Family History   Problem Relation Age of Onset      "Diabetes Mother      Hypertension Mother      Diabetes Father      Hypertension Father      Crohn Disease Father      Hypertension Maternal Grandmother      Anxiety Disorder Maternal Grandmother      Hypertension Maternal Grandfather      Diabetes Maternal Grandfather      Anxiety Disorder Maternal Grandfather        Social History:  Marital Status:  Single [1]  Social History   Substance Use Topics     Smoking status: Never Smoker     Smokeless tobacco: Never Used      Comment: no exporure     Alcohol use No        Medications:      acetaminophen-codeine (TYLENOL WITH CODEINE #3) 300-30 MG per tablet   docusate sodium (COLACE) 100 MG tablet   escitalopram (LEXAPRO) 20 MG tablet   prochlorperazine (COMPAZINE) 5 MG tablet   ranitidine (ZANTAC) 150 MG tablet         Review of Systems   Constitutional: Negative for fever.   HENT:        See HPI   Respiratory: Negative for shortness of breath.    Cardiovascular: Negative for chest pain.   Gastrointestinal: Negative for abdominal pain.   All other systems reviewed and are negative.      Physical Exam   BP: 138/86  Pulse: 75  Temp: 98.4  F (36.9  C)  Resp: 18  Height: 172.7 cm (5' 8\")  SpO2: 98 %      Physical Exam  /86  Pulse 75  Temp 98.4  F (36.9  C) (Temporal)  Resp 18  Ht 1.727 m (5' 8\")  LMP 07/15/2018  SpO2 98%  General: alert and in no acute distress  Head: atraumatic, normocephalic  Oropharynx: Eschars present of the bilateral tonsils, consistent with recent tonsillectomy.  No active bleeding noted.  Abd: Soft, nontender, nondistended, no peritoneal signs  Musculoskel/Extremities: normal extremities, no edema, erythema, tenderness and full AROM of major joints without tenderness  Skin: no rashes, no diaphoresis and skin color normal  Neuro: Patient awake, alert, oriented, speech is fluent, gait is normal  Psychiatric: affect/mood normal, cooperative, normal judgement/insight and memory intact      ED Course     ED Course     Procedures           "     Critical Care time:  none               No results found for this or any previous visit (from the past 24 hour(s)).    Medications   ketorolac (TORADOL) injection 30 mg (30 mg Intramuscular Given 7/27/18 8409)       Assessments & Plan (with Medical Decision Making)  22 year old female, with recent tonsillectomy, and one emergency department visit the following day, who presents to the emergency department regarding concerns of bleeding from her tonsillectomy site.  Oropharynx exam shows good eschars which have formed on the bilateral tonsils, with no evidence of active bleeding.  She has been able to swallow secretions, and food.  She is taking acetaminophen with codeine at home.  Patient was given intramuscular injection of Toradol for pain.  I discussed expected course with patient, and she will follow-up as needed.  No vomiting, and do not feel that any additional medications are indicated at this point.  Patient has Compazine at home.     I have reviewed the nursing notes.    I have reviewed the findings, diagnosis, plan and need for follow up with the patient.       Discharge Medication List as of 7/27/2018 11:19 PM          Final diagnoses:   Post-tonsillectomy pain       7/27/2018   [facility] EMERGENCY DEPARTMENT     Ethan Hernández MD  07/28/18 6616

## 2018-08-22 ENCOUNTER — OFFICE VISIT (OUTPATIENT)
Dept: OTOLARYNGOLOGY | Facility: CLINIC | Age: 23
End: 2018-08-22
Payer: COMMERCIAL

## 2018-08-22 VITALS
TEMPERATURE: 97.8 F | SYSTOLIC BLOOD PRESSURE: 136 MMHG | HEART RATE: 70 BPM | BODY MASS INDEX: 36.04 KG/M2 | DIASTOLIC BLOOD PRESSURE: 74 MMHG | WEIGHT: 237 LBS

## 2018-08-22 DIAGNOSIS — J35.01 CHRONIC TONSILLITIS: Primary | ICD-10-CM

## 2018-08-22 PROCEDURE — 99024 POSTOP FOLLOW-UP VISIT: CPT | Performed by: OTOLARYNGOLOGY

## 2018-08-22 ASSESSMENT — PAIN SCALES - GENERAL: PAINLEVEL: NO PAIN (0)

## 2018-08-22 NOTE — MR AVS SNAPSHOT
After Visit Summary   8/22/2018    Antonia Marc    MRN: [id number]           Patient Information     Date Of Birth          1995        Visit Information        Provider Department      8/22/2018 2:00 PM Galilea Good MD [facility]        Today's Diagnoses     Chronic tonsillitis    -  1      Care Instructions    Per physician's instructions            Follow-ups after your visit        Who to contact     If you have questions or need follow up information about today's clinic visit or your schedule please contact [facility] directly at [phone].  Normal or non-critical lab and imaging results will be communicated to you by MyChart, letter or phone within 4 business days after the clinic has received the results. If you do not hear from us within 7 days, please contact the clinic through MyChart or phone. If you have a critical or abnormal lab result, we will notify you by phone as soon as possible.  Submit refill requests through [employer] or call your pharmacy and they will forward the refill request to us. Please allow 3 business days for your refill to be completed.          Additional Information About Your Visit        Care EveryWhere ID     This is your Care EveryWhere ID. This could be used by other organizations to access your [facility] medical records  [id number]        Your Vitals Were     Pulse Temperature BMI (Body Mass Index)             70 97.8  F (36.6  C) (Oral) 36.04 kg/m2          Blood Pressure from Last 3 Encounters:   08/22/18 136/74   07/27/18 138/86   07/24/18 131/83    Weight from Last 3 Encounters:   08/22/18 107.5 kg (237 lb)   07/23/18 104.3 kg (230 lb)   07/05/18 106.6 kg (235 lb)              Today, you had the following     No orders found for display       Primary Care Provider Office Phone # Fax #    ROS Thornton [phone] [address] [facility] [address]        Equal Access to  Services     [facility]: Hadii aad ku hadfabricenessa Rendon, washaradda luqadaha, qaybta kaalmaarchana lal, lavonne ken. So [facility] [phone].    ATENCIÓN: Si habla español, tiene a ma disposición servicios gratuitos de asistencia lingüística. Llame al [phone].    We comply with applicable federal civil rights laws and [place] laws. We do not discriminate on the basis of race, color, national origin, age, disability, sex, sexual orientation, or gender identity.            Thank you!     Thank you for choosing [facility]  for your care. Our goal is always to provide you with excellent care. Hearing back from our patients is one way we can continue to improve our services. Please take a few minutes to complete the written survey that you may receive in the mail after your visit with us. Thank you!             Your Updated Medication List - Protect others around you: Learn how to safely use, store and throw away your medicines at www.disposemymeds.org.          This list is accurate as of 8/22/18  3:06 PM.  Always use your most recent med list.                   Brand Name Dispense Instructions for use Diagnosis    docusate sodium 100 MG tablet    COLACE    60 tablet    Take one to four capsules daily    Post-operative nausea and vomiting, Post-tonsillectomy pain       escitalopram 20 MG tablet    LEXAPRO    30 tablet    Take 1 tablet (20 mg) by mouth daily    Mild episode of recurrent major depressive disorder (H)

## 2018-08-22 NOTE — NURSING NOTE
"Initial /74 (BP Location: Right arm, Patient Position: Chair, Cuff Size: Adult Regular)  Pulse 70  Temp 97.8  F (36.6  C) (Oral)  Wt 107.5 kg (237 lb)  BMI 36.04 kg/m2 Estimated body mass index is 36.04 kg/(m^2) as calculated from the following:    Height as of 7/27/18: 1.727 m (5' 8\").    Weight as of this encounter: 107.5 kg (237 lb). .    Bianca Henriquez LPN    "

## 2018-08-22 NOTE — PROGRESS NOTES
History of Present Illness - Antonia Marc is a 22 year old female who is status post tonsillectomy on 7/23/2018 for chronic tonsillitis.  She had a lot of trouble with pain, nausea, and a self-limited bleed in the first week. She denies any current pain. She has no tonsil stones.    /74 (BP Location: Right arm, Patient Position: Chair, Cuff Size: Adult Regular)  Pulse 70  Temp 97.8  F (36.6  C) (Oral)  Wt 107.5 kg (237 lb)  BMI 36.04 kg/m2  General - The patient is well nourished and well developed, and appears to have good nutritional status.  Alert and oriented to person and place, answers questions and cooperates with examination appropriately.   Head and Face - Normocephalic and atraumatic, with no gross asymmetry noted of the contour of the facial features.  The facial nerve is intact, with strong symmetric movements.  Eyes - Extraocular movements intact, and the pupils were reactive to light.  Sclera were not icteric or injected, conjunctiva were pink and moist.  Neck - Normal midline excursion of the laryngotracheal complex during swallowing.  Full range of motion on passive movement.  Palpation of the occipital, submental, submandibular, internal jugular chain, and supraclavicular nodes did not demonstrate any abnormal lymph nodes or masses.  The carotid pulse was palpable bilaterally.  Palpation of the thyroid was soft and smooth, with no nodules or goiter appreciated.  The trachea was mobile and midline.  Mouth - Examination of the oral cavity shows pink, healthy, moist mucosa.  No lesions or ulceration noted.  The dentition are in good repair.  The tongue is mobile and midline.  Oropharynx - The tonsil beds are remucosalizing appropriately.  No signs of bleeding or clots.  The Uvula is midline and the soft palate is symmetric.     A/P - Antonia Marc has had a tonsillectomy complicated by nausea and a little bleeding in the first week. She is fully recovered.  They have no restrictions at  this point and can return on an as needed basis.

## 2018-08-22 NOTE — LETTER
8/22/2018         RE: Antonia Marc  [address] [place] [id number]        Dear Colleague,    Thank you for referring your patient, Antonia Marc, to the [facility]. Please see a copy of my visit note below.    History of Present Illness - Antonia Marc is a 22 year old female who is status post tonsillectomy on 7/23/2018 for chronic tonsillitis.  She had a lot of trouble with pain, nausea, and a self-limited bleed in the first week. She denies any current pain. She has no tonsil stones.    /74 (BP Location: Right arm, Patient Position: Chair, Cuff Size: Adult Regular)  Pulse 70  Temp 97.8  F (36.6  C) (Oral)  Wt 107.5 kg (237 lb)  BMI 36.04 kg/m2  General - The patient is well nourished and well developed, and appears to have good nutritional status.  Alert and oriented to person and place, answers questions and cooperates with examination appropriately.   Head and Face - Normocephalic and atraumatic, with no gross asymmetry noted of the contour of the facial features.  The facial nerve is intact, with strong symmetric movements.  Eyes - Extraocular movements intact, and the pupils were reactive to light.  Sclera were not icteric or injected, conjunctiva were pink and moist.  Neck - Normal midline excursion of the laryngotracheal complex during swallowing.  Full range of motion on passive movement.  Palpation of the occipital, submental, submandibular, internal jugular chain, and supraclavicular nodes did not demonstrate any abnormal lymph nodes or masses.  The carotid pulse was palpable bilaterally.  Palpation of the thyroid was soft and smooth, with no nodules or goiter appreciated.  The trachea was mobile and midline.  Mouth - Examination of the oral cavity shows pink, healthy, moist mucosa.  No lesions or ulceration noted.  The dentition are in good repair.  The tongue is mobile and midline.  Oropharynx - The tonsil beds are remucosalizing appropriately.  No  signs of bleeding or clots.  The Uvula is midline and the soft palate is symmetric.     A/P - Antonia Marc has had a tonsillectomy complicated by nausea and a little bleeding in the first week. She is fully recovered.  They have no restrictions at this point and can return on an as needed basis.        Again, thank you for allowing me to participate in the care of your patient.        Sincerely,        Galilea Good MD

## 2018-09-21 ENCOUNTER — OFFICE VISIT (OUTPATIENT)
Dept: URGENT CARE | Facility: URGENT CARE | Age: 23
End: 2018-09-21
Payer: COMMERCIAL

## 2018-09-21 VITALS
TEMPERATURE: 98 F | BODY MASS INDEX: 37.1 KG/M2 | HEART RATE: 60 BPM | WEIGHT: 244 LBS | RESPIRATION RATE: 18 BRPM | DIASTOLIC BLOOD PRESSURE: 76 MMHG | SYSTOLIC BLOOD PRESSURE: 130 MMHG

## 2018-09-21 DIAGNOSIS — L50.9 HIVES: Primary | ICD-10-CM

## 2018-09-21 DIAGNOSIS — L29.9 ITCHING: ICD-10-CM

## 2018-09-21 DIAGNOSIS — K08.89 PAIN, DENTAL: ICD-10-CM

## 2018-09-21 PROCEDURE — 96372 THER/PROPH/DIAG INJ SC/IM: CPT | Performed by: PHYSICIAN ASSISTANT

## 2018-09-21 PROCEDURE — 99213 OFFICE O/P EST LOW 20 MIN: CPT | Mod: 25 | Performed by: PHYSICIAN ASSISTANT

## 2018-09-21 RX ORDER — DEXAMETHASONE SODIUM PHOSPHATE 4 MG/ML
10 INJECTION, SOLUTION INTRA-ARTICULAR; INTRALESIONAL; INTRAMUSCULAR; INTRAVENOUS; SOFT TISSUE ONCE
Qty: 2.5 ML | Refills: 0 | OUTPATIENT
Start: 2018-09-21 | End: 2018-11-26

## 2018-09-21 RX ORDER — DIPHENHYDRAMINE HCL 25 MG
50 TABLET ORAL ONCE
Qty: 2 TABLET | Refills: 0
Start: 2018-09-21 | End: 2018-09-21

## 2018-09-21 RX ORDER — CLINDAMYCIN HCL 300 MG
300 CAPSULE ORAL 3 TIMES DAILY
Qty: 21 CAPSULE | Refills: 0 | Status: SHIPPED | OUTPATIENT
Start: 2018-09-21 | End: 2018-09-24

## 2018-09-21 ASSESSMENT — ENCOUNTER SYMPTOMS
BACK PAIN: 0
CHILLS: 0
SORE THROAT: 0
NECK PAIN: 0
RHINORRHEA: 0
ENDOCRINE NEGATIVE: 1
MYALGIAS: 0
EYES NEGATIVE: 1
ARTHRALGIAS: 0
SHORTNESS OF BREATH: 0
PALPITATIONS: 0
RESPIRATORY NEGATIVE: 1
HEMATOLOGIC/LYMPHATIC NEGATIVE: 1
HEADACHES: 0
JOINT SWELLING: 0
VOMITING: 0
WEAKNESS: 0
BRUISES/BLEEDS EASILY: 0
LIGHT-HEADEDNESS: 0
CARDIOVASCULAR NEGATIVE: 1
WOUND: 0
NAUSEA: 0
DIARRHEA: 0
MUSCULOSKELETAL NEGATIVE: 1
FEVER: 0
ALLERGIC/IMMUNOLOGIC NEGATIVE: 1
DIZZINESS: 0
COUGH: 0
NECK STIFFNESS: 0

## 2018-09-21 NOTE — PROGRESS NOTES
Chief Complaint:    Chief Complaint   Patient presents with     Hives     Started this morning. Thinking reaction to Amoxicillin. Started Amoxicillin 4 days ago. Father has penicillin allergy.       HPI: Antonia Marc is an 22 year old female who presents for evaluation and treatment of rash.  Patient has been on Amoxicillin for 4 days for dental infection.  Rash start this morning and has worsened.  The rash is very itchy.        ROS:      Review of Systems   Constitutional: Negative for chills and fever.   HENT: Negative for congestion, ear pain, rhinorrhea and sore throat.    Eyes: Negative.    Respiratory: Negative.  Negative for cough and shortness of breath.    Cardiovascular: Negative.  Negative for chest pain and palpitations.   Gastrointestinal: Negative for diarrhea, nausea and vomiting.   Endocrine: Negative.    Genitourinary: Negative.    Musculoskeletal: Negative.  Negative for arthralgias, back pain, joint swelling, myalgias, neck pain and neck stiffness.   Skin: Positive for rash. Negative for wound.   Allergic/Immunologic: Negative.  Negative for immunocompromised state.   Neurological: Negative for dizziness, weakness, light-headedness and headaches.   Hematological: Negative.  Does not bruise/bleed easily.        Family History   Family History   Problem Relation Age of Onset     Diabetes Mother      Hypertension Mother      Diabetes Father      Hypertension Father      Crohn Disease Father      Hypertension Maternal Grandmother      Anxiety Disorder Maternal Grandmother      Hypertension Maternal Grandfather      Diabetes Maternal Grandfather      Anxiety Disorder Maternal Grandfather        Social History  Social History     Social History     Marital status: Single     Spouse name: N/A     Number of children: N/A     Years of education: N/A     Occupational History     Not on file.     Social History Main Topics     Smoking status: Never Smoker     Smokeless tobacco: Never Used      Comment:  no exporure     Alcohol use No     Drug use: No     Sexual activity: No     Other Topics Concern     Not on file     Social History Narrative        Surgical History:  Past Surgical History:   Procedure Laterality Date     TONSILLECTOMY ADULT Bilateral 7/23/2018    Procedure: TONSILLECTOMY ADULT;  Bilateral Tonsillectomy;  Surgeon: Galilea Good MD;  Location: [facility] OR        Problem List:  Patient Active Problem List   Diagnosis     Flat feet     Irregular menstrual bleeding     Recurrent major depressive disorder, in remission (H)     Anxiety     JAZZY (generalized anxiety disorder)     Mild episode of recurrent major depressive disorder (H)     Recurrent tonsillitis        Allergies:  Allergies   Allergen Reactions     Amoxicillin      Oxycodone Nausea and Vomiting        Current Meds:    Current Outpatient Prescriptions:      AMOXICILLIN PO, Take by mouth 3 times daily , Disp: , Rfl:      clindamycin (CLEOCIN) 300 MG capsule, Take 1 capsule (300 mg) by mouth 3 times daily for 7 days, Disp: 21 capsule, Rfl: 0     dexamethasone (DECADRON) 4 MG/ML injection, Inject 2.5 mLs (10 mg) as directed once for 1 dose, Disp: 2.5 mL, Rfl: 0     diphenhydrAMINE (BENADRYL) 25 MG tablet, Take 2 tablets (50 mg) by mouth once for 1 dose, Disp: 2 tablet, Rfl: 0     escitalopram (LEXAPRO) 20 MG tablet, Take 1 tablet (20 mg) by mouth daily, Disp: 30 tablet, Rfl: 5     docusate sodium (COLACE) 100 MG tablet, Take one to four capsules daily (Patient not taking: Reported on 8/22/2018), Disp: 60 tablet, Rfl: 0     PHYSICAL EXAM:     Vital signs noted and reviewed by Misael White  /76 (BP Location: Right arm, Cuff Size: Adult Large)  Pulse 60  Temp 98  F (36.7  C) (Tympanic)  Resp 18  Wt 244 lb (110.7 kg)  BMI 37.1 kg/m2     PEFR:    Physical Exam   Constitutional: She is oriented to person, place, and time. She appears well-developed and well-nourished. No distress.   HENT:   Head: Normocephalic and atraumatic.   Right Ear:  Tympanic membrane and external ear normal.   Left Ear: Tympanic membrane and external ear normal.   Mouth/Throat: Oropharynx is clear and moist.   Eyes: EOM are normal. Pupils are equal, round, and reactive to light.   Neck: Normal range of motion. Neck supple.   Cardiovascular: Normal rate, regular rhythm, normal heart sounds and intact distal pulses.  Exam reveals no gallop and no friction rub.    No murmur heard.  Pulmonary/Chest: Effort normal and breath sounds normal. No respiratory distress.   Abdominal: Soft. Bowel sounds are normal. She exhibits no distension and no mass. There is no tenderness. There is no guarding.   Lymphadenopathy:     She has no cervical adenopathy.   Neurological: She is alert and oriented to person, place, and time. She has normal reflexes. No cranial nerve deficit.   Skin: Skin is warm and dry. She is not diaphoretic.   Erythematous slightly raised patches covering much of the upper torso.   Psychiatric: She has a normal mood and affect. Her behavior is normal. Judgment and thought content normal.   Nursing note and vitals reviewed.       ASSESSMENT:     1. Hives    2. Pain, dental    3. Itching           PLAN:     Patient given 10 Mg Decadron IM and 50 Mg Benadryl PO in clinic today.  Mother is here to drive her.  Rx fro Clindamycin for her dental infection.  She will be following up with her dentis next week to have the teeth extracted.  Worrisome symptoms discussed with instructions to go to the ED.  Patient verbalized understanding and agreed with this plan.     Misael White  9/21/2018, 6:25 PM

## 2018-09-21 NOTE — NURSING NOTE
"Chief Complaint   Patient presents with     Hives     Started this morning. Thinking reaction to Amoxicillin. Started Amoxicillin 4 days ago.        Initial /76 (BP Location: Right arm, Cuff Size: Adult Large)  Pulse 60  Temp 98  F (36.7  C) (Tympanic)  Resp 18  Wt 244 lb (110.7 kg)  BMI 37.1 kg/m2 Estimated body mass index is 37.1 kg/(m^2) as calculated from the following:    Height as of 7/27/18: 5' 8\" (1.727 m).    Weight as of this encounter: 244 lb (110.7 kg).    Patient presents to the clinic using No DME    Health Maintenance that is potentially due pending provider review:  NONE    n/a    Is there anyone who you would like to be able to receive your results? Not Applicable  If yes have patient fill out BR[facility]  Edwardo Keys M.A.        "

## 2018-09-21 NOTE — MR AVS SNAPSHOT
After Visit Summary   9/21/2018    Antonia Marc    MRN: [id number]           Patient Information     Date Of Birth          1995        Visit Information        Provider Department      9/21/2018 5:40 PM Misael White PA-C [facility]        Today's Diagnoses     Hives    -  1    Pain, dental        Itching           Follow-ups after your visit        Your next 10 appointments already scheduled     Sep 24, 2018  1:00 PM CDT   [facility]    [address] [facility] [address] [phone]           Bring a current list of meds and any records pertaining to this visit. For Physicals, please bring immunization records and any forms needing to be filled out. Please arrive 10 minutes early to complete paperwork.              Who to contact     If you have questions or need follow up information about today's clinic visit or your schedule please contact [facility] directly at [phone].  Normal or non-critical lab and imaging results will be communicated to you by MyChart, letter or phone within 4 business days after the clinic has received the results. If you do not hear from us within 7 days, please contact the clinic through MyChart or phone. If you have a critical or abnormal lab result, we will notify you by phone as soon as possible.  Submit refill requests through [employer] or call your pharmacy and they will forward the refill request to us. Please allow 3 business days for your refill to be completed.          Additional Information About Your Visit        Care EveryWhere ID     This is your Care EveryWhere ID. This could be used by other organizations to access your [facility] medical records  [id number]        Your Vitals Were     Pulse Temperature Respirations BMI (Body Mass Index)          60 98  F (36.7  C)  (Tympanic) 18 37.1 kg/m2         Blood Pressure from Last 3 Encounters:   09/21/18 130/76   08/22/18 136/74   07/27/18 138/86    Weight from Last 3 Encounters:   09/21/18 244 lb (110.7 kg)   08/22/18 237 lb (107.5 kg)   07/23/18 230 lb (104.3 kg)              Today, you had the following     No orders found for display         Today's Medication Changes          These changes are accurate as of 9/21/18  7:09 PM.  If you have any questions, ask your nurse or doctor.               Start taking these medicines.        Dose/Directions    clindamycin 300 MG capsule   Commonly known as:  CLEOCIN   Used for:  Pain, dental   Started by:  Misael White PA-C        Dose:  300 mg   Take 1 capsule (300 mg) by mouth 3 times daily for 7 days   Quantity:  21 capsule   Refills:  0       dexamethasone 4 MG/ML injection   Commonly known as:  DECADRON   Used for:  Hives   Started by:  Misael White PA-C        Dose:  10 mg   Inject 2.5 mLs (10 mg) as directed once for 1 dose   Quantity:  2.5 mL   Refills:  0       diphenhydrAMINE 25 MG tablet   Commonly known as:  BENADRYL   Used for:  Itching   Started by:  Misael White PA-C        Dose:  50 mg   Take 2 tablets (50 mg) by mouth once for 1 dose   Quantity:  2 tablet   Refills:  0            Where to get your medicines      These medications were sent to [facility] [address] [place] [address]  [address] [facility] [address]     Phone:  [phone]     clindamycin 300 MG capsule         Some of these will need a paper prescription and others can be bought over the counter.  Ask your nurse if you have questions.     You don't need a prescription for these medications     dexamethasone 4 MG/ML injection    diphenhydrAMINE 25 MG tablet                Primary Care Provider Office Phone # Fax #    ROS Thornton [phone] [address] [facility] [address]        Equal Access to Services     BANG WAGNER AH:  Hadii laila viera Sosebastienali, waaxda luqadaha, qaybta kaalmada lukasz, lavonne ginnyin hayaan naylafelicia erwin laheidymarsha jesus manuel. So [facility] [phone].    ATENCIÓN: Si alton augustin, tiene a ma disposición servicios gratuitos de asistencia lingüística. Llame al [phone].    We comply with applicable federal civil rights laws and [place] laws. We do not discriminate on the basis of race, color, national origin, age, disability, sex, sexual orientation, or gender identity.            Thank you!     Thank you for choosing [facility]  for your care. Our goal is always to provide you with excellent care. Hearing back from our patients is one way we can continue to improve our services. Please take a few minutes to complete the written survey that you may receive in the mail after your visit with us. Thank you!             Your Updated Medication List - Protect others around you: Learn how to safely use, store and throw away your medicines at www.disposemymeds.org.          This list is accurate as of 9/21/18  7:09 PM.  Always use your most recent med list.                   Brand Name Dispense Instructions for use Diagnosis    AMOXICILLIN PO      Take by mouth 3 times daily        clindamycin 300 MG capsule    CLEOCIN    21 capsule    Take 1 capsule (300 mg) by mouth 3 times daily for 7 days    Pain, dental       dexamethasone 4 MG/ML injection    DECADRON    2.5 mL    Inject 2.5 mLs (10 mg) as directed once for 1 dose    Hives       diphenhydrAMINE 25 MG tablet    BENADRYL    2 tablet    Take 2 tablets (50 mg) by mouth once for 1 dose    Itching       docusate sodium 100 MG tablet    COLACE    60 tablet    Take one to four capsules daily    Post-operative nausea and vomiting, Post-tonsillectomy pain       escitalopram 20 MG tablet    LEXAPRO    30 tablet    Take 1 tablet (20 mg) by mouth daily    Mild episode of recurrent major depressive disorder (H)

## 2018-09-22 NOTE — NURSING NOTE
Prior to injection verified patient identity using patient's name and date of birth.  Due to injection administration, patient instructed to remain in clinic for 15 minutes  afterwards, and to report any adverse reaction to me immediately.    Screening Questionnaire for Adult Immunization    Are you sick today?   No   Do you have allergies to medications, food, a vaccine component or latex?   No   Have you ever had a serious reaction after receiving a vaccination?   No   Do you have a long-term health problem with heart disease, lung disease, asthma, kidney disease, metabolic disease (e.g. diabetes), anemia, or other blood disorder?   No   Do you have cancer, leukemia, HIV/AIDS, or any other immune system problem?   No   In the past 3 months, have you taken medications that affect  your immune system, such as prednisone, other steroids, or anticancer drugs; drugs for the treatment of rheumatoid arthritis, Crohn s disease, or psoriasis; or have you had radiation treatments?   No   Have you had a seizure, or a brain or other nervous system problem?   No   During the past year, have you received a transfusion of blood or blood     products, or been given immune (gamma) globulin or antiviral drug?   No   For women: Are you pregnant or is there a chance you could become        pregnant during the next month?   No   Have you received any vaccinations in the past 4 weeks?   No     Immunization questionnaire answers were all negative.        Per orders of [facility], injection of Decadron and Benadryl given by Edwardo Keys. Patient instructed to remain in clinic for 15 minutes afterwards, and to report any adverse reaction to me immediately.       Screening performed by Edwardo Keys on 9/21/2018 at 7:06 PM.  Edwardo Keys M.A.

## 2018-09-22 NOTE — PROGRESS NOTES
Chief Complaint:          Chief Complaint   Patient presents with     Hives       Started this morning. Thinking reaction to Amoxicillin. Started Amoxicillin 4 days ago. Father has penicillin allergy.         HPI: Antonia Marc is an 22 year old female who presents for evaluation and treatment of rash.  Patient has been on Amoxicillin for 4 days for dental infection.  Rash start this morning and has worsened.  The rash is very itchy.          ROS:       Review of Systems   Constitutional: Negative for chills and fever.   HENT: Negative for congestion, ear pain, rhinorrhea and sore throat.    Eyes: Negative.    Respiratory: Negative.  Negative for cough and shortness of breath.    Cardiovascular: Negative.  Negative for chest pain and palpitations.   Gastrointestinal: Negative for diarrhea, nausea and vomiting.   Endocrine: Negative.    Genitourinary: Negative.    Musculoskeletal: Negative.  Negative for arthralgias, back pain, joint swelling, myalgias, neck pain and neck stiffness.   Skin: Positive for rash. Negative for wound.   Allergic/Immunologic: Negative.  Negative for immunocompromised state.   Neurological: Negative for dizziness, weakness, light-headedness and headaches.   Hematological: Negative.  Does not bruise/bleed easily.         Family History    Family History          Family History   Problem Relation Age of Onset     Diabetes Mother       Hypertension Mother       Diabetes Father       Hypertension Father       Crohn Disease Father       Hypertension Maternal Grandmother       Anxiety Disorder Maternal Grandmother       Hypertension Maternal Grandfather       Diabetes Maternal Grandfather       Anxiety Disorder Maternal Grandfather              Social History   Social History    Social History            Social History     Marital status: Single       Spouse name: N/A     Number of children: N/A     Years of education: N/A          Occupational History     Not on file.             Social  History Main Topics     Smoking status: Never Smoker     Smokeless tobacco: Never Used         Comment: no exporure     Alcohol use No     Drug use: No     Sexual activity: No           Other Topics Concern     Not on file      Social History Narrative            Surgical History:   Past Surgical History          Past Surgical History:   Procedure Laterality Date     TONSILLECTOMY ADULT Bilateral 7/23/2018     Procedure: TONSILLECTOMY ADULT;  Bilateral Tonsillectomy;  Surgeon: Galilea Good MD;  Location: [facility] OR            Problem List:      Patient Active Problem List   Diagnosis     Flat feet     Irregular menstrual bleeding     Recurrent major depressive disorder, in remission (H)     Anxiety     JAZZY (generalized anxiety disorder)     Mild episode of recurrent major depressive disorder (H)     Recurrent tonsillitis         Allergies:       Allergies   Allergen Reactions     Amoxicillin       Oxycodone Nausea and Vomiting         Current Meds:     Current Outpatient Prescriptions:      AMOXICILLIN PO, Take by mouth 3 times daily , Disp: , Rfl:      clindamycin (CLEOCIN) 300 MG capsule, Take 1 capsule (300 mg) by mouth 3 times daily for 7 days, Disp: 21 capsule, Rfl: 0     dexamethasone (DECADRON) 4 MG/ML injection, Inject 2.5 mLs (10 mg) as directed once for 1 dose, Disp: 2.5 mL, Rfl: 0     diphenhydrAMINE (BENADRYL) 25 MG tablet, Take 2 tablets (50 mg) by mouth once for 1 dose, Disp: 2 tablet, Rfl: 0     escitalopram (LEXAPRO) 20 MG tablet, Take 1 tablet (20 mg) by mouth daily, Disp: 30 tablet, Rfl: 5     docusate sodium (COLACE) 100 MG tablet, Take one to four capsules daily (Patient not taking: Reported on 8/22/2018), Disp: 60 tablet, Rfl: 0     PHYSICAL EXAM:     Vital signs noted and reviewed by Misael White  /76 (BP Location: Right arm, Cuff Size: Adult Large)  Pulse 60  Temp 98  F (36.7  C) (Tympanic)  Resp 18  Wt 244 lb (110.7 kg)  BMI 37.1 kg/m2     PEFR:     Physical Exam   Constitutional:  She is oriented to person, place, and time. She appears well-developed and well-nourished. No distress.   HENT:   Head: Normocephalic and atraumatic.   Right Ear: Tympanic membrane and external ear normal.   Left Ear: Tympanic membrane and external ear normal.   Mouth/Throat: Oropharynx is clear and moist.   Eyes: EOM are normal. Pupils are equal, round, and reactive to light.   Neck: Normal range of motion. Neck supple.   Cardiovascular: Normal rate, regular rhythm, normal heart sounds and intact distal pulses.  Exam reveals no gallop and no friction rub.    No murmur heard.  Pulmonary/Chest: Effort normal and breath sounds normal. No respiratory distress.   Abdominal: Soft. Bowel sounds are normal. She exhibits no distension and no mass. There is no tenderness. There is no guarding.   Lymphadenopathy:     She has no cervical adenopathy.   Neurological: She is alert and oriented to person, place, and time. She has normal reflexes. No cranial nerve deficit.   Skin: Skin is warm and dry. She is not diaphoretic.   Erythematous slightly raised patches covering much of the upper torso.   Psychiatric: She has a normal mood and affect. Her behavior is normal. Judgment and thought content normal.   Nursing note and vitals reviewed.        ASSESSMENT:     1. Hives    2. Pain, dental    3. Itching             PLAN:     Patient given 10 Mg Decadron IM and 50 Mg Benadryl PO in clinic today.  Mother is here to drive her.  Rx fro Clindamycin for her dental infection.  She will be following up with her dentis next week to have the teeth extracted.  Worrisome symptoms discussed with instructions to go to the ED.  Patient verbalized understanding and agreed with this plan.     Misael White  9/21/2018, 6:25 PM

## 2018-09-24 ENCOUNTER — OFFICE VISIT (OUTPATIENT)
Dept: FAMILY MEDICINE | Facility: CLINIC | Age: 23
End: 2018-09-24
Payer: COMMERCIAL

## 2018-09-24 VITALS
DIASTOLIC BLOOD PRESSURE: 86 MMHG | SYSTOLIC BLOOD PRESSURE: 132 MMHG | HEART RATE: 82 BPM | HEIGHT: 68 IN | TEMPERATURE: 97.9 F | BODY MASS INDEX: 37.38 KG/M2 | OXYGEN SATURATION: 99 % | WEIGHT: 246.6 LBS

## 2018-09-24 DIAGNOSIS — F41.9 ANXIETY AND DEPRESSION: ICD-10-CM

## 2018-09-24 DIAGNOSIS — Z23 NEED FOR PROPHYLACTIC VACCINATION AND INOCULATION AGAINST INFLUENZA: ICD-10-CM

## 2018-09-24 DIAGNOSIS — R10.13 EPIGASTRIC PAIN: Primary | ICD-10-CM

## 2018-09-24 DIAGNOSIS — F32.A ANXIETY AND DEPRESSION: ICD-10-CM

## 2018-09-24 PROCEDURE — 90471 IMMUNIZATION ADMIN: CPT | Performed by: NURSE PRACTITIONER

## 2018-09-24 PROCEDURE — 99214 OFFICE O/P EST MOD 30 MIN: CPT | Mod: 25 | Performed by: NURSE PRACTITIONER

## 2018-09-24 PROCEDURE — 90686 IIV4 VACC NO PRSV 0.5 ML IM: CPT | Performed by: NURSE PRACTITIONER

## 2018-09-24 RX ORDER — BUSPIRONE HYDROCHLORIDE 10 MG/1
10 TABLET ORAL 2 TIMES DAILY
Qty: 60 TABLET | Refills: 2 | Status: SHIPPED | OUTPATIENT
Start: 2018-09-24 | End: 2018-10-26

## 2018-09-24 RX ORDER — LORAZEPAM 0.5 MG/1
0.25-0.5 TABLET ORAL DAILY PRN
Qty: 10 TABLET | Refills: 0 | Status: SHIPPED | OUTPATIENT
Start: 2018-09-24 | End: 2018-10-16

## 2018-09-24 RX ORDER — BUPROPION HYDROCHLORIDE 150 MG/1
150 TABLET ORAL EVERY MORNING
Qty: 30 TABLET | Refills: 2 | Status: SHIPPED | OUTPATIENT
Start: 2018-09-24 | End: 2018-10-16

## 2018-09-24 ASSESSMENT — ANXIETY QUESTIONNAIRES
5. BEING SO RESTLESS THAT IT IS HARD TO SIT STILL: NOT AT ALL
1. FEELING NERVOUS, ANXIOUS, OR ON EDGE: NEARLY EVERY DAY
IF YOU CHECKED OFF ANY PROBLEMS ON THIS QUESTIONNAIRE, HOW DIFFICULT HAVE THESE PROBLEMS MADE IT FOR YOU TO DO YOUR WORK, TAKE CARE OF THINGS AT HOME, OR GET ALONG WITH OTHER PEOPLE: VERY DIFFICULT
2. NOT BEING ABLE TO STOP OR CONTROL WORRYING: NEARLY EVERY DAY
6. BECOMING EASILY ANNOYED OR IRRITABLE: NOT AT ALL
7. FEELING AFRAID AS IF SOMETHING AWFUL MIGHT HAPPEN: NOT AT ALL
GAD7 TOTAL SCORE: 12
3. WORRYING TOO MUCH ABOUT DIFFERENT THINGS: NEARLY EVERY DAY

## 2018-09-24 ASSESSMENT — PATIENT HEALTH QUESTIONNAIRE - PHQ9: 5. POOR APPETITE OR OVEREATING: NEARLY EVERY DAY

## 2018-09-24 NOTE — MR AVS SNAPSHOT
After Visit Summary   9/24/2018    Antonia Marc    MRN: [id number]           Patient Information     Date Of Birth          1995        Visit Information        Provider Department      9/24/2018 1:00 PM Roma Ye APRN [facility]        Today's Diagnoses     Epigastric pain    -  1    Need for prophylactic vaccination and inoculation against influenza        Anxiety and depression          Care Instructions    Zantac 150 mg twice daily   Follow GERD diet, avoid spicy, fatty meals, tomatoes, coffee    For anxiety start Buspar 10 mg twice daily     Taper off Lexapro:  Take 10 mg daily for 7-10 days, then decrease to 10 mg every other day, or 5 mg daily for 2 more weeks and stop    Start Wellbutrin in about a week after starting Buspar     Okay to start Wellbutrin while still on Lexapro                 Follow-ups after your visit        Additional Services     GASTROENTEROLOGY ADULT REF PROCEDURE ONLY       Last Lab Result: Creatinine (mg/dL)       Date                     Value                 07/24/2018               0.66             ----------  Body mass index is 37.5 kg/(m^2).     Needed:  No  Language:  English    Patient will be contacted to schedule procedure.     Please be aware that coverage of these services is subject to the terms and limitations of your health insurance plan.  Call member services at your health plan with any benefit or coverage questions.  Any procedures must be performed at a [facility] facility OR coordinated by your clinic's referral office.    Please bring the following with you to your appointment:    (1) Any X-Rays, CTs or MRIs which have been performed.  Contact the facility where they were done to arrange for  prior to your scheduled appointment.    (2) List of current medications   (3) This referral request   (4) Any documents/labs given to you for this referral                  Who to contact     If you have  "questions or need follow up information about today's clinic visit or your schedule please contact [facility] directly at [phone].  Normal or non-critical lab and imaging results will be communicated to you by MyChart, letter or phone within 4 business days after the clinic has received the results. If you do not hear from us within 7 days, please contact the clinic through MyChart or phone. If you have a critical or abnormal lab result, we will notify you by phone as soon as possible.  Submit refill requests through [employer] or call your pharmacy and they will forward the refill request to us. Please allow 3 business days for your refill to be completed.          Additional Information About Your Visit        Care EveryWhere ID     This is your Care EveryWhere ID. This could be used by other organizations to access your [facility] medical records  [id number]        Your Vitals Were     Pulse Temperature Height Pulse Oximetry BMI (Body Mass Index)       82 97.9  F (36.6  C) (Tympanic) 5' 8\" (1.727 m) 99% 37.5 kg/m2        Blood Pressure from Last 3 Encounters:   09/24/18 132/86   09/21/18 130/76   08/22/18 136/74    Weight from Last 3 Encounters:   09/24/18 246 lb 9.6 oz (111.9 kg)   09/21/18 244 lb (110.7 kg)   08/22/18 237 lb (107.5 kg)              We Performed the Following     FLU VACCINE, SPLIT VIRUS, IM (QUADRIVALENT) [95038]- >3 YRS     GASTROENTEROLOGY ADULT REF PROCEDURE ONLY     Vaccine Administration, Initial [52328]          Today's Medication Changes          These changes are accurate as of 9/24/18  1:52 PM.  If you have any questions, ask your nurse or doctor.               Start taking these medicines.        Dose/Directions    buPROPion 150 MG 24 hr tablet   Commonly known as:  WELLBUTRIN XL   Used for:  Anxiety and depression   Started by:  Roma Ye APRN CNP        Dose:  150 mg   Take 1 tablet (150 mg) by mouth every morning   Quantity:  30 tablet   Refills:  2    "    busPIRone 10 MG tablet   Commonly known as:  BUSPAR   Used for:  Anxiety and depression   Started by:  Roma eY APRN CNP        Dose:  10 mg   Take 1 tablet (10 mg) by mouth 2 times daily   Quantity:  60 tablet   Refills:  2            Where to get your medicines      These medications were [place] [facility] [place] - [address]  [address] [address]     Phone:  [phone]     buPROPion 150 MG 24 hr tablet    busPIRone 10 MG tablet                Primary Care Provider Office Phone # Fax #    ROS Thornton [phone]       [address] [address]        Equal Access to Services     BANG WAGNER : Manolo harto Sosebastienali, waaxda luqadaha, qaybta kaalmada adeegyada, lavonne ken. So [facility] [phone].    ATENCIÓN: Si habla español, tiene a ma disposición servicios gratuitos de asistencia lingüística. Ll[facility] [phone].    We comply with applicable federal civil rights laws and [place] laws. We do not discriminate on the basis of race, color, national origin, age, disability, sex, sexual orientation, or gender identity.            Thank you!     Thank you for choosing [facility]  for your care. Our goal is always to provide you with excellent care. Hearing back from our patients is one way we can continue to improve our services. Please take a few minutes to complete the written survey that you may receive in the mail after your visit with us. Thank you!             Your Updated Medication List - Protect others around you: Learn how to safely use, store and throw away your medicines at www.disposemymeds.org.          This list is accurate as of 9/24/18  1:52 PM.  Always use your most recent med list.                   Brand Name Dispense Instructions for use Diagnosis    AMOXICILLIN PO      Take by mouth 3 times daily        buPROPion 150 MG 24 hr tablet     WELLBUTRIN XL    30 tablet    Take 1 tablet (150 mg) by mouth every morning    Anxiety and depression       busPIRone 10 MG tablet    BUSPAR    60 tablet    Take 1 tablet (10 mg) by mouth 2 times daily    Anxiety and depression       clindamycin 300 MG capsule    CLEOCIN    21 capsule    Take 1 capsule (300 mg) by mouth 3 times daily for 7 days    Pain, dental       dexamethasone 4 MG/ML injection    DECADRON    2.5 mL    Inject 2.5 mLs (10 mg) as directed once for 1 dose    Hives       docusate sodium 100 MG tablet    COLACE    60 tablet    Take one to four capsules daily    Post-operative nausea and vomiting, Post-tonsillectomy pain       escitalopram 20 MG tablet    LEXAPRO    30 tablet    Take 1 tablet (20 mg) by mouth daily    Mild episode of recurrent major depressive disorder (H)

## 2018-09-24 NOTE — PATIENT INSTRUCTIONS
Zantac 150 mg twice daily   Follow GERD diet, avoid spicy, fatty meals, tomatoes, coffee    For anxiety start Buspar 10 mg twice daily     Taper off Lexapro:  Take 10 mg daily for 7-10 days, then decrease to 10 mg every other day, or 5 mg daily for 2 more weeks and stop    Start Wellbutrin in about a week after starting Buspar     Okay to start Wellbutrin while still on Lexapro

## 2018-09-24 NOTE — PROGRESS NOTES
SUBJECTIVE:   Antonia Marc is a 22 year old female who presents to clinic today for the following health issues: on and off epigastric pain, started in March, worse again past 2 weeks. Was treated with Protonix in the past, but patient states it increased anxiety and she is afraid to take Protonix again. It was determined that her epigastric pain was due to NSAID's induced gastritis, since at the time when she developed abdominal pain she was taking daily Ibuprofen and Naproxen.       ABDOMINAL PAIN     Onset: 2 weeks     Description:   Character: Sharp and Dull ache  Location: epigastric region  Radiation: None    Intensity: moderate    Progression of Symptoms:  worsening    Accompanying Signs & Symptoms:  Fever/Chills?: no   Gas/Bloating: YES  Nausea: YES  Vomitting: no   Diarrhea?: YES,   Constipation:no   Dysuria or Hematuria: no    History:   Trauma: no   Previous similar pain: YES   Previous tests done: CT    Precipitating factors:   Does the pain change with:     Food: YES, after she ate she instantly had diarrhea     BM: no     Urination: no     Alleviating factors:  None     Therapies Tried and outcome: pepto- has helped          Depression Followup    Status since last visit: Stable- states her depression is fine, anxiety is more of an issue for her, has been gaining weight- thinks it may be due to being on Lexapro     See PHQ-9 for current symptoms.  Other associated symptoms: None    Complicating factors:   Significant life event:  No   Current substance abuse:  None  Anxiety or Panic symptoms:  Yes-  Had an anxiety attack yesterday     PHQ-9 5/23/2017 6/16/2017 9/24/2018   Total Score 17 22 11   Q9: Suicide Ideation Not at all Several days Not at all     In the past two weeks have you had thoughts of suicide or self-harm?  No.    Do you have concerns about your personal safety or the safety of others?   No  PHQ-9  English  PHQ-9   Any Language  Suicide Assessment Five-step Evaluation and Treatment  "(SAFE-T)    Problem list and histories reviewed & adjusted, as indicated.  Additional history: as documented    Labs reviewed in EPIC    Reviewed and updated as needed this visit by clinical staff  Tobacco  Allergies  Meds  Med Hx  Surg Hx  Fam Hx  Soc Hx      Reviewed and updated as needed this visit by Provider         ROS:  Constitutional, HEENT, cardiovascular, pulmonary, gi and gu systems are negative, except as otherwise noted.    OBJECTIVE:     /86  Pulse 82  Temp 97.9  F (36.6  C) (Tympanic)  Ht 5' 8\" (1.727 m)  Wt 246 lb 9.6 oz (111.9 kg)  SpO2 99%  BMI 37.5 kg/m2  Body mass index is 37.5 kg/(m^2).  GENERAL: healthy, alert and no distress  ABDOMEN: tenderness epigastric  NEURO: Normal strength and tone, mentation intact and speech normal  PSYCH: mentation appears normal, affect normal/bright    Diagnostic Test Results:  EGD needs to schedule     ASSESSMENT/PLAN:     1. Epigastric pain  -start Zantac 150 mg twice daily  -follow GERD diet  - GASTROENTEROLOGY ADULT REF PROCEDURE ONLY    2. Need for prophylactic vaccination and inoculation against influenza  - FLU VACCINE, SPLIT VIRUS, IM (QUADRIVALENT) [57069]- >3 YRS  - Vaccine Administration, Initial [85995]    3. Anxiety and depression  - start busPIRone (BUSPAR) 10 MG tablet; Take 1 tablet (10 mg) by mouth 2 times daily  Dispense: 60 tablet; Refill: 2  - buPROPion (WELLBUTRIN XL) 150 MG 24 hr tablet; Take 1 tablet (150 mg) by mouth every morning  Dispense: 30 tablet; Refill: 2  -taper off Lexapro due to intolerance and weight gain   -given small Lorazepam prescription, only for short term use  - LORazepam (ATIVAN) 0.5 MG tablet; Take 0.5-1 tablets (0.25-0.5 mg) by mouth daily as needed for anxiety  Dispense: 10 tablet; Refill: 0    See Patient Instructions    ROS [facility]    Injectable Influenza Immunization Documentation    1.  Is the person to be vaccinated sick today?   No    2. Does the person " to be vaccinated have an allergy to a component   of the vaccine?   No  Egg Allergy Algorithm Link    3. Has the person to be vaccinated ever had a serious reaction   to influenza vaccine in the past?   No    4. Has the person to be vaccinated ever had Guillain-Barré syndrome?   No    Form completed by Antonia Nelson

## 2018-09-25 ASSESSMENT — ANXIETY QUESTIONNAIRES: GAD7 TOTAL SCORE: 12

## 2018-09-25 ASSESSMENT — PATIENT HEALTH QUESTIONNAIRE - PHQ9: SUM OF ALL RESPONSES TO PHQ QUESTIONS 1-9: 11

## 2018-10-16 ENCOUNTER — ANESTHESIA EVENT (OUTPATIENT)
Dept: GASTROENTEROLOGY | Facility: CLINIC | Age: 23
End: 2018-10-16
Payer: COMMERCIAL

## 2018-10-16 NOTE — ANESTHESIA PREPROCEDURE EVALUATION
Anesthesia Evaluation     . Pt has had prior anesthetic.            ROS/MED HX    ENT/Pulmonary: Comment: Tracheomalacia as       Neurologic:       Cardiovascular:     (+) ----. : . . . :. . Previous cardiac testing date:results:date: results:ECG reviewed date:18 results:Sinus Rhythm -Short NV syndrome - occasional PAC     Elisa = 104   # PACs = 1.  -Abnormal precordial QRS contours -nondiagnostic for this age.     ABNORMAL date: results:          METS/Exercise Tolerance:     Hematologic:         Musculoskeletal: Comment: Flat feet        GI/Hepatic:         Renal/Genitourinary:         Endo:     (+) Obesity, .      Psychiatric:     (+) psychiatric history anxiety and depression      Infectious Disease:         Malignancy:         Other: Comment: Irregular menstrual bleeding                     Physical Exam  Normal systems: cardiovascular and dental    Airway   Mallampati: I  TM distance: >3 FB  Neck ROM: full    Dental     Cardiovascular   Rhythm and rate: regular and normal      Pulmonary    breath sounds clear to auscultation                    Anesthesia Plan      History & Physical Review  History and physical reviewed and following examination; no interval change.    ASA Status:  2 .    NPO Status:  > 6 hours    Plan for MAC Reason for MAC:  Deep or markedly invasive procedure (G8)         Postoperative Care      Consents  Anesthetic plan, risks, benefits and alternatives discussed with:  Patient..                          .

## 2018-10-19 ENCOUNTER — HOSPITAL ENCOUNTER (OUTPATIENT)
Facility: CLINIC | Age: 23
Discharge: HOME | End: 2018-10-19
Attending: SURGERY | Admitting: SURGERY
Payer: COMMERCIAL

## 2018-10-19 ENCOUNTER — ANESTHESIA (OUTPATIENT)
Dept: GASTROENTEROLOGY | Facility: CLINIC | Age: 23
End: 2018-10-19
Payer: COMMERCIAL

## 2018-10-19 VITALS
TEMPERATURE: 98.4 F | HEART RATE: 71 BPM | SYSTOLIC BLOOD PRESSURE: 108 MMHG | BODY MASS INDEX: 37.38 KG/M2 | WEIGHT: 246.6 LBS | RESPIRATION RATE: 16 BRPM | HEIGHT: 68 IN | OXYGEN SATURATION: 99 % | DIASTOLIC BLOOD PRESSURE: 57 MMHG

## 2018-10-19 LAB
HCG UR QL: NEGATIVE
UPPER GI ENDOSCOPY: NORMAL

## 2018-10-19 PROCEDURE — 88365 INSITU HYBRIDIZATION (FISH): CPT | Performed by: SURGERY

## 2018-10-19 PROCEDURE — 81025 URINE PREGNANCY TEST: CPT | Performed by: NURSE ANESTHETIST, CERTIFIED REGISTERED

## 2018-10-19 PROCEDURE — 43239 EGD BIOPSY SINGLE/MULTIPLE: CPT | Performed by: SURGERY

## 2018-10-19 PROCEDURE — 88341 IMHCHEM/IMCYTCHM EA ADD ANTB: CPT | Mod: 26 | Performed by: SURGERY

## 2018-10-19 PROCEDURE — 88365 INSITU HYBRIDIZATION (FISH): CPT | Mod: 26 | Performed by: SURGERY

## 2018-10-19 PROCEDURE — 88364 INSITU HYBRIDIZATION (FISH): CPT | Mod: 26 | Performed by: SURGERY

## 2018-10-19 PROCEDURE — 25000125 ZZHC RX 250: Performed by: SURGERY

## 2018-10-19 PROCEDURE — 88342 IMHCHEM/IMCYTCHM 1ST ANTB: CPT | Mod: 26 | Performed by: SURGERY

## 2018-10-19 PROCEDURE — 88305 TISSUE EXAM BY PATHOLOGIST: CPT | Mod: 26,59 | Performed by: SURGERY

## 2018-10-19 PROCEDURE — 88342 IMHCHEM/IMCYTCHM 1ST ANTB: CPT | Performed by: SURGERY

## 2018-10-19 PROCEDURE — 25000128 H RX IP 250 OP 636: Performed by: NURSE ANESTHETIST, CERTIFIED REGISTERED

## 2018-10-19 PROCEDURE — 88364 INSITU HYBRIDIZATION (FISH): CPT | Performed by: SURGERY

## 2018-10-19 PROCEDURE — 37000008 ZZH ANESTHESIA TECHNICAL FEE, 1ST 30 MIN: Performed by: SURGERY

## 2018-10-19 PROCEDURE — 88305 TISSUE EXAM BY PATHOLOGIST: CPT | Performed by: SURGERY

## 2018-10-19 PROCEDURE — 88341 IMHCHEM/IMCYTCHM EA ADD ANTB: CPT | Performed by: SURGERY

## 2018-10-19 PROCEDURE — 25000125 ZZHC RX 250: Performed by: NURSE ANESTHETIST, CERTIFIED REGISTERED

## 2018-10-19 RX ORDER — PROPOFOL 10 MG/ML
INJECTION, EMULSION INTRAVENOUS PRN
Status: DISCONTINUED | OUTPATIENT
Start: 2018-10-19 | End: 2018-10-19

## 2018-10-19 RX ORDER — FLUMAZENIL 0.1 MG/ML
0.2 INJECTION, SOLUTION INTRAVENOUS
Status: DISCONTINUED | OUTPATIENT
Start: 2018-10-19 | End: 2018-10-19 | Stop reason: HOSPADM

## 2018-10-19 RX ORDER — LIDOCAINE 40 MG/G
CREAM TOPICAL
Status: DISCONTINUED | OUTPATIENT
Start: 2018-10-19 | End: 2018-10-19 | Stop reason: HOSPADM

## 2018-10-19 RX ORDER — ONDANSETRON 2 MG/ML
4 INJECTION INTRAMUSCULAR; INTRAVENOUS
Status: DISCONTINUED | OUTPATIENT
Start: 2018-10-19 | End: 2018-10-19 | Stop reason: HOSPADM

## 2018-10-19 RX ORDER — NALOXONE HYDROCHLORIDE 0.4 MG/ML
.1-.4 INJECTION, SOLUTION INTRAMUSCULAR; INTRAVENOUS; SUBCUTANEOUS
Status: DISCONTINUED | OUTPATIENT
Start: 2018-10-19 | End: 2018-10-19 | Stop reason: HOSPADM

## 2018-10-19 RX ORDER — SODIUM CHLORIDE, SODIUM LACTATE, POTASSIUM CHLORIDE, CALCIUM CHLORIDE 600; 310; 30; 20 MG/100ML; MG/100ML; MG/100ML; MG/100ML
INJECTION, SOLUTION INTRAVENOUS CONTINUOUS
Status: DISCONTINUED | OUTPATIENT
Start: 2018-10-19 | End: 2018-10-19 | Stop reason: HOSPADM

## 2018-10-19 RX ADMIN — PROPOFOL 300 MG: 10 INJECTION, EMULSION INTRAVENOUS at 07:29

## 2018-10-19 RX ADMIN — LIDOCAINE HYDROCHLORIDE 5 ML: 10 INJECTION, SOLUTION EPIDURAL; INFILTRATION; INTRACAUDAL; PERINEURAL at 07:17

## 2018-10-19 RX ADMIN — SODIUM CHLORIDE, POTASSIUM CHLORIDE, SODIUM LACTATE AND CALCIUM CHLORIDE: 600; 310; 30; 20 INJECTION, SOLUTION INTRAVENOUS at 07:17

## 2018-10-19 NOTE — ANESTHESIA POSTPROCEDURE EVALUATION
Patient: Antonia Marc    Procedure(s):  gastroscopy    Diagnosis:epigastric pain    diagnostic  Diagnosis Additional Information: No value filed.    Anesthesia Type:  MAC    Note:  Anesthesia Post Evaluation    Patient location during evaluation: Bedside  Patient participation: Able to fully participate in evaluation  Level of consciousness: awake and alert  Pain management: adequate  Airway patency: patent  Cardiovascular status: acceptable  Respiratory status: acceptable  Hydration status: acceptable  PONV: none     Anesthetic complications: None          Last vitals:  Vitals:    10/19/18 0641   BP: 129/86   Pulse: 76   Resp: 18   Temp: 36.9  C (98.4  F)   SpO2: 98%         Electronically Signed By: ROS Palomino CRNA  October 19, 2018  7:44 AM

## 2018-10-19 NOTE — BRIEF OP NOTE
Procedure:   1. Esophagogastroduodenoscopy    Findings:  1. Mild grade A esophagitis    Path: Pending biopsies from GE junction, Antrum, Duodenum, Gastric body    Heraclio Ziegler DO on 10/19/2018 at 7:40 AM

## 2018-10-19 NOTE — H&P
22 year old year old female here for upper endoscopy for epigastric abdominal pain, bloating and nausea.  Patient states constant, non radiating, ongoing for past several months.  Initially thought it was gastritis due to excess NSAIDs, has since stopped, started PPI and no relief of symptoms.  Father has history of Crohn's.          Patient Active Problem List   Diagnosis     Flat feet     Irregular menstrual bleeding     Recurrent major depressive disorder, in remission (H)     Anxiety     JAZZY (generalized anxiety disorder)     Mild episode of recurrent major depressive disorder (H)     Recurrent tonsillitis       Past Medical History:   Diagnosis Date     Closed fracture of unspecified part of radius with ulna(813.83)      Other diseases of trachea and bronchus, not elsewhere classified     tracheomalacia as a         Past Surgical History:   Procedure Laterality Date     TONSILLECTOMY ADULT Bilateral 2018    Procedure: TONSILLECTOMY ADULT;  Bilateral Tonsillectomy;  Surgeon: Galilea Good MD;  Location: [facility] OR       Family History   Problem Relation Age of Onset     Diabetes Mother      Hypertension Mother      Diabetes Father      Hypertension Father      Crohn Disease Father      Hypertension Maternal Grandmother      Anxiety Disorder Maternal Grandmother      Hypertension Maternal Grandfather      Diabetes Maternal Grandfather      Anxiety Disorder Maternal Grandfather        No current outpatient prescriptions on file.       Allergies   Allergen Reactions     Amoxicillin      Oxycodone Nausea and Vomiting       Pt reports that she has never smoked. She has never used smokeless tobacco. She reports that she does not drink alcohol or use illicit drugs.    Exam:    Awake, Alert OX3  Lungs - CTA bilaterally  CV - RRR, no murmurs, distal pulses intact  Abd - soft, non-distended, non-tender, +BS  Extr - No cyanosis or edema    A/P 22 year old year old female in need of upper endoscopy for epigastric  abdominal pain. Risks, benefits, alternatives, and complications were discussed including the possibility of perforation and the patient agreed to proceed.    Heraclio Ziegler, DO on 10/19/2018 at 7:16 AM

## 2018-10-26 ENCOUNTER — TELEPHONE (OUTPATIENT)
Dept: FAMILY MEDICINE | Facility: CLINIC | Age: 23
End: 2018-10-26

## 2018-10-26 DIAGNOSIS — F32.A ANXIETY AND DEPRESSION: ICD-10-CM

## 2018-10-26 DIAGNOSIS — F41.9 ANXIETY AND DEPRESSION: ICD-10-CM

## 2018-10-26 LAB — COPATH REPORT: NORMAL

## 2018-10-26 RX ORDER — ESCITALOPRAM OXALATE 10 MG/1
5 TABLET ORAL DAILY
Qty: 45 TABLET | Refills: 0 | Status: SHIPPED | OUTPATIENT
Start: 2018-10-26 | End: 2019-10-08

## 2018-10-26 RX ORDER — BUSPIRONE HYDROCHLORIDE 10 MG/1
10 TABLET ORAL 3 TIMES DAILY
Qty: 90 TABLET | Refills: 2 | Status: SHIPPED | OUTPATIENT
Start: 2018-10-26 | End: 2019-10-08

## 2018-10-26 NOTE — TELEPHONE ENCOUNTER
Pt instructed.  She will call back in a few weeks with an update of current regimen.  Anne Salinas RN

## 2018-10-26 NOTE — TELEPHONE ENCOUNTER
"Spoke with pt - in regard to the Lexapro, said she tapered down to 10mg (doesn't remember how long she did that) then cut it down to 5mg (does not know how long she took that). Took last pill/dose this morning. Through the cycle of tapering dose down \"I've been feeling really crappy without it.\"     Took Wellbutrin for one week and stopped due to \"constant diarrhea\" and \"couldn't eat.\" Had a \"massive headache\" and \"icky tummy\" when on it.  She is taking Buspar 10mg as directed (along with the Lexapro 5mg). \"I feel like it's doing nothing for me.\"    Advise,     Lawanda NOBLES RN      "

## 2018-10-26 NOTE — TELEPHONE ENCOUNTER
During her last visit we decided to wean off Lexapro because of the weight gain. I recommended Wellbutrin as alternative because it typically does not cause weight gain compare to other antidepressants (SSRI, SNRIs). Unfortunately she did not tolerate Wellbutrin, so lets stop it. I think her main problem was anxiety, she can try higher dose of Buspar to 10 mg 3 times daily.    She can stay on Lexapro only 5 mg if no issues with weight gain and increase Buspar to 10 mg 3 times daily to help with anxiety.    Roma Ye, ROS CNP

## 2018-11-01 ENCOUNTER — OFFICE VISIT (OUTPATIENT)
Dept: FAMILY MEDICINE | Facility: CLINIC | Age: 23
End: 2018-11-01
Payer: COMMERCIAL

## 2018-11-01 VITALS
BODY MASS INDEX: 37.95 KG/M2 | HEART RATE: 67 BPM | SYSTOLIC BLOOD PRESSURE: 124 MMHG | DIASTOLIC BLOOD PRESSURE: 80 MMHG | WEIGHT: 250.4 LBS | OXYGEN SATURATION: 100 % | HEIGHT: 68 IN | TEMPERATURE: 98.7 F

## 2018-11-01 DIAGNOSIS — F41.9 ANXIETY: ICD-10-CM

## 2018-11-01 DIAGNOSIS — K58.9 IRRITABLE BOWEL SYNDROME, UNSPECIFIED TYPE: ICD-10-CM

## 2018-11-01 DIAGNOSIS — Z30.011 ENCOUNTER FOR INITIAL PRESCRIPTION OF CONTRACEPTIVE PILLS: ICD-10-CM

## 2018-11-01 DIAGNOSIS — K21.00 GASTROESOPHAGEAL REFLUX DISEASE WITH ESOPHAGITIS: ICD-10-CM

## 2018-11-01 DIAGNOSIS — Z00.00 ANNUAL PHYSICAL EXAM: Primary | ICD-10-CM

## 2018-11-01 PROCEDURE — G0145 SCR C/V CYTO,THINLAYER,RESCR: HCPCS | Performed by: NURSE PRACTITIONER

## 2018-11-01 PROCEDURE — 99395 PREV VISIT EST AGE 18-39: CPT | Performed by: NURSE PRACTITIONER

## 2018-11-01 RX ORDER — DESOGESTREL AND ETHINYL ESTRADIOL 21-5 (28)
1 KIT ORAL DAILY
Qty: 84 TABLET | Refills: 3 | Status: SHIPPED | OUTPATIENT
Start: 2018-11-01 | End: 2018-11-26

## 2018-11-01 RX ORDER — HYDROXYZINE PAMOATE 25 MG/1
25 CAPSULE ORAL 3 TIMES DAILY PRN
Qty: 30 CAPSULE | Refills: 3 | Status: SHIPPED | OUTPATIENT
Start: 2018-11-01 | End: 2018-11-12

## 2018-11-01 RX ORDER — LORAZEPAM 0.5 MG/1
0.5 TABLET ORAL DAILY PRN
Qty: 20 TABLET | Refills: 0 | Status: SHIPPED | OUTPATIENT
Start: 2018-11-01 | End: 2018-11-12

## 2018-11-01 ASSESSMENT — ANXIETY QUESTIONNAIRES
1. FEELING NERVOUS, ANXIOUS, OR ON EDGE: SEVERAL DAYS
3. WORRYING TOO MUCH ABOUT DIFFERENT THINGS: SEVERAL DAYS
IF YOU CHECKED OFF ANY PROBLEMS ON THIS QUESTIONNAIRE, HOW DIFFICULT HAVE THESE PROBLEMS MADE IT FOR YOU TO DO YOUR WORK, TAKE CARE OF THINGS AT HOME, OR GET ALONG WITH OTHER PEOPLE: SOMEWHAT DIFFICULT
7. FEELING AFRAID AS IF SOMETHING AWFUL MIGHT HAPPEN: SEVERAL DAYS
6. BECOMING EASILY ANNOYED OR IRRITABLE: SEVERAL DAYS
2. NOT BEING ABLE TO STOP OR CONTROL WORRYING: SEVERAL DAYS
GAD7 TOTAL SCORE: 7
5. BEING SO RESTLESS THAT IT IS HARD TO SIT STILL: SEVERAL DAYS

## 2018-11-01 ASSESSMENT — PATIENT HEALTH QUESTIONNAIRE - PHQ9
SUM OF ALL RESPONSES TO PHQ QUESTIONS 1-9: 11
5. POOR APPETITE OR OVEREATING: SEVERAL DAYS

## 2018-11-01 NOTE — PATIENT INSTRUCTIONS
Preventive Health Recommendations  Female Ages 21 to 25     Yearly exam:     See your health care provider every year in order to  o Review health changes.   o Discuss preventive care.    o Review your medicines if your doctor has prescribed any.      You should be tested each year for STDs (sexually transmitted diseases).       Talk to your provider about how often you should have cholesterol testing.      Get a Pap test every three years. If you have an abnormal result, your doctor may have you test more often.      If you are at risk for diabetes, you should have a diabetes test (fasting glucose).     Shots:     Get a flu shot each year.     Get a tetanus shot every 10 years.     Consider getting the shot (vaccine) that prevents cervical cancer (Gardasil).    Nutrition:     Eat at least 5 servings of fruits and vegetables each day.    Eat whole-grain bread, whole-wheat pasta and brown rice instead of white grains and rice.    Get adequate Calcium and Vitamin D.     Lifestyle    Exercise at least 150 minutes a week each week (30 minutes a day, 5 days a week). This will help you control your weight and prevent disease.    Limit alcohol to one drink per day.    No smoking.     Wear sunscreen to prevent skin cancer.    See your dentist every six months for an exam and cleaning.    Possible IBS?    Low FODMAP's diet can help    Lets consult with GI doctor     For sleep try Melatonin 3 mg, may increase to 5 mg if needed, the other over the counter option: Unisom 12.5-25 mg as needed     Lets continue Lexapro 5 mg daily, since it can help with IBS and abdominal pain, please discuss with GI .    Try Vistaril 25 mg 3 times daily as needed for anxiety, for severe anxiety, panic attacks can take Lorazepam 1 tablet daily as needed, should not be used on daily basis as it can cause tolerance and addiction.

## 2018-11-01 NOTE — LETTER
November 8, 2018      Antonia Marc  [address] [place] [id number]    Dear ,      I am happy to inform you that your recent cervical cancer screening test (PAP smear) was normal.      Preventative screenings such as this help to ensure your health for years to come. You should repeat a pap smear in 3 years, unless otherwise directed.      You will still need to return to the clinic every year for your annual exam and other preventive tests.     If you have additional questions regarding this result, please call our registered nurse, Estefania at [phone].      Sincerely,      ROS Hansen CNP/rlm

## 2018-11-01 NOTE — PROGRESS NOTES
SUBJECTIVE:   Antonia Marc is a 22 year old female who presents to clinic today for the following health issues:    Chief Complaint   Patient presents with     Physical     With pap      Depression     Wants to talk about depression and anxiety      Contraception     Would like to get on birth control pills      Results     Would like to go over results of endoscopy      Depression and Anxiety Follow-Up    Status since last visit: No change- states nothing has gotten better, tried stopping the Lexapro due to weight gain, as soon as she stopped taking it she started getting migraines, and felt sick to her stomach     Other associated symptoms:None    Complicating factors:     Significant life event: No     Current substance abuse: None    PHQ 5/23/2017 6/16/2017 9/24/2018   PHQ-9 Total Score 17 22 11   Q9: Suicide Ideation Not at all Several days Not at all     JAZZY-7 SCORE 5/23/2017 6/16/2017 9/24/2018   Total Score 15 21 12     In the past two weeks have you had thoughts of suicide or self-harm?  No.    Do you have concerns about your personal safety or the safety of others?   No  PHQ-9  English  PHQ-9   Any Language  JAZZY-7  Suicide Assessment Five-step Evaluation and Treatment (SAFE-T)    Amount of exercise or physical activity: constantly walking at work     Problems taking medications regularly: No    Diet: regular (no restrictions)     SUBJECTIVE:   CC: Antonia Marc is an 22 year old woman who presents for preventive health visit.     Healthy Habits:    Do you get at least three servings of calcium containing foods daily (dairy, green leafy vegetables, etc.)? No     Amount of exercise or daily activities, outside of work: active with her job     Problems taking medications regularly No    Medication side effects: No    Have you had an eye exam in the past two years? yes    Do you see a dentist twice per year? no    Do you have sleep apnea, excessive snoring or daytime drowsiness?no          Today's  "PHQ-2 Score:   PHQ-2 ( 1999 [employer]) 4/4/2018 9/17/2015   Q1: Little interest or pleasure in doing things 0 0   Q2: Feeling down, depressed or hopeless 3 0   PHQ-2 Score 3 0     Abuse: Current or Past(Physical, Sexual or Emotional)- No  Do you feel safe in your environment - Yes    Social History   Substance Use Topics     Smoking status: Never Smoker     Smokeless tobacco: Never Used      Comment: no exporure     Alcohol use No     If you drink alcohol do you typically have >3 drinks per day or >7 drinks per week? No                     Reviewed orders with patient.  Reviewed health maintenance and updated orders accordingly - Yes  Labs reviewed in EPIC    Mammogram not appropriate for this patient based on age.    Pertinent mammograms are reviewed under the imaging tab.  History of abnormal Pap smear: NO - age 21-29 PAP every 3 years recommended     Reviewed and updated as needed this visit by clinical staff  Tobacco  Allergies  Meds  Med Hx  Surg Hx  Fam Hx  Soc Hx        Reviewed and updated as needed this visit by Provider            ROS:  CONSTITUTIONAL: NEGATIVE for fever, chills, change in weight  INTEGUMENTARU/SKIN: NEGATIVE for worrisome rashes, moles or lesions  EYES: NEGATIVE for vision changes or irritation  ENT: NEGATIVE for ear, mouth and throat problems  RESP: NEGATIVE for significant cough or SOB  BREAST: NEGATIVE for masses, tenderness or discharge  CV: NEGATIVE for chest pain, palpitations or peripheral edema  GI: POSITIVE for abdominal pain epigastric and nausea  : NEGATIVE for unusual urinary or vaginal symptoms. Periods are regular.  MUSCULOSKELETAL: NEGATIVE for significant arthralgias or myalgia  NEURO: NEGATIVE for weakness, dizziness or paresthesias  PSYCHIATRIC: POSITIVE foranxiety    OBJECTIVE:   Temp 98.7  F (37.1  C) (Tympanic)  Ht 5' 8\" (1.727 m)  Wt 250 lb 6.4 oz (113.6 kg)  LMP 10/26/2018  BMI 38.07 kg/m2  EXAM:  GENERAL: healthy, alert and no distress  EYES: Eyes grossly " normal to inspection, PERRL and conjunctivae and sclerae normal  HENT: ear canals and TM's normal, nose and mouth without ulcers or lesions  NECK: no adenopathy, no asymmetry, masses, or scars and thyroid normal to palpation  RESP: lungs clear to auscultation - no rales, rhonchi or wheezes  CV: regular rate and rhythm, normal S1 S2, no S3 or S4, no murmur, click or rub, no peripheral edema and peripheral pulses strong  ABDOMEN: soft, nontender, no hepatosplenomegaly, no masses and bowel sounds normal   (female): normal female external genitalia, normal urethral meatus, vaginal mucosa, normal cervix/adnexa/uterus without masses or discharge  NEURO: Normal strength and tone, mentation intact and speech normal  PSYCH: mentation appears normal, affect normal/bright    Diagnostic Test Results:  none     ASSESSMENT/PLAN:   1. Annual physical exam  - Pap imaged thin layer screen only - recommended age 21 - 24 years    2. Encounter for initial prescription of contraceptive pills  - desogestrel-ethinyl estradiol (KARIVA) 0.15-0.02/0.01 MG (21/5) per tablet; Take 1 tablet by mouth daily  Dispense: 84 tablet; Refill: 3    3. Irritable bowel syndrome, unspecified type  -the patient will continue low dose Lexapro 5 mg daily, since it helps her to control GI  Symptoms, such as abdominal pain and nausea  -discussed low FODMAP's diet  -recommended follow up with GI   - GASTROENTEROLOGY ADULT REF CONSULT ONLY    4. Gastroesophageal reflux disease with esophagitis  - GASTROENTEROLOGY ADULT REF CONSULT ONLY    5. Anxiety  - LORazepam (ATIVAN) 0.5 MG tablet; Take 1 tablet (0.5 mg) by mouth daily as needed for anxiety (not for daily use)  Dispense: 20 tablet; Refill: 0  - hydrOXYzine (VISTARIL) 25 MG capsule; Take 1 capsule (25 mg) by mouth 3 times daily as needed for anxiety  Dispense: 30 capsule; Refill: 3  -continue Lexapro 5 mg daily    COUNSELING:   Reviewed preventive health counseling, as reflected in patient instructions        "Regular exercise       Healthy diet/nutrition    BP Readings from Last 1 Encounters:   10/19/18 108/57     Estimated body mass index is 38.07 kg/(m^2) as calculated from the following:    Height as of this encounter: 5' 8\" (1.727 m).    Weight as of this encounter: 250 lb 6.4 oz (113.6 kg).           reports that she has never smoked. She has never used smokeless tobacco.      Counseling Resources:  ATP IV Guidelines  Pooled Cohorts Equation Calculator  Breast Cancer Risk Calculator  FRAX Risk Assessment  ICSI Preventive Guidelines  Dietary Guidelines for Americans, 2010  USDA's MyPlate  ASA Prophylaxis  Lung CA Screening    ROS [facility]  "

## 2018-11-01 NOTE — MR AVS SNAPSHOT
After Visit Summary   11/1/2018    Antonia Marc    MRN: [id number]           Patient Information     Date Of Birth          1995        Visit Information        Provider Department      11/1/2018 2:00 PM Roma Ye APRN [facility]        Today's Diagnoses     Annual physical exam    -  1    Encounter for initial prescription of contraceptive pills        Irritable bowel syndrome, unspecified type        Gastroesophageal reflux disease with esophagitis        Anxiety          Care Instructions      Preventive Health Recommendations  Female Ages 21 to 25     Yearly exam:     See your health care provider every year in order to  o Review health changes.   o Discuss preventive care.    o Review your medicines if your doctor has prescribed any.      You should be tested each year for STDs (sexually transmitted diseases).       Talk to your provider about how often you should have cholesterol testing.      Get a Pap test every three years. If you have an abnormal result, your doctor may have you test more often.      If you are at risk for diabetes, you should have a diabetes test (fasting glucose).     Shots:     Get a flu shot each year.     Get a tetanus shot every 10 years.     Consider getting the shot (vaccine) that prevents cervical cancer (Gardasil).    Nutrition:     Eat at least 5 servings of fruits and vegetables each day.    Eat whole-grain bread, whole-wheat pasta and brown rice instead of white grains and rice.    Get adequate Calcium and Vitamin D.     Lifestyle    Exercise at least 150 minutes a week each week (30 minutes a day, 5 days a week). This will help you control your weight and prevent disease.    Limit alcohol to one drink per day.    No smoking.     Wear sunscreen to prevent skin cancer.    See your dentist every six months for an exam and cleaning.    Possible IBS?    Low FODMAP's diet can help    Lets consult with GI doctor     For sleep  try Melatonin 3 mg, may increase to 5 mg if needed, the other over the counter option: Unisom 12.5-25 mg as needed     Lets continue Lexapro 5 mg daily, since it can help with IBS and abdominal pain, please discuss with GI .    Try Vistaril 25 mg 3 times daily as needed for anxiety, for severe anxiety, panic attacks can take Lorazepam 1 tablet daily as needed, should not be used on daily basis as it can cause tolerance and addiction.                     Follow-ups after your visit        Additional Services     GASTROENTEROLOGY ADULT REF CONSULT ONLY       Preferred Location: [facility] [phone], [facility]: [phone] and [place] [facility] [phone]      Please be aware that coverage of these services is subject to the terms and limitations of your health insurance plan.  Call member services at your health plan with any benefit or coverage questions.  Any procedures must be performed at a [place] facility OR coordinated by your clinic's referral office.    Please bring the following with you to your appointment:    (1) Any X-Rays, CTs or MRIs which have been performed.  Contact the facility where they were done to arrange for  prior to your scheduled appointment.    (2) List of current medications   (3) This referral request   (4) Any documents/labs given to you for this referral                  Who to contact     If you have questions or need follow up information about today's clinic visit or your schedule please contact [facility] directly at [phone].  Normal or non-critical lab and imaging results will be communicated to you by MyChart, letter or phone within 4 business days after the clinic has received the results. If you do not hear from us within 7 days, please contact the clinic through [facility] or phone. If you have a critical or abnormal lab result, we will notify you by phone as soon as possible.  Submit refill requests through [employer] or call your  "pharmacy and they will forward the refill request to us. Please allow 3 business days for your refill to be completed.          Additional Information About Your Visit        Care EveryWhere ID     This is your Care EveryWhere ID. This could be used by other organizations to access your [facility] medical records  [id number]        Your Vitals Were     Pulse Temperature Height Last Period Pulse Oximetry BMI (Body Mass Index)    67 98.7  F (37.1  C) (Tympanic) 5' 8\" (1.727 m) 10/26/2018 100% 38.07 kg/m2       Blood Pressure from Last 3 Encounters:   11/01/18 124/80   10/19/18 108/57   09/24/18 132/86    Weight from Last 3 Encounters:   11/01/18 250 lb 6.4 oz (113.6 kg)   10/19/18 246 lb 9.6 oz (111.9 kg)   09/24/18 246 lb 9.6 oz (111.9 kg)              We Performed the Following     GASTROENTEROLOGY ADULT REF CONSULT ONLY     Pap imaged thin layer screen only - recommended age 21 - 24 years          Today's Medication Changes          These changes are accurate as of 11/1/18  2:50 PM.  If you have any questions, ask your nurse or doctor.               Start taking these medicines.        Dose/Directions    desogestrel-ethinyl estradiol 0.15-0.02/0.01 MG (21/5) per tablet   Commonly known as:  KARIVA   Used for:  Encounter for initial prescription of contraceptive pills   Started by:  Roma Ye APRN CNP        Dose:  1 tablet   Take 1 tablet by mouth daily   Quantity:  84 tablet   Refills:  3       hydrOXYzine 25 MG capsule   Commonly known as:  VISTARIL   Used for:  Anxiety   Started by:  Roma Ye APRN CNP        Dose:  25 mg   Take 1 capsule (25 mg) by mouth 3 times daily as needed for anxiety   Quantity:  30 capsule   Refills:  3       LORazepam 0.5 MG tablet   Commonly known as:  ATIVAN   Used for:  Anxiety   Started by:  Roma Ye APRN CNP        Dose:  0.5 mg   Take 1 tablet (0.5 mg) by mouth daily as needed for anxiety (not for daily use)   Quantity:  20 tablet "   Refills:  0            Where to get your medicines      These medications were sent to [facility] - [place], [place] - [address]  [address] [address]     Phone:  [phone]     desogestrel-ethinyl estradiol 0.15-0.02/0.01 MG (21/5) per tablet    hydrOXYzine 25 MG capsule         Some of these will need a paper prescription and others can be bought over the counter.  Ask your nurse if you have questions.     Bring a paper prescription for each of these medications     LORazepam 0.5 MG tablet                Primary Care Provider Office Phone # Fax #    ROS Thornton [phone] [address] [address]        Equal Access to Services     BANG WAGNER : Manolo viera Sochelsy, waaxda luqadaha, qaybta kaalmada adeegyaarchana, lavonne pereira . So [facility] [phone].    ATENCIÓN: Si habla español, tiene a ma disposición servicios gratuitos de asistencia lingüística. Llame al [phone].    We comply with applicable federal civil rights laws and [place] laws. We do not discriminate on the basis of race, color, national origin, age, disability, sex, sexual orientation, or gender identity.            Thank you!     Thank you for choosing [facility]  for your care. Our goal is always to provide you with excellent care. Hearing back from our patients is one way we can continue to improve our services. Please take a few minutes to complete the written survey that you may receive in the mail after your visit with us. Thank you!             Your Updated Medication List - Protect others around you: Learn how to safely use, store and throw away your medicines at www.disposemymeds.org.          This list is accurate as of 11/1/18  2:50 PM.  Always use your most recent med list.                   Brand Name Dispense Instructions for use Diagnosis    busPIRone 10 MG tablet    BUSPAR    90 tablet     Take 1 tablet (10 mg) by mouth 3 times daily    Anxiety and depression       desogestrel-ethinyl estradiol 0.15-0.02/0.01 MG (21/5) per tablet    KARIVA    84 tablet    Take 1 tablet by mouth daily    Encounter for initial prescription of contraceptive pills       dexamethasone 4 MG/ML injection    DECADRON    2.5 mL    Inject 2.5 mLs (10 mg) as directed once for 1 dose    Hives       escitalopram 10 MG tablet    LEXAPRO    45 tablet    Take 0.5 tablets (5 mg) by mouth daily    Anxiety and depression       hydrOXYzine 25 MG capsule    VISTARIL    30 capsule    Take 1 capsule (25 mg) by mouth 3 times daily as needed for anxiety    Anxiety       LORazepam 0.5 MG tablet    ATIVAN    20 tablet    Take 1 tablet (0.5 mg) by mouth daily as needed for anxiety (not for daily use)    Anxiety

## 2018-11-02 ASSESSMENT — ANXIETY QUESTIONNAIRES: GAD7 TOTAL SCORE: 7

## 2018-11-05 LAB
COPATH REPORT: NORMAL
PAP: NORMAL

## 2018-11-12 ENCOUNTER — OFFICE VISIT (OUTPATIENT)
Dept: FAMILY MEDICINE | Facility: CLINIC | Age: 23
End: 2018-11-12
Payer: COMMERCIAL

## 2018-11-12 VITALS
DIASTOLIC BLOOD PRESSURE: 80 MMHG | OXYGEN SATURATION: 98 % | TEMPERATURE: 99.2 F | HEART RATE: 76 BPM | WEIGHT: 255 LBS | SYSTOLIC BLOOD PRESSURE: 122 MMHG | BODY MASS INDEX: 38.77 KG/M2

## 2018-11-12 DIAGNOSIS — F41.9 ANXIETY: ICD-10-CM

## 2018-11-12 DIAGNOSIS — K08.89 PAIN IN TOOTH: ICD-10-CM

## 2018-11-12 DIAGNOSIS — Z01.818 PREOP GENERAL PHYSICAL EXAM: Primary | ICD-10-CM

## 2018-11-12 DIAGNOSIS — F33.40 RECURRENT MAJOR DEPRESSIVE DISORDER, IN REMISSION (H): ICD-10-CM

## 2018-11-12 PROCEDURE — 99214 OFFICE O/P EST MOD 30 MIN: CPT | Performed by: NURSE PRACTITIONER

## 2018-11-12 NOTE — NURSING NOTE
"Initial /80 (BP Location: Left arm, Patient Position: Chair, Cuff Size: Adult Large)  Pulse 76  Temp 99.2  F (37.3  C) (Tympanic)  Wt 255 lb (115.7 kg)  LMP 10/26/2018  SpO2 98%  BMI 38.77 kg/m2 Estimated body mass index is 38.77 kg/(m^2) as calculated from the following:    Height as of 11/1/18: 5' 8\" (1.727 m).    Weight as of this encounter: 255 lb (115.7 kg). .    Tamara Cruz    "

## 2018-11-12 NOTE — PROGRESS NOTES
[facility]  [address] [address]  [phone]  Dept: [phone]    PRE-OP EVALUATION:  Today's date: 2018    Antonia Marc (: 1995) presents for pre-operative evaluation assessment as requested by Dr. Chino.  She requires evaluation and anesthesia risk assessment prior to undergoing surgery/procedure for treatment of [place] tooth impaction .    Proposed Surgery/ Procedure: [place] tooth extraction  Date of Surgery/ Procedure: 2018  Time of Surgery/ Procedure: to be determined  Hospital/Surgical Facility: [facility]  Fax number for surgical facility: [phone]  Phone number [phone] ([place])  Primary Physician: Roma Ye  Type of Anesthesia Anticipated: IV sedation    Patient has a Health Care Directive or Living Will:  NO    1. NO - Do you have a history of heart attack, stroke, stent, bypass or surgery on an artery in the head, neck, heart or legs?  2. NO - Do you ever have any pain or discomfort in your chest?  3. NO - Do you have a history of  Heart Failure?  4. NO - Are you troubled by shortness of breath when: walking on the level, up a slight hill or at night?  5. NO - Do you currently have a cold, bronchitis or other respiratory infection?  6. NO - Do you have a cough, shortness of breath or wheezing?  7. NO - Do you sometimes get pains in the calves of your legs when you walk?  8. NO - Do you or anyone in your family have previous history of blood clots?  9. NO - Do you or does anyone in your family have a serious bleeding problem such as prolonged bleeding following surgeries or cuts?  10. NO - Have you ever had problems with anemia or been told to take iron pills?  11. NO - Have you had any abnormal blood loss such as black, tarry or bloody stools, or abnormal vaginal bleeding?  12. NO - Have you ever had a blood transfusion?  13. NO - Have you or any of your relatives ever had problems with anesthesia?  14. NO  - Do you have sleep apnea, excessive snoring or daytime drowsiness?  15. NO - Do you have any prosthetic heart valves?  16. NO - Do you have prosthetic joints?  17. NO - Is there any chance that you may be pregnant?      HPI:     HPI related to upcoming procedure: patient having 4 wisdom teeth removed.       DEPRESSION - Patient has a long history of Depression of moderate severity requiring medication for control with recent symptoms being stable..Current symptoms of depression include anxiety and Insomnia .                                                                                               ANXIETY : controlled with current medications    MEDICAL HISTORY:     Patient Active Problem List    Diagnosis Date Noted     Recurrent tonsillitis 2018     Priority: Medium     Recurrent major depressive disorder, in remission (H) 2017     Priority: Medium     Anxiety 2017     Priority: Medium     JAZZY (generalized anxiety disorder) 2017     Priority: Medium     Mild episode of recurrent major depressive disorder (H) 2017     Priority: Medium     Irregular menstrual bleeding 2012     Priority: Medium     Flat feet 2011     Priority: Medium      Past Medical History:   Diagnosis Date     Closed fracture of unspecified part of radius with ulna(813.83)      Other diseases of trachea and bronchus, not elsewhere classified     tracheomalacia as a       Past Surgical History:   Procedure Laterality Date     ESOPHAGOSCOPY, GASTROSCOPY, DUODENOSCOPY (EGD), COMBINED N/A 10/19/2018    Procedure: COMBINED ESOPHAGOSCOPY, GASTROSCOPY, DUODENOSCOPY (EGD), BIOPSY SINGLE OR MULTIPLE;  Surgeon: Heraclio Ziegler DO;  Location: [facility]     TONSILLECTOMY ADULT Bilateral 2018    Procedure: TONSILLECTOMY ADULT;  Bilateral Tonsillectomy;  Surgeon: Galilea oGod MD;  Location: [facility]     Current Outpatient Prescriptions   Medication Sig Dispense Refill     busPIRone (BUSPAR) 10 MG  tablet Take 1 tablet (10 mg) by mouth 3 times daily 90 tablet 2     desogestrel-ethinyl estradiol (KARIVA) 0.15-0.02/0.01 MG (21/5) per tablet Take 1 tablet by mouth daily 84 tablet 3     escitalopram (LEXAPRO) 10 MG tablet Take 0.5 tablets (5 mg) by mouth daily 45 tablet 0     dexamethasone (DECADRON) 4 MG/ML injection Inject 2.5 mLs (10 mg) as directed once for 1 dose 2.5 mL 0     OTC products: None, except as noted above    Allergies   Allergen Reactions     Amoxicillin      Oxycodone Nausea and Vomiting      Latex Allergy: NO    Social History   Substance Use Topics     Smoking status: Never Smoker     Smokeless tobacco: Never Used      Comment: no exporure     Alcohol use No     History   Drug Use No       REVIEW OF SYSTEMS:   CONSTITUTIONAL: NEGATIVE for fever, chills, change in weight  INTEGUMENTARY/SKIN: NEGATIVE for worrisome rashes, moles or lesions  EYES: NEGATIVE for vision changes or irritation  ENT/MOUTH: NEGATIVE for ear, mouth and throat problems  RESP: NEGATIVE for significant cough or SOB  BREAST: NEGATIVE for masses, tenderness or discharge  CV: NEGATIVE for chest pain, palpitations or peripheral edema  GI: NEGATIVE for nausea, abdominal pain, heartburn, or change in bowel habits  : NEGATIVE for frequency, dysuria, or hematuria  MUSCULOSKELETAL: NEGATIVE for significant arthralgias or myalgia  NEURO: NEGATIVE for weakness, dizziness or paresthesias  ENDOCRINE: NEGATIVE for temperature intolerance, skin/hair changes  HEME: NEGATIVE for bleeding problems  PSYCHIATRIC: NEGATIVE for changes in mood or affect    EXAM:   /80 (BP Location: Left arm, Patient Position: Chair, Cuff Size: Adult Large)  Pulse 76  Temp 99.2  F (37.3  C) (Tympanic)  Wt 255 lb (115.7 kg)  LMP 10/26/2018  SpO2 98%  BMI 38.77 kg/m2    GENERAL APPEARANCE: healthy, alert and no distress     EYES: EOMI, PERRL     HENT: ear canals and TM's normal and nose and mouth without ulcers or lesions     NECK: no adenopathy, no  asymmetry, masses, or scars and thyroid normal to palpation     RESP: lungs clear to auscultation - no rales, rhonchi or wheezes     CV: regular rates and rhythm, normal S1 S2, no S3 or S4 and no murmur, click or rub     ABDOMEN:  soft, nontender, no HSM or masses and bowel sounds normal     MS: extremities normal- no gross deformities noted, no evidence of inflammation in joints, FROM in all extremities.     SKIN: no suspicious lesions or rashes     NEURO: Normal strength and tone, sensory exam grossly normal, mentation intact and speech normal     PSYCH: mentation appears normal. and affect normal/bright     LYMPHATICS: No cervical adenopathy    DIAGNOSTICS:   EKG: Not indicated due to non-vascular surgery and low risk of event (age <65 and without cardiac risk factors)    Recent Labs   Lab Test  07/24/18   1345  03/16/18   2255  05/29/17   1820  05/29/17   1740   HGB  14.4  14.2   --   14.8   PLT   --   291   --   302   INR   --    --   1.14  Canceled, Test credited   Unsatisfactory specimen - hemolyzed  Notified Mariya, [facility] 5/29/17 1756 JM     NA  138  139  141  Canceled, Test credited   Unsatisfactory specimen - hemolyzed  Notified Mariya, [facility] 5/29/17 1756 JM     POTASSIUM  3.8  3.2*  3.9  Canceled, Test credited   Unsatisfactory specimen - hemolyzed  Notified Mariya, [facility] 5/29/17 1756 JM     CR  0.66  0.77  0.79  Canceled, Test credited   Unsatisfactory specimen - hemolyzed  Notified Mariya, [facility] 5/29/17 1756 JM          IMPRESSION:   Reason for surgery/procedure: removal of wisdom teeth  Diagnosis/reason for consult: preoperative exam    The proposed surgical procedure is considered INTERMEDIATE risk.    REVISED CARDIAC RISK INDEX  The patient has the following serious cardiovascular risks for perioperative complications such as (MI, PE, VFib and 3  AV Block):  No serious cardiac risks  INTERPRETATION: 0 risks: Class I (very low risk - 0.4% complication rate)    The patient has the following additional risks  for perioperative complications:  No identified additional risks      ICD-10-CM    1. Preop general physical exam Z01.818    2. Pain in tooth K08.89    3. Recurrent major depressive disorder, in remission (H) F33.40    4. Anxiety F41.9        RECOMMENDATIONS:     --Consult hospital rounder / IM to assist post-op medical management    --Patient is to take all scheduled medications on the day of surgery EXCEPT for modifications listed below.    APPROVAL GIVEN to proceed with proposed procedure, without further diagnostic evaluation       Signed Electronically by: ROS Castro CNP    Copy of this evaluation report is provided to requesting physician.    [facility] Preop Guidelines    Revised Cardiac Risk Index

## 2018-11-12 NOTE — MR AVS SNAPSHOT
After Visit Summary   11/12/2018    Antonia Marc    MRN: [id number]           Patient Information     Date Of Birth          1995        Visit Information        Provider Department      11/12/2018 4:00 PM Jewell Borges APRN CNP [facility]        Today's Diagnoses     Preop general physical exam    -  1    Pain in tooth        Recurrent major depressive disorder, in remission (H)        Anxiety          Care Instructions      Before Your Surgery      Call your surgeon if there is any change in your health. This includes signs of a cold or flu (such as a sore throat, runny nose, cough, rash or fever).    Do not smoke, drink alcohol or take over the counter medicine (unless your surgeon or primary care doctor tells you to) for the 24 hours before and after surgery.    If you take prescribed drugs: Follow your doctor s orders about which medicines to take and which to stop until after surgery.    Eating and drinking prior to surgery: follow the instructions from your surgeon    Take a shower or bath the night before surgery. Use the soap your surgeon gave you to gently clean your skin. If you do not have soap from your surgeon, use your regular soap. Do not shave or scrub the surgery site.  Wear clean pajamas and have clean sheets on your bed.           Follow-ups after your visit        Your next 10 appointments already scheduled     Jan 23, 2019  1:00 PM CST   (Arrive by 12:45 PM)   New Patient Visit with Bev Peterson MD   [facility] ([facility])    [address] [place] [address] [phone]              Who to contact     If you have questions or need follow up information about today's clinic visit or your schedule please contact [facility] directly at [phone].  Normal or non-critical lab and imaging results will be communicated to you by MyChart, letter or phone  within 4 business days after the clinic has received the results. If you do not hear from us within 7 days, please contact the clinic through [facility] or phone. If you have a critical or abnormal lab result, we will notify you by phone as soon as possible.  Submit refill requests through [employer] or call your pharmacy and they will forward the refill request to us. Please allow 3 business days for your refill to be completed.          Additional Information About Your Visit        Care EveryWhere ID     This is your Care EveryWhere ID. This could be used by other organizations to access your [facility] medical records  [id number]        Your Vitals Were     Pulse Temperature Last Period Pulse Oximetry BMI (Body Mass Index)       76 99.2  F (37.3  C) (Tympanic) 10/26/2018 98% 38.77 kg/m2        Blood Pressure from Last 3 Encounters:   11/12/18 122/80   11/01/18 124/80   10/19/18 108/57    Weight from Last 3 Encounters:   11/12/18 255 lb (115.7 kg)   11/01/18 250 lb 6.4 oz (113.6 kg)   10/19/18 246 lb 9.6 oz (111.9 kg)              Today, you had the following     No orders found for display         Today's Medication Changes          These changes are accurate as of 11/12/18  4:32 PM.  If you have any questions, ask your nurse or doctor.               Stop taking these medicines if you haven't already. Please contact your care team if you have questions.     hydrOXYzine 25 MG capsule   Commonly known as:  VISTARIL           LORazepam 0.5 MG tablet   Commonly known as:  ATIVAN                    Primary Care Provider Office Phone # Fax #    Roma Karrie Ye, ROS [phone] [address] [facility] [address]        Equal Access to Services     BANG WAGNER AH: Hadii laila Rendon, ana todd, shira kaalmada lukasz, lavonne ken. So [facility] [phone].    ATENCIÓN: Si habla español, tiene a ma disposición servicios gratuitos de asistencia  lingüística. Mann al [phone].    We comply with applicable federal civil rights laws and [place] laws. We do not discriminate on the basis of race, color, national origin, age, disability, sex, sexual orientation, or gender identity.            Thank you!     Thank you for choosing [facility]  for your care. Our goal is always to provide you with excellent care. Hearing back from our patients is one way we can continue to improve our services. Please take a few minutes to complete the written survey that you may receive in the mail after your visit with us. Thank you!             Your Updated Medication List - Protect others around you: Learn how to safely use, store and throw away your medicines at www.disposemymeds.org.          This list is accurate as of 11/12/18  4:32 PM.  Always use your most recent med list.                   Brand Name Dispense Instructions for use Diagnosis    busPIRone 10 MG tablet    BUSPAR    90 tablet    Take 1 tablet (10 mg) by mouth 3 times daily    Anxiety and depression       desogestrel-ethinyl estradiol 0.15-0.02/0.01 MG (21/5) per tablet    KARIVA    84 tablet    Take 1 tablet by mouth daily    Encounter for initial prescription of contraceptive pills       dexamethasone 4 MG/ML injection    DECADRON    2.5 mL    Inject 2.5 mLs (10 mg) as directed once for 1 dose    Hives       escitalopram 10 MG tablet    LEXAPRO    45 tablet    Take 0.5 tablets (5 mg) by mouth daily    Anxiety and depression

## 2018-11-26 ENCOUNTER — HOSPITAL ENCOUNTER (EMERGENCY)
Facility: CLINIC | Age: 23
Discharge: HOME | End: 2018-11-26
Attending: PHYSICIAN ASSISTANT | Admitting: PHYSICIAN ASSISTANT
Payer: COMMERCIAL

## 2018-11-26 ENCOUNTER — TELEPHONE (OUTPATIENT)
Dept: PEDIATRICS | Facility: CLINIC | Age: 23
End: 2018-11-26

## 2018-11-26 VITALS
OXYGEN SATURATION: 100 % | WEIGHT: 245 LBS | HEART RATE: 83 BPM | RESPIRATION RATE: 16 BRPM | DIASTOLIC BLOOD PRESSURE: 88 MMHG | BODY MASS INDEX: 37.25 KG/M2 | SYSTOLIC BLOOD PRESSURE: 140 MMHG | TEMPERATURE: 97.9 F

## 2018-11-26 DIAGNOSIS — K62.5 BRIGHT RED RECTAL BLEEDING: ICD-10-CM

## 2018-11-26 DIAGNOSIS — K60.2 ANAL FISSURE: ICD-10-CM

## 2018-11-26 DIAGNOSIS — R10.84 CHRONIC GENERALIZED ABDOMINAL PAIN: ICD-10-CM

## 2018-11-26 DIAGNOSIS — G89.29 CHRONIC GENERALIZED ABDOMINAL PAIN: ICD-10-CM

## 2018-11-26 LAB
ALBUMIN SERPL-MCNC: 3.6 G/DL (ref 3.4–5)
ALBUMIN UR-MCNC: NEGATIVE MG/DL
ALP SERPL-CCNC: 76 U/L (ref 40–150)
ALT SERPL W P-5'-P-CCNC: 24 U/L (ref 0–50)
ANION GAP SERPL CALCULATED.3IONS-SCNC: 3 MMOL/L (ref 3–14)
APPEARANCE UR: CLEAR
AST SERPL W P-5'-P-CCNC: 15 U/L (ref 0–45)
BASOPHILS # BLD AUTO: 0.1 10E9/L (ref 0–0.2)
BASOPHILS NFR BLD AUTO: 0.7 %
BILIRUB SERPL-MCNC: 0.4 MG/DL (ref 0.2–1.3)
BILIRUB UR QL STRIP: NEGATIVE
BUN SERPL-MCNC: 10 MG/DL (ref 7–30)
CALCIUM SERPL-MCNC: 8.4 MG/DL (ref 8.5–10.1)
CHLORIDE SERPL-SCNC: 108 MMOL/L (ref 94–109)
CO2 SERPL-SCNC: 29 MMOL/L (ref 20–32)
COLOR UR AUTO: YELLOW
CREAT SERPL-MCNC: 0.74 MG/DL (ref 0.52–1.04)
DIFFERENTIAL METHOD BLD: NORMAL
EOSINOPHIL # BLD AUTO: 0.1 10E9/L (ref 0–0.7)
EOSINOPHIL NFR BLD AUTO: 1.6 %
ERYTHROCYTE [DISTWIDTH] IN BLOOD BY AUTOMATED COUNT: 11.7 % (ref 10–15)
GFR SERPL CREATININE-BSD FRML MDRD: >90 ML/MIN/1.7M2
GLUCOSE SERPL-MCNC: 89 MG/DL (ref 70–99)
GLUCOSE UR STRIP-MCNC: NEGATIVE MG/DL
HCG SERPL QL: NEGATIVE
HCT VFR BLD AUTO: 44.5 % (ref 35–47)
HGB BLD-MCNC: 14.8 G/DL (ref 11.7–15.7)
HGB UR QL STRIP: NEGATIVE
IMM GRANULOCYTES # BLD: 0 10E9/L (ref 0–0.4)
IMM GRANULOCYTES NFR BLD: 0.4 %
KETONES UR STRIP-MCNC: NEGATIVE MG/DL
LEUKOCYTE ESTERASE UR QL STRIP: NEGATIVE
LIPASE SERPL-CCNC: 112 U/L (ref 73–393)
LYMPHOCYTES # BLD AUTO: 2.7 10E9/L (ref 0.8–5.3)
LYMPHOCYTES NFR BLD AUTO: 35.3 %
MCH RBC QN AUTO: 28.9 PG (ref 26.5–33)
MCHC RBC AUTO-ENTMCNC: 33.3 G/DL (ref 31.5–36.5)
MCV RBC AUTO: 87 FL (ref 78–100)
MONOCYTES # BLD AUTO: 0.5 10E9/L (ref 0–1.3)
MONOCYTES NFR BLD AUTO: 6.3 %
NEUTROPHILS # BLD AUTO: 4.2 10E9/L (ref 1.6–8.3)
NEUTROPHILS NFR BLD AUTO: 55.7 %
NITRATE UR QL: NEGATIVE
NRBC # BLD AUTO: 0 10*3/UL
NRBC BLD AUTO-RTO: 0 /100
PH UR STRIP: 6 PH (ref 5–7)
PLATELET # BLD AUTO: 376 10E9/L (ref 150–450)
POTASSIUM SERPL-SCNC: 3.8 MMOL/L (ref 3.4–5.3)
PROT SERPL-MCNC: 7.1 G/DL (ref 6.8–8.8)
RBC # BLD AUTO: 5.12 10E12/L (ref 3.8–5.2)
SODIUM SERPL-SCNC: 140 MMOL/L (ref 133–144)
SOURCE: NORMAL
SP GR UR STRIP: 1.02 (ref 1–1.03)
UROBILINOGEN UR STRIP-MCNC: 0 MG/DL (ref 0–2)
WBC # BLD AUTO: 7.6 10E9/L (ref 4–11)

## 2018-11-26 PROCEDURE — 99283 EMERGENCY DEPT VISIT LOW MDM: CPT | Performed by: STUDENT IN AN ORGANIZED HEALTH CARE EDUCATION/TRAINING PROGRAM

## 2018-11-26 PROCEDURE — 25000132 ZZH RX MED GY IP 250 OP 250 PS 637: Performed by: FAMILY MEDICINE

## 2018-11-26 PROCEDURE — 99284 EMERGENCY DEPT VISIT MOD MDM: CPT | Mod: Z6 | Performed by: PHYSICIAN ASSISTANT

## 2018-11-26 PROCEDURE — 85025 COMPLETE CBC W/AUTO DIFF WBC: CPT | Performed by: STUDENT IN AN ORGANIZED HEALTH CARE EDUCATION/TRAINING PROGRAM

## 2018-11-26 PROCEDURE — 83690 ASSAY OF LIPASE: CPT | Performed by: STUDENT IN AN ORGANIZED HEALTH CARE EDUCATION/TRAINING PROGRAM

## 2018-11-26 PROCEDURE — 81003 URINALYSIS AUTO W/O SCOPE: CPT | Performed by: STUDENT IN AN ORGANIZED HEALTH CARE EDUCATION/TRAINING PROGRAM

## 2018-11-26 PROCEDURE — 99283 EMERGENCY DEPT VISIT LOW MDM: CPT | Performed by: PHYSICIAN ASSISTANT

## 2018-11-26 PROCEDURE — 84703 CHORIONIC GONADOTROPIN ASSAY: CPT | Performed by: STUDENT IN AN ORGANIZED HEALTH CARE EDUCATION/TRAINING PROGRAM

## 2018-11-26 PROCEDURE — 80053 COMPREHEN METABOLIC PANEL: CPT | Performed by: STUDENT IN AN ORGANIZED HEALTH CARE EDUCATION/TRAINING PROGRAM

## 2018-11-26 RX ORDER — ACETAMINOPHEN 500 MG
1000 TABLET ORAL ONCE
Status: COMPLETED | OUTPATIENT
Start: 2018-11-26 | End: 2018-11-26

## 2018-11-26 RX ADMIN — ACETAMINOPHEN 1000 MG: 500 TABLET ORAL at 12:24

## 2018-11-26 NOTE — TELEPHONE ENCOUNTER
S-(situation): rectal bleeding     B-(background): started yesterday    A-(assessment): started yesterday around 4 pm. No cramping prior to bleeding. Bright red blood right away. Bright red since. Every time she goes to  Bathroom  Gone x 5 since it started. Cramping. Sore bottom, cant sit. No history of hemorrhoids. No history of hemorrhoids. Not dizzy or light headed. Clots are increasing now. She compares them to softball size.     R-(recommendations): advised she needs to go to ED immediately. She needs a   She states she understands and agrees with plan

## 2018-11-26 NOTE — ED PROVIDER NOTES
History     Chief Complaint   Patient presents with     Rectal Bleeding     started yesterday, with passing stool, also noted blood in urine.      JEFFRY  Antonia Marc is a 22 year old female with history of anxiety, depression patient states she who presents with complaints of right red bleeding per rectum since yesterday.  Had a normal bowel movement yesterday and subsequently passed bright red blood per rectum.  She states she has had rectal pain since that time as well as intermittent episodes of bright red bleeding per rectum.  Patient reports a long history of daily cramping abdominal pain that has been going on for months.  She states her primary care provider thinks she has a component of IBS.  She states her bowel movements typically fluctuate between constipation and diarrhea, this has been ongoing for months as well and she experiences intermittent rectal bleeding (this has been ongoing for months as well).  Patient reports that there is an increase in the amount of rectal bleeding since yesterday which is what brought her in for evaluation.  She continues to have rectal pain that worsens with defecation.  Denies fevers, chills, nausea, vomiting, or urinary symptoms.  Patient has had an upper endoscopy which revealed esophagitis but the patient states she does not take any antacids on a consistent basis.  She takes occasional Ibuprofen.  Patient has never had a colonoscopy.  Patient's mother states that patient's father has Crohn's disease, but patient has never been evaluated for this.      Problem List:    Patient Active Problem List    Diagnosis Date Noted     Recurrent tonsillitis 02/01/2018     Priority: Medium     Recurrent major depressive disorder, in remission (H) 05/30/2017     Priority: Medium     Anxiety 05/30/2017     Priority: Medium     JAZZY (generalized anxiety disorder) 05/30/2017     Priority: Medium     Mild episode of recurrent major depressive disorder (H) 05/30/2017     Priority:  Medium     Irregular menstrual bleeding 04/02/2012     Priority: Medium     Flat feet 09/02/2011     Priority: Medium        Past Medical History:    Past Medical History:   Diagnosis Date     Closed fracture of unspecified part of radius with ulna(813.83)      Other diseases of trachea and bronchus, not elsewhere classified        Past Surgical History:    Past Surgical History:   Procedure Laterality Date     ESOPHAGOSCOPY, GASTROSCOPY, DUODENOSCOPY (EGD), COMBINED N/A 10/19/2018    Procedure: COMBINED ESOPHAGOSCOPY, GASTROSCOPY, DUODENOSCOPY (EGD), BIOPSY SINGLE OR MULTIPLE;  Surgeon: Heraclio Ziegler DO;  Location: [facility]     TONSILLECTOMY ADULT Bilateral 7/23/2018    Procedure: TONSILLECTOMY ADULT;  Bilateral Tonsillectomy;  Surgeon: Galilea Good MD;  Location: [facility]       Family History:    Family History   Problem Relation Age of Onset     Diabetes Mother      Hypertension Mother      Diabetes Father      Hypertension Father      Crohn Disease Father      Hypertension Maternal Grandmother      Anxiety Disorder Maternal Grandmother      Hypertension Maternal Grandfather      Diabetes Maternal Grandfather      Anxiety Disorder Maternal Grandfather        Social History:  Marital Status:  Single [1]  Social History   Substance Use Topics     Smoking status: Never Smoker     Smokeless tobacco: Never Used      Comment: no exporure     Alcohol use No        Medications:      busPIRone (BUSPAR) 10 MG tablet   escitalopram (LEXAPRO) 10 MG tablet   lidocaine (XYLOCAINE) 2 % topical gel         Review of Systems   Constitutional: Negative.    Respiratory: Negative.    Cardiovascular: Negative.    Gastrointestinal: Positive for abdominal pain (diffuse abdominal cramping daily), anal bleeding, blood in stool, constipation, diarrhea and rectal pain.   Genitourinary: Negative.    Musculoskeletal: Negative.    Skin: Negative.    Neurological: Negative.    All other systems reviewed and are negative.      Physical  Exam   BP: 140/88  Pulse: 83  Temp: 97.9  F (36.6  C)  Resp: 16  Weight: 111.1 kg (245 lb)  SpO2: 100 %      Physical Exam   Constitutional: She appears well-developed and well-nourished. No distress.   HENT:   Head: Normocephalic and atraumatic.   Eyes: Conjunctivae and EOM are normal. Pupils are equal, round, and reactive to light.   Neck: Normal range of motion. Neck supple.   Cardiovascular: Normal rate, regular rhythm and normal heart sounds.    Pulmonary/Chest: Effort normal and breath sounds normal.   Abdominal: Soft. She exhibits no distension. There is no tenderness. There is no rigidity, no rebound, no guarding and no CVA tenderness.   Genitourinary: Rectal exam shows fissure (at 12 o'clock position) and guaiac positive stool. Rectal exam shows no external hemorrhoid, no mass and no tenderness.   Genitourinary Comments: No gross melena or bloody stool.     Musculoskeletal: Normal range of motion.   Neurological: She is alert.   Skin: Skin is warm and dry. No rash noted.       ED Course     ED Course     Procedures      Results for orders placed or performed during the hospital encounter of 11/26/18 (from the past 24 hour(s))   CBC with platelets differential   Result Value Ref Range    WBC 7.6 4.0 - 11.0 10e9/L    RBC Count 5.12 3.8 - 5.2 10e12/L    Hemoglobin 14.8 11.7 - 15.7 g/dL    Hematocrit 44.5 35.0 - 47.0 %    MCV 87 78 - 100 fl    MCH 28.9 26.5 - 33.0 pg    MCHC 33.3 31.5 - 36.5 g/dL    RDW 11.7 10.0 - 15.0 %    Platelet Count 376 150 - 450 10e9/L    Diff Method Automated Method     % Neutrophils 55.7 %    % Lymphocytes 35.3 %    % Monocytes 6.3 %    % Eosinophils 1.6 %    % Basophils 0.7 %    % Immature Granulocytes 0.4 %    Nucleated RBCs 0 0 /100    Absolute Neutrophil 4.2 1.6 - 8.3 10e9/L    Absolute Lymphocytes 2.7 0.8 - 5.3 10e9/L    Absolute Monocytes 0.5 0.0 - 1.3 10e9/L    Absolute Eosinophils 0.1 0.0 - 0.7 10e9/L    Absolute Basophils 0.1 0.0 - 0.2 10e9/L    Abs Immature Granulocytes 0.0  0 - 0.4 10e9/L    Absolute Nucleated RBC 0.0    Comprehensive metabolic panel   Result Value Ref Range    Sodium 140 133 - 144 mmol/L    Potassium 3.8 3.4 - 5.3 mmol/L    Chloride 108 94 - 109 mmol/L    Carbon Dioxide 29 20 - 32 mmol/L    Anion Gap 3 3 - 14 mmol/L    Glucose 89 70 - 99 mg/dL    Urea Nitrogen 10 7 - 30 mg/dL    Creatinine 0.74 0.52 - 1.04 mg/dL    GFR Estimate >90 >60 mL/min/1.7m2    GFR Estimate If Black >90 >60 mL/min/1.7m2    Calcium 8.4 (L) 8.5 - 10.1 mg/dL    Bilirubin Total 0.4 0.2 - 1.3 mg/dL    Albumin 3.6 3.4 - 5.0 g/dL    Protein Total 7.1 6.8 - 8.8 g/dL    Alkaline Phosphatase 76 40 - 150 U/L    ALT 24 0 - 50 U/L    AST 15 0 - 45 U/L   Lipase   Result Value Ref Range    Lipase 112 73 - 393 U/L   HCG qualitative Blood   Result Value Ref Range    HCG Qualitative Serum Negative NEG^Negative   UA reflex to Microscopic and Culture   Result Value Ref Range    Color Urine Yellow     Appearance Urine Clear     Glucose Urine Negative NEG^Negative mg/dL    Bilirubin Urine Negative NEG^Negative    Ketones Urine Negative NEG^Negative mg/dL    Specific Gravity Urine 1.023 1.003 - 1.035    Blood Urine Negative NEG^Negative    pH Urine 6.0 5.0 - 7.0 pH    Protein Albumin Urine Negative NEG^Negative mg/dL    Urobilinogen mg/dL 0.0 0.0 - 2.0 mg/dL    Nitrite Urine Negative NEG^Negative    Leukocyte Esterase Urine Negative NEG^Negative    Source Midstream Urine        Medications   acetaminophen (TYLENOL) tablet 1,000 mg (1,000 mg Oral Given 11/26/18 1224)       Assessments & Plan (with Medical Decision Making)     Pt is a 22 year old female with history of anxiety, depression patient states she who presents with complaints of right red bleeding per rectum since yesterday.  Had a normal bowel movement yesterday and subsequently passed bright red blood per rectum.  She states she has had rectal pain since that time as well as intermittent episodes of bright red bleeding per rectum.  Patient reports a long  history of daily cramping abdominal pain that has been going on for months.  She states her primary care provider thinks she has a component of IBS.  She states her bowel movements typically fluctuate between constipation and diarrhea, this has been ongoing for months as well and she experiences intermittent rectal bleeding (this has been ongoing for months as well).  Patient reports that there is an increase in the amount of rectal bleeding since yesterday which is what brought her in for evaluation.  She continues to have rectal pain that worsens with defecation.  Patient has had an upper endoscopy which revealed esophagitis but the patient states she does not take any antacids on a consistent basis.  Patient's mother states that patient's father has Crohn's disease, but patient has never been evaluated for this.  Pt is afebrile on arrival.  Exam as above.  Patient is noted to have an anal fissure on exam.  No evidence of thrombosed external hemorrhoids.  Patient is hemoccult positive on a very small amount of stool obtained.  No gross melena or bloody stool.  CBC is negative; no evidence of leukocytosis or anemia.  Urinalysis was negative.  UPT was negative.  Comprehensive metabolic panel is normal.  Discussed results with patient.  Given patient's chronic daily cramping abdominal pain and intermittent rectal bleeding, will place order for outpatient colonoscopy for further evaluation of inflammatory bowel disease.  Mother expresses concern about patient possibly having Crohn's disease as her father has this.  Patient has had an upper endoscopy but has never been evaluated with a colonoscopy.  She has an upcoming appointment in January with gastroenterology.  A colonoscopy order was placed.  Will prescribe topical lidocaine jelly for symptomatic treatment of patient's anal fissure which is also likely contributing.  Encouraged increased fiber in diet, hydration, and sitz baths as well.  Return precautions were  reviewed.  Hand-outs were provided.    Patient was sent with 2% topical Lidocaine gel and was instructed to follow-up with PCP in 5-7 days for continued care and management or sooner if new or worsening symptoms.  She is to return to the ED for persistent and/or worsening symptoms.  Patient expressed understanding of the diagnosis and plan and was discharged home in good condition.    I have reviewed the nursing notes.    I have reviewed the findings, diagnosis, plan and need for follow up with the patient.    Discharge Medication List as of 11/26/2018  3:22 PM      START taking these medications    Details   lidocaine (XYLOCAINE) 2 % topical gel Apply to affected area 3 times dailyDisp-30 mL, [id number] to anal fissure             Final diagnoses:   Bright red rectal bleeding   Anal fissure   Chronic generalized abdominal pain       11/26/2018   [facility] EMERGENCY DEPARTMENT      Disclaimer:  This note consists of symbols derived from keyboarding, dictation and/or voice recognition software.  As a result, there may be errors in the script that have gone undetected.  Please consider this when interpreting information found in this chart.     Regina Yusuf PA-C  11/28/18 0261

## 2018-11-26 NOTE — ED AVS SNAPSHOT
[facility]    [address] [facility] [address]    Phone:  [phone]    Fax:  [phone]                                       Antonia Marc   MRN: [id number]    Department:  [facility]   Date of Visit:  11/26/2018           Patient Information     Date Of Birth          1995        Your diagnoses for this visit were:     Bright red rectal bleeding     Anal fissure     Chronic generalized abdominal pain        You were seen by Regina Yusuf PA-C.      Follow-up Information     Follow up with Roma Ye APRN CNP. Call in 1 week.    Specialty:  Nurse Practitioner - Gerontology    Why:  For follow-up    Contact information:    [address] [place] [address]  [phone]          Follow up with [facility].    Specialty:  EMERGENCY MEDICINE    Why:  As needed, If symptoms worsen    Contact information:    [address] [place] [address] [phone]    Additional information:    The medical center is located at   [address] [place] [address] (between [facility] and   [facility] [place], [place] miles north   of [place]).      Discharge References/Attachments     GASTROINTESTINAL (GI) BLEEDING, WHEN YOU HAVE (ENGLISH)      Your next 10 appointments already scheduled     Jan 23, 2019  1:00 PM CST   (Arrive by 12:45 PM)   New Patient Visit with Bev Peterson MD   [facility] ([facility])    [address] [place] [address] [phone]              24 Hour Appointment Hotline       To make an appointment at any [facility], call [phone]). If you don't have a family doctor or clinic, we will help you find one. [facility] are conveniently located to serve the needs of you and your family.          ED Discharge Orders     GASTROENTEROLOGY ADULT REF PROCEDURE ONLY       Last Lab  Result: Creatinine (mg/dL)       Date                     Value                 11/26/2018               0.74             ----------  Body mass index is 37.25 kg/(m^2).     Needed:  No  Language:  English    Patient will be contacted to schedule procedure.     Please be aware that coverage of these services is subject to the terms and limitations of your health insurance plan.  Call member services at your health plan with any benefit or coverage questions.  Any procedures must be performed at a [facility] facility OR coordinated by your clinic's referral office.    Please bring the following with you to your appointment:    (1) Any X-Rays, CTs or MRIs which have been performed.  Contact the facility where they were done to arrange for  prior to your scheduled appointment.    (2) List of current medications   (3) This referral request   (4) Any documents/labs given to you for this referral                     Review of your medicines      START taking        Dose / Directions Last dose taken    lidocaine 2 % topical gel   Commonly known as:  XYLOCAINE   Quantity:  30 mL        Apply to affected area 3 times daily   Refills:  0          Our records show that you are taking the medicines listed below. If these are incorrect, please call your family doctor or clinic.        Dose / Directions Last dose taken    busPIRone 10 MG tablet   Commonly known as:  BUSPAR   Dose:  10 mg   Quantity:  90 tablet        Take 1 tablet (10 mg) by mouth 3 times daily   Refills:  2        escitalopram 10 MG tablet   Commonly known as:  LEXAPRO   Dose:  5 mg   Quantity:  45 tablet        Take 0.5 tablets (5 mg) by mouth daily   Refills:  0                Prescriptions were sent or printed at these locations (1 Prescription)                   [facility] [address] [address]    Telephone:  [phone]   Fax:  [phone]   Hours:                   E-Prescribed (1 of 1)         lidocaine (XYLOCAINE) 2 % topical gel                Procedures and tests performed during your visit     CBC with platelets differential    Comprehensive metabolic panel    HCG qualitative Blood    Lipase    UA reflex to Microscopic and Culture    Vital signs      Orders Needing Specimen Collection     None      Pending Results     No orders found from 11/24/2018 to 11/27/2018.            Pending Culture Results     No orders found from 11/24/2018 to 11/27/2018.            Pending Results Instructions     If you had any lab results that were not finalized at the time of your Discharge, you can call the ED Lab Result RN at [phone]. You will be contacted by this team for any positive Lab results or changes in treatment. The nurses are available 7 days a week from 10A to 6:30P.  You can leave a message 24 hours per day and they will return your call.        Test Results From Your Hospital Stay        11/26/2018  1:56 PM      Component Results     Component Value Ref Range & Units Status    WBC 7.6 4.0 - 11.0 10e9/L Final    RBC Count 5.12 3.8 - 5.2 10e12/L Final    Hemoglobin 14.8 11.7 - 15.7 g/dL Final    Hematocrit 44.5 35.0 - 47.0 % Final    MCV 87 78 - 100 fl Final    MCH 28.9 26.5 - 33.0 pg Final    MCHC 33.3 31.5 - 36.5 g/dL Final    RDW 11.7 10.0 - 15.0 % Final    Platelet Count 376 150 - 450 10e9/L Final    Diff Method Automated Method  Final    % Neutrophils 55.7 % Final    % Lymphocytes 35.3 % Final    % Monocytes 6.3 % Final    % Eosinophils 1.6 % Final    % Basophils 0.7 % Final    % Immature Granulocytes 0.4 % Final    Nucleated RBCs 0 0 /100 Final    Absolute Neutrophil 4.2 1.6 - 8.3 10e9/L Final    Absolute Lymphocytes 2.7 0.8 - 5.3 10e9/L Final    Absolute Monocytes 0.5 0.0 - 1.3 10e9/L Final    Absolute Eosinophils 0.1 0.0 - 0.7 10e9/L Final    Absolute Basophils 0.1 0.0 - 0.2 10e9/L Final    Abs Immature Granulocytes 0.0 0 - 0.4 10e9/L Final    Absolute Nucleated  RBC 0.0  Final         11/26/2018  2:15 PM      Component Results     Component Value Ref Range & Units Status    Sodium 140 133 - 144 mmol/L Final    Potassium 3.8 3.4 - 5.3 mmol/L Final    Chloride 108 94 - 109 mmol/L Final    Carbon Dioxide 29 20 - 32 mmol/L Final    Anion Gap 3 3 - 14 mmol/L Final    Glucose 89 70 - 99 mg/dL Final    Urea Nitrogen 10 7 - 30 mg/dL Final    Creatinine 0.74 0.52 - 1.04 mg/dL Final    GFR Estimate >90 >60 mL/min/1.7m2 Final    Non  GFR Calc    GFR Estimate If Black >90 >60 mL/min/1.7m2 Final    African American GFR Calc    Calcium 8.4 (L) 8.5 - 10.1 mg/dL Final    Bilirubin Total 0.4 0.2 - 1.3 mg/dL Final    Albumin 3.6 3.4 - 5.0 g/dL Final    Protein Total 7.1 6.8 - 8.8 g/dL Final    Alkaline Phosphatase 76 40 - 150 U/L Final    ALT 24 0 - 50 U/L Final    AST 15 0 - 45 U/L Final         11/26/2018  2:13 PM      Component Results     Component Value Ref Range & Units Status    Lipase 112 73 - 393 U/L Final         11/26/2018  1:59 PM      Component Results     Component Value Ref Range & Units Status    Color Urine Yellow  Final    Appearance Urine Clear  Final    Glucose Urine Negative NEG^Negative mg/dL Final    Bilirubin Urine Negative NEG^Negative Final    Ketones Urine Negative NEG^Negative mg/dL Final    Specific Gravity Urine 1.023 1.003 - 1.035 Final    Blood Urine Negative NEG^Negative Final    pH Urine 6.0 5.0 - 7.0 pH Final    Protein Albumin Urine Negative NEG^Negative mg/dL Final    Urobilinogen mg/dL 0.0 0.0 - 2.0 mg/dL Final    Nitrite Urine Negative NEG^Negative Final    Leukocyte Esterase Urine Negative NEG^Negative Final    Source Midstream Urine  Final         11/26/2018  2:08 PM      Component Results     Component Value Ref Range & Units Status    HCG Qualitative Serum Negative NEG^Negative Final    This test is for screening purposes.  Results should be interpreted along with   the clinical picture.  Confirmation testing is available if  warranted by   ordering RAM301, HCG Quantitative Pregnancy.                  Thank you for choosing [facility]       Thank you for choosing [facility] for your care. Our goal is always to provide you with excellent care. Hearing back from our patients is one way we can continue to improve our services. Please take a few minutes to complete the written survey that you may receive in the mail after you visit with us. Thank you!        Care EveryWhere ID     This is your Care EveryWhere ID. This could be used by other organizations to access your [facility] medical records  [id number]        Equal Access to Services     BANG WAGNER : Manolo Rendon, ana todd, shira lal, lavonne ken. So [facility] [phone].    ATENCIÓN: Si habla español, tiene a ma disposición servicios gratuitos de asistencia lingüística. Llame al [phone].    We comply with applicable federal civil rights laws and [place] laws. We do not discriminate on the basis of race, color, national origin, age, disability, sex, sexual orientation, or gender identity.            After Visit Summary       This is your record. Keep this with you and show to your community pharmacist(s) and doctor(s) at your next visit.

## 2018-11-26 NOTE — ED AVS SNAPSHOT
[facility]    [address] [facility] [address]    Phone:  [phone]    Fax:  [phone]                                       Antonia Marc   MRN: [id number]    Department:  [facility]   Date of Visit:  11/26/2018           After Visit Summary Signature Page     I have received my discharge instructions, and my questions have been answered. I have discussed any challenges I see with this plan with the nurse or doctor.    ..........................................................................................................................................  Patient/Patient Representative Signature      ..........................................................................................................................................  Patient Representative Print Name and Relationship to Patient    ..................................................               ................................................  Date                                   Time    ..........................................................................................................................................  Reviewed by Signature/Title    ...................................................              ..............................................  Date                                               Time          22EPIC Rev 08/18

## 2018-11-26 NOTE — ED NOTES
Pt c/o bright red blood with passing stool - states has been on narcotics for recent surgery and had taken some stool softeners as needed although stool has been soft. Patient here for eval of bright red bleeding

## 2018-11-26 NOTE — LETTER
November 26, 2018      To Whom It May Concern:      Antonia Marc was seen in our Emergency Department today, 11/26/18.  Please excuse from work today.  Thank you.        Sincerely,        Regina Yusuf PA-C

## 2018-11-27 ENCOUNTER — HOSPITAL ENCOUNTER (OUTPATIENT)
Facility: CLINIC | Age: 23
End: 2018-11-27
Attending: SURGERY | Admitting: SURGERY
Payer: COMMERCIAL

## 2018-11-28 ASSESSMENT — ENCOUNTER SYMPTOMS
CONSTITUTIONAL NEGATIVE: 1
ABDOMINAL PAIN: 1
ANAL BLEEDING: 1
MUSCULOSKELETAL NEGATIVE: 1
RECTAL PAIN: 1
NEUROLOGICAL NEGATIVE: 1
RESPIRATORY NEGATIVE: 1
DIARRHEA: 1
BLOOD IN STOOL: 1
CARDIOVASCULAR NEGATIVE: 1
CONSTIPATION: 1

## 2018-12-29 ENCOUNTER — ANESTHESIA EVENT (OUTPATIENT)
Dept: GASTROENTEROLOGY | Facility: CLINIC | Age: 23
End: 2018-12-29

## 2018-12-29 RX ORDER — SODIUM CHLORIDE, SODIUM LACTATE, POTASSIUM CHLORIDE, CALCIUM CHLORIDE 600; 310; 30; 20 MG/100ML; MG/100ML; MG/100ML; MG/100ML
INJECTION, SOLUTION INTRAVENOUS CONTINUOUS
Status: CANCELLED | OUTPATIENT
Start: 2018-12-29

## 2018-12-29 RX ORDER — LIDOCAINE 40 MG/G
CREAM TOPICAL
Status: CANCELLED | OUTPATIENT
Start: 2018-12-29

## 2018-12-29 NOTE — ANESTHESIA PREPROCEDURE EVALUATION
Anesthesia Pre-Procedure Evaluation    Patient: Antonia Marc   MRN: [id number] : 1995          Preoperative Diagnosis: bright red rectal bleeding, chronic generalized abdominal pain    diagnostic    Procedure(s):  COLONOSCOPY    Past Medical History:   Diagnosis Date     Closed fracture of unspecified part of radius with ulna(813.83)      Other diseases of trachea and bronchus, not elsewhere classified     tracheomalacia as a       Past Surgical History:   Procedure Laterality Date     ESOPHAGOSCOPY, GASTROSCOPY, DUODENOSCOPY (EGD), COMBINED N/A 10/19/2018    Procedure: COMBINED ESOPHAGOSCOPY, GASTROSCOPY, DUODENOSCOPY (EGD), BIOPSY SINGLE OR MULTIPLE;  Surgeon: Heraclio Ziegler DO;  Location: [facility]     TONSILLECTOMY ADULT Bilateral 2018    Procedure: TONSILLECTOMY ADULT;  Bilateral Tonsillectomy;  Surgeon: Galilea Good MD;  Location: [facility]       Anesthesia Evaluation     .             ROS/MED HX    ENT/Pulmonary: Comment: Other diseases of trachea and bronchus, not elsewhere classified      Neurologic:       Cardiovascular:     (+) ----. : . . . :. . Previous cardiac testing date:results:date: results:ECG reviewed date:18 results:Sinus Rhythm -Short NH syndrome - occasional PAC     Elisa = 89   # PACs = 1.  -Abnormal precordial QRS contours -nondiagnostic for this age.     ABNORMAL date: results:          METS/Exercise Tolerance:     Hematologic:         Musculoskeletal: Comment: Flat feet        GI/Hepatic: Comment: Rectal bleeding and pain        Renal/Genitourinary:         Endo:         Psychiatric:     (+) psychiatric history depression and anxiety      Infectious Disease:         Malignancy:         Other: Comment: Irregular menstrual bleeding                                Lab Results   Component Value Date    WBC 7.6 2018    HGB 14.8 2018    HCT 44.5 2018     2018    CRP <2.9 2018    SED 4 2018     2018     "POTASSIUM 3.8 11/26/2018    CHLORIDE 108 11/26/2018    CO2 29 11/26/2018    BUN 10 11/26/2018    CR 0.74 11/26/2018    GLC 89 11/26/2018    NAVDEEP 8.4 (L) 11/26/2018    ALBUMIN 3.6 11/26/2018    PROTTOTAL 7.1 11/26/2018    ALT 24 11/26/2018    AST 15 11/26/2018    ALKPHOS 76 11/26/2018    BILITOTAL 0.4 11/26/2018    LIPASE 112 11/26/2018    PTT 31 05/29/2017    INR 1.14 05/29/2017    TSH 1.48 09/14/2017    T4 1.08 04/02/2012    HCG Negative 10/19/2018    HCGS Negative 11/26/2018       Preop Vitals  BP Readings from Last 3 Encounters:   11/26/18 140/88   11/12/18 122/80   11/01/18 124/80    Pulse Readings from Last 3 Encounters:   11/26/18 83   11/12/18 76   11/01/18 67      Resp Readings from Last 3 Encounters:   11/26/18 16   10/19/18 16   09/21/18 18    SpO2 Readings from Last 3 Encounters:   11/26/18 100%   11/12/18 98%   11/01/18 100%      Temp Readings from Last 1 Encounters:   11/26/18 36.6  C (97.9  F) (Oral)    Ht Readings from Last 1 Encounters:   11/01/18 1.727 m (5' 8\")      Wt Readings from Last 1 Encounters:   11/26/18 111.1 kg (245 lb)    Estimated body mass index is 37.25 kg/m  as calculated from the following:    Height as of 11/1/18: 1.727 m (5' 8\").    Weight as of 11/26/18: 111.1 kg (245 lb).                   Sacha Griffith, CRNA, APRN CRNA  "

## 2019-01-02 ENCOUNTER — ANESTHESIA (OUTPATIENT)
Dept: GASTROENTEROLOGY | Facility: CLINIC | Age: 24
End: 2019-01-02

## 2019-02-18 ENCOUNTER — TELEPHONE (OUTPATIENT)
Dept: FAMILY MEDICINE | Facility: CLINIC | Age: 24
End: 2019-02-18

## 2019-02-18 ASSESSMENT — ANXIETY QUESTIONNAIRES
3. WORRYING TOO MUCH ABOUT DIFFERENT THINGS: NOT AT ALL
1. FEELING NERVOUS, ANXIOUS, OR ON EDGE: NOT AT ALL
IF YOU CHECKED OFF ANY PROBLEMS ON THIS QUESTIONNAIRE, HOW DIFFICULT HAVE THESE PROBLEMS MADE IT FOR YOU TO DO YOUR WORK, TAKE CARE OF THINGS AT HOME, OR GET ALONG WITH OTHER PEOPLE: NOT DIFFICULT AT ALL
2. NOT BEING ABLE TO STOP OR CONTROL WORRYING: NOT AT ALL
GAD7 TOTAL SCORE: 0
6. BECOMING EASILY ANNOYED OR IRRITABLE: NOT AT ALL
5. BEING SO RESTLESS THAT IT IS HARD TO SIT STILL: NOT AT ALL
7. FEELING AFRAID AS IF SOMETHING AWFUL MIGHT HAPPEN: NOT AT ALL

## 2019-02-18 ASSESSMENT — PATIENT HEALTH QUESTIONNAIRE - PHQ9
SUM OF ALL RESPONSES TO PHQ QUESTIONS 1-9: 0
5. POOR APPETITE OR OVEREATING: NOT AT ALL

## 2019-02-18 NOTE — TELEPHONE ENCOUNTER
PHQ9 Due between: 1/24/19-5/24/19    Please contact patient to complete follow up PHQ9 before their DUE DATE.     Index date 9/24/18, phq9 score 11.    This is important feedback for your care team to assess your symptoms and treatment plan.    You completed this same questionnaire   PHQ-9 SCORE 11/1/2018   PHQ-9 Total Score 11       MA STAFF: If upon calling patient and PHQ9 score is higher that 5 route to the provider. You may also seek an RN for review.

## 2019-02-18 NOTE — TELEPHONE ENCOUNTER
Panel Management Review      Patient has the following on her problem list: None      Composite cancer screening  Chart review shows that this patient is due/due soon for the following None  Summary:    Patient is due/failing the following:   PHQ9    Action needed:   Patient completed phq9/Jazzy 7     Type of outreach:    Phone, spoke to patient.  PHQ9 and JAZZY 7 was done     Questions for provider review:    None                                                                                                                                    Antonia Nelson

## 2019-02-19 ASSESSMENT — ANXIETY QUESTIONNAIRES: GAD7 TOTAL SCORE: 0

## 2019-04-05 NOTE — TELEPHONE ENCOUNTER
FUTURE VISIT INFORMATION      FUTURE VISIT INFORMATION:    Date: 4/26/19    Time: 12:20pm    [place] [facility] [place]FORMATION:    Referring provider:  Roma Ye CNP    Referring providers clinic:  [facility]    Reason for visit/diagnosis:  Epigastric Pain, Reflux    NOTES STATUS DETAILS   OFFICE NOTE from referring provider  Internal 11/1/18, 9/24/18 4/4/18   OFFICE NOTE from other specialist   Internal 11/26/18 Telephone   DISCHARGE SUMMARY FROM HOSPITAL N/A    DISCHARGE REPORT FROM ED Internal 11/26/18, 4/6/18, 3/16/18, 2/6/18   OPERATIVE REPORT  N/A    PFC REPORT N/A    MEDICATION LIST Internal    LABS     FIT/STOOL TESTING N/A    PERTINENT LABS N/A    PATHOLOGY REPORTS RELATED TO DIAGNOSIS Internal 10/19/18   DIAGNOSTIC PROCEDURES     COLONOSCOPY N/A    ENDOSCOPY (EGD) Internal 10/19/18   ERCP N/A    EUS N/A    FLEX SIGMOIDOSCOPY N/A    IMAGING & REPORT      CT, MRI, US, XR Internal

## 2019-04-26 ENCOUNTER — PRE VISIT (OUTPATIENT)
Dept: GASTROENTEROLOGY | Facility: CLINIC | Age: 24
End: 2019-04-26

## 2019-08-26 ENCOUNTER — APPOINTMENT (OUTPATIENT)
Dept: CT IMAGING | Facility: CLINIC | Age: 24
End: 2019-08-26
Attending: EMERGENCY MEDICINE

## 2019-08-26 ENCOUNTER — HOSPITAL ENCOUNTER (EMERGENCY)
Facility: CLINIC | Age: 24
Discharge: HOME | End: 2019-08-27
Attending: EMERGENCY MEDICINE | Admitting: EMERGENCY MEDICINE

## 2019-08-26 DIAGNOSIS — S06.0X9A CONCUSSION WITH < 1 HR LOSS OF CONSCIOUSNESS: ICD-10-CM

## 2019-08-26 DIAGNOSIS — S09.90XA INJURY OF HEAD, INITIAL ENCOUNTER: ICD-10-CM

## 2019-08-26 PROCEDURE — 99285 EMERGENCY DEPT VISIT HI MDM: CPT | Mod: 25 | Performed by: EMERGENCY MEDICINE

## 2019-08-26 PROCEDURE — 25000128 H RX IP 250 OP 636: Performed by: EMERGENCY MEDICINE

## 2019-08-26 PROCEDURE — 70450 CT HEAD/BRAIN W/O DYE: CPT

## 2019-08-26 PROCEDURE — 72125 CT NECK SPINE W/O DYE: CPT

## 2019-08-26 PROCEDURE — 99284 EMERGENCY DEPT VISIT MOD MDM: CPT | Mod: Z6 | Performed by: EMERGENCY MEDICINE

## 2019-08-26 PROCEDURE — 96374 THER/PROPH/DIAG INJ IV PUSH: CPT | Performed by: EMERGENCY MEDICINE

## 2019-08-26 PROCEDURE — 25000132 ZZH RX MED GY IP 250 OP 250 PS 637: Performed by: EMERGENCY MEDICINE

## 2019-08-26 RX ORDER — ACETAMINOPHEN 500 MG
1000 TABLET ORAL EVERY 8 HOURS PRN
Qty: 100 TABLET | Refills: 0 | COMMUNITY
Start: 2019-08-26 | End: 2019-08-31

## 2019-08-26 RX ORDER — IBUPROFEN 200 MG
800 TABLET ORAL EVERY 8 HOURS PRN
Qty: 30 TABLET | Refills: 0 | COMMUNITY
Start: 2019-08-26 | End: 2019-10-08

## 2019-08-26 RX ORDER — ACETAMINOPHEN 500 MG
1000 TABLET ORAL ONCE
Status: COMPLETED | OUTPATIENT
Start: 2019-08-26 | End: 2019-08-26

## 2019-08-26 RX ORDER — KETOROLAC TROMETHAMINE 15 MG/ML
15 INJECTION, SOLUTION INTRAMUSCULAR; INTRAVENOUS ONCE
Status: COMPLETED | OUTPATIENT
Start: 2019-08-26 | End: 2019-08-26

## 2019-08-26 RX ADMIN — ACETAMINOPHEN 1000 MG: 500 TABLET, FILM COATED ORAL at 22:47

## 2019-08-26 RX ADMIN — KETOROLAC TROMETHAMINE 15 MG: 15 INJECTION, SOLUTION INTRAMUSCULAR; INTRAVENOUS at 23:47

## 2019-08-26 ASSESSMENT — MIFFLIN-ST. JEOR: SCORE: 1761.05

## 2019-08-26 ASSESSMENT — ENCOUNTER SYMPTOMS
WEAKNESS: 0
COUGH: 0
NECK STIFFNESS: 1
NECK PAIN: 1
HEADACHES: 1
CHEST TIGHTNESS: 0
FATIGUE: 1
CONFUSION: 0
SEIZURES: 0
LIGHT-HEADEDNESS: 1
NAUSEA: 0
NUMBNESS: 1
SHORTNESS OF BREATH: 0
WOUND: 0
ABDOMINAL PAIN: 0

## 2019-08-26 NOTE — LETTER
August 27, 2019      To Whom It May Concern:      Antonia Marc was seen in our Emergency Department today, 08/27/19.  I expect her condition to improve over the next 2-5 days.  She may return to work when improved.    Sincerely,        Magdiel Arriola MD

## 2019-08-26 NOTE — ED AVS SNAPSHOT
[facility] Emergency Department  [address] [address]  Phone:  [phone]  Fax:  [phone]                                    Antonia Marc   MRN: [id number]    Department:  [facility] Emergency Department   Date of Visit:  8/26/2019           After Visit Summary Signature Page    I have received my discharge instructions, and my questions have been answered. I have discussed any challenges I see with this plan with the nurse or doctor.    ..........................................................................................................................................  Patient/Patient Representative Signature      ..........................................................................................................................................  Patient Representative Print Name and Relationship to Patient    ..................................................               ................................................  Date                                   Time    ..........................................................................................................................................  Reviewed by Signature/Title    ...................................................              ..............................................  Date                                               Time          22EPIC Rev 08/18

## 2019-08-27 VITALS
RESPIRATION RATE: 18 BRPM | HEIGHT: 68 IN | OXYGEN SATURATION: 100 % | BODY MASS INDEX: 31.83 KG/M2 | SYSTOLIC BLOOD PRESSURE: 138 MMHG | HEART RATE: 58 BPM | WEIGHT: 210 LBS | TEMPERATURE: 97.7 F | DIASTOLIC BLOOD PRESSURE: 71 MMHG

## 2019-08-27 RX ORDER — ONDANSETRON 4 MG/1
4 TABLET, ORALLY DISINTEGRATING ORAL EVERY 8 HOURS PRN
Qty: 10 TABLET | Refills: 0 | Status: SHIPPED | OUTPATIENT
Start: 2019-08-27 | End: 2019-10-08

## 2019-08-27 RX ORDER — MORPHINE SULFATE 15 MG/1
15-30 TABLET ORAL EVERY 4 HOURS PRN
Qty: 8 TABLET | Refills: 0 | Status: SHIPPED | OUTPATIENT
Start: 2019-08-27 | End: 2019-10-08

## 2019-08-27 NOTE — DISCHARGE INSTRUCTIONS
What is a concussion?  A concussion is a mild traumatic brain injury (TBI) that occurs from a direct blow or bump to the head. It can also result from a blow to another part of the body when the force travels to the head. A concussion can affect coordination, learning, memory, and emotions.     Most concussions heal without issue. However, like any other injury, a brain injury should be given time to heal, which includes physical and mental rest.     One of the most severe problems that can occur is bleeding inside the brain. If bleeding or swelling occurs, pressure in the skull rises and can cause brain injury. Bleeding might develop right after an injury or hours later, so monitoring symptoms is critical as this can be life threatening.    Signs and Symptoms  Common symptoms include:   Altered mental status including confusion, inappropriate emotions, agitation or abrupt change in personality   Amnesia/memory problems   Blurred vision/double vision/seeing stars or black spots   Dizziness, poor balance or unsteadiness   Excessive fatigue/feel slowed down   Excessive or persistent headache   Excessive sensitivity to light or loud noise    Feel  in a fog    Loss of consciousness or orientation   Ringing in ears   Vacant stare/glassy eyed   Vomiting    Why do a baseline computer test?  Neurocognitive tests, such as ImPACT , provide information on how the brain is responding to injury. ImPACT has two components: a pre- and post-concussion test. The pre-test scoring represents one s baseline (normal) brain function. The ImPACT test is then repeated post injury. Results of the pre- and post-concussion tests are compared and a care plan is developed. An ImPACT test should be completed yearly due to natural maturing of the brain.    What can I expect from [place] s concussion program?  [facility] providers will:   Facilitate ImPACT tests    Manage concussion symptoms and make  recommendations for return to activity   Facilitate post-concussion testing   Our team includes other healthcare providers, including neuropsychologists, neurologists and therapists who specialize in concussion management and will provide you with comprehensive, coordinated care    When is it safe to return to activity?  You should be free of symptoms and have returned to normal sleeping and eating patterns as well as typical concentration levels before resuming activity. Once normal activities have resumed and there are no symptoms at rest, more demanding activities may be tried. Over time, activity will be increased as long as symptoms do not return. A gradual return to activities allows us the opportunity to assess brain healing.     Under no circumstances should anyone return to activity while experiencing concussion signs or symptoms. There should be no return to activity on the same day concussion symptoms are noted or a formal diagnosis of a concussion is made.  For more information contact:  Sports concussion hotline: [phone]  Schedule an appointment: [phone]  [email or web address]    For the same link to the [facility] website above, you can use this QR code:

## 2019-08-27 NOTE — ED PROVIDER NOTES
History     Chief Complaint   Patient presents with     Loss of Consciousness     Hit top of head on door of car when she got up       HPI  Antonia Marc is a 23 year old female with no significant contributing past medical history presented for evaluation of head injury.  Patient stood up and struck the top of her head extremely hard on a fixed object leading to immediate loss of consciousness.  Family was adjacent to her at the time and managed to catch her and lowered her to the ground without secondary injury.  Family report patient was unconscious for roughly 5 minutes without any apparent seizure activity.  No loss of bowel or bladder control.  Upon awakening patient will be tired with diffuse tingling sensation in her arms and legs.  She reports a severe headache with blurry vision.  Not on any blood thinners.  Reports a headache currently 7/10.  Also reporting neck pain which is worse with movement of her neck.  With movement she reports sharp midline pain upon arrival.  Patient fully immobilized upon arrival and left in a c-collar    Allergies:  Allergies   Allergen Reactions     Amoxicillin Hives     Oxycodone Nausea and Vomiting       Problem List:    Patient Active Problem List    Diagnosis Date Noted     Recurrent tonsillitis 02/01/2018     Priority: Medium     Recurrent major depressive disorder, in remission (H) 05/30/2017     Priority: Medium     Anxiety 05/30/2017     Priority: Medium     JAZZY (generalized anxiety disorder) 05/30/2017     Priority: Medium     Mild episode of recurrent major depressive disorder (H) 05/30/2017     Priority: Medium     Irregular menstrual bleeding 04/02/2012     Priority: Medium     Flat feet 09/02/2011     Priority: Medium        Past Medical History:    Past Medical History:   Diagnosis Date     Closed fracture of unspecified part of radius with ulna(813.83)      Other diseases of trachea and bronchus, not elsewhere classified        Past Surgical History:   "  Past Surgical History:   Procedure Laterality Date     ESOPHAGOSCOPY, GASTROSCOPY, DUODENOSCOPY (EGD), COMBINED N/A 10/19/2018    Procedure: COMBINED ESOPHAGOSCOPY, GASTROSCOPY, DUODENOSCOPY (EGD), BIOPSY SINGLE OR MULTIPLE;  Surgeon: Heraclio Ziegler DO;  Location: [facility]     TONSILLECTOMY ADULT Bilateral 7/23/2018    Procedure: TONSILLECTOMY ADULT;  Bilateral Tonsillectomy;  Surgeon: Galilea Good MD;  Location: [facility]       Family History:    Family History   Problem Relation Age of Onset     Diabetes Mother      Hypertension Mother      Diabetes Father      Hypertension Father      Crohn's Disease Father      Hypertension Maternal Grandmother      Anxiety Disorder Maternal Grandmother      Hypertension Maternal Grandfather      Diabetes Maternal Grandfather      Anxiety Disorder Maternal Grandfather        Social History:  Marital Status:  Single [1]  Social History     Tobacco Use     Smoking status: Never Smoker     Smokeless tobacco: Never Used     Tobacco comment: no exporure   Substance Use Topics     Alcohol use: No     Drug use: No        Medications:      acetaminophen (TYLENOL) 500 MG tablet   ibuprofen (ADVIL/MOTRIN) 200 MG tablet   morphine (MSIR) 15 MG IR tablet   ondansetron (ZOFRAN ODT) 4 MG ODT tab   busPIRone (BUSPAR) 10 MG tablet   escitalopram (LEXAPRO) 10 MG tablet   lidocaine (XYLOCAINE) 2 % topical gel         Review of Systems   Constitutional: Positive for fatigue.   HENT: Negative for congestion.    Eyes: Positive for visual disturbance (blurry).   Respiratory: Negative for cough, chest tightness and shortness of breath.    Cardiovascular: Negative for chest pain.   Gastrointestinal: Negative for abdominal pain and nausea.   Genitourinary: Negative for enuresis.   Musculoskeletal: Positive for neck pain and neck stiffness.   Skin: Negative for rash and wound.   Neurological: Positive for light-headedness, numbness (\"tingling\" in legs and arms) and headaches. Negative for " "seizures and weakness.   Psychiatric/Behavioral: Negative for confusion.   All other systems reviewed and are negative.      Physical Exam   BP: 138/85  Pulse: 62  Temp: 97.7  F (36.5  C)  Resp: 18  Height: 172.7 cm (5' 8\")  Weight: 95.3 kg (210 lb)  SpO2: 100 %      Physical Exam   Constitutional: She is oriented to person, place, and time. She appears well-developed and well-nourished. No distress.   Groggy appearing but alert and opens eyes to voice   HENT:   Head: Normocephalic.   Mouth/Throat: Oropharynx is clear and moist.   Small hematoma on the vertex of the head   Eyes: Pupils are equal, round, and reactive to light.   Asymmetric gaze with slight left side downward deviation and left eyelid drooping   Neck: Muscular tenderness present. No spinous process tenderness present. Decreased range of motion (due to pain) present.   Cardiovascular: Normal rate and regular rhythm.   Pulmonary/Chest: Effort normal.   Abdominal: Soft. There is no tenderness.   Neurological: She is alert and oriented to person, place, and time. No cranial nerve deficit or sensory deficit (no difference in sensation to light touch to arms and legs). She exhibits normal muscle tone.   Skin: Skin is warm and dry. Capillary refill takes less than 2 seconds. She is not diaphoretic.   Psychiatric: She has a normal mood and affect.   Nursing note and vitals reviewed.      ED Course        Procedures                 Trauma Summary Disposition     Patient is trauma admission:  Standard Emergency Evaluation    Spine  Backboard removal time: 1040p   C-collar and immobilization: applied prior to arrival.  CSpine Clearance: Patient left in collar  Full Primary and Secondary survey with appropriate immobilization of spine completed in exam section.     Neuro  GCS at arrival:  Motor 6=Obeys commands   Verbal 5=Oriented   Eye Opening 3=To voice   Total: 14     GCS at disposition: 15    ED Procedures completed  none                   Results for orders " placed or performed during the hospital encounter of 08/26/19 (from the past 24 hour(s))   Head CT w/o contrast    Narrative    EXAM: CT HEAD W/O CONTRAST  LOCATION: [facility]  DATE/TIME: 8/26/2019 11:10 PM    INDICATION: See the Clinical Information for Interpreting Provider  COMPARISON:  TECHNIQUE: Routine without IV contrast. Multiplanar reformats. Dose reduction techniques were used.    FINDINGS:  INTRACRANIAL CONTENTS: No intracranial hemorrhage, extraaxial collection, or mass effect.  No CT evidence of acute infarct. Normal parenchymal density for age. The ventricles and sulci are normal for age.     VISUALIZED ORBITS/SINUSES/MASTOIDS: No significant orbital abnormality. No significant paranasal sinus mucosal disease. No significant middle ear or mastoid effusion.    Posterior fossa structures including cerebellar hemispheres, fourth ventricle and region of CP angle cisterns are clear. Nasopharynx and orbits are clear. Region of the sella and suprasellar cistern are clear.      Impression    IMPRESSION:  1.  Normal for age.  2.  No CT finding of a mass, infarct or hemorrhage.   Cervical spine CT w/o contrast    Narrative    EXAM: CT CERVICAL SPINE W/O CONTRAST  LOCATION: [facility]  DATE/TIME: 8/26/2019 11:10 PM    INDICATION: See the Clinical Information for Interpreting Provider  COMPARISON: None  TECHNIQUE: Routine without IV contrast. Multiplanar reformats. Dose reduction techniques were used.    FINDINGS:  VERTEBRA: Normal vertebral body heights and alignment. No fracture or posttraumatic subluxation.     CANAL/FORAMINA: No canal or neural foraminal stenosis.    PARASPINAL: No extraspinal abnormality.      Impression    IMPRESSION:  1.  No fracture or subluxation.  2.  No high grade spinal canal or neural foraminal stenosis.             Medications   acetaminophen (TYLENOL) tablet 1,000 mg (1,000 mg Oral Given 8/26/19 0506)   ketorolac (TORADOL) injection 15 mg (15 mg  Intravenous Given 8/26/19 2624)     11:43 PM Patient re-assessed: Blurry vision has resolved.  Still reporting severe diffuse headache.  Toradol ordered.  Discussed medication options and expected course of recovery with concussion.  Concussion referral ordered.    Assessments & Plan (with Medical Decision Making)  23-year-old female with no significant contributing past medical history presenting for evaluation of head injury.  Patient had axial load injury when she stood up into a fixed leading to an immediate loss of consciousness for several minutes.  Mildly groggy after awakening with some mild blurry vision along with headache and numbness.  Due to her ongoing symptoms obtained.  Imaging thankfully showed no evidence of acute abnormality.  Treated with acetaminophen followed by Toradol with improvement in her symptoms.  Given the negative imaging, symptoms most consistent with acute concussion with loss of consciousness.  Referred to concussion clinic follow-up and concussion precautions provided     I have reviewed the nursing notes.    I have reviewed the findings, diagnosis, plan and need for follow up with the patient.       Discharge Medication List as of 8/27/2019 12:02 AM      START taking these medications    Details   acetaminophen (TYLENOL) 500 MG tablet Take 2 tablets (1,000 mg) by mouth every 8 hours as needed for fever or pain, Disp-100 tablet, R-0, OTC      ibuprofen (ADVIL/MOTRIN) 200 MG tablet Take 4 tablets (800 mg) by mouth every 8 hours as needed for mild pain, Disp-30 tablet, R-0, OTC      morphine (MSIR) 15 MG IR tablet Take 1-2 tablets (15-30 mg) by mouth every 4 hours as needed for moderate to severe pain, Disp-8 tablet, R-0, Local Print      ondansetron (ZOFRAN ODT) 4 MG ODT tab Take 1 tablet (4 mg) by mouth every 8 hours as needed for nausea, Disp-10 tablet, R-0, Local Print             Final diagnoses:   Concussion with < 1 hr loss of consciousness   Injury of head, initial encounter        8/26/2019   [facility] EMERGENCY DEPARTMENT     Arriola, Magdiel Hernandez MD  08/27/19 0605

## 2019-08-29 ENCOUNTER — AMBULATORY - HEALTHEAST (OUTPATIENT)
Dept: NEUROLOGY | Facility: CLINIC | Age: 24
End: 2019-08-29

## 2019-08-29 DIAGNOSIS — S06.0X9A CONCUSSION WITH < 1 HR LOSS OF CONSCIOUSNESS: ICD-10-CM

## 2019-10-08 ENCOUNTER — HOSPITAL ENCOUNTER (EMERGENCY)
Facility: CLINIC | Age: 24
Discharge: HOME | End: 2019-10-08
Attending: NURSE PRACTITIONER | Admitting: NURSE PRACTITIONER

## 2019-10-08 VITALS
RESPIRATION RATE: 16 BRPM | DIASTOLIC BLOOD PRESSURE: 82 MMHG | SYSTOLIC BLOOD PRESSURE: 136 MMHG | OXYGEN SATURATION: 100 % | HEIGHT: 68 IN | WEIGHT: 200 LBS | TEMPERATURE: 97.7 F | BODY MASS INDEX: 30.31 KG/M2

## 2019-10-08 DIAGNOSIS — K64.9 ACUTE HEMORRHOID: ICD-10-CM

## 2019-10-08 DIAGNOSIS — K62.89 RECTAL PAIN: ICD-10-CM

## 2019-10-08 LAB
ALBUMIN SERPL-MCNC: 3.9 G/DL (ref 3.4–5)
ALP SERPL-CCNC: 47 U/L (ref 40–150)
ALT SERPL W P-5'-P-CCNC: 26 U/L (ref 0–50)
ANION GAP SERPL CALCULATED.3IONS-SCNC: 5 MMOL/L (ref 3–14)
AST SERPL W P-5'-P-CCNC: 15 U/L (ref 0–45)
BASOPHILS # BLD AUTO: 0 10E9/L (ref 0–0.2)
BASOPHILS NFR BLD AUTO: 0.7 %
BILIRUB SERPL-MCNC: 1.4 MG/DL (ref 0.2–1.3)
BUN SERPL-MCNC: 9 MG/DL (ref 7–30)
CALCIUM SERPL-MCNC: 8.6 MG/DL (ref 8.5–10.1)
CHLORIDE SERPL-SCNC: 110 MMOL/L (ref 94–109)
CO2 SERPL-SCNC: 28 MMOL/L (ref 20–32)
CREAT SERPL-MCNC: 0.79 MG/DL (ref 0.52–1.04)
DIFFERENTIAL METHOD BLD: NORMAL
EOSINOPHIL # BLD AUTO: 0.1 10E9/L (ref 0–0.7)
EOSINOPHIL NFR BLD AUTO: 1.2 %
ERYTHROCYTE [DISTWIDTH] IN BLOOD BY AUTOMATED COUNT: 12.3 % (ref 10–15)
GFR SERPL CREATININE-BSD FRML MDRD: >90 ML/MIN/{1.73_M2}
GLUCOSE SERPL-MCNC: 110 MG/DL (ref 70–99)
HCT VFR BLD AUTO: 40.6 % (ref 35–47)
HEMOCCULT STL QL: NORMAL
HGB BLD-MCNC: 13.6 G/DL (ref 11.7–15.7)
IMM GRANULOCYTES # BLD: 0 10E9/L (ref 0–0.4)
IMM GRANULOCYTES NFR BLD: 0.2 %
INTERNAL QC OK POCT: YES
LYMPHOCYTES # BLD AUTO: 2.1 10E9/L (ref 0.8–5.3)
LYMPHOCYTES NFR BLD AUTO: 35.6 %
MCH RBC QN AUTO: 28.9 PG (ref 26.5–33)
MCHC RBC AUTO-ENTMCNC: 33.5 G/DL (ref 31.5–36.5)
MCV RBC AUTO: 86 FL (ref 78–100)
MONOCYTES # BLD AUTO: 0.4 10E9/L (ref 0–1.3)
MONOCYTES NFR BLD AUTO: 6.3 %
NEUTROPHILS # BLD AUTO: 3.3 10E9/L (ref 1.6–8.3)
NEUTROPHILS NFR BLD AUTO: 56 %
NRBC # BLD AUTO: 0 10*3/UL
NRBC BLD AUTO-RTO: 0 /100
PLATELET # BLD AUTO: 307 10E9/L (ref 150–450)
POTASSIUM SERPL-SCNC: 3.6 MMOL/L (ref 3.4–5.3)
PROT SERPL-MCNC: 6.7 G/DL (ref 6.8–8.8)
RBC # BLD AUTO: 4.71 10E12/L (ref 3.8–5.2)
SODIUM SERPL-SCNC: 143 MMOL/L (ref 133–144)
TEST CARD LOT NUMBER: NORMAL
WBC # BLD AUTO: 5.9 10E9/L (ref 4–11)

## 2019-10-08 PROCEDURE — 85025 COMPLETE CBC W/AUTO DIFF WBC: CPT | Performed by: NURSE PRACTITIONER

## 2019-10-08 PROCEDURE — 99284 EMERGENCY DEPT VISIT MOD MDM: CPT | Mod: Z6 | Performed by: NURSE PRACTITIONER

## 2019-10-08 PROCEDURE — 80053 COMPREHEN METABOLIC PANEL: CPT | Performed by: NURSE PRACTITIONER

## 2019-10-08 PROCEDURE — 99284 EMERGENCY DEPT VISIT MOD MDM: CPT

## 2019-10-08 PROCEDURE — 82272 OCCULT BLD FECES 1-3 TESTS: CPT | Performed by: NURSE PRACTITIONER

## 2019-10-08 RX ORDER — DOCUSATE SODIUM 100 MG/1
200 CAPSULE, LIQUID FILLED ORAL 2 TIMES DAILY PRN
Qty: 90 CAPSULE | Refills: 0 | Status: SHIPPED | OUTPATIENT
Start: 2019-10-08 | End: 2020-05-27

## 2019-10-08 ASSESSMENT — ENCOUNTER SYMPTOMS
NAUSEA: 0
HEADACHES: 0
SHORTNESS OF BREATH: 0
SINUS PAIN: 0
CONFUSION: 0
SORE THROAT: 0
RECTAL PAIN: 1
CONSTIPATION: 0
DIFFICULTY URINATING: 0
ABDOMINAL PAIN: 0
ANAL BLEEDING: 1
CHILLS: 0
FEVER: 0
WHEEZING: 0
EYE REDNESS: 0
DIAPHORESIS: 0
VOMITING: 0
DYSURIA: 0
BLOOD IN STOOL: 1
DIARRHEA: 0
COUGH: 0

## 2019-10-08 ASSESSMENT — MIFFLIN-ST. JEOR: SCORE: 1715.69

## 2019-10-08 NOTE — ED AVS SNAPSHOT
[facility] [place] Emergency Department  [address] [address]  Phone:  [phone]  Fax:  [phone]                                    Antonia Marc   MRN: [id number]    Department:  [facility] [place] Emergency [facility]   Date of Visit:  10/8/2019           After Visit Summary Signature Page    I have received my discharge instructions, and my questions have been answered. I have discussed any challenges I see with this plan with the nurse or doctor.    ..........................................................................................................................................  Patient/Patient Representative Signature      ..........................................................................................................................................  Patient Representative Print Name and Relationship to Patient    ..................................................               ................................................  Date                                   Time    ..........................................................................................................................................  Reviewed by Signature/Title    ...................................................              ..............................................  Date                                               Time          22EPIC Rev 08/18

## 2019-10-08 NOTE — ED PROVIDER NOTES
History     Chief Complaint   Patient presents with     Rectal/perineal Pain     For several weeks, pain and bleeding with bowel movements, has not been evaluated for this yet     HPI  Antonia Marc is a 23 year old female who presents with concerns of BRRB and rectal pain.  Pt reports onset of symptoms was one week ago and noted during and post bowel movements.  Rectal pain is with bowel movements and also with sitting and walking.  Tried epsom salt bath and this has provided relief.  Pt has tried high fiber diet without improvement.    Last normal bowel was two days ago and stool was formed and was hard.   PT reports occasional black stools but denies any today.  Patient admits to history of father with Crohn's disease.  Patient reports that her primary care provider believes that she may have irritable bowel syndrome but patient has never seen a gastroenterologist.  Patient has had an upper endoscopy which revealed some esophagitis and patient reports she does not take any antacids on a regular basis.  Patient denies taking any ibuprofen.  Patient denies fever, aches, chills, sweats.  Patient reports that she works full-time at [employer] and this involves lifting and walking extensively    Allergies:  Allergies   Allergen Reactions     Amoxicillin Hives     Oxycodone Nausea and Vomiting       Problem List:    Patient Active Problem List    Diagnosis Date Noted     Recurrent tonsillitis 02/01/2018     Priority: Medium     Recurrent major depressive disorder, in remission (H) 05/30/2017     Priority: Medium     Anxiety 05/30/2017     Priority: Medium     JAZZY (generalized anxiety disorder) 05/30/2017     Priority: Medium     Mild episode of recurrent major depressive disorder (H) 05/30/2017     Priority: Medium     Irregular menstrual bleeding 04/02/2012     Priority: Medium     Flat feet 09/02/2011     Priority: Medium        Past Medical History:    Past Medical History:   Diagnosis Date     Closed fracture of  unspecified part of radius with ulna(813.83)      Other diseases of trachea and bronchus, not elsewhere classified        Past Surgical History:    Past Surgical History:   Procedure Laterality Date     ESOPHAGOSCOPY, GASTROSCOPY, DUODENOSCOPY (EGD), COMBINED N/A 10/19/2018    Procedure: COMBINED ESOPHAGOSCOPY, GASTROSCOPY, DUODENOSCOPY (EGD), BIOPSY SINGLE OR MULTIPLE;  Surgeon: Heraclio Ziegler DO;  Location: [facility]     TONSILLECTOMY ADULT Bilateral 7/23/2018    Procedure: TONSILLECTOMY ADULT;  Bilateral Tonsillectomy;  Surgeon: Galilea Good MD;  Location: [facility]       Family History:    Family History   Problem Relation Age of Onset     Diabetes Mother      Hypertension Mother      Diabetes Father      Hypertension Father      Crohn's Disease Father      Hypertension Maternal Grandmother      Anxiety Disorder Maternal Grandmother      Hypertension Maternal Grandfather      Diabetes Maternal Grandfather      Anxiety Disorder Maternal Grandfather        Social History:  Marital Status:  Single [1]  Social History     Tobacco Use     Smoking status: Never Smoker     Smokeless tobacco: Never Used     Tobacco comment: no exporure   Substance Use Topics     Alcohol use: No     Drug use: No        Medications:    docusate sodium (COLACE) 100 MG capsule  hydrocortisone (ANUSOL-HC) 2.5 % cream      Review of Systems   Constitutional: Negative for chills, diaphoresis and fever.   HENT: Negative for congestion, ear pain, sinus pain and sore throat.    Eyes: Negative for redness and visual disturbance.   Respiratory: Negative for cough, shortness of breath and wheezing.    Cardiovascular: Negative for chest pain.   Gastrointestinal: Positive for anal bleeding, blood in stool and rectal pain. Negative for abdominal pain, constipation, diarrhea, nausea and vomiting.   Genitourinary: Negative for difficulty urinating, dysuria, urgency and vaginal pain.   Skin: Negative for rash.   Neurological: Negative for headaches.  "  Psychiatric/Behavioral: Negative for confusion and self-injury.   All other systems reviewed and are negative.      Physical Exam   BP: 136/82  Heart Rate: 63  Temp: 97.7  F (36.5  C)  Resp: 16  Height: 172.7 cm (5' 8\")  Weight: 90.7 kg (200 lb)  SpO2: 100 %      Physical Exam  Vitals signs and nursing note reviewed.   Constitutional:       General: She is not in acute distress.     Appearance: She is well-developed. She is not diaphoretic.   HENT:      Head: Normocephalic and atraumatic.      Right Ear: Hearing, tympanic membrane, ear canal and external ear normal.      Left Ear: Hearing, tympanic membrane, ear canal and external ear normal.      Nose: Nose normal.      Mouth/Throat:      Pharynx: Uvula midline.      Tonsils: No tonsillar exudate.   Eyes:      General: No scleral icterus.        Right eye: No discharge.         Left eye: No discharge.      Conjunctiva/sclera: Conjunctivae normal.   Neck:      Musculoskeletal: Normal range of motion and neck supple.   Cardiovascular:      Rate and Rhythm: Normal rate and regular rhythm.      Heart sounds: Normal heart sounds. No murmur. No friction rub.   Pulmonary:      Effort: Pulmonary effort is normal. No respiratory distress.      Breath sounds: Normal breath sounds. No stridor. No wheezing or rales.   Chest:      Chest wall: No tenderness.   Abdominal:      General: Bowel sounds are normal. There is no distension.      Palpations: Abdomen is soft. There is no mass.      Tenderness: There is no tenderness. There is no right CVA tenderness, left CVA tenderness, guarding or rebound.      Hernia: No hernia is present.   Genitourinary:     Rectum: Guaiac result negative. External hemorrhoid present. No mass, tenderness, anal fissure or internal hemorrhoid. Normal anal tone.   Skin:     General: Skin is warm and dry.      Coloration: Skin is not pale.      Findings: No erythema or rash.   Neurological:      Mental Status: She is alert and oriented to person, " place, and time.      Deep Tendon Reflexes: Reflexes normal.         ED Course        Procedures    Results for orders placed or performed during the hospital encounter of 10/08/19 (from the past 24 hour(s))   Occult blood stool POCT   Result Value Ref Range    Occult Blood p neg    Internal QC OK Yes     Test Card Lot Number [id number] 22    CBC with platelets differential   Result Value Ref Range    WBC 5.9 4.0 - 11.0 10e9/L    RBC Count 4.71 3.8 - 5.2 10e12/L    Hemoglobin 13.6 11.7 - 15.7 g/dL    Hematocrit 40.6 35.0 - 47.0 %    MCV 86 78 - 100 fl    MCH 28.9 26.5 - 33.0 pg    MCHC 33.5 31.5 - 36.5 g/dL    RDW 12.3 10.0 - 15.0 %    Platelet Count 307 150 - 450 10e9/L    Diff Method Automated Method     % Neutrophils 56.0 %    % Lymphocytes 35.6 %    % Monocytes 6.3 %    % Eosinophils 1.2 %    % Basophils 0.7 %    % Immature Granulocytes 0.2 %    Nucleated RBCs 0 0 /100    Absolute Neutrophil 3.3 1.6 - 8.3 10e9/L    Absolute Lymphocytes 2.1 0.8 - 5.3 10e9/L    Absolute Monocytes 0.4 0.0 - 1.3 10e9/L    Absolute Eosinophils 0.1 0.0 - 0.7 10e9/L    Absolute Basophils 0.0 0.0 - 0.2 10e9/L    Abs Immature Granulocytes 0.0 0 - 0.4 10e9/L    Absolute Nucleated RBC 0.0    Comprehensive metabolic panel   Result Value Ref Range    Sodium 143 133 - 144 mmol/L    Potassium 3.6 3.4 - 5.3 mmol/L    Chloride 110 (H) 94 - 109 mmol/L    Carbon Dioxide 28 20 - 32 mmol/L    Anion Gap 5 3 - 14 mmol/L    Glucose 110 (H) 70 - 99 mg/dL    Urea Nitrogen 9 7 - 30 mg/dL    Creatinine 0.79 0.52 - 1.04 mg/dL    GFR Estimate >90 >60 mL/min/[1.73_m2]    GFR Estimate If Black >90 >60 mL/min/[1.73_m2]    Calcium 8.6 8.5 - 10.1 mg/dL    Bilirubin Total 1.4 (H) 0.2 - 1.3 mg/dL    Albumin 3.9 3.4 - 5.0 g/dL    Protein Total 6.7 (L) 6.8 - 8.8 g/dL    Alkaline Phosphatase 47 40 - 150 U/L    ALT 26 0 - 50 U/L    AST 15 0 - 45 U/L       Medications - No data to display    Assessments & Plan (with Medical Decision Making)     I have reviewed the  nursing notes.    I have reviewed the findings, diagnosis, plan and need for follow up with the patient.  Antonia Marc is a 23 year old female who presents with concerns of BRRB and rectal pain with reported onset of symptoms 1 week ago and persistent.  Patient reports and admits that she had a history of this approximately 1 year ago and she denies subsequent seeing a gastroenterologist.  Patient denies current fever, aches, chills, nausea, vomiting, abdominal pain.  On examination abdomen is flat with normal bowel sounds and there is no masses or tenderness noted.  Patient is hemodynamically stable.  Guaiac completed and is negative.  External hemorrhoid noted.  Patient is nontender with rectal exam and no evidence of anal fissures.  CBC completed and normal hemoglobin and not concerning for acute GI bleed.  Discussed with patient concern for hemorrhoids and treating her hemorrhoids with hydrocortisone cream and Colace and healthy eating habits and recommend referral to gastroenterology for further evaluation especially with dad being positive for Crohn's disease.  Patient verbalized understanding regarding all of the above.  Patient discharged in stable condition.    Discharge Medication List as of 10/8/2019  3:44 PM      START taking these medications    Details   docusate sodium (COLACE) 100 MG capsule Take 2 capsules (200 mg) by mouth 2 times daily as needed for constipation, Disp-90 capsule, R-0, E-Prescribe      hydrocortisone (ANUSOL-HC) 2.5 % cream Place rectally 2 times daily as needed for hemorrhoidsDisp-30 g, [id number]             Final diagnoses:   Acute hemorrhoid   Rectal pain       10/8/2019   [facility] EMERGENCY DEPARTMENT     Jewell Kaplan, ROS CNP  10/08/19 [id number]

## 2019-10-08 NOTE — ED NOTES
Pt c/o rectal pain - has hx of IBS and now with rectal pain. States she has rectal pressure with standing and bright red blood with stooling - ? Hemorrhoid. Patient here with her mom and in no distress at this time.

## 2019-10-08 NOTE — DISCHARGE INSTRUCTIONS
Start Colace and take 2 capsules by mouth twice daily until having loose stools then decrease down to lesser amounts until having soft daily bowel movements.  Start using the hydrocortisone cream and apply externally and also use the applicator to apply internally twice daily for 2 weeks.  Increase your water to 64 fluid ounces daily.  Increase your vegetable intake to 5-7 servings daily.  Stop the Colace if you should develop diarrhea.  Follow-up with gastroenterology for further evaluation.  Return if you should develop fever, severe abdominal pain, chills or sweats.

## 2019-10-09 ENCOUNTER — PATIENT OUTREACH (OUTPATIENT)
Dept: CARE COORDINATION | Facility: CLINIC | Age: 24
End: 2019-10-09

## 2019-10-09 DIAGNOSIS — Z71.89 OTHER SPECIFIED COUNSELING: ICD-10-CM

## 2019-10-09 NOTE — LETTER
[facility] CARE COORDINATION  [facility]  [address] [place] [address]    October 9, 2019    Antonia Marc  [address] [place] [id number]      Dear Antonia,    I am a clinic care coordinator who works with ROS Polanco CNP  at [facility].  I wanted to introduce myself and provide you with my contact information so that you can call me with questions or concerns about your health care. Below is a description of clinic care coordination and how I can further assist you.     The clinic care coordinator is a registered nurse and/or  who understand the health care system. The goal of clinic care coordination is to help you manage your health and improve access to the [facility] system in the most efficient manner. The registered nurse can assist you in meeting your health care goals by providing education, coordinating services, and strengthening the communication among your providers. The  can assist you with financial, behavioral, psychosocial, chemical dependency, counseling, and/or psychiatric resources.    Please feel free to contact me at [phone], with any questions or concerns. We at [facility] are focused on providing you with the highest-quality healthcare experience possible and that all starts with you.     Sincerely,     AMY Diehl, RN   [facility] - RN Care Coordinator

## 2019-10-09 NOTE — PROGRESS NOTES
Clinic Care Coordination Contact  [facility]/Voicemail    Referral Source: ED Follow-Up  Patient presented to [facility] ED on 10/8 for evaluation of BRRB and rectal pain x 1 week. ED provider placed referral to GI for further follow up.    Clinical Data: Care Coordinator Outreach    Outreach attempted x 1.  Left message on patient's voicemail with call back information and requested return call.    Plan: Care Coordinator will send care coordination introduction letter with care coordinator contact information and explanation of care coordination services via mail. Care Coordinator will try to reach patient again in 1-2 business days.    AMY Diehl, RN   [facility] - RN Care Coordinator  Phone: [phone]

## 2019-10-10 NOTE — PROGRESS NOTES
Clinic Care Coordination Contact  [facility]/Voicemail    Referral Source: ED Follow-Up  See initial note below    Clinical Data: Care Coordinator Outreach    Outreach attempted x 2.  Left message on patient's voicemail with call back information and requested return call.    Plan: Care Coordinator sent care coordination introduction letter on 10/9/19 via mail. Care Coordinator will do no further outreaches at this time.    AMY Diehl, RN   [facility] - RN Care Coordinator  Phone: [phone]

## 2019-10-15 ENCOUNTER — TELEPHONE (OUTPATIENT)
Dept: FAMILY MEDICINE | Facility: CLINIC | Age: 24
End: 2019-10-15

## 2019-10-15 NOTE — TELEPHONE ENCOUNTER
Left message for patient to return call to clinic and ask for Roma's care team.  Please complete Phq9/Jeremy when patient returns telephone call.      Panel Management Review      Patient has the following on her problem list:     Depression / Dysthymia review    Measure:  Needs PHQ-9 score of 4 or less during index window.  Administer PHQ-9 and if score is 5 or more, send encounter to provider for next steps.    5 - 7 month window range:     PHQ-9 SCORE 9/24/2018 11/1/2018 2/18/2019   PHQ-9 Total Score 11 11 0       If PHQ-9 recheck is 5 or more, route to provider for next steps.    Patient is due for:  PHQ9      Composite cancer screening  Chart review shows that this patient is due/due soon for the following None  Summary:    Patient is due/failing the following:   PHQ9    Action needed:   Patient needs to do PHQ9.    Type of outreach:    Phone, left message for patient to call back.     Questions for provider review:    None                                                                                                                                    Nicole Holliday CMA (AAMA) 3:47 PM 10/15/2019         Chart routed to Care Team .

## 2019-10-15 NOTE — LETTER
October 24, 2019      Antonia Marc  [address] [place] [phone]        Dear Antonia,     In order to ensure we are providing the best quality care, we have reviewed your chart and see that you are due for:  An update on the depression and anxiety questionnaires.  These tools help your provider to monitor and manage your symptoms and treatment plan.  Please complete the enclosed form and return in the provided envelope.    Please contact the clinic with any questions or concerns.  [phone]    Thank you for trusting us with your health care.  Sincerely,    Your [facility] Team/lw

## 2019-10-15 NOTE — TELEPHONE ENCOUNTER
Panel Management Review      Patient has the following on her problem list:     Depression / Dysthymia review    Measure:  Needs PHQ-9 score of 4 or less during index window.  Administer PHQ-9 and if score is 5 or more, send encounter to provider for next steps.    10 - 14 month window range: 7/26/19-11/23/19    PHQ-9 SCORE 9/24/2018 11/1/2018 2/18/2019   PHQ-9 Total Score 11 11 0       If PHQ-9 recheck is 5 or more, route to provider for next steps.    Patient is due for:  PHQ9      Composite cancer screening  Chart review shows that this patient is due/due soon for the following None  Summary:    Patient is due/failing the following:   PHQ9    Action needed:   Patient needs to do PHQ9.    Type of outreach:        Questions for provider review:    None                                                                                                                                    CHARMAINE Cade MA       Chart routed to Care Team .

## 2019-10-16 NOTE — TELEPHONE ENCOUNTER
(1st attempt) Left a voice msg for pt to call back.  Whenever she does transfer to the care team to perform PHQ9.  Shirley Rubi CMA ([email or web address])   (aka: Leonarda Rubi)

## 2019-10-24 NOTE — TELEPHONE ENCOUNTER
No response to phone calls, will send letter with questionnaire for the patient to complete and return.

## 2019-12-09 NOTE — ED NOTES
No active bleeding since arrival to ED, denies nausea   See telephone note from 11/20/19. Pt was already informed of her sleep study results, confirming DENISE. I believe we were waiting on CPAP titration. Pt to clarify. What results is she asking for?

## 2020-05-25 ENCOUNTER — NURSE TRIAGE (OUTPATIENT)
Dept: NURSING | Facility: CLINIC | Age: 25
End: 2020-05-25

## 2020-05-25 NOTE — TELEPHONE ENCOUNTER
"    Additional Information    Negative: Moving the earlobe or touching the ear clearly increases the pain    Negative: Foreign body struck in the ear (e.g., bug, piece of cotton)    Negative: Followed an ear injury    Negative: [1] Recently diagnosed with otitis media AND [2] currently on oral antibiotics    Negative: [1] Stiff neck (unable to touch chin to chest) AND [2] fever    Negative: [1] Bony area of skull behind the ear is pink or swollen AND [2] fever    Negative: Fever > 104 F (40 C)    Negative: Patient sounds very sick or weak to the triager    [1] SEVERE pain AND [2] not improved 2 hours after taking analgesic medication (e.g., ibuprofen or acetaminophen)    Answer Assessment - Initial Assessment Questions  1. LOCATION: \"Which ear is involved?\"      both  2. ONSET: \"When did the ear start hurting\"       today  3. SEVERITY: \"How bad is the pain?\"  (Scale 1-10; mild, moderate or severe)    - MILD (1-3): doesn't interfere with normal activities     - MODERATE (4-7): interferes with normal activities or awakens from sleep     - SEVERE (8-10): excruciating pain, unable to do any normal activities       severe  4. URI SYMPTOMS: \" Do you have a runny nose or cough?\"      no  5. FEVER: \"Do you have a fever?\" If so, ask: \"What is your temperature, how was it measured, and when did it start?\"      no  6. CAUSE: \"Have you been swimming recently?\", \"How often do you use Q-TIPS?\", \"Have you had any recent air travel or scuba diving?\"      no  7. OTHER SYMPTOMS: \"Do you have any other symptoms?\" (e.g., headache, stiff neck, dizziness, vomiting, runny nose, decreased hearing)      no  8. PREGNANCY: \"Is there any chance you are pregnant?\" \"When was your last menstrual period?\"      no    Protocols used: EARACHE-A-AH      "

## 2020-05-27 ENCOUNTER — OFFICE VISIT (OUTPATIENT)
Dept: FAMILY MEDICINE | Facility: CLINIC | Age: 25
End: 2020-05-27

## 2020-05-27 VITALS
HEART RATE: 80 BPM | SYSTOLIC BLOOD PRESSURE: 110 MMHG | WEIGHT: 177 LBS | OXYGEN SATURATION: 99 % | DIASTOLIC BLOOD PRESSURE: 60 MMHG | TEMPERATURE: 97.4 F | BODY MASS INDEX: 26.91 KG/M2 | RESPIRATION RATE: 14 BRPM

## 2020-05-27 DIAGNOSIS — M26.609 TEMPOROMANDIBULAR JOINT DISORDER: Primary | ICD-10-CM

## 2020-05-27 PROCEDURE — 99214 OFFICE O/P EST MOD 30 MIN: CPT | Performed by: INTERNAL MEDICINE

## 2020-05-27 RX ORDER — CYCLOBENZAPRINE HCL 5 MG
5-10 TABLET ORAL
Qty: 30 TABLET | Refills: 0 | Status: SHIPPED | OUTPATIENT
Start: 2020-05-27 | End: 2020-09-23

## 2020-05-27 RX ORDER — CETIRIZINE HYDROCHLORIDE 10 MG/1
10 TABLET ORAL DAILY PRN
COMMUNITY
End: 2020-09-23

## 2020-05-27 ASSESSMENT — ANXIETY QUESTIONNAIRES
GAD7 TOTAL SCORE: 0
7. FEELING AFRAID AS IF SOMETHING AWFUL MIGHT HAPPEN: NOT AT ALL
1. FEELING NERVOUS, ANXIOUS, OR ON EDGE: NOT AT ALL
2. NOT BEING ABLE TO STOP OR CONTROL WORRYING: NOT AT ALL
3. WORRYING TOO MUCH ABOUT DIFFERENT THINGS: NOT AT ALL
IF YOU CHECKED OFF ANY PROBLEMS ON THIS QUESTIONNAIRE, HOW DIFFICULT HAVE THESE PROBLEMS MADE IT FOR YOU TO DO YOUR WORK, TAKE CARE OF THINGS AT HOME, OR GET ALONG WITH OTHER PEOPLE: NOT DIFFICULT AT ALL
6. BECOMING EASILY ANNOYED OR IRRITABLE: NOT AT ALL
5. BEING SO RESTLESS THAT IT IS HARD TO SIT STILL: NOT AT ALL

## 2020-05-27 ASSESSMENT — PATIENT HEALTH QUESTIONNAIRE - PHQ9
SUM OF ALL RESPONSES TO PHQ QUESTIONS 1-9: 0
5. POOR APPETITE OR OVEREATING: NOT AT ALL

## 2020-05-27 NOTE — PROGRESS NOTES
Subjective     Antonia Marc is a 24 year old female who presents to clinic today for the following health issues:    HPI   Concern - Ear pain  Onset: 2 days    Description:   Both ears, was swimming last Wednesday and was fine in the next day, had noticed that the pain shifts from a ear with the other, also had a headache since this ear problem starts. The pain in the ear sometimes can be a 10 for an hour and shift to the other ear same pain level. Feels like sometimes when lay down there is a water or oozing there. No blood so far. No fever, no vomiting. The first day felt like was somewhat dizzy, when woke up that morning almost hit the floor. No dizzy when move her head side to side.      Intensity: moderate    Progression of Symptoms:  constant    Accompanying Signs & Symptoms:  See above    Previous history of similar problem:   na    Precipitating factors:   Worsened by: na    Alleviating factors:Improved by: na    Therapies Tried and outcome: Tylenol and Ice.  Pain can be severe at times.     Known history of seasonal allergies    Crackling in ear can occur with mouth opening.     She reports long standing history of clenching and grinding.        Current Outpatient Medications   Medication Sig Dispense Refill     cetirizine (ZYRTEC) 10 MG tablet Take 10 mg by mouth daily as needed for allergies           Reviewed and updated as needed this visit by Provider  Meds         Review of Systems   Constitutional, HEENT, cardiovascular, pulmonary, gi and gu systems are negative, except as otherwise noted.      Objective    /60   Pulse 80   Temp 97.4  F (36.3  C) (Tympanic)   Resp 14   Wt 80.3 kg (177 lb)   SpO2 99%   Breastfeeding No   BMI 26.91 kg/m    Body mass index is 26.91 kg/m .  Physical Exam   GENERAL APPEARANCE: healthy, alert and no distress  EYES: Eyes grossly normal to inspection, PERRL and conjunctivae and sclerae normal  HENT: ear canals and TM's normal and nose and mouth without  "ulcers or lesions  NECK: no adenopathy, no asymmetry, masses, or scars, thyroid normal to palpation and severe pain with palpation over bilateral TMJ  RESP: lungs clear to auscultation - no rales, rhonchi or wheezes  CV: regular rates and rhythm, normal S1 S2, no S3 or S4 and no murmur, click or rub          Assessment & Plan       ICD-10-CM    1. Temporomandibular joint disorder  M26.609 cyclobenzaprine (FLEXERIL) 5 MG tablet        BMI:   Estimated body mass index is 26.91 kg/m  as calculated from the following:    Height as of 10/8/19: 1.727 m (5' 8\").    Weight as of this encounter: 80.3 kg (177 lb).   Weight management plan: Patient was referred to their PCP to discuss a diet and exercise plan.        1. Use Ibuprofen 400-600 mg up to 3 x day.  Can alternate with Tylenol 500-1000 mg up to 3 x day  2. Use heat or ice  3. Gentle stretching and massage  4. Self check in to relax face/jaw  5. See your dentist for mouth guard which is what ultimately will help treat the TMJ  6. Muscle relaxer Flexeril as needed.  Watch for drowsiness  7. Follow-up if symptoms persist      Return in about 1 month (around 6/27/2020) for If symptoms don't improve or if they worsen.    Jewell Mckeon, DO  [facility]      "

## 2020-05-27 NOTE — PATIENT INSTRUCTIONS
1. Use Ibuprofen 400-600 mg up to 3 x day.  Can alternate with Tylenol 500-1000 mg up to 3 x day  2. Use heat or ice  3. Gentle stretching and massage  4. Self check in to relax face/jaw  5. See your dentist for mouth guard which is what ultimately will help treat the TMJ  6. Muscle relaxer Flexeril as needed.  Watch for drowsiness  7. Follow-up if symptoms persist      Patient Education     Self-Care for Temporomandibular Disorders (TMD)  You have temporomandibular disorder (TMD). This term describes a group of problems related to the temporomandibular joint (TMJ) and nearby muscles. The TMJ is located where the upper and lower jaws meet. Treatment will get your jaw back to normal function. But your care doesn t end there. Once you ve had TMD, it s important to avoid reinjury. Get in the habit of doing self-checks. This can make you aware of any symptoms that begin to return, so you can take action right away.    Doing self-checks  Make it a habit to assess your body a few times each day. Try writing yourself a reminder. Or set an alarm on your watch or computer. When doing a self-check, ask yourself:    Do I feel stressed?    Are my muscles tense?    Am I grinding or clenching my teeth?    Is my posture healthy for my body?    Is there anything I can do to make myself more comfortable?  If you answer yes to any of the questions above, you need to take action. Changing your posture or taking a short break can help prevent or relieve TMD symptoms.  Listening to your body  Many people get used to ignoring pain. But pain is a signal that your body needs care. To maintain your TMJ health:    Don t eat hard or chewy foods. Even if you feel fine, eating such foods can trigger symptoms again.    Be aware of your body. Don t ignore TMD symptoms. The nagging pain in your neck or jaw may be a sign that you need care.    Keep follow-up appointments. Be sure to keep all appointments with your healthcare  team.                                                                                                                                Managing stress  Stress is a key factor in TMD. Stress can make you clench your muscles or grind your teeth. It can also affect your sleep, reducing your body s ability to heal. Here are a few tips to manage stress:    Learn ways to relax. Try listening to music or gently stretching. Take a few slow deep breaths. Or, close your eyes and imagine a place or object that is calming.    Get plenty of rest and sleep.    Set goals you know you can attain.    Make time for people and things you enjoy.    Ask for help if you need it. Friends and family can run errands and cook meals for you.   Staying active  Activity helps the body in many ways. You stay looser and more relaxed. It also helps keep muscles and tissues conditioned. That way you can heal faster and make reinjury less likely. Here are some tips to get you started:    Talk with your healthcare provider before starting an exercise program.    Always warm up and stretch before each activity. This helps prevent injury.    Try walking or swimming. These activities are easy on your joints. They also benefit your heart and lungs.    Try yoga or kim chi. These are relaxing activities known for reducing stress.  Date Last Reviewed: 8/1/2017 2000-2019 [employer]. [address] [place] [address], [place] [address]. All rights reserved. This information is not intended as a substitute for professional medical care. Always follow your healthcare professional's instructions.           Patient Education     Pain Relief Methods for Temporomandibular Disorders (TMD)  You have been diagnosed with temporomandibular disorder (TMD). This term describes a group of problems related to the temporomandibular joint (TMJ) and nearby muscles. The TMJ is located where the upper and lower jaws meet. TMD can cause painful and frustrating symptoms. But  your healthcare provider can recommend various pain relief methods as part of your treatment. These may include medicines and certain types of therapy, such as massage or gentle exercise.  Using medicines    Medicines may be prescribed to treat TMD. Others may be available over the counter. The medicine type and dosage will depend on the problem you have. For your safety, tell your healthcare provider if you are currently taking any medicines. Also mention any vitamins, herbs, or supplements you are using. Common medicines used to treat TMD include:    Anti-inflammatories and analgesics. These treat pain, inflammation, osteoarthritis, and rheumatoid arthritis. Anti-inflammatories reduce swelling, heat, redness, and pain. They also help restore function. Analgesics reduce pain. Nonsteroidal anti-inflammatories (NSAIDs) relieve inflammation as well as pain.    Muscle relaxants. These treat myofascial pain. This is pain that occurs in the soft tissues or muscles around the TMJ. Muscle relaxants help ease muscle tension. This reduces pressure on the TMJ from tight jaw muscles.    Antidepressants. These can be used to reduce pain or teeth grinding (bruxism). At higher dosages, these medicines are used to treat depression. Given at low dosages, antidepressants help relieve TMD symptoms. They can reduce muscle pain. They also raise the level of serotonin, a body chemical that improves sleep. This in turn can decrease bruxism during the night.  Treating painful muscles  A trigger point is a painful spot in a tight muscle. It is often painful to the touch and may refer pain to other places. Your healthcare provider can focus on trigger points using:    Massage, both inside and outside the mouth. This relaxes muscles and improves circulation.    Palpation, which is applying pressure to points of the jaw and face with the fingers.    Cold spray and stretching of the muscles to relax them.    An anesthetic for pain relief. This  may be given as an injection by your dentist.  Treating the joint  Therapy may focus directly on the TMJ. There are different ways to treat the joint:    A self-care program helps you treat and manage symptoms on your own. Your program may include exercises. It may also include using ice and heat to relieve pain.    Gentle manipulation reduces pain and restores range of motion. The healthcare provider uses his or her hands to relax muscles and ligaments around the joint.    Exercises strengthen muscles in the jaw and face.    Ultrasound uses sound waves to reduce pain and swelling. It also improves pain and swelling.  Treating inflammation  When the joint is inflamed, movement becomes difficult. It is even impossible at times. Your healthcare provider can help. Treatment may include:    Rest and gentle exercise. This is done to increase range of motion. One common exercise is to apply pressure to the jaw and resist the movement (isometric exercise).    A cold pack. This eases swelling and reduces pain. A cold pack may be applied for 10 to 20 minutes. Repeat 3 or 4 times a day. To make a cold pack, put ice cubes in a plastic bag that seals at the top. Wrap the bag in a clean, thin towel or cloth. Never put ice or a cold pack directly on the skin.    Massage and gentle manipulation.  As described above.     Date Last Reviewed: 8/1/2017 2000-2019 [employer]. [address] [place] [address], [place] [address]. All rights reserved. This information is not intended as a substitute for professional medical care. Always follow your healthcare professional's instructions.

## 2020-05-28 ASSESSMENT — ANXIETY QUESTIONNAIRES: GAD7 TOTAL SCORE: 0

## 2020-07-09 ENCOUNTER — VIRTUAL VISIT (OUTPATIENT)
Dept: FAMILY MEDICINE | Facility: CLINIC | Age: 25
End: 2020-07-09

## 2020-07-09 DIAGNOSIS — Z30.011 ENCOUNTER FOR INITIAL PRESCRIPTION OF CONTRACEPTIVE PILLS: Primary | ICD-10-CM

## 2020-07-09 PROCEDURE — 99213 OFFICE O/P EST LOW 20 MIN: CPT | Mod: 95 | Performed by: NURSE PRACTITIONER

## 2020-07-09 RX ORDER — DESOGESTREL AND ETHINYL ESTRADIOL 21-5 (28)
1 KIT ORAL DAILY
Qty: 84 TABLET | Refills: 3 | Status: SHIPPED | OUTPATIENT
Start: 2020-07-09 | End: 2020-09-23

## 2020-07-09 NOTE — PROGRESS NOTES
"Antonia Marc is a 24 year old female who is being evaluated via a billable telephone visit.      The patient has been notified of following:     \"This telephone visit will be conducted via a call between you and your physician/provider. We have found that certain health care needs can be provided without the need for a physical exam.  This service lets us provide the care you need with a short phone conversation.  If a prescription is necessary we can send it directly to your pharmacy.  If lab work is needed we can place an order for that and you can then stop by our lab to have the test done at a later time.    Telephone visits are billed at different rates depending on your insurance coverage. During this emergency period, for some insurers they may be billed the same as an in-person visit.  Please reach out to your insurance provider with any questions.    If during the course of the call the physician/provider feels a telephone visit is not appropriate, you will not be charged for this service.\"    Patient has given verbal consent for Telephone visit?  Yes    What phone number would you like to be contacted at? [phone]    How would you like to obtain your AVS? Mail a copy    Subjective     Antonia Marc is a 24 year old female who presents via phone visit today for the following health issues:    HPI  Patient has not taken OCP's for about 6 months. She would like to restart. Denies sexual activity LMP was 14 days ago.         Reviewed and updated as needed this visit by Provider         Review of Systems   Constitutional, HEENT, cardiovascular, pulmonary, gi and gu systems are negative, except as otherwise noted.       Objective   Reported vitals:  LMP 06/28/2020    healthy, alert and no distress  PSYCH: Alert and oriented times 3; coherent speech, normal   rate and volume, able to articulate logical thoughts, able   to abstract reason, no tangential thoughts, no hallucinations   or delusions  Her " affect is normal  RESP: No cough, no audible wheezing, able to talk in full sentences  Remainder of exam unable to be completed due to telephone visits    Diagnostic Test Results:  Labs reviewed in Epic        Assessment/Plan:  1. Encounter for initial prescription of contraceptive pills  -refill provided, no side effects  - desogestrel-ethinyl estradiol (KARIVA) 0.15-0.02/0.01 MG (21/5) tablet; Take 1 tablet by mouth daily  Dispense: 84 tablet; Refill: 3    Return in about 6 months (around 1/9/2021) for Physical Exam.      Phone call duration:  10 minutes    ROS Hansen CNP

## 2020-09-23 ENCOUNTER — OFFICE VISIT (OUTPATIENT)
Dept: FAMILY MEDICINE | Facility: CLINIC | Age: 25
End: 2020-09-23

## 2020-09-23 VITALS
HEIGHT: 68 IN | DIASTOLIC BLOOD PRESSURE: 74 MMHG | SYSTOLIC BLOOD PRESSURE: 130 MMHG | RESPIRATION RATE: 20 BRPM | WEIGHT: 165.4 LBS | HEART RATE: 54 BPM | OXYGEN SATURATION: 100 % | BODY MASS INDEX: 25.07 KG/M2 | TEMPERATURE: 99.6 F

## 2020-09-23 DIAGNOSIS — R82.90 NONSPECIFIC FINDING ON EXAMINATION OF URINE: ICD-10-CM

## 2020-09-23 DIAGNOSIS — N30.01 ACUTE CYSTITIS WITH HEMATURIA: Primary | ICD-10-CM

## 2020-09-23 DIAGNOSIS — R39.9 SYMPTOMS INVOLVING URINARY SYSTEM: ICD-10-CM

## 2020-09-23 LAB
ALBUMIN UR-MCNC: 30 MG/DL
APPEARANCE UR: ABNORMAL
BACTERIA #/AREA URNS HPF: ABNORMAL /HPF
BILIRUB UR QL STRIP: NEGATIVE
COLOR UR AUTO: YELLOW
GLUCOSE UR STRIP-MCNC: NEGATIVE MG/DL
HGB UR QL STRIP: ABNORMAL
KETONES UR STRIP-MCNC: NEGATIVE MG/DL
LEUKOCYTE ESTERASE UR QL STRIP: ABNORMAL
NITRATE UR QL: NEGATIVE
NON-SQ EPI CELLS #/AREA URNS LPF: ABNORMAL /LPF
PH UR STRIP: 5.5 PH (ref 5–7)
RBC #/AREA URNS AUTO: ABNORMAL /HPF
SOURCE: ABNORMAL
SP GR UR STRIP: 1.01 (ref 1–1.03)
UROBILINOGEN UR STRIP-ACNC: 0.2 EU/DL (ref 0.2–1)
WBC #/AREA URNS AUTO: ABNORMAL /HPF

## 2020-09-23 PROCEDURE — 87086 URINE CULTURE/COLONY COUNT: CPT | Performed by: FAMILY MEDICINE

## 2020-09-23 PROCEDURE — 81001 URINALYSIS AUTO W/SCOPE: CPT | Performed by: FAMILY MEDICINE

## 2020-09-23 PROCEDURE — 99213 OFFICE O/P EST LOW 20 MIN: CPT | Performed by: FAMILY MEDICINE

## 2020-09-23 RX ORDER — SULFAMETHOXAZOLE/TRIMETHOPRIM 800-160 MG
1 TABLET ORAL 2 TIMES DAILY
Qty: 6 TABLET | Refills: 0 | Status: SHIPPED | OUTPATIENT
Start: 2020-09-23 | End: 2020-09-26

## 2020-09-23 RX ORDER — PHENAZOPYRIDINE HYDROCHLORIDE 200 MG/1
200 TABLET, FILM COATED ORAL 3 TIMES DAILY PRN
Qty: 9 TABLET | Refills: 0 | Status: SHIPPED | OUTPATIENT
Start: 2020-09-23 | End: 2020-09-26

## 2020-09-23 ASSESSMENT — MIFFLIN-ST. JEOR: SCORE: 1553.75

## 2020-09-23 NOTE — PROGRESS NOTES
Subjective     //Antonia Marc is a 24 year old female who presents to clinic today for the following health issues:    HPI       Genitourinary - Female  Onset/Duration: 9/22/20  Description:   Painful urination (Dysuria): YES           Frequency: YES  Blood in urine (Hematuria): YES  Delay in urine (Hesitency): YES  Intensity: severe  Progression of Symptoms:  worsening  Accompanying Signs & Symptoms:  Fever/chills: no  Flank pain: YES- right side, lower back  Nausea and vomiting: no  Vaginal symptoms: itching  Abdominal/Pelvic Pain: YES- pelvic pain  History:   History of frequent UTI s: no  History of kidney stones: no  Sexually Active: no  Possibility of pregnancy: No  Precipitating or alleviating factors: dehydrated, decrease in the amount of fluids she is drinking  Therapies tried and outcome: Increase fluid intake and OTC advil or tylenol     Patient Active Problem List   Diagnosis     Flat feet     Irregular menstrual bleeding     Recurrent major depressive disorder, in remission (H)     Anxiety     JAZZY (generalized anxiety disorder)     Mild episode of recurrent major depressive disorder (H)     Recurrent tonsillitis     Past Surgical History:   Procedure Laterality Date     ESOPHAGOSCOPY, GASTROSCOPY, DUODENOSCOPY (EGD), COMBINED N/A 10/19/2018    Procedure: COMBINED ESOPHAGOSCOPY, GASTROSCOPY, DUODENOSCOPY (EGD), BIOPSY SINGLE OR MULTIPLE;  Surgeon: Heraclio Ziegler DO;  Location: [facility]     TONSILLECTOMY ADULT Bilateral 7/23/2018    Procedure: TONSILLECTOMY ADULT;  Bilateral Tonsillectomy;  Surgeon: Galilea Good MD;  Location: [facility]       Social History     Tobacco Use     Smoking status: Never Smoker     Smokeless tobacco: Never Used     Tobacco comment: no exporure   Substance Use Topics     Alcohol use: No     Family History   Problem Relation Age of Onset     Diabetes Mother      Hypertension Mother      Diabetes Father      Hypertension Father      Crohn's Disease Father       "Hypertension Maternal Grandmother      Anxiety Disorder Maternal Grandmother      Hypertension Maternal Grandfather      Diabetes Maternal Grandfather      Anxiety Disorder Maternal Grandfather          Current Outpatient Medications   Medication Sig Dispense Refill     phenazopyridine (PYRIDIUM) 200 MG tablet Take 1 tablet (200 mg) by mouth 3 times daily as needed for urinary tract discomfort 9 tablet 0     sulfamethoxazole-trimethoprim (BACTRIM DS) 800-160 MG tablet Take 1 tablet by mouth 2 times daily for 3 days 6 tablet 0     Allergies   Allergen Reactions     Amoxicillin Hives     Oxycodone Nausea and Vomiting         Review of Systems   C: NEGATIVE for fever, chills, change in weight  I: NEGATIVE for worrisome rashes, moles or lesions  R: NEGATIVE for significant cough or SOB  CV: NEGATIVE for chest pain, palpitations or peripheral edema  GI: NEGATIVE for nausea, abdominal pain, heartburn, or change in bowel habits  :see above  H: NEGATIVE for bleeding problems      Objective    /74 (BP Location: Right arm, Patient Position: Chair, Cuff Size: Adult Regular)   Pulse 54   Temp 99.6  F (37.6  C) (Tympanic)   Resp 20   Ht 1.727 m (5' 8\")   Wt 75 kg (165 lb 6.4 oz)   SpO2 100%   BMI 25.15 kg/m    Body mass index is 25.15 kg/m .  Physical Exam   GENERAL:  alert and no distress, ambulatory w/o assist  SKIN: no suspicious lesions, no rashes  BACK: very slight right CVA tenderness ut no left CVA tenderness  ABD:  Flat, soft, no tenderness, no guarding, no mass palpable on exam    Results for orders placed or performed in visit on 09/23/20 (from the past 24 hour(s))   *UA reflex to Microscopic and Culture ([place] [facility] (except [place] and [place])    Specimen: Midstream Urine   Result Value Ref Range    Color Urine Yellow     Appearance Urine Cloudy     Glucose Urine Negative NEG^Negative mg/dL    Bilirubin Urine Negative NEG^Negative    Ketones Urine Negative NEG^Negative mg/dL    " Specific Gravity Urine 1.015 1.003 - 1.035    Blood Urine Large (A) NEG^Negative    pH Urine 5.5 5.0 - 7.0 pH    Protein Albumin Urine 30 (A) NEG^Negative mg/dL    Urobilinogen Urine 0.2 0.2 - 1.0 EU/dL    Nitrite Urine Negative NEG^Negative    Leukocyte Esterase Urine Moderate (A) NEG^Negative    Source Midstream Urine    Urine Microscopic   Result Value Ref Range    WBC Urine 10-25 (A) OTO5^0 - 5 /HPF    RBC Urine 25-50 (A) OTO2^O - 2 /HPF    Squamous Epithelial /LPF Urine Few FEW^Few /LPF    Bacteria Urine Few (A) NEG^Negative /HPF           Assessment & Plan     Antonia was seen today for uti.    Diagnoses and all orders for this visit:    Acute cystitis with hematuria  -     sulfamethoxazole-trimethoprim (BACTRIM DS) 800-160 MG tablet; Take 1 tablet by mouth 2 times daily for 3 days  -     phenazopyridine (PYRIDIUM) 200 MG tablet; Take 1 tablet (200 mg) by mouth 3 times daily as needed for urinary tract discomfort    Symptoms involving urinary system  -     *UA reflex to Microscopic and Culture ([facility] (except [place] and [place])  -     Urine Microscopic    Nonspecific finding on examination of urine  -     Urine Culture Aerobic Bacterial      Discussed results of test.  No acute/urgent signs today.  Start empiric antibiotics and adjust depending on culture results. Advised to push fluids as tolerated; cranberry juice as tolerated. Advised if with fever, flank pain or new urinary symptoms, see doctor again.       Patient Instructions         Thank you for choosing [facility].  You may be receiving an email and/or telephone survey request from [facility] [employer] regarding your visit today.  Please take a few minutes to respond to the survey to let us know how we are doing.      If you have questions or concerns, please contact us via [employer] or you can contact your care team at [phone].    Our Clinic hours are:  Monday 6:40 am  to 7:00 pm  Tuesday -Friday 6:40 am  "to 5:00 pm    The [place] outpatient lab hours are:  Monday - Friday 6:10 am to 4:45 pm  Saturdays 7:00 am to 11:00 am  Appointments are required, call [phone]    If you have clinical questions after hours or would like to schedule an appointment,  call the clinic at [phone].    Patient Education     Bladder Infection, Female (Adult)    Urine is normally doesn't have any bacteria in it. But bacteria can get into the urinary tract from the skin around the rectum. Or they can travel in the blood from elsewhere in the body. Once they are in your urinary tract, they can cause infection in the urethra (urethritis), the bladder (cystitis), or the kidneys (pyelonephritis).  The most common place for an infection is in the bladder. This is called a bladder infection. This is one of the most common infections in women. Most bladder infections are easily treated. They are not serious unless the infection spreads to the kidney.  The phrases \"bladder infection,\" \"UTI,\" and \"cystitis\" are often used to describe the same thing. But they are not always the same. Cystitis is an inflammation of the bladder. The most common cause of cystitis is an infection.  Symptoms  The infection causes inflammation in the urethra and bladder. This causes many of the symptoms. The most common symptoms of a bladder infection are:    Pain or burning when urinating    Having to urinate more often than usual    Urgent need to urinate    Only a small amount of urine comes out    Blood in urine    Abdominal discomfort. This is usually in the lower abdomen above the pubic bone.    Cloudy urine    Strong- or bad-smelling urine    Unable to urinate (urinary retention)    Unable to hold urine in (urinary incontinence)    Fever    Loss of appetite    Confusion (in older adults)  Causes  Bladder infections are not contagious. You can't get one from someone else, from a toilet seat, or from sharing a bath.  The most common cause of bladder infections " is bacteria from the bowels. The bacteria get onto the skin around the opening of the urethra. From there, they can get into the urine and travel up to the bladder, causing inflammation and infection. This usually happens because of:    Wiping improperly after urinating. Always wipe from front to back.    Bowel incontinence    Pregnancy    Procedures such as having a catheter inserted    Older age    Not emptying your bladder. This can allow bacteria a chance to grow in your urine.    Dehydration    Constipation    Sex    Use of a diaphragm for birth control   Treatment  Bladder infections are diagnosed by a urine test. They are treated with antibiotics and usually clear up quickly without complications. Treatment helps prevent a more serious kidney infection.  Medicines  Medicines can help in the treatment of a bladder infection:    Take antibiotics until they are used up, even if you feel better. It is important to finish them to make sure the infection has cleared.    You can use acetaminophen or ibuprofen for pain, fever, or discomfort, unless another medicine was prescribed. If you have chronic liver or kidney disease, talk with your healthcare provider before using these medicines. Also talk with your provider if you've ever had a stomach ulcer or gastrointestinal bleeding, or are taking blood-thinner medicines.    If you are given phenazopydridine to reduce burning with urination, it will cause your urine to become a bright orange color. This can stain clothing.  Care and prevention  These self-care steps can help prevent future infections:    Drink plenty of fluids to prevent dehydration and flush out your bladder. Do this unless you must restrict fluids for other health reasons, or your doctor told you not to.    Proper cleaning after going to the bathroom is important. Wipe from front to back after using the toilet to prevent the spread of bacteria.    Urinate more often. Don't try to hold urine in for a  long time.    Wear loose-fitting clothes and cotton underwear. Avoid tight-fitting pants.    Improve your diet and prevent constipation. Eat more fresh fruit and vegetables, and fiber, and less junk and fatty foods.    Avoid sex until your symptoms are gone.    Avoid caffeine, alcohol, and spicy foods. These can irritate your bladder.    Urinate right after intercourse to flush out your bladder.    If you use birth control pills and have frequent bladder infections, discuss it with your doctor.  Follow-up care  Call your healthcare provider if all symptoms are not gone after 3 days of treatment. This is especially important if you have repeat infections.  If a culture was done, you will be told if your treatment needs to be changed. If directed, you can call to find out the results.  If X-rays were done, you will be told if the results will affect your treatment.  Call 911  Call 911 if any of the following occur:    Trouble breathing    Hard to wake up or confusion    Fainting or loss of consciousness    Rapid heart rate  When to seek medical advice  Call your healthcare provider right away if any of these occur:    Fever of 100.4 F (38.0 C) or higher, or as directed by your healthcare provider    Symptoms are not better by the third day of treatment    Back or belly (abdominal) pain that gets worse    Repeated vomiting, or unable to keep medicine down    Weakness or dizziness    Vaginal discharge    Pain, redness, or swelling in the outer vaginal area (labia)  Date Last Reviewed: 10/1/2016    7527-2908 [employer]. [address] [place] [address]. All rights reserved. This information is not intended as a substitute for professional medical care. Always follow your healthcare professional's instructions.               Return in about 3 days (around 9/26/2020) for If condition does not improve or if you have fever.    Prabhu Newell MD  [facility]

## 2020-09-23 NOTE — PATIENT INSTRUCTIONS
"      Thank you for choosing [facility].  You may be receiving an email and/or telephone survey request from [facility] Customer Experience regarding your visit today.  Please take a few minutes to respond to the survey to let us know how we are doing.      If you have questions or concerns, please contact us via [employer] or you can contact your care team at [phone].    Our Clinic hours are:  Monday 6:40 am  to 7:00 pm  Tuesday -Friday 6:40 am to 5:00 pm    The [place] outpatient lab hours are:  Monday - Friday 6:10 am to 4:45 pm  Saturdays 7:00 am to 11:00 am  Appointments are required, call [phone]    If you have clinical questions after hours or would like to schedule an appointment,  call the clinic at [phone].    Patient Education     Bladder Infection, Female (Adult)    Urine is normally doesn't have any bacteria in it. But bacteria can get into the urinary tract from the skin around the rectum. Or they can travel in the blood from elsewhere in the body. Once they are in your urinary tract, they can cause infection in the urethra (urethritis), the bladder (cystitis), or the kidneys (pyelonephritis).  The most common place for an infection is in the bladder. This is called a bladder infection. This is one of the most common infections in women. Most bladder infections are easily treated. They are not serious unless the infection spreads to the kidney.  The phrases \"bladder infection,\" \"UTI,\" and \"cystitis\" are often used to describe the same thing. But they are not always the same. Cystitis is an inflammation of the bladder. The most common cause of cystitis is an infection.  Symptoms  The infection causes inflammation in the urethra and bladder. This causes many of the symptoms. The most common symptoms of a bladder infection are:    Pain or burning when urinating    Having to urinate more often than usual    Urgent need to urinate    Only a small amount of urine comes out    Blood in " urine    Abdominal discomfort. This is usually in the lower abdomen above the pubic bone.    Cloudy urine    Strong- or bad-smelling urine    Unable to urinate (urinary retention)    Unable to hold urine in (urinary incontinence)    Fever    Loss of appetite    Confusion (in older adults)  Causes  Bladder infections are not contagious. You can't get one from someone else, from a toilet seat, or from sharing a bath.  The most common cause of bladder infections is bacteria from the bowels. The bacteria get onto the skin around the opening of the urethra. From there, they can get into the urine and travel up to the bladder, causing inflammation and infection. This usually happens because of:    Wiping improperly after urinating. Always wipe from front to back.    Bowel incontinence    Pregnancy    Procedures such as having a catheter inserted    Older age    Not emptying your bladder. This can allow bacteria a chance to grow in your urine.    Dehydration    Constipation    Sex    Use of a diaphragm for birth control   Treatment  Bladder infections are diagnosed by a urine test. They are treated with antibiotics and usually clear up quickly without complications. Treatment helps prevent a more serious kidney infection.  Medicines  Medicines can help in the treatment of a bladder infection:    Take antibiotics until they are used up, even if you feel better. It is important to finish them to make sure the infection has cleared.    You can use acetaminophen or ibuprofen for pain, fever, or discomfort, unless another medicine was prescribed. If you have chronic liver or kidney disease, talk with your healthcare provider before using these medicines. Also talk with your provider if you've ever had a stomach ulcer or gastrointestinal bleeding, or are taking blood-thinner medicines.    If you are given phenazopydridine to reduce burning with urination, it will cause your urine to become a bright orange color. This can stain  clothing.  Care and prevention  These self-care steps can help prevent future infections:    Drink plenty of fluids to prevent dehydration and flush out your bladder. Do this unless you must restrict fluids for other health reasons, or your doctor told you not to.    Proper cleaning after going to the bathroom is important. Wipe from front to back after using the toilet to prevent the spread of bacteria.    Urinate more often. Don't try to hold urine in for a long time.    Wear loose-fitting clothes and cotton underwear. Avoid tight-fitting pants.    Improve your diet and prevent constipation. Eat more fresh fruit and vegetables, and fiber, and less junk and fatty foods.    Avoid sex until your symptoms are gone.    Avoid caffeine, alcohol, and spicy foods. These can irritate your bladder.    Urinate right after intercourse to flush out your bladder.    If you use birth control pills and have frequent bladder infections, discuss it with your doctor.  Follow-up care  Call your healthcare provider if all symptoms are not gone after 3 days of treatment. This is especially important if you have repeat infections.  If a culture was done, you will be told if your treatment needs to be changed. If directed, you can call to find out the results.  If X-rays were done, you will be told if the results will affect your treatment.  Call 911  Call 911 if any of the following occur:    Trouble breathing    Hard to wake up or confusion    Fainting or loss of consciousness    Rapid heart rate  When to seek medical advice  Call your healthcare provider right away if any of these occur:    Fever of 100.4 F (38.0 C) or higher, or as directed by your healthcare provider    Symptoms are not better by the third day of treatment    Back or belly (abdominal) pain that gets worse    Repeated vomiting, or unable to keep medicine down    Weakness or dizziness    Vaginal discharge    Pain, redness, or swelling in the outer vaginal area  (labia)  Date Last Reviewed: 10/1/2016    5774-5591 [employer]. [address] [place] [address], [place] [address]. All rights reserved. This information is not intended as a substitute for professional medical care. Always follow your healthcare professional's instructions.

## 2020-09-24 LAB
BACTERIA SPEC CULT: NORMAL
Lab: NORMAL
SPECIMEN SOURCE: NORMAL

## 2021-01-22 ENCOUNTER — NURSE TRIAGE (OUTPATIENT)
Dept: FAMILY MEDICINE | Facility: CLINIC | Age: 26
End: 2021-01-22

## 2021-01-22 ENCOUNTER — OFFICE VISIT (OUTPATIENT)
Dept: URGENT CARE | Facility: URGENT CARE | Age: 26
End: 2021-01-22

## 2021-01-22 VITALS
WEIGHT: 166 LBS | OXYGEN SATURATION: 100 % | RESPIRATION RATE: 16 BRPM | TEMPERATURE: 98.1 F | BODY MASS INDEX: 25.16 KG/M2 | SYSTOLIC BLOOD PRESSURE: 123 MMHG | HEART RATE: 65 BPM | DIASTOLIC BLOOD PRESSURE: 66 MMHG | HEIGHT: 68 IN

## 2021-01-22 DIAGNOSIS — R10.32 GROIN PAIN, LEFT: Primary | ICD-10-CM

## 2021-01-22 DIAGNOSIS — R59.1 LYMPHADENOPATHY: ICD-10-CM

## 2021-01-22 LAB
ALBUMIN UR-MCNC: NEGATIVE MG/DL
APPEARANCE UR: CLEAR
BASOPHILS # BLD AUTO: 0 10E9/L (ref 0–0.2)
BASOPHILS NFR BLD AUTO: 0.8 %
BILIRUB UR QL STRIP: NEGATIVE
COLOR UR AUTO: YELLOW
DIFFERENTIAL METHOD BLD: ABNORMAL
EOSINOPHIL # BLD AUTO: 0.1 10E9/L (ref 0–0.7)
EOSINOPHIL NFR BLD AUTO: 1.8 %
ERYTHROCYTE [DISTWIDTH] IN BLOOD BY AUTOMATED COUNT: 12.9 % (ref 10–15)
GLUCOSE UR STRIP-MCNC: NEGATIVE MG/DL
HCT VFR BLD AUTO: 39.7 % (ref 35–47)
HGB BLD-MCNC: 13.4 G/DL (ref 11.7–15.7)
HGB UR QL STRIP: NEGATIVE
KETONES UR STRIP-MCNC: NEGATIVE MG/DL
LEUKOCYTE ESTERASE UR QL STRIP: NEGATIVE
LYMPHOCYTES # BLD AUTO: 1.8 10E9/L (ref 0.8–5.3)
LYMPHOCYTES NFR BLD AUTO: 47.1 %
MCH RBC QN AUTO: 28.9 PG (ref 26.5–33)
MCHC RBC AUTO-ENTMCNC: 33.8 G/DL (ref 31.5–36.5)
MCV RBC AUTO: 86 FL (ref 78–100)
MONOCYTES # BLD AUTO: 0.6 10E9/L (ref 0–1.3)
MONOCYTES NFR BLD AUTO: 15.3 %
NEUTROPHILS # BLD AUTO: 1.4 10E9/L (ref 1.6–8.3)
NEUTROPHILS NFR BLD AUTO: 35 %
NITRATE UR QL: NEGATIVE
PH UR STRIP: 6 PH (ref 5–7)
PLATELET # BLD AUTO: 221 10E9/L (ref 150–450)
RBC # BLD AUTO: 4.64 10E12/L (ref 3.8–5.2)
SOURCE: NORMAL
SP GR UR STRIP: >1.03 (ref 1–1.03)
UROBILINOGEN UR STRIP-ACNC: 0.2 EU/DL (ref 0.2–1)
WBC # BLD AUTO: 3.9 10E9/L (ref 4–11)

## 2021-01-22 PROCEDURE — 81003 URINALYSIS AUTO W/O SCOPE: CPT | Performed by: PHYSICIAN ASSISTANT

## 2021-01-22 PROCEDURE — 99214 OFFICE O/P EST MOD 30 MIN: CPT | Performed by: PHYSICIAN ASSISTANT

## 2021-01-22 PROCEDURE — 85025 COMPLETE CBC W/AUTO DIFF WBC: CPT | Performed by: PHYSICIAN ASSISTANT

## 2021-01-22 PROCEDURE — 36415 COLL VENOUS BLD VENIPUNCTURE: CPT | Performed by: PHYSICIAN ASSISTANT

## 2021-01-22 ASSESSMENT — ENCOUNTER SYMPTOMS
CHILLS: 0
SHORTNESS OF BREATH: 0
SORE THROAT: 0
FEVER: 0
DIARRHEA: 0
NAUSEA: 0
ABDOMINAL PAIN: 0
DIFFICULTY URINATING: 0
VOMITING: 0
HEADACHES: 0

## 2021-01-22 ASSESSMENT — MIFFLIN-ST. JEOR: SCORE: 1551.47

## 2021-01-22 NOTE — TELEPHONE ENCOUNTER
Reason for call:  Patient reporting a symptom    Symptom or request: Left hip 4-5 days ago her hip started to feel numb like she could not tell that she was moving it. The about 3-4 days ago she started to feel pines and needle feeling on her hip where it took her breath away a little. She also have been feeling constipated and nausea. It is currently numb and there is a bump on her hip/groin and each day it is getting more painful.     Duration (how long have symptoms been present): 4-5 days    Have you been treated for this before? Yes      Phone Number patient can be reached at:  Home number on file [phone] (home)    Best Time:  any    Can we leave a detailed message on this number:  YES    Call taken on 1/22/2021 at 1:29 PM by Roxanna Staton

## 2021-01-22 NOTE — TELEPHONE ENCOUNTER
"Patient has trouble walking- mass on the lower left abdomen getting bigger- no fevers- nausea, constipated and pain shooting down left leg.    Patient was advised to go to [facility] to be seen. The patient agrees.    Reason for Disposition    SEVERE abdominal pain (e.g., excruciating)    Additional Information    Negative: Looks like a broken bone or dislocated joint (e.g., crooked or deformed)    Negative: Sounds like a life-threatening emergency to the triager    Negative: Followed a hip injury    Negative: Leg pain is main symptom    Negative: Back pain radiating (shooting) into hip    Negative: SEVERE pain (e.g., excruciating, unable to do any normal activities) and fever    Negative: Can't stand (bear weight) or walk    Negative: Fever and red area (or area very tender to touch)    Negative: Patient sounds very sick or weak to the triager    SEVERE pain (e.g., excruciating, unable to do any normal activities)    Answer Assessment - Initial Assessment Questions  1. LOCATION and RADIATION: \"Where is the pain located?\"       Left groin abdomen  2. QUALITY: \"What does the pain feel like?\"  (e.g., sharp, dull, aching, burning)      Sharp pins and needles  3. SEVERITY: \"How bad is the pain?\" \"What does it keep you from doing?\"   (Scale 1-10; or mild, moderate, severe)    -  MILD (1-3): doesn't interfere with normal activities     -  MODERATE (4-7): interferes with normal activities (e.g., work or school) or awakens from sleep, limping     -  SEVERE (8-10): excruciating pain, unable to do any normal activities, unable to walk      Moderate to severe  4. ONSET: \"When did the pain start?\" \"Does it come and go, or is it there all the time?\"      One week ago  5. 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 CAUSE: \"What do you think is causing the hip pain?\"       Possible hernia from lifting  7. AGGRAVATING FACTORS: \"What " "makes the hip pain worse?\" (e.g., walking, climbing stairs, running)      Lay down- decreases pain  8. OTHER SYMPTOMS: \"Do you have any other symptoms?\" (e.g., back pain, pain shooting down leg,  fever, rash)      Pain shooting down leg    Answer Assessment - Initial Assessment Questions  1. LOCATION: \"Where does it hurt?\"       Left side of lower abdomen  2. RADIATION: \"Does the pain shoot anywhere else?\" (e.g., chest, back)      Down the left leg  3. ONSET: \"When did the pain begin?\" (e.g., minutes, hours or days ago)       About one week ago  4. SUDDEN: \"Gradual or sudden onset?\"      Gradual now getting worse  5. PATTERN \"Does the pain come and go, or is it constant?\"     - If constant: \"Is it getting better, staying the same, or worsening?\"       (Note: Constant means the pain never goes away completely; most serious pain is constant and it progresses)      - If intermittent: \"How long does it last?\" \"Do you have pain now?\"      (Note: Intermittent means the pain goes away completely between bouts)      Constant   6. SEVERITY: \"How bad is the pain?\"  (e.g., Scale 1-10; mild, moderate, or severe)    - MILD (1-3): doesn't interfere with normal activities, abdomen soft and not tender to touch     - MODERATE (4-7): interferes with normal activities or awakens from sleep, tender to touch     - SEVERE (8-10): excruciating pain, doubled over, unable to do any normal activities       Moderate to severe  7. RECURRENT SYMPTOM: \"Have you ever had this type of abdominal pain before?\" If so, ask: \"When was the last time?\" and \"What happened that time?\"       no  8. CAUSE: \"What do you think is causing the abdominal pain?\"      Lifts heavy objects at work- works at [employer]  9. RELIEVING/AGGRAVATING FACTORS: \"What makes it better or worse?\" (e.g., movement, antacids, bowel movement)      Laying decreases pain  10. OTHER SYMPTOMS: \"Has there been any vomiting, diarrhea, constipation, or urine problems?\"        Constipation, " "nausea, no fevers - vaginal bleeding- not time for period  11. PREGNANCY: \"Is there any chance you are pregnant?\" \"When was your last menstrual period?\"        na    Protocols used: ABDOMINAL PAIN - FEMALE-A-OH, HIP PAIN-A-OH      "

## 2021-01-23 ENCOUNTER — APPOINTMENT (OUTPATIENT)
Dept: ULTRASOUND IMAGING | Facility: CLINIC | Age: 26
End: 2021-01-23
Attending: EMERGENCY MEDICINE

## 2021-01-23 ENCOUNTER — HOSPITAL ENCOUNTER (EMERGENCY)
Facility: CLINIC | Age: 26
Discharge: HOME | End: 2021-01-24
Attending: EMERGENCY MEDICINE | Admitting: EMERGENCY MEDICINE

## 2021-01-23 VITALS
HEART RATE: 82 BPM | OXYGEN SATURATION: 100 % | TEMPERATURE: 98.2 F | DIASTOLIC BLOOD PRESSURE: 83 MMHG | SYSTOLIC BLOOD PRESSURE: 137 MMHG | RESPIRATION RATE: 18 BRPM

## 2021-01-23 DIAGNOSIS — G57.12 MERALGIA PARESTHETICA OF LEFT SIDE: ICD-10-CM

## 2021-01-23 DIAGNOSIS — I88.9 LYMPHADENITIS: ICD-10-CM

## 2021-01-23 LAB
BASOPHILS # BLD AUTO: 0 10E9/L (ref 0–0.2)
BASOPHILS NFR BLD AUTO: 0.6 %
DIFFERENTIAL METHOD BLD: NORMAL
EOSINOPHIL # BLD AUTO: 0.1 10E9/L (ref 0–0.7)
EOSINOPHIL NFR BLD AUTO: 1.5 %
ERYTHROCYTE [DISTWIDTH] IN BLOOD BY AUTOMATED COUNT: 12.5 % (ref 10–15)
HCT VFR BLD AUTO: 38.6 % (ref 35–47)
HGB BLD-MCNC: 13.2 G/DL (ref 11.7–15.7)
IMM GRANULOCYTES # BLD: 0 10E9/L (ref 0–0.4)
IMM GRANULOCYTES NFR BLD: 0.4 %
LYMPHOCYTES # BLD AUTO: 1.6 10E9/L (ref 0.8–5.3)
LYMPHOCYTES NFR BLD AUTO: 33.8 %
MCH RBC QN AUTO: 29.3 PG (ref 26.5–33)
MCHC RBC AUTO-ENTMCNC: 34.2 G/DL (ref 31.5–36.5)
MCV RBC AUTO: 86 FL (ref 78–100)
MONOCYTES # BLD AUTO: 0.4 10E9/L (ref 0–1.3)
MONOCYTES NFR BLD AUTO: 8.1 %
NEUTROPHILS # BLD AUTO: 2.7 10E9/L (ref 1.6–8.3)
NEUTROPHILS NFR BLD AUTO: 55.6 %
NRBC # BLD AUTO: 0 10*3/UL
NRBC BLD AUTO-RTO: 0 /100
PLATELET # BLD AUTO: 214 10E9/L (ref 150–450)
RBC # BLD AUTO: 4.51 10E12/L (ref 3.8–5.2)
WBC # BLD AUTO: 4.8 10E9/L (ref 4–11)

## 2021-01-23 PROCEDURE — 85025 COMPLETE CBC W/AUTO DIFF WBC: CPT | Performed by: EMERGENCY MEDICINE

## 2021-01-23 PROCEDURE — 99284 EMERGENCY DEPT VISIT MOD MDM: CPT | Mod: 25

## 2021-01-23 PROCEDURE — 80053 COMPREHEN METABOLIC PANEL: CPT | Performed by: EMERGENCY MEDICINE

## 2021-01-23 PROCEDURE — 93971 EXTREMITY STUDY: CPT | Mod: LT

## 2021-01-23 ASSESSMENT — ENCOUNTER SYMPTOMS
HEADACHES: 1
MYALGIAS: 0
BACK PAIN: 0
UNEXPECTED WEIGHT CHANGE: 0
DIAPHORESIS: 0

## 2021-01-23 NOTE — PROGRESS NOTES
SUBJECTIVE:   Antonia Marc is a 25 year old female presenting with a chief complaint of   Chief Complaint   Patient presents with     Urgent Care     Groin Pain     lost feeling to left groin area 4 days ago, has 8/10 pain, talk to nurse line and they told her to come in       She is an established patient of [facility].    Groin pain    Onset of symptoms was 4 day(s) ago. Initially felt numbness to the area, then 2 days ago noted tender lump. Lump did not increase in size.  Course of illness is same.    Severity moderate  Current and Associated symptoms: Painful lump left groin  Treatment measures tried include Tylenol  Predisposing factors include none.  She denies any trauma or injury to the affected area.  No recent fevers or chills.  No abnormal vaginal discharge.  No difficulty urinating.  No concerns for STDs.  No shortness of breath or chest pain.  No swelling in the left lower extremity.  Has been on oral contraceptives for the past 9 months.  No recent travel.  Denies any unintentional weight loss. No n/v/d. No HA. No sore throat. She does a lot of heavy lifting at work.  She called the nurse line and was advised to be seen for possible hernia.      Review of Systems   Constitutional: Negative for chills and fever.   HENT: Negative for sore throat.    Respiratory: Negative for shortness of breath.    Cardiovascular: Negative for leg swelling.   Gastrointestinal: Negative for abdominal pain, diarrhea, nausea and vomiting.   Genitourinary: Negative for difficulty urinating.   Musculoskeletal:        Left groin pain   Skin: Negative for rash.   Allergic/Immunologic: Negative for immunocompromised state.   Neurological: Negative for headaches.       Past Medical History:   Diagnosis Date     Closed fracture of unspecified part of radius with ulna(813.83)      Other diseases of trachea and bronchus, not elsewhere classified     tracheomalacia as a       Family History   Problem Relation Age of Onset  "    Diabetes Mother      Hypertension Mother      Diabetes Father      Hypertension Father      Crohn's Disease Father      Hypertension Maternal Grandmother      Anxiety Disorder Maternal Grandmother      Hypertension Maternal Grandfather      Diabetes Maternal Grandfather      Anxiety Disorder Maternal Grandfather      No current outpatient medications on file.     Social History     Tobacco Use     Smoking status: Never Smoker     Smokeless tobacco: Never Used     Tobacco comment: no exporure   Substance Use Topics     Alcohol use: No       OBJECTIVE  /66   Pulse 65   Temp 98.1  F (36.7  C) (Oral)   Resp 16   Ht 1.727 m (5' 8\")   Wt 75.3 kg (166 lb)   SpO2 100%   BMI 25.24 kg/m      Physical Exam  Constitutional:       General: She is not in acute distress.     Appearance: She is well-developed.   HENT:      Head: Normocephalic and atraumatic.      Right Ear: External ear normal.      Left Ear: External ear normal.   Eyes:      Conjunctiva/sclera: Conjunctivae normal.   Neck:      Musculoskeletal: Normal range of motion.   Cardiovascular:      Rate and Rhythm: Regular rhythm.      Heart sounds: Normal heart sounds.   Pulmonary:      Effort: Pulmonary effort is normal. No respiratory distress.      Breath sounds: Normal breath sounds.   Abdominal:      General: Bowel sounds are normal. There is no distension.      Palpations: Abdomen is soft.      Tenderness: There is no abdominal tenderness.   Musculoskeletal:         General: Tenderness present.      Comments: Tenderness to palpation left groin.  Palpable nodule about 1 cm in diameter.--Does appear consistent with a swollen lymph node.   Skin:     General: Skin is warm and dry.      Findings: No bruising, erythema or rash.   Neurological:      Mental Status: She is alert.         Labs:  Results for orders placed or performed in visit on 01/22/21 (from the past 24 hour(s))   UA reflex to Microscopic and Culture    Specimen: Midstream Urine   Result " Value Ref Range    Color Urine Yellow     Appearance Urine Clear     Glucose Urine Negative NEG^Negative mg/dL    Bilirubin Urine Negative NEG^Negative    Ketones Urine Negative NEG^Negative mg/dL    Specific Gravity Urine >1.030 1.003 - 1.035    Blood Urine Negative NEG^Negative    pH Urine 6.0 5.0 - 7.0 pH    Protein Albumin Urine Negative NEG^Negative mg/dL    Urobilinogen Urine 0.2 0.2 - 1.0 EU/dL    Nitrite Urine Negative NEG^Negative    Leukocyte Esterase Urine Negative NEG^Negative    Source Midstream Urine    CBC with platelets and differential   Result Value Ref Range    WBC 3.9 (L) 4.0 - 11.0 10e9/L    RBC Count 4.64 3.8 - 5.2 10e12/L    Hemoglobin 13.4 11.7 - 15.7 g/dL    Hematocrit 39.7 35.0 - 47.0 %    MCV 86 78 - 100 fl    MCH 28.9 26.5 - 33.0 pg    MCHC 33.8 31.5 - 36.5 g/dL    RDW 12.9 10.0 - 15.0 %    Platelet Count 221 150 - 450 10e9/L    % Neutrophils 35.0 %    % Lymphocytes 47.1 %    % Monocytes 15.3 %    % Eosinophils 1.8 %    % Basophils 0.8 %    Absolute Neutrophil 1.4 (L) 1.6 - 8.3 10e9/L    Absolute Lymphocytes 1.8 0.8 - 5.3 10e9/L    Absolute Monocytes 0.6 0.0 - 1.3 10e9/L    Absolute Eosinophils 0.1 0.0 - 0.7 10e9/L    Absolute Basophils 0.0 0.0 - 0.2 10e9/L    Diff Method Automated Method            ASSESSMENT:      ICD-10-CM    1. Groin pain, left  R10.32 US MSK Limited   2. Lymphadenopathy  R59.1 UA reflex to Microscopic and Culture     CBC with platelets and differential     US MSK Limited            PLAN:    Left groin pain: Exam suggestive of swollen/reactive lymph node in the left groin.  Her CBC is reassuring.  Ultrasound of the affected area is ordered and is pending at this time.  Patient will call and schedule.  She will be notified if any abnormal results.  In the interim recommended Tylenol or Motrin as needed for pain.  Heat over the affected area.  Patient educational information provided.  Keep monitoring symptoms.  Follow-up if any worsening symptoms.  Patient agrees with  the plan.    Followup:    If not improving or if condition worsens, follow up with your Primary Care Provider    Patient Instructions   Please call [phone] to schedule ultrasound imaging.  Please take  Ibuprofen or Advil as needed for pain.    Patient Education     Lymphadenopathy  Lymphadenopathy is swelling of the lymph nodes. Lymph nodes are small, bean-shaped glands around the body.  What are lymph nodes?  Lymph nodes are part of your immune system. These glands are found in your neck, over your clavicle, armpits, groin, chest, and belly (abdomen). They act as filters for lymph fluid as it flows through your body. Lymph fluid contains white blood cells (lymphocytes) that help the body fight infection and disease.   Why lymph nodes swell  Lymphadenopathy is very common. The glands often get larger during a viral or bacterial infection. It can happen during a cold, the flu, or strep throat. The nodes may swell in just one area of the body, such as the neck (localized). Or nodes may swell all over the body (generalized). The neck (cervical) lymph nodes are the most common site of lymphadenopathy.  What causes lymphadenopathy?  Dead cells and fluid build up in the lymph nodes as they help fight infection or disease. This causes them to swell in size. Enlarged lymph nodes are often near the source of infection. This can help to find the cause of an infection. For example, swollen lymph nodes around the jaw may be because of an infection in the teeth or mouth. But lymphadenopathy may also be generalized. This is common in some viral illnesses such as infectious mononucleosis, HIV, or chickenpox (varicella).  Lymphadenopathy can also be caused by:    Infection of a lymph node or small group of nodes (lymphadenitis)    Cancer    Reactions to medicines such as antibiotics and certain blood pressure, gout, and seizure medicines    Other health conditions, such as lupus or sarcoidosis  Symptoms of  lymphadenopathy  Lymphadenopathy can cause symptoms such as:    Lumps under the jaw, on the sides or back of the neck, in the armpits, in the groin, or in the chest or belly (abdomen)    Pain or soreness in any of these areas    Redness or warmth in any of these areas  You may also have symptoms from an infection causing the swollen glands. These symptoms may include fever, sore throat, body aches, or cough.  Diagnosing lymphadenopathy  Your healthcare provider will ask about your health history and symptoms. He or she will give you a physical exam and check the areas where lymph nodes are enlarged. Your provider will check the size, texture, and location of the nodes. He or she will ask how long they have been swollen and if they are painful. You may be advised to have diagnostic tests and referral to specialists may be advised. The tests may include:    Blood tests. These are done to check for signs of infection and other problems.    Urine test. This is also done to check for infection and other problems.    Chest X-ray, ultrasound, CT scan, or MRI scans. These tests can show enlarged lymph nodes or other problems.    Lymph node biopsy. The cause of enlarged lymph nodes may be checked with a biopsy. Small samples of lymph node tissue are taken and checked in a lab for signs of infection, cancer, and other causes. You may be referred to other specialists for their opinion as well.  Treatment for lymphadenopathy  The treatment of enlarged lymph nodes depends on the cause. Enlarged lymph nodes are often harmless and go away without any treatment. Treatment is most often done on the cause of the enlarged nodes and may include:    Antibiotic or antiviral medicine to treat a bacterial or viral infection    Incision and drainage of a lymph node for lymphadenitis    Other medicines or procedures to treat the cause of the enlarged nodes  You may need a follow-up exam in 3 to 4 weeks to recheck enlarged nodes.  When to  call your healthcare provider  Call your healthcare provider if:    Your symptoms get worse    You have new symptoms    You have a fever of 100.4 F (38 C) or higher, or as directed by your provider    Your lymph nodes that are still swollen after 3 to 4 weeks    Mary last reviewed this educational content on 6/1/2019 2000-2020 [employer]. [address] [place] [address], [place] [id number]. All rights reserved. This information is not intended as a substitute for professional medical care. Always follow your healthcare professional's instructions.

## 2021-01-23 NOTE — PATIENT INSTRUCTIONS
Please call [phone] to schedule ultrasound imaging.  Please take  Ibuprofen or Advil as needed for pain.    Patient Education     Lymphadenopathy  Lymphadenopathy is swelling of the lymph nodes. Lymph nodes are small, bean-shaped glands around the body.  What are lymph nodes?  Lymph nodes are part of your immune system. These glands are found in your neck, over your clavicle, armpits, groin, chest, and belly (abdomen). They act as filters for lymph fluid as it flows through your body. Lymph fluid contains white blood cells (lymphocytes) that help the body fight infection and disease.   Why lymph nodes swell  Lymphadenopathy is very common. The glands often get larger during a viral or bacterial infection. It can happen during a cold, the flu, or strep throat. The nodes may swell in just one area of the body, such as the neck (localized). Or nodes may swell all over the body (generalized). The neck (cervical) lymph nodes are the most common site of lymphadenopathy.  What causes lymphadenopathy?  Dead cells and fluid build up in the lymph nodes as they help fight infection or disease. This causes them to swell in size. Enlarged lymph nodes are often near the source of infection. This can help to find the cause of an infection. For example, swollen lymph nodes around the jaw may be because of an infection in the teeth or mouth. But lymphadenopathy may also be generalized. This is common in some viral illnesses such as infectious mononucleosis, HIV, or chickenpox (varicella).  Lymphadenopathy can also be caused by:    Infection of a lymph node or small group of nodes (lymphadenitis)    Cancer    Reactions to medicines such as antibiotics and certain blood pressure, gout, and seizure medicines    Other health conditions, such as lupus or sarcoidosis  Symptoms of lymphadenopathy  Lymphadenopathy can cause symptoms such as:    Lumps under the jaw, on the sides or back of the neck, in the armpits, in the groin, or in  the chest or belly (abdomen)    Pain or soreness in any of these areas    Redness or warmth in any of these areas  You may also have symptoms from an infection causing the swollen glands. These symptoms may include fever, sore throat, body aches, or cough.  Diagnosing lymphadenopathy  Your healthcare provider will ask about your health history and symptoms. He or she will give you a physical exam and check the areas where lymph nodes are enlarged. Your provider will check the size, texture, and location of the nodes. He or she will ask how long they have been swollen and if they are painful. You may be advised to have diagnostic tests and referral to specialists may be advised. The tests may include:    Blood tests. These are done to check for signs of infection and other problems.    Urine test. This is also done to check for infection and other problems.    Chest X-ray, ultrasound, CT scan, or MRI scans. These tests can show enlarged lymph nodes or other problems.    Lymph node biopsy. The cause of enlarged lymph nodes may be checked with a biopsy. Small samples of lymph node tissue are taken and checked in a lab for signs of infection, cancer, and other causes. You may be referred to other specialists for their opinion as well.  Treatment for lymphadenopathy  The treatment of enlarged lymph nodes depends on the cause. Enlarged lymph nodes are often harmless and go away without any treatment. Treatment is most often done on the cause of the enlarged nodes and may include:    Antibiotic or antiviral medicine to treat a bacterial or viral infection    Incision and drainage of a lymph node for lymphadenitis    Other medicines or procedures to treat the cause of the enlarged nodes  You may need a follow-up exam in 3 to 4 weeks to recheck enlarged nodes.  When to call your healthcare provider  Call your healthcare provider if:    Your symptoms get worse    You have new symptoms    You have a fever of 100.4 F (38 C) or  higher, or as directed by your provider    Your lymph nodes that are still swollen after 3 to 4 weeks    Mary last reviewed this educational content on 6/1/2019 2000-2020 [employer]. [address] [place] [address], [place] [address]. All rights reserved. This information is not intended as a substitute for professional medical care. Always follow your healthcare professional's instructions.

## 2021-01-24 LAB
ALBUMIN SERPL-MCNC: 3.2 G/DL (ref 3.4–5)
ALP SERPL-CCNC: 38 U/L (ref 40–150)
ALT SERPL W P-5'-P-CCNC: 22 U/L (ref 0–50)
ANION GAP SERPL CALCULATED.3IONS-SCNC: 4 MMOL/L (ref 3–14)
AST SERPL W P-5'-P-CCNC: 17 U/L (ref 0–45)
BILIRUB SERPL-MCNC: 1 MG/DL (ref 0.2–1.3)
BUN SERPL-MCNC: 13 MG/DL (ref 7–30)
CALCIUM SERPL-MCNC: 8.6 MG/DL (ref 8.5–10.1)
CHLORIDE SERPL-SCNC: 111 MMOL/L (ref 94–109)
CO2 SERPL-SCNC: 25 MMOL/L (ref 20–32)
CREAT SERPL-MCNC: 0.81 MG/DL (ref 0.52–1.04)
GFR SERPL CREATININE-BSD FRML MDRD: >90 ML/MIN/{1.73_M2}
GLUCOSE SERPL-MCNC: 93 MG/DL (ref 70–99)
POTASSIUM SERPL-SCNC: 3.8 MMOL/L (ref 3.4–5.3)
PROT SERPL-MCNC: 6.5 G/DL (ref 6.8–8.8)
SODIUM SERPL-SCNC: 140 MMOL/L (ref 133–144)

## 2021-01-24 RX ORDER — PREDNISONE 20 MG/1
TABLET ORAL
Qty: 10 TABLET | Refills: 0 | Status: SHIPPED | OUTPATIENT
Start: 2021-01-24 | End: 2021-11-23

## 2021-01-24 NOTE — ED TRIAGE NOTES
Here for left groin pain and pain to behind left knee pain started about 2 hours ago. Went to urgent care last night and was diagnose with swollen lymph node to groin area. Walking worsens pain. Took tylenol about 2 hours ago. ABCs intact.

## 2021-01-24 NOTE — ED PROVIDER NOTES
"  History   Chief Complaint  Groin pain    HPI   Antonia Marc is a 25 year old female who presents for evaluation of left sided groin pain. The patient reports that she first noticed this about a week ago while walking at work, stating that it felt like \"pins and needles\" in that region. After some self examination, the patient felt what appears to be a swollen lymph node in her left inguinal region. The lump itself is painful and her discomfort is increased by walking. After being seen yesterday at urgent care, she was scheduled for an outpatient ultrasound. She denies noticing any size change to the lump. Denies any redness, rashes, vaginal discharge, weight loss, diaphoresis, myalgias, or back pain. She does endorse some headaches but states that this is normal for her at baseline.   No vaginal discharge, genital rashes or sores, no pain to BLE.    Review of Systems   Constitutional: Negative for diaphoresis and unexpected weight change.   Genitourinary: Negative for vaginal discharge.        Groin pain   Musculoskeletal: Negative for back pain and myalgias.   Skin: Negative for rash.   Neurological: Positive for headaches.   All other systems reviewed and are negative.    Allergies  Amoxicillin  Oxycodone    Medications  The patient is currently on no regular medications.    Past Medical History  Radius fracture  Depression  Anxiety  Recurrent tonsillitis    Past Surgical History  Tonsillectomy    Family History  Diabetes  Hypertension  Crohn's disease    Social History  Presents to the ED with a family member.     Physical Exam     Patient Vitals for the past 24 hrs:   BP Temp Temp src Pulse Resp SpO2   01/23/21 2155 137/83 98.2  F (36.8  C) Oral 82 18 100 %     Physical Exam  I have reviewed the triage vital signs    Eyes: No discharge, symmetrical lids  ENT: Moist mucous membranes, no ear discharge  Neck: Full range of motion  Respiratory: CTAB, no wheezes  Cardiovascular: Regular rate and rhythm, no " lower extremity edema  Chest: Equal rise  Gastrointestinal: Soft. Nondistended. NTTP. No rebound or guarding  Musculoskeletal: No gross deformities.   Skin: Warm and well perfused. No visible rash.  Neurologic: Moves all extremities, speech fluent without dysarthria  Psychiatric: Appropriate affect, alert and interactive  Lymphatic: Mildly tender single ~1 cm lymph node felt L inguinal canal, mobile, tender.  No other lymph nodes palpated in R inguinal region, BUE, supraclavicular, axillary, cervical regions.  No lymphedema.    Emergency Department Course   Imaging:  US Lower Extremity Venous Duplex, left:  No deep venous thrombosis in the left lower extremity. as per radiology.     Laboratory:  CBC: WBC: 4.8, HGB: 13.2, PLT: 214  BMP: Chloride 111 (H), albumin 3.2 (L), protein total 6.5 (L), alkphos 38 (L), o/w WNL (Creatinine: 0.81)    Emergency Department Course:  Reviewed:  I reviewed nursing notes, vitals and past medical history    Assessments:   I physically examined the patient as documented above.    0045 I rechecked the patient.     Disposition:  The patient was discharged to home.     Impression & Plan   Medical Decision MakinF presenting with parasthesias and pain to her anterolateral proximal L thigh, who noticed lymph nodes when palpating around after onset of her symptoms.  DDx includes but is not limited to meralgia parasthetica, lymphadenitis, lymphoma, leukemia.  Pt highly anxious regarding her lymph node, which was palpated at urgent care and outpatient US ordered.  Lymph node is solitary on exam, tender, mobile, small.  Pt has no weight loss, night sweats, or any other pain symptoms.  CBC and CMP reassuring.  Low suspicion for lymphoma or other malignant process; advised pt to follow-up with PCP for monitoring.  US obtained, showing no DVT, no Baker's cyst, and normal lymph nodes.  Pt also with a distribution of pain classic for meralgia parasthetica.  Advised avoiding tight waistbands,  will rx short course of prednisone, advised follow-up with PCP for further management.  Return to ED precautions given.    Diagnosis:    ICD-10-CM    1. Meralgia paresthetica of left side  G57.12    2. Lymphadenitis  I88.9      Discharge Medications:  Discharge Medication List as of 1/24/2021 12:54 AM      START taking these medications    Details   predniSONE (DELTASONE) 20 MG tablet Take two tablets (= 40mg) each day for 5 (five) days, Disp-10 tablet, R-0, Local Print           Scribe Disclosure:  Sea HURT, am serving as a scribe at 11:36 PM on 1/23/2021 to document services personally performed by David Conley MD based on my observations and the provider's statements to me.      David Conley MD  01/24/21 [id number]

## 2021-11-23 ENCOUNTER — OFFICE VISIT (OUTPATIENT)
Dept: FAMILY MEDICINE | Facility: CLINIC | Age: 26
End: 2021-11-23

## 2021-11-23 VITALS
BODY MASS INDEX: 28.13 KG/M2 | TEMPERATURE: 97.3 F | OXYGEN SATURATION: 100 % | SYSTOLIC BLOOD PRESSURE: 126 MMHG | DIASTOLIC BLOOD PRESSURE: 80 MMHG | WEIGHT: 185 LBS | HEART RATE: 74 BPM

## 2021-11-23 DIAGNOSIS — B08.1 MOLLUSCUM CONTAGIOSUM: ICD-10-CM

## 2021-11-23 DIAGNOSIS — J02.9 SORE THROAT: Primary | ICD-10-CM

## 2021-11-23 LAB
DEPRECATED S PYO AG THROAT QL EIA: NEGATIVE
GROUP A STREP BY PCR: NOT DETECTED

## 2021-11-23 PROCEDURE — U0005 INFEC AGEN DETEC AMPLI PROBE: HCPCS | Performed by: NURSE PRACTITIONER

## 2021-11-23 PROCEDURE — U0003 INFECTIOUS AGENT DETECTION BY NUCLEIC ACID (DNA OR RNA); SEVERE ACUTE RESPIRATORY SYNDROME CORONAVIRUS 2 (SARS-COV-2) (CORONAVIRUS DISEASE [COVID-19]), AMPLIFIED PROBE TECHNIQUE, MAKING USE OF HIGH THROUGHPUT TECHNOLOGIES AS DESCRIBED BY CMS-2020-01-R: HCPCS | Performed by: NURSE PRACTITIONER

## 2021-11-23 PROCEDURE — 99214 OFFICE O/P EST MOD 30 MIN: CPT | Performed by: NURSE PRACTITIONER

## 2021-11-23 PROCEDURE — 87651 STREP A DNA AMP PROBE: CPT | Performed by: NURSE PRACTITIONER

## 2021-11-23 RX ORDER — IMIQUIMOD 12.5 MG/.25G
CREAM TOPICAL
Qty: 12 PACKET | Refills: 3 | Status: SHIPPED | OUTPATIENT
Start: 2021-11-23 | End: 2022-09-27

## 2021-11-23 ASSESSMENT — ANXIETY QUESTIONNAIRES
6. BECOMING EASILY ANNOYED OR IRRITABLE: NOT AT ALL
8. IF YOU CHECKED OFF ANY PROBLEMS, HOW DIFFICULT HAVE THESE MADE IT FOR YOU TO DO YOUR WORK, TAKE CARE OF THINGS AT HOME, OR GET ALONG WITH OTHER PEOPLE?: NOT DIFFICULT AT ALL
1. FEELING NERVOUS, ANXIOUS, OR ON EDGE: NOT AT ALL
GAD7 TOTAL SCORE: 0
3. WORRYING TOO MUCH ABOUT DIFFERENT THINGS: NOT AT ALL
GAD7 TOTAL SCORE: 0
5. BEING SO RESTLESS THAT IT IS HARD TO SIT STILL: NOT AT ALL
4. TROUBLE RELAXING: NOT AT ALL
7. FEELING AFRAID AS IF SOMETHING AWFUL MIGHT HAPPEN: NOT AT ALL
GAD7 TOTAL SCORE: 0
2. NOT BEING ABLE TO STOP OR CONTROL WORRYING: NOT AT ALL
7. FEELING AFRAID AS IF SOMETHING AWFUL MIGHT HAPPEN: NOT AT ALL

## 2021-11-23 ASSESSMENT — PAIN SCALES - GENERAL: PAINLEVEL: EXTREME PAIN (9)

## 2021-11-23 ASSESSMENT — PATIENT HEALTH QUESTIONNAIRE - PHQ9
SUM OF ALL RESPONSES TO PHQ QUESTIONS 1-9: 0
SUM OF ALL RESPONSES TO PHQ QUESTIONS 1-9: 0
10. IF YOU CHECKED OFF ANY PROBLEMS, HOW DIFFICULT HAVE THESE PROBLEMS MADE IT FOR YOU TO DO YOUR WORK, TAKE CARE OF THINGS AT HOME, OR GET ALONG WITH OTHER PEOPLE: NOT DIFFICULT AT ALL

## 2021-11-23 NOTE — LETTER
November 23, 2021      Antonia Marc  [address] [place] [id number]        To Whom It May Concern:    Antonia Marc was seen in our clinic. Please excuse her from work today 11/23/2021       Sincerely,        ROS Childs CNP

## 2021-11-23 NOTE — PATIENT INSTRUCTIONS
Patient Education     Self-Care for Sore Throats     Sore throats happen for many reasons, such as colds, allergies, cigarette smoke, air pollution, and infections caused by viruses or bacteria. In any case, your throat becomes red and sore. Your goal for self-care is to reduce your discomfort while giving your throat a chance to heal.  Moisten and soothe your throat  Tips include the following:    Try a sip of water first thing after waking up.    Keep your throat moist by drinking 6 or more glasses of clear liquids every day.    Run a cool-air humidifier in your room overnight.    Stay away from cigarette smoke.     Check the air quality index,if air pollution gives you a sore throat. On high pollution days, try to limit outdoor time.    Suck on throat lozenges, cough drops, hard candy, ice chips, or frozen fruit-juice bars. Use the sugar-free versions if your diet or medical condition requires them.  Gargle to ease irritation  Gargling every hour or 2 can ease irritation. Try gargling with 1 of these solutions:    1/4 teaspoon of salt in 1/2 cup of warm water    An over-the-counter anesthetic gargle  Use medicine for more relief  Over-the-counter medicine can reduce sore throat symptoms. Ask your pharmacist if you have questions about which medicine to use. To prevent possible medicine interactions, let the pharmacist know what medicines you take. To decrease symptoms:    Ease pain with anesthetic sprays. Aspirin or an aspirin substitute also helps. Remember, never give aspirin to anyone 18 or younger. Don't take aspirin if you are already taking blood thinners.     For sore throats caused by allergies, try antihistamines to block the allergic reaction.  Unless a sore throat is caused by a bacterial infection, antibiotics won t help you.  Prevent future sore throats  Prevention tips include:    Stop smoking or reduce contact with secondhand smoke. Smoke irritates the tender throat lining.    Limit contact with  pets and with allergy-causing substances, such as pollen and mold.    Wash your hands often when you re around someone with a sore throat or cold. This will keep viruses or bacteria from spreading.    Limit outdoor time when air pollution is bad.    Don t strain your vocal cords.  When to call your healthcare provider  Contact your healthcare provider if you have:    Fever of 100.4 F (38.0 C) or higher, or as directed by your healthcare provider    White spots on the throat    Great Trouble swallowing    A skin rash    Recent exposure to someone else with strep bacteria    Severe hoarseness and swollen glands in the neck or jaw  Call 911  Call 911 if any of the following occur:    Trouble breathing or catching your breath    Drooling and problems swallowing    Wheezing    Unable to talk    Feeling dizzy or faint    Feeling of doom  Tyba last reviewed this educational content on 9/1/2019 2000-2021 [employer]. All rights reserved. This information is not intended as a substitute for professional medical care. Always follow your healthcare professional's instructions.           Patient Education     Molluscum Contagiosum (Adult)  Molluscum contagiosum is a common skin infection. It is caused by a pox virus. The infection results in raised, flesh-colored bumps with central indentations on the skin. The bumps are sometimes itchy, but not painful. They may spread or form lines when scratched. Almost any area of skin can be affected. Common sites include the face, neck, armpit, arms, hands, and genitals.    Molluscum contagiosum spreads easily from one part of the body to another. It spreads through scratching or other contact. It can also spread from person to person. This often happens through shared clothing, towels, or objects such as shared sports gear. It can spread during contact sports or sexual contact.  Because it is caused by a virus, antibiotics don't help. The infection usually goes away  on its own within a period of 6 to 18 months, but may spread if not treated. The infection may continue in people with a weak immune system. This includes people with diabetes, cancer, or HIV.  If the bumps are bothersome or unsightly, treatment may remove them. This may include scraping, freezing, or by applying an acid, blistering solution, or a cream that modulates the immune system.  Home care  Your healthcare provider can prescribe a medicine to help the bumps heal. Follow the provider s instructions for using these medicines.    The following are general care guidelines:    Don't scratch the rash. Scratching spreads the infection. If needed, cover affected skin with bandages to help prevent scratching.    Wash your hands before and after caring for the rash.    Don't share towels, washcloths, or clothing with anyone.    Don't shave any areas where the bumps are present.    Don't have sex if the bumps are in the genital area.    If participating in contact sports or other activity that involves skin-to-skin contact, cover all affected skin with clothing or bandages.    Don't swim in public pools until the rash clears.  Follow-up care  Follow up with your healthcare provider, or as advised.  When to seek medical advice  Call your healthcare provider right away if any of these occur:    Fever of 100.4 F (38 C) or higher    Signs of infection, such as warmth, pain, oozing, or redness    Bumps appear on a new area of the body or seem to be spreading rapidly  [employer] last reviewed this educational content on 6/1/2018 2000-2021 [employer]. All rights reserved. This information is not intended as a substitute for professional medical care. Always follow your healthcare professional's instructions.

## 2021-11-23 NOTE — PROGRESS NOTES
Assessment & Plan     Sore throat  Labs done today to look for cause  - Streptococcus A Rapid Scr w Reflx to PCR - Lab Collect  - Symptomatic COVID-19 Virus (Coronavirus) by PCR  - Group A Streptococcus PCR Throat Swab    Molluscum contagiosum  Symptomatic care strategies reviewed prevention strategies reviewed begin new medication side effects discussed  - imiquimod (ALDARA) 5 % external cream  Dispense: 12 packet; Refill: 3      Call or return to the clinic with any worsening of symptoms or no resolution. Patient/Parent verbalized understanding and is in agreement. Medication side effects reviewed.   Current Outpatient Medications   Medication Sig Dispense Refill     imiquimod (ALDARA) 5 % external cream Apply a small sized amount to warts or molluscum three times weekly at bedtime.   Wash off after 8 hours.   May use for up to 16 weeks. 12 packet 3     Chart documentation with Dragon Voice recognition Software. Although reviewed after completion, some words and grammatical errors may remain.  See after visit summary patient information  ROS Childs CNP  [facility]    Aquilino Knight is a 25 year old who presents for the following health issues     HPI     Concern - Derm   Onset: 3 weeks   Description: Thigh area, vaginal area  Intensity: moderate  Progression of Symptoms:  worsening  Accompanying Signs & Symptoms: pain in throat  Feels like lump   extreme fatigue   Neck feels swollen   Groin pain, left Lymphadenopathy in Jan. 2021    Sore throat x 3 days-no cough-no fever    Sexually active 1 partner x1 year  No previous history of STI  Menstrual periods monthly        Review of Systems   Constitutional, HEENT, cardiovascular, pulmonary, GI, , musculoskeletal, neuro, skin, endocrine and psych systems are negative, except as otherwise noted.      Objective    /80   Pulse 74   Temp 97.3  F (36.3  C) (Tympanic)   Wt 83.9 kg (185 lb)   LMP 11/16/2021    SpO2 100%   BMI 28.13 kg/m    Body mass index is 28.13 kg/m .  Physical Exam   GENERAL: healthy, alert and no distress  EYES: Eyes grossly normal to inspection, PERRL and conjunctivae and sclerae normal  HENT: ear canals and TM's normal, nose and mouth without ulcers or lesions posterior pharynx is slightly injected.  Taste bud posterior tongue hypertrophy present  NECK: no adenopathy, no asymmetry, masses, or scars and thyroid normal to palpation  RESP: lungs clear to auscultation - no rales, rhonchi or wheezes  CV: regular rate and rhythm, normal S1 S2, no S3 or S4, no murmur, click or rub, no peripheral edema and peripheral pulses strong  ABDOMEN: soft, nontender, no hepatosplenomegaly, no masses and bowel sounds normal  MS: no gross musculoskeletal defects noted, no edema  SKIN: Flesh-colored pinpoint molluscum lesions 3 on the left inner thigh 2 on the right inner thigh  NEURO: Normal strength and tone, mentation intact and speech normal  PSYCH: mentation appears normal, affect normal/bright    Admission on 01/23/2021, Discharged on 01/24/2021   Component Date Value Ref Range Status     WBC 01/23/2021 4.8  4.0 - 11.0 10e9/L Final     RBC Count 01/23/2021 4.51  3.8 - 5.2 10e12/L Final     Hemoglobin 01/23/2021 13.2  11.7 - 15.7 g/dL Final     Hematocrit 01/23/2021 38.6  35.0 - 47.0 % Final     MCV 01/23/2021 86  78 - 100 fl Final     MCH 01/23/2021 29.3  26.5 - 33.0 pg Final     MCHC 01/23/2021 34.2  31.5 - 36.5 g/dL Final     RDW 01/23/2021 12.5  10.0 - 15.0 % Final     Platelet Count 01/23/2021 214  150 - 450 10e9/L Final     Diff Method 01/23/2021 Automated Method   Final     % Neutrophils 01/23/2021 55.6  % Final     % Lymphocytes 01/23/2021 33.8  % Final     % Monocytes 01/23/2021 8.1  % Final     % Eosinophils 01/23/2021 1.5  % Final     % Basophils 01/23/2021 0.6  % Final     % Immature Granulocytes 01/23/2021 0.4  % Final     Nucleated RBCs 01/23/2021 0  0 /100 Final     Absolute Neutrophil 01/23/2021  2.7  1.6 - 8.3 10e9/L Final     Absolute Lymphocytes 01/23/2021 1.6  0.8 - 5.3 10e9/L Final     Absolute Monocytes 01/23/2021 0.4  0.0 - 1.3 10e9/L Final     Absolute Eosinophils 01/23/2021 0.1  0.0 - 0.7 10e9/L Final     Absolute Basophils 01/23/2021 0.0  0.0 - 0.2 10e9/L Final     Abs Immature Granulocytes 01/23/2021 0.0  0 - 0.4 10e9/L Final     Absolute Nucleated RBC 01/23/2021 0.0   Final     Sodium 01/23/2021 140  133 - 144 mmol/L Final     Potassium 01/23/2021 3.8  3.4 - 5.3 mmol/L Final     Chloride 01/23/2021 111* 94 - 109 mmol/L Final     Carbon Dioxide 01/23/2021 25  20 - 32 mmol/L Final     Anion Gap 01/23/2021 4  3 - 14 mmol/L Final     Glucose 01/23/2021 93  70 - 99 mg/dL Final     Urea Nitrogen 01/23/2021 13  7 - 30 mg/dL Final     Creatinine 01/23/2021 0.81  0.52 - 1.04 mg/dL Final     GFR Estimate 01/23/2021 >90  >60 mL/min/[1.73_m2] Final    Comment: Non  GFR Calc  Starting 12/18/2018, serum creatinine based estimated GFR (eGFR) will be   calculated using the Chronic Kidney Disease Epidemiology Collaboration   (CKD-EPI) equation.       GFR Estimate If Black 01/23/2021 >90  >60 mL/min/[1.73_m2] Final    Comment:  GFR Calc  Starting 12/18/2018, serum creatinine based estimated GFR (eGFR) will be   calculated using the Chronic Kidney Disease Epidemiology Collaboration   (CKD-EPI) equation.       Calcium 01/23/2021 8.6  8.5 - 10.1 mg/dL Final     Bilirubin Total 01/23/2021 1.0  0.2 - 1.3 mg/dL Final     Albumin 01/23/2021 3.2* 3.4 - 5.0 g/dL Final     Protein Total 01/23/2021 6.5* 6.8 - 8.8 g/dL Final     Alkaline Phosphatase 01/23/2021 38* 40 - 150 U/L Final     ALT 01/23/2021 22  0 - 50 U/L Final     AST 01/23/2021 17  0 - 45 U/L Final               Answers for HPI/ROS submitted by the patient on 11/23/2021  If you checked off any problems, how difficult have these problems made it for you to do your work, take care of things at home, or get along with other people?:  Not difficult at all  PHQ9 TOTAL SCORE: 0  JAZZY 7 TOTAL SCORE: 0

## 2021-11-23 NOTE — NURSING NOTE
"Chief Complaint   Patient presents with     Derm Problem       Initial /80   Pulse 74   Temp 97.3  F (36.3  C) (Tympanic)   Wt 83.9 kg (185 lb)   LMP 11/16/2021   SpO2 100%   BMI 28.13 kg/m   Estimated body mass index is 28.13 kg/m  as calculated from the following:    Height as of 1/22/21: 1.727 m (5' 8\").    Weight as of this encounter: 83.9 kg (185 lb).    Patient presents to the clinic using No DME    Health Maintenance that is potentially due pending provider review:  Pap Smear    Gave pt phone number/pended order to schedule mammo and/or colonoscopy(or FIT)    Is there anyone who you would like to be able to receive your results? No  If yes have patient fill out BRENT    "

## 2021-11-24 LAB — SARS-COV-2 RNA RESP QL NAA+PROBE: NEGATIVE

## 2021-11-24 ASSESSMENT — PATIENT HEALTH QUESTIONNAIRE - PHQ9: SUM OF ALL RESPONSES TO PHQ QUESTIONS 1-9: 0

## 2021-11-24 ASSESSMENT — ANXIETY QUESTIONNAIRES: GAD7 TOTAL SCORE: 0

## 2021-12-25 ENCOUNTER — HEALTH MAINTENANCE LETTER (OUTPATIENT)
Age: 26
End: 2021-12-25

## 2022-02-22 ENCOUNTER — TRANSFERRED RECORDS (OUTPATIENT)
Dept: HEALTH INFORMATION MANAGEMENT | Facility: CLINIC | Age: 27
End: 2022-02-22
Payer: COMMERCIAL

## 2022-04-29 ENCOUNTER — TELEPHONE (OUTPATIENT)
Dept: FAMILY MEDICINE | Facility: CLINIC | Age: 27
End: 2022-04-29
Payer: COMMERCIAL

## 2022-04-29 NOTE — TELEPHONE ENCOUNTER
Patient Quality Outreach    Patient is due for the following:   Cervical Cancer Screening - PAP Needed  Physical  - recommend anytime  Immunizations  -  Tdap    NEXT STEPS:   Schedule a yearly physical    Type of outreach:    Sent [employer] message.      Questions for provider review:    None     Bailee Kahler

## 2022-08-08 ENCOUNTER — OFFICE VISIT (OUTPATIENT)
Dept: FAMILY MEDICINE | Facility: CLINIC | Age: 27
End: 2022-08-08
Payer: COMMERCIAL

## 2022-08-08 VITALS
OXYGEN SATURATION: 100 % | BODY MASS INDEX: 32.54 KG/M2 | RESPIRATION RATE: 18 BRPM | TEMPERATURE: 98.5 F | SYSTOLIC BLOOD PRESSURE: 120 MMHG | DIASTOLIC BLOOD PRESSURE: 74 MMHG | HEART RATE: 69 BPM | WEIGHT: 214 LBS

## 2022-08-08 DIAGNOSIS — F41.9 ANXIETY: ICD-10-CM

## 2022-08-08 DIAGNOSIS — G43.011 INTRACTABLE MIGRAINE WITHOUT AURA AND WITH STATUS MIGRAINOSUS: Primary | ICD-10-CM

## 2022-08-08 PROCEDURE — 99214 OFFICE O/P EST MOD 30 MIN: CPT | Performed by: NURSE PRACTITIONER

## 2022-08-08 RX ORDER — PROCHLORPERAZINE MALEATE 5 MG
5 TABLET ORAL EVERY 8 HOURS PRN
COMMUNITY
End: 2022-09-27

## 2022-08-08 RX ORDER — HYDROXYZINE HYDROCHLORIDE 25 MG/1
25 TABLET, FILM COATED ORAL 3 TIMES DAILY PRN
Qty: 60 TABLET | Refills: 1 | Status: SHIPPED | OUTPATIENT
Start: 2022-08-08 | End: 2022-09-27

## 2022-08-08 RX ORDER — TOPIRAMATE 25 MG/1
TABLET, FILM COATED ORAL
Qty: 120 TABLET | Refills: 1 | Status: SHIPPED | OUTPATIENT
Start: 2022-08-08 | End: 2022-09-02

## 2022-08-08 RX ORDER — SUMATRIPTAN 25 MG/1
25 TABLET, FILM COATED ORAL
Qty: 30 TABLET | Refills: 1 | Status: SHIPPED | OUTPATIENT
Start: 2022-08-08 | End: 2022-09-02

## 2022-08-08 ASSESSMENT — PATIENT HEALTH QUESTIONNAIRE - PHQ9
5. POOR APPETITE OR OVEREATING: NEARLY EVERY DAY
10. IF YOU CHECKED OFF ANY PROBLEMS, HOW DIFFICULT HAVE THESE PROBLEMS MADE IT FOR YOU TO DO YOUR WORK, TAKE CARE OF THINGS AT HOME, OR GET ALONG WITH OTHER PEOPLE: NOT DIFFICULT AT ALL
SUM OF ALL RESPONSES TO PHQ QUESTIONS 1-9: 0
SUM OF ALL RESPONSES TO PHQ QUESTIONS 1-9: 0

## 2022-08-08 ASSESSMENT — ANXIETY QUESTIONNAIRES
3. WORRYING TOO MUCH ABOUT DIFFERENT THINGS: NEARLY EVERY DAY
2. NOT BEING ABLE TO STOP OR CONTROL WORRYING: NEARLY EVERY DAY
1. FEELING NERVOUS, ANXIOUS, OR ON EDGE: SEVERAL DAYS
5. BEING SO RESTLESS THAT IT IS HARD TO SIT STILL: NEARLY EVERY DAY
IF YOU CHECKED OFF ANY PROBLEMS ON THIS QUESTIONNAIRE, HOW DIFFICULT HAVE THESE PROBLEMS MADE IT FOR YOU TO DO YOUR WORK, TAKE CARE OF THINGS AT HOME, OR GET ALONG WITH OTHER PEOPLE: VERY DIFFICULT
GAD7 TOTAL SCORE: 14
6. BECOMING EASILY ANNOYED OR IRRITABLE: SEVERAL DAYS
GAD7 TOTAL SCORE: 14
7. FEELING AFRAID AS IF SOMETHING AWFUL MIGHT HAPPEN: NOT AT ALL

## 2022-08-08 ASSESSMENT — PAIN SCALES - GENERAL: PAINLEVEL: EXTREME PAIN (9)

## 2022-08-08 NOTE — PATIENT INSTRUCTIONS
Start topiramate daily as we discussed.  Can try imitrex for relief of headache as needed.  Let me know if things aren't getting better.

## 2022-08-08 NOTE — LETTER
August 8, 2022      Antonia Marc  [address] [place] [id number]        To Whom It May Concern:    Antonia Marc  was seen on 8/8/2022.  Please excuse her  until 8/10/2022 due to illness.        Sincerely,        ROS Greene CNP

## 2022-08-08 NOTE — PROGRESS NOTES
Assessment & Plan     Intractable migraine without aura and with status migrainosus  Daily headaches for several months. Negative MRI and normal labs during ER visit on 8/4/2022. Start topiramate, gradually increase dose to symptomatic relief or 100mg. Can try Imitrex for abortive therapy, however we discussed that given the length of time she has had headache, may not be effective right now. She will send me a [employer] message in 2 weeks with updated symptoms.  - topiramate (TOPAMAX) 25 MG tablet; Take one tablet at bedtime for 5 days, then increase to 2 tablets for 5 days, then increase to 3 tablets for 5 days, then 4 tablets nightly.  - SUMAtriptan (IMITREX) 25 MG tablet; Take 1 tablet (25 mg) by mouth at onset of headache for migraine May repeat in 2 hours. Max 8 tablets/24 hours.    Anxiety  Increasing, unsure if related to consistent headaches. We discussed starting medication for this, however also starting medication for migraines. Can use hydroxyzine for now. If anxiety persists after headaches are controlled, would recommend starting an selective serotonin reuptake inhibitor at that time. She will let me know how this is going.  - hydrOXYzine (ATARAX) 25 MG tablet; Take 1 tablet (25 mg) by mouth 3 times daily as needed for anxiety    Patient Instructions   Start topiramate daily as we discussed.  Can try imitrex for relief of headache as needed.  Let me know if things aren't getting better.      Return in about 2 weeks (around 8/22/2022) for worsening or continued symptoms.    ROS Greene CNP  [facility]   Antonia is a 26 year old, presenting for the following health issues:  Headache      HPI     Concern - Migraines    Onset: 5 days  Description: headaches every day for the last month, worsened last Wednesday and fainted at work and now is having migraines daily. Feels anxiety is worsening too so dealing with shortness of breath, panic  attacks.  Intensity: severe 9/10  Progression of Symptoms:  constant  Accompanying Signs & Symptoms: anxiety   Previous history of similar problem: has head headaches not migraines  Precipitating factors:        Worsened by: none  Alleviating factors:        Improved by: none  Therapies tried and outcome: has been taking compazine and feels that has made anxiety worse. Also has been using excedrin and is concerned caffeine is making things worse.    Above HPI reviewed. Additionally, daily headaches for months, awakens with them in the morning and progressively worsen throughout the day. Had syncopal episode with preceding dizziness on 8/4/2022. Seen in ED at [place] at that time. MRI was done and was unremarkable. Labs normal. Was given droperidol, benadryl and IVF with marked improvement in the ED, but symptoms later returned. Was given compazine for home, helps with nausea but not headache. Endorses nausea, photophobia, occasional dizziness. No previous history of migraines. Mom is in clinic and denies H migraines.      Review of Systems   Constitutional, HEENT, cardiovascular, pulmonary, gi and gu systems are negative, except as otherwise noted.    Answers for HPI/ROS submitted by the patient on 8/8/2022  If you checked off any problems, how difficult have these problems made it for you to do your work, take care of things at home, or get along with other people?: Not difficult at all  PHQ9 TOTAL SCORE: 0        Objective    /74 (BP Location: Right arm, Patient Position: Sitting, Cuff Size: Adult Regular)   Pulse 69   Temp 98.5  F (36.9  C) (Tympanic)   Resp 18   Wt 97.1 kg (214 lb)   LMP 07/08/2022   SpO2 100%   BMI 32.54 kg/m    Body mass index is 32.54 kg/m .  Physical Exam   General Appearance:  Alert, cooperative, no distress, appears stated age. Afebrile.  Integument: Warm, dry, no rashes or lesions.  HEENT:  Head: Atraumatic, normocephalic. No cranial bone tenderness. Face  nontraumatic.  Eyes: Conjunctiva clear, Lids normal. PERRL.   Nose: nares patent. No erythema of nasal mucosa. No rhinorrhea.  Neck: Supple. No Cspine tenderness.  Respiratory: No distress.   Cardiovascular:: Regular rate  GI: Soft, nontender, normal bowel sounds. No masses, organomegaly, rigidity, or guarding.  Musculoskeletal: No swelling or erythema of extremities. Moves all extremities equally. Back: no Tspine or Lspine tenderness.   Neurologic: Alert & oriented to person, place and time. Normal tone. PERRL. Normal speech, no dysarthria.  CN II-XII grossly intact. Motor: RUE/LUE  strength 5/5 bilaterally, elbow flexion/extension 5/5 bilaterally, shoulder elevation 5/5 bilaterally. RLE/LLE knee flexion/extension 5/5 bilaterally, ankle plantar/dorsiflexion 5/5 bilaterally. No pronator drift. Sensory: intact distally  Gait: Normal, no assistive devices. Assistance of one. Psychomotor slowing (-). Abnormal Movements (-).Rapid alternating Movements intact. Finger nose finger intact. Heel to shin normal bilaterally.  Psych: Normal mood and affect.              .  ..

## 2022-08-12 ENCOUNTER — OFFICE VISIT (OUTPATIENT)
Dept: FAMILY MEDICINE | Facility: CLINIC | Age: 27
End: 2022-08-12
Payer: COMMERCIAL

## 2022-08-12 ENCOUNTER — NURSE TRIAGE (OUTPATIENT)
Dept: NURSING | Facility: CLINIC | Age: 27
End: 2022-08-12

## 2022-08-12 DIAGNOSIS — Z53.9 ERRONEOUS ENCOUNTER--DISREGARD: Primary | ICD-10-CM

## 2022-08-12 NOTE — TELEPHONE ENCOUNTER
Pt looks like she may be active in [employer] account.    Message sent to pt.  Anne Salinas RN

## 2022-08-12 NOTE — TELEPHONE ENCOUNTER
Recommend follow up as scheduled next week, try to drink more fluids  Try Tylenol 500 mg 4 times daily for headaches, may also try Roquedrin     ROS Hansen CNP

## 2022-08-12 NOTE — TELEPHONE ENCOUNTER
"Episodes of fainting at work  Today complaining of headache  And weird sensation through her body of \"feelings of passing out\"  Numbing sensation followed by feelings of extreme heat \" Hotness\"  Patient says she is adequately hydrated  \"Symptoms are mild and seems to take a lot out of her\"  Triage guidelines recommend to see pcp within 2 weeks  Caller verbalized and understands directives  COVID 19 Nurse Triage Plan/Patient Instructions    Please be aware that novel coronavirus (COVID-19) may be circulating in the community. If you develop symptoms such as fever, cough, or SOB or if you have concerns about the presence of another infection including coronavirus (COVID-19), please contact your health care provider or visit [email or web address].     Disposition/Instructions    In-Person Visit with provider recommended. Reference Visit Selection Guide.    Thank you for taking steps to prevent the spread of this virus.  o Limit your contact with others.  o Wear a simple mask to cover your cough.  o Wash your hands well and often.    Resources    [facility]: About COVID-19: [email or web address]    CDC: What to Do If You're Sick: www.cdc.gov/coronavirus/2019-ncov/about/steps-when-sick.html    CDC: Ending Home Isolation: www.cdc.gov/coronavirus/2019-ncov/hcp/disposition-in-home-patients.html     CDC: Caring for Someone: www.cdc.gov/coronavirus/2019-ncov/if-you-are-sick/care-for-someone.html     [facility]: Interim Guidance for Hospital Discharge to Home: www.health.[facility].[place]/diseases/coronavirus/hcp/hospdischarge.pdf    [facility] clinical trials (COVID-19 research studies): [email or web address]-clinical-trials     Below are the COVID-19 hotlines at the [place] Department of Health ([facility]). Interpreters are available.   o For health questions: Call 575-093-4726 or 1-757.773.2038 (7 a.m. to 7 p.m.)  o For questions about schools and childcare: Call 335-091-3526 or 1-826.316.4080 (7 " a.m. to 7 p.m.)                     Reason for Disposition    Simple fainting is a chronic symptom (has occurred multiple times)    Additional Information    Negative: Still unconscious    Negative: Still feels dizzy or lightheaded    Negative: Difficult to awaken or acting confused (e.g., disoriented, slurred speech)    Negative: Difficulty breathing    Negative: Bluish (or gray) lips or face    Negative: Shock suspected (e.g., cold/pale/clammy skin, too weak to stand, low BP, rapid pulse)    Negative: Bleeding (e.g., vomiting blood, rectal bleeding or tarry stools, severe vaginal bleeding)    Negative: Chest pain    Negative: Extra heart beats or heart is beating fast (i.e., palpitations)    Negative: Heart beating < 50 beats per minute OR > 140 beats per minute    Negative: Fainted suddenly after medicine, allergic food or bee sting    Negative: Sounds like a life-threatening emergency to the triager    Negative: Has diabetes (diabetes mellitus) and fainting from low blood sugar (i.e., < 70 mg/dL or 3.9 mmol/L)    Negative: Seizure suspected (e.g., muscle jerking or shaking followed by confusion)    Negative: Heat exhaustion suspected (i.e., dehydration from heat exposure)    Negative: Fainted > 15 minutes ago and still looks pale (pale skin, pallor)    Negative: Fainted > 15 minutes ago and still feels weak or dizzy    Negative: History of heart problems or congestive heart failure    Negative: Occurred during exercise    Negative: Any head or face injury    Negative: Age > 50 years    Negative: Drinking very little and has signs of dehydration (e.g., no urine > 12 hours, very dry mouth)    Negative: Fainted 2 times in one day    Negative: Patient sounds very sick or weak to the triager    Negative: All other patients, and now alert and feels fine  (Exception: SIMPLE FAINT due to stress, pain, prolonged standing, or suddenly standing.)    Negative: Patient wants to be seen    Protocols used: [id number]

## 2022-08-17 ENCOUNTER — APPOINTMENT (OUTPATIENT)
Dept: GENERAL RADIOLOGY | Facility: CLINIC | Age: 27
End: 2022-08-17
Attending: FAMILY MEDICINE
Payer: COMMERCIAL

## 2022-08-17 ENCOUNTER — HOSPITAL ENCOUNTER (EMERGENCY)
Facility: CLINIC | Age: 27
Discharge: HOME | End: 2022-08-17
Attending: FAMILY MEDICINE | Admitting: FAMILY MEDICINE
Payer: COMMERCIAL

## 2022-08-17 ENCOUNTER — HOSPITAL ENCOUNTER (OUTPATIENT)
Dept: CARDIOLOGY | Facility: CLINIC | Age: 27
Discharge: HOME | End: 2022-08-17
Attending: NURSE PRACTITIONER | Admitting: NURSE PRACTITIONER
Payer: COMMERCIAL

## 2022-08-17 ENCOUNTER — OFFICE VISIT (OUTPATIENT)
Dept: FAMILY MEDICINE | Facility: CLINIC | Age: 27
End: 2022-08-17
Payer: COMMERCIAL

## 2022-08-17 VITALS
TEMPERATURE: 100.6 F | WEIGHT: 210 LBS | RESPIRATION RATE: 18 BRPM | SYSTOLIC BLOOD PRESSURE: 127 MMHG | OXYGEN SATURATION: 100 % | BODY MASS INDEX: 31.93 KG/M2 | HEART RATE: 82 BPM | DIASTOLIC BLOOD PRESSURE: 80 MMHG

## 2022-08-17 VITALS
BODY MASS INDEX: 32.43 KG/M2 | OXYGEN SATURATION: 100 % | WEIGHT: 214 LBS | SYSTOLIC BLOOD PRESSURE: 122 MMHG | RESPIRATION RATE: 16 BRPM | DIASTOLIC BLOOD PRESSURE: 70 MMHG | TEMPERATURE: 98.4 F | HEART RATE: 75 BPM | HEIGHT: 68 IN

## 2022-08-17 DIAGNOSIS — G90.A POTS (POSTURAL ORTHOSTATIC TACHYCARDIA SYNDROME): ICD-10-CM

## 2022-08-17 DIAGNOSIS — R42 POSTURAL DIZZINESS WITH NEAR SYNCOPE: Primary | ICD-10-CM

## 2022-08-17 DIAGNOSIS — R51.9 ACUTE NONINTRACTABLE HEADACHE, UNSPECIFIED HEADACHE TYPE: ICD-10-CM

## 2022-08-17 DIAGNOSIS — R50.9 FEVER, UNSPECIFIED FEVER CAUSE: ICD-10-CM

## 2022-08-17 DIAGNOSIS — G44.209 TENSION HEADACHE: ICD-10-CM

## 2022-08-17 DIAGNOSIS — R42 DIZZINESS: ICD-10-CM

## 2022-08-17 DIAGNOSIS — R55 POSTURAL DIZZINESS WITH NEAR SYNCOPE: Primary | ICD-10-CM

## 2022-08-17 DIAGNOSIS — R03.0 ELEVATED BP WITHOUT DIAGNOSIS OF HYPERTENSION: ICD-10-CM

## 2022-08-17 LAB
ALBUMIN SERPL-MCNC: 3.9 G/DL (ref 3.4–5)
ALBUMIN UR-MCNC: NEGATIVE MG/DL
ALP SERPL-CCNC: 53 U/L (ref 40–150)
ALT SERPL W P-5'-P-CCNC: 24 U/L (ref 0–50)
ANION GAP SERPL CALCULATED.3IONS-SCNC: 5 MMOL/L (ref 3–14)
APPEARANCE UR: CLEAR
AST SERPL W P-5'-P-CCNC: 14 U/L (ref 0–45)
BASOPHILS # BLD AUTO: 0 10E3/UL (ref 0–0.2)
BASOPHILS NFR BLD AUTO: 1 %
BILIRUB SERPL-MCNC: 1.1 MG/DL (ref 0.2–1.3)
BILIRUB UR QL STRIP: NEGATIVE
BUN SERPL-MCNC: 12 MG/DL (ref 7–30)
CALCIUM SERPL-MCNC: 8.8 MG/DL (ref 8.5–10.1)
CHLORIDE BLD-SCNC: 111 MMOL/L (ref 94–109)
CO2 SERPL-SCNC: 24 MMOL/L (ref 20–32)
COLOR UR AUTO: ABNORMAL
CREAT SERPL-MCNC: 0.74 MG/DL (ref 0.52–1.04)
D DIMER PPP FEU-MCNC: 0.3 UG/ML FEU (ref 0–0.5)
EOSINOPHIL # BLD AUTO: 0.1 10E3/UL (ref 0–0.7)
EOSINOPHIL NFR BLD AUTO: 2 %
ERYTHROCYTE [DISTWIDTH] IN BLOOD BY AUTOMATED COUNT: 12.2 % (ref 10–15)
FLUAV RNA SPEC QL NAA+PROBE: NEGATIVE
FLUBV RNA RESP QL NAA+PROBE: NEGATIVE
GFR SERPL CREATININE-BSD FRML MDRD: >90 ML/MIN/1.73M2
GLUCOSE BLD-MCNC: 95 MG/DL (ref 70–99)
GLUCOSE UR STRIP-MCNC: NEGATIVE MG/DL
HCG SERPL QL: NEGATIVE
HCT VFR BLD AUTO: 44.7 % (ref 35–47)
HGB BLD-MCNC: 15.2 G/DL (ref 11.7–15.7)
HGB UR QL STRIP: NEGATIVE
HOLD SPECIMEN: NORMAL
KETONES UR STRIP-MCNC: NEGATIVE MG/DL
LEUKOCYTE ESTERASE UR QL STRIP: NEGATIVE
LYMPHOCYTES # BLD AUTO: 2.3 10E3/UL (ref 0.8–5.3)
LYMPHOCYTES NFR BLD AUTO: 34 %
MCH RBC QN AUTO: 28.8 PG (ref 26.5–33)
MCHC RBC AUTO-ENTMCNC: 34 G/DL (ref 31.5–36.5)
MCV RBC AUTO: 85 FL (ref 78–100)
MONOCYTES # BLD AUTO: 0.5 10E3/UL (ref 0–1.3)
MONOCYTES NFR BLD AUTO: 7 %
NEUTROPHILS # BLD AUTO: 3.8 10E3/UL (ref 1.6–8.3)
NEUTROPHILS NFR BLD AUTO: 56 %
NITRATE UR QL: NEGATIVE
NT-PROBNP SERPL-MCNC: 149 PG/ML (ref 0–450)
PH UR STRIP: 7.5 [PH] (ref 5–7)
PLATELET # BLD AUTO: 276 10E3/UL (ref 150–450)
POTASSIUM BLD-SCNC: 4 MMOL/L (ref 3.4–5.3)
PROT SERPL-MCNC: 7.4 G/DL (ref 6.8–8.8)
RBC # BLD AUTO: 5.28 10E6/UL (ref 3.8–5.2)
SARS-COV-2 RNA RESP QL NAA+PROBE: NEGATIVE
SODIUM SERPL-SCNC: 140 MMOL/L (ref 133–144)
SP GR UR STRIP: 1 (ref 1–1.03)
TROPONIN I SERPL HS-MCNC: <3 NG/L
TSH SERPL DL<=0.005 MIU/L-ACNC: 1.67 MU/L (ref 0.4–4)
UROBILINOGEN UR STRIP-MCNC: NORMAL MG/DL
WBC # BLD AUTO: 6.8 10E3/UL (ref 4–11)

## 2022-08-17 PROCEDURE — 87636 SARSCOV2 & INF A&B AMP PRB: CPT | Performed by: FAMILY MEDICINE

## 2022-08-17 PROCEDURE — 84484 ASSAY OF TROPONIN QUANT: CPT | Performed by: FAMILY MEDICINE

## 2022-08-17 PROCEDURE — 36415 COLL VENOUS BLD VENIPUNCTURE: CPT | Performed by: NURSE PRACTITIONER

## 2022-08-17 PROCEDURE — 99285 EMERGENCY DEPT VISIT HI MDM: CPT | Mod: CS | Performed by: FAMILY MEDICINE

## 2022-08-17 PROCEDURE — 99285 EMERGENCY DEPT VISIT HI MDM: CPT | Mod: CS,25 | Performed by: FAMILY MEDICINE

## 2022-08-17 PROCEDURE — 93242 EXT ECG>48HR<7D RECORDING: CPT

## 2022-08-17 PROCEDURE — 86618 LYME DISEASE ANTIBODY: CPT | Performed by: FAMILY MEDICINE

## 2022-08-17 PROCEDURE — 36415 COLL VENOUS BLD VENIPUNCTURE: CPT | Performed by: FAMILY MEDICINE

## 2022-08-17 PROCEDURE — 83880 ASSAY OF NATRIURETIC PEPTIDE: CPT | Performed by: FAMILY MEDICINE

## 2022-08-17 PROCEDURE — 93010 ELECTROCARDIOGRAM REPORT: CPT | Performed by: FAMILY MEDICINE

## 2022-08-17 PROCEDURE — 81003 URINALYSIS AUTO W/O SCOPE: CPT | Performed by: FAMILY MEDICINE

## 2022-08-17 PROCEDURE — 85379 FIBRIN DEGRADATION QUANT: CPT | Performed by: FAMILY MEDICINE

## 2022-08-17 PROCEDURE — 99214 OFFICE O/P EST MOD 30 MIN: CPT | Performed by: NURSE PRACTITIONER

## 2022-08-17 PROCEDURE — 71046 X-RAY EXAM CHEST 2 VIEWS: CPT

## 2022-08-17 PROCEDURE — 80050 GENERAL HEALTH PANEL: CPT | Performed by: NURSE PRACTITIONER

## 2022-08-17 PROCEDURE — 84703 CHORIONIC GONADOTROPIN ASSAY: CPT | Performed by: FAMILY MEDICINE

## 2022-08-17 PROCEDURE — 84443 ASSAY THYROID STIM HORMONE: CPT | Performed by: FAMILY MEDICINE

## 2022-08-17 PROCEDURE — 93244 EXT ECG>48HR<7D REV&INTERPJ: CPT | Performed by: INTERNAL MEDICINE

## 2022-08-17 PROCEDURE — 93005 ELECTROCARDIOGRAM TRACING: CPT | Performed by: FAMILY MEDICINE

## 2022-08-17 PROCEDURE — C9803 HOPD COVID-19 SPEC COLLECT: HCPCS | Performed by: FAMILY MEDICINE

## 2022-08-17 RX ORDER — PROPRANOLOL HYDROCHLORIDE 10 MG/1
10 TABLET ORAL 2 TIMES DAILY
Qty: 60 TABLET | Refills: 1 | Status: SHIPPED | OUTPATIENT
Start: 2022-08-17 | End: 2022-08-30

## 2022-08-17 ASSESSMENT — ANXIETY QUESTIONNAIRES
7. FEELING AFRAID AS IF SOMETHING AWFUL MIGHT HAPPEN: NOT AT ALL
IF YOU CHECKED OFF ANY PROBLEMS ON THIS QUESTIONNAIRE, HOW DIFFICULT HAVE THESE PROBLEMS MADE IT FOR YOU TO DO YOUR WORK, TAKE CARE OF THINGS AT HOME, OR GET ALONG WITH OTHER PEOPLE: EXTREMELY DIFFICULT
7. FEELING AFRAID AS IF SOMETHING AWFUL MIGHT HAPPEN: NOT AT ALL
2. NOT BEING ABLE TO STOP OR CONTROL WORRYING: NOT AT ALL
GAD7 TOTAL SCORE: 9
GAD7 TOTAL SCORE: 9
3. WORRYING TOO MUCH ABOUT DIFFERENT THINGS: NOT AT ALL
1. FEELING NERVOUS, ANXIOUS, OR ON EDGE: NEARLY EVERY DAY
8. IF YOU CHECKED OFF ANY PROBLEMS, HOW DIFFICULT HAVE THESE MADE IT FOR YOU TO DO YOUR WORK, TAKE CARE OF THINGS AT HOME, OR GET ALONG WITH OTHER PEOPLE?: EXTREMELY DIFFICULT
5. BEING SO RESTLESS THAT IT IS HARD TO SIT STILL: NEARLY EVERY DAY
4. TROUBLE RELAXING: NEARLY EVERY DAY
GAD7 TOTAL SCORE: 9
6. BECOMING EASILY ANNOYED OR IRRITABLE: NOT AT ALL

## 2022-08-17 ASSESSMENT — ENCOUNTER SYMPTOMS
NERVOUS/ANXIOUS: 1
SYNCOPE: 1

## 2022-08-17 ASSESSMENT — PAIN SCALES - GENERAL: PAINLEVEL: NO PAIN (0)

## 2022-08-17 ASSESSMENT — ACTIVITIES OF DAILY LIVING (ADL)
ADLS_ACUITY_SCORE: 35
ADLS_ACUITY_SCORE: 33

## 2022-08-17 NOTE — ED PROVIDER NOTES
"  History     Chief Complaint   Patient presents with     Chest Pain     Dizziness     HPI     Antonia Marc is a 26 year old female who comes in with her father with 2 weeks of dizziness and recent onset of shortness of breath and chest discomfort.  She said that she was at work 2 weeks ago when she had an episode of near syncope.  She felt faint and had to lay down on the ground but she says she never lost consciousness completely.  This feeling of near fainting has persisted since that time.  At the same time she has developed a headache.  She has not noticed shaking chills.  She has not had a cough.  She went into an ER 2 weeks ago and was told that they were concerned that she was \"having a stroke.\"  She had a brain MRI which did not show evidence of a stroke or vascular disease.  I reviewed the records of that visit and she had an MRI of the brain without contrast but did not have a contrast study and did not have MRA of the brain or neck.  She was told that she had migraines.  She went into a clinic and was prescribed Topamax which she started taking 2 weeks ago.  Today she went into the clinic and was noted to have tachycardia when changing from a sitting to a lying position but no change in her blood pressure which was mildly elevated.  She was told she may have postural orthostatic tachycardia syndrome and had a Zio patch placed and was started on propranolol and referred to cardiology.  She has had a constant daily headache that is bifrontal and occipital and moderately severe.  Today while in the clinic she developed tightness in her chest and feels short of breath.  She is concerned her symptoms are due to anxiety and has a history of anxiety.  She not aware of any tick bites.  She does not have any flank pain.  She denies dysuria.  She has not had a cough or sore throat.  No abdominal pain.  No change in bowel or bladder symptoms.    Allergies:  Allergies   Allergen Reactions     Amoxicillin Hives "     Oxycodone Nausea and Vomiting       Problem List:    Patient Active Problem List    Diagnosis Date Noted     Recurrent tonsillitis 02/01/2018     Priority: Medium     Recurrent major depressive disorder, in remission (H) 05/30/2017     Priority: Medium     Anxiety 05/30/2017     Priority: Medium     JAZZY (generalized anxiety disorder) 05/30/2017     Priority: Medium     Mild episode of recurrent major depressive disorder (H) 05/30/2017     Priority: Medium     Irregular menstrual bleeding 04/02/2012     Priority: Medium     Flat feet 09/02/2011     Priority: Medium        Past Medical History:    Past Medical History:   Diagnosis Date     Closed fracture of unspecified part of radius with ulna(813.83)      Other diseases of trachea and bronchus, not elsewhere classified        Past Surgical History:    Past Surgical History:   Procedure Laterality Date     ESOPHAGOSCOPY, GASTROSCOPY, DUODENOSCOPY (EGD), COMBINED N/A 10/19/2018    Procedure: COMBINED ESOPHAGOSCOPY, GASTROSCOPY, DUODENOSCOPY (EGD), BIOPSY SINGLE OR MULTIPLE;  Surgeon: Heraclio Ziegler DO;  Location: [facility]     TONSILLECTOMY ADULT Bilateral 7/23/2018    Procedure: TONSILLECTOMY ADULT;  Bilateral Tonsillectomy;  Surgeon: Galilea Good MD;  Location: [facility]       Family History:    Family History   Problem Relation Age of Onset     Diabetes Mother      Hypertension Mother      Diabetes Father      Hypertension Father      Crohn's Disease Father      Hypertension Maternal Grandmother      Anxiety Disorder Maternal Grandmother      Hypertension Maternal Grandfather      Diabetes Maternal Grandfather      Anxiety Disorder Maternal Grandfather        Social History:  Marital Status:  Single [1]  Social History     Tobacco Use     Smoking status: Never Smoker     Smokeless tobacco: Never Used     Tobacco comment: no exporure   Substance Use Topics     Alcohol use: No     Drug use: No        Medications:    hydrOXYzine (ATARAX) 25 MG  tablet  imiquimod (ALDARA) 5 % external cream  prochlorperazine (COMPAZINE) 5 MG tablet  propranolol (INDERAL) 10 MG tablet  SUMAtriptan (IMITREX) 25 MG tablet  topiramate (TOPAMAX) 25 MG tablet        Review of Systems    All other systems are reviewed and are negative    Physical Exam   BP: (!) 142/84  Pulse: 82  Temp: (!) 100.6  F (38.1  C)  Resp: 18  Weight: 95.3 kg (210 lb)  SpO2: 99 %      Physical Exam     Nursing note and vitals were reviewed.  Constitutional: Awake and alert, adequately nourished and developed appearing 26-year-old in no apparent discomfort, who does not appear acutely ill, and who answers questions appropriately and cooperates with examination.  HEENT: Atraumatic and normocephalic.  PERRL EOMI.   Neck: Freely mobile.  Cardiovascular: Cardiac examination reveals normal heart rate and regular rhythm without murmur.  Pulmonary/Chest: Breathing is unlabored.  Breath sounds are clear and equal bilaterally.  There no retractions, tachypnea, rales, wheezes, or rhonchi.  Abdomen: Soft, nontender, no HSM or masses rebound or guarding.  Musculoskeletal: Extremities are warm and well-perfused and without edema  Neurological: Alert, oriented, thought content logical, coherent   Skin: Warm, dry, no rashes.  No signs of erythema migrans  Psychiatric: Affect broad and appropriate.    ED Course                 Procedures              EKG Interpretation:      Interpreted by Jamison Escamilla MD  Time reviewed: 14:53  Symptoms at time of EKG: near-syncope   Rhythm: normal sinus   Rate: normal  Axis: normal  Ectopy: none  Conduction: normal  ST Segments/ T Waves: No ST-T wave changes  Q Waves: none  Comparison to prior: Unchanged from 4/6/2018    Clinical Impression: normal EKG          Critical Care time:  none               Results for orders placed or performed during the hospital encounter of 08/17/22 (from the past 24 hour(s))   D dimer quantitative   Result Value Ref Range    D-Dimer Quantitative 0.30  0.00 - 0.50 ug/mL FEU    Narrative    This D-dimer assay is intended for use in conjunction with a clinical pretest probability assessment model to exclude pulmonary embolism (PE) and deep venous thrombosis (DVT) in outpatients suspected of PE or DVT. The cut-off value is 0.50 ug/mL FEU.   Troponin I   Result Value Ref Range    Troponin I High Sensitivity <3 <54 ng/L   TSH with free T4 reflex   Result Value Ref Range    TSH 1.67 0.40 - 4.00 mU/L   Nt probnp inpatient (BNP)   Result Value Ref Range    N terminal Pro BNP Inpatient 149 0 - 450 pg/mL   HCG qualitative Blood   Result Value Ref Range    hCG Serum Qualitative Negative Negative   Extra Tube    Narrative    The following orders were created for panel order Extra Tube.  Procedure                               Abnormality         Status                     ---------                               -----------         ------                     Extra Green Top (Lithium...[[id number]]                      Final result                 Please view results for these tests on the individual orders.   Extra Green Top (Lithium Heparin) Tube   Result Value Ref Range    Hold Specimen JI    Symptomatic; Unknown Influenza A/B & SARS-CoV2 (COVID-19) Virus PCR Multiplex Nose    Specimen: Nose; Swab   Result Value Ref Range    Influenza A PCR Negative Negative    Influenza B PCR Negative Negative    SARS CoV2 PCR Negative Negative    Narrative    Testing was performed using the shira SARS-CoV-2 & Influenza A/B Assay on the shira Mya System. This test should be ordered for the detection of SARS-CoV-2 and influenza viruses in individuals who meet clinical and/or epidemiological criteria. Test performance is unknown in asymptomatic patients. This test is for in vitro diagnostic use under the FDA EUA for laboratories certified under CLIA to perform moderate and/or high complexity testing. This test has not been FDA cleared or approved. A negative result does not rule out the  presence of PCR inhibitors in the specimen or target RNA in concentration below the limit of detection for the assay. If only one viral target is positive but coinfection with multiple targets is suspected, the sample should be re-tested with another FDA cleared, approved or authorized test, if coinfection would change clinical management. [facility] are certified under the Clinical Laboratory Improvement Amendments of 1988 (CLIA-88) as  qualified to perform moderate and/or high complexity laboratory testing.   UA reflex to Microscopic   Result Value Ref Range    Color Urine Straw Colorless, Straw, Light Yellow, Yellow    Appearance Urine Clear Clear    Glucose Urine Negative Negative mg/dL    Bilirubin Urine Negative Negative    Ketones Urine Negative Negative mg/dL    Specific Gravity Urine 1.005 1.003 - 1.035    Blood Urine Negative Negative    pH Urine 7.5 (H) 5.0 - 7.0    Protein Albumin Urine Negative Negative mg/dL    Urobilinogen Urine Normal Normal, 2.0 mg/dL    Nitrite Urine Negative Negative    Leukocyte Esterase Urine Negative Negative    Narrative    Microscopic not indicated   XR Chest 2 Views    Narrative    EXAM: XR CHEST 2 VIEWS  LOCATION: [facility]  DATE/TIME: 8/17/2022 7:21 PM    INDICATION: Fever, cough  COMPARISON: 05/29/2017      Impression    IMPRESSION: Electronic device overlies the left chest. Otherwise negative. The lungs are clear.       Medications - No data to display    Assessments & Plan (with Medical Decision Making)     26-year-old female presented with 2 weeks of headache and near syncopal symptoms.  Today she developed chest discomfort after a visit to the physician for evaluation.  Her physical examination was significant for fever of 100.6 and otherwise normal vital signs.  The remainder of her examination was normal including her pulmonary and abdominal examinations.  Her laboratory evaluation is similarly normal.  I discussed  with her that she likely has some type of viral syndrome or tickborne illness.  I did not find a rash of erythema migrans.  Lyme testing is pending.  I discussed that this will likely resolve in the next 1 to 3 weeks.  She should be seen for reevaluation if her fevers are increasing or she is developing other significant symptoms such as difficulty breathing, significant pain, or other worrisome symptoms.  I think some of the chest pressure she felt today was anxiety related to concerns about the cause of her symptoms.  Laboratory testing makes PE unlikely as a cause for her symptoms and urine analysis did not show any evidence for UTI.  Her CBC and blood chemistries done in the clinic today were normal.  Influenza and COVID testing are negative.  D-dimer, troponin, TSH, BNP and EKG are all normal suggesting no evidence for a viral cardiomyopathy.  She had no arrhythmia on cardiac monitor in the ED.  I do not think her symptoms are due to an arrhythmia or cardiac cause.  Her chest x-ray is normal with no signs of pneumonia or pneumothorax.  At this time I recommend expectant management and follow-up if symptoms persist or new concerning symptoms develop as discussed above.  She expressed understanding and her questions were answered.    I have reviewed the nursing notes.    I have reviewed the findings, diagnosis, plan and need for follow up with the patient.       New Prescriptions    No medications on file       Final diagnoses:   Fever, unspecified fever cause   Acute nonintractable headache, unspecified headache type       8/17/2022   [facility] EMERGENCY DEPT     Jamison Escamilla MD  08/17/22 2010

## 2022-08-17 NOTE — PATIENT INSTRUCTIONS
Possible POTs syndrome, this is autonomic condition, more common in younger patients, female patients     BP was high, recommend to try low dose Propranolol 10 mg twice daily, this can help with headaches too    Heart monitor for 7 days     Cardiology consult

## 2022-08-17 NOTE — ED TRIAGE NOTES
Chest pain and SOA for 2 weeks ever since passed out, was seen for, is currently on a heart monitor, worse today     Triage Assessment     Row Name 08/17/22 1438       Triage Assessment (Adult)    Airway WDL WDL       Respiratory WDL    Respiratory WDL rhythm/pattern    Rhythm/Pattern, Respiratory shortness of breath

## 2022-08-17 NOTE — PROGRESS NOTES
Answers for HPI/ROS submitted by the patient on 8/17/2022  JAZZY 7 TOTAL SCORE: 9  Headache Symptoms are: worsened  How often are you getting headaches or migraines? : Everyday  Are you able to do normal daily activities when you have a migraine?: No  Migraine Rescue/Relief Medications:: other  Effectiveness of rescue/relief medications:: I get no relief  Migraine Preventative Medications:: other  ER or UC Visits:: 1 time  Depression/Anxiety: Anxiety  Anxiety since last: : worse  Other associated symotome: : Yes  Significant life event: : No  Anxious:: Yes  Current substance use:: No  How many servings of fruits and vegetables do you eat daily?: 2-3  On average, how many sweetened beverages do you drink each day (Examples: soda, juice, sweet tea, etc.  Do NOT count diet or artificially sweetened beverages)?: 2  How many minutes a day do you exercise enough to make your heart beat faster?: 60 or more  How many days per week do you miss taking your medication?: 0      Assessment & Plan     Postural dizziness with near syncope  -patient reports positional dizziness, she had near syncope episode at work the other day when she stood up at her desk table and collapsed on the floor, she states she did not loose consciousness, she looked very pale per her co-worker, since this episode patient also have been having headaches and she went to ED recently regarding her symptoms and had normal brain MRI, EKG and electrolyte panel with CBC. She was told that her symptoms can be anxiety related and was prescribed Vistaril. Antonia states she is not feeling anxious and her job is not too stressful and when she tried Vistaril it just made her feel more drowsy and dizzy. Patient denies chest pain, palpitations, shortness of breath and blurry vision   - Adult Leadless EKG Monitor 3 to 7 Days; Future  - Comprehensive metabolic panel (BMP + Alb, Alk Phos, ALT, AST, Total. Bili, TP); Future  - CBC with platelets and differential;  Future  - Adult Cardiology Eval  Referral; Future  - Comprehensive metabolic panel (BMP + Alb, Alk Phos, ALT, AST, Total. Bili, TP)  - CBC with platelets and differential  -will also consider possible heart US for additional evaluation if symptoms persist     POTS (postural orthostatic tachycardia syndrome)  -I did serial BP and HR checks today during clinic visit, the patient has postural tachycardia and I advised that her symptoms can be due to possible POTs, I also recommended Holter study and cardiology consult for additional evaluation  -BP sitting was 143/86, HR was 72, standing up BP went up to 157/88,   -I recommended to monitor HR and BP at home, will start Propranolol, which can also be helpful for her headaches, since she has history of migraines   - Adult Leadless EKG Monitor 3 to 7 Days; Future  - Comprehensive metabolic panel (BMP + Alb, Alk Phos, ALT, AST, Total. Bili, TP); Future  - CBC with platelets and differential; Future  - Adult Cardiology Eval  Referral; Future  - Comprehensive metabolic panel (BMP + Alb, Alk Phos, ALT, AST, Total. Bili, TP)  - CBC with platelets and differential    Tension headache    - propranolol (INDERAL) 10 MG tablet; Take 1 tablet (10 mg) by mouth 2 times daily    Elevated BP without diagnosis of hypertension    - propranolol (INDERAL) 10 MG tablet; Take 1 tablet (10 mg) by mouth 2 times daily  - Adult Cardiology Eval  Referral; Future        Return in about 3 days (around 8/20/2022) for my chart message with BP home readings .    ROS Hansen [facility] is a 26 year old patient presenting for the following health issues:  Anxiety, Headache, and Syncope         Migraine     Since your last clinic visit, how have your headaches changed?  Worsened- passed out at work a few times.     How often are you getting headaches or migraines? Every day      Are you able to do normal daily  "activities when you have a migraine? No    Are you taking rescue/relief medications? (Select all that apply) No    How helpful is your rescue/relief medication?  N/A     Are you taking any medications to prevent migraines? (Select all that apply)  Topamax    In the past 4 weeks, how often have you gone to urgent care or the emergency room because of your headaches? 1    Depression and Anxiety Follow-Up    How are you doing with your depression since your last visit? No change    How are you doing with your anxiety since your last visit?  No change      Are you having other symptoms that might be associated with depression or anxiety? headaches     Have you had a significant life event? No     Do you have any concerns with your use of alcohol or other drugs? No    Social History     Tobacco Use     Smoking status: Never Smoker     Smokeless tobacco: Never Used     Tobacco comment: no exporure   Substance Use Topics     Alcohol use: No     Drug use: No     PHQ 5/27/2020 11/23/2021 8/8/2022   PHQ-9 Total Score 0 0 0   Q9: Thoughts of better off dead/self-harm past 2 weeks Not at all Not at all Not at all     JAZZY-7 SCORE 11/23/2021 8/8/2022 8/17/2022   Total Score 0 (minimal anxiety) - 9 (mild anxiety)   Total Score 0 14 9     Review of Systems   Cardiovascular: Positive for syncope.   Psychiatric/Behavioral: The patient is nervous/anxious.             Objective    /70 (BP Location: Right arm, Patient Position: Sitting, Cuff Size: Adult Large)   Pulse 75   Temp 98.4  F (36.9  C) (Tympanic)   Resp 16   Ht 1.727 m (5' 8\")   Wt 97.1 kg (214 lb)   LMP 07/08/2022   SpO2 100%   BMI 32.54 kg/m    Body mass index is 32.54 kg/m .  Physical Exam   GENERAL: healthy, alert and no distress  EYES: Eyes grossly normal to inspection, PERRL and conjunctivae and sclerae normal  RESP: lungs clear to auscultation - no rales, rhonchi or wheezes  CV: regular rate and rhythm, normal S1 S2, no S3 or S4, no murmur, click or rub, " no peripheral edema and peripheral pulses strong  MS: no gross musculoskeletal defects noted, no edema  SKIN: no suspicious lesions or rashes  NEURO: Normal strength and tone, mentation intact and speech normal  PSYCH: mentation appears normal, affect normal/bright    Results for orders placed or performed in visit on 08/17/22 (from the past 24 hour(s))   Comprehensive metabolic panel (BMP + Alb, Alk Phos, ALT, AST, Total. Bili, TP)   Result Value Ref Range    Sodium 140 133 - 144 mmol/L    Potassium 4.0 3.4 - 5.3 mmol/L    Chloride 111 (H) 94 - 109 mmol/L    Carbon Dioxide (CO2) 24 20 - 32 mmol/L    Anion Gap 5 3 - 14 mmol/L    Urea Nitrogen 12 7 - 30 mg/dL    Creatinine 0.74 0.52 - 1.04 mg/dL    Calcium 8.8 8.5 - 10.1 mg/dL    Glucose 95 70 - 99 mg/dL    Alkaline Phosphatase 53 40 - 150 U/L    AST 14 0 - 45 U/L    ALT 24 0 - 50 U/L    Protein Total 7.4 6.8 - 8.8 g/dL    Albumin 3.9 3.4 - 5.0 g/dL    Bilirubin Total 1.1 0.2 - 1.3 mg/dL    GFR Estimate >90 >60 mL/min/1.73m2   CBC with platelets and differential    Narrative    The following orders were created for panel order CBC with platelets and differential.  Procedure                               Abnormality         Status                     ---------                               -----------         ------                     CBC with platelets and d...[[id number]]  Abnormal            Final result                 Please view results for these tests on the individual orders.   CBC with platelets and differential   Result Value Ref Range    WBC Count 6.8 4.0 - 11.0 10e3/uL    RBC Count 5.28 (H) 3.80 - 5.20 10e6/uL    Hemoglobin 15.2 11.7 - 15.7 g/dL    Hematocrit 44.7 35.0 - 47.0 %    MCV 85 78 - 100 fL    MCH 28.8 26.5 - 33.0 pg    MCHC 34.0 31.5 - 36.5 g/dL    RDW 12.2 10.0 - 15.0 %    Platelet Count 276 150 - 450 10e3/uL    % Neutrophils 56 %    % Lymphocytes 34 %    % Monocytes 7 %    % Eosinophils 2 %    % Basophils 1 %    Absolute Neutrophils 3.8 1.6 -  8.3 10e3/uL    Absolute Lymphocytes 2.3 0.8 - 5.3 10e3/uL    Absolute Monocytes 0.5 0.0 - 1.3 10e3/uL    Absolute Eosinophils 0.1 0.0 - 0.7 10e3/uL    Absolute Basophils 0.0 0.0 - 0.2 10e3/uL                 .  ..

## 2022-08-17 NOTE — LETTER
[facility]  [address] [address]  Phone: [phone]    August 17, 2022        Antonia Marc  [address] [address]          To whom it may concern:    RE: Antonia Marc    Patient was seen and treated today at our clinic and missed work.  Patient may return to work 8/24  with the following:  No restrictions    Please contact me for questions or concerns.      Sincerely,        ROS Hansen CNP

## 2022-08-18 LAB — B BURGDOR IGG+IGM SER QL: 0.11

## 2022-08-18 NOTE — RESULT ENCOUNTER NOTE
Final result for Lyme Disease Total Abs Bld with Reflex to Confirm CLIA is NEGATIVE.    No change in treatment per [facility] Lab Result Lyme Disease protocol.

## 2022-08-18 NOTE — DISCHARGE INSTRUCTIONS
Your test results today are all normal.    Your symptoms are likely due to some type of viral illness.  These are difficult to diagnose but should resolve uneventfully.  Would like you to return for reevaluation if you still have a fever above 100.4 after the weekend or sooner if you are having worsening symptoms such as increasing fevers, increasing headache, trouble breathing, specific pain in the chest or abdomen or other worrisome symptoms at any time.  We will notify you if your Lyme testing is positive.  Do not begin taking the propranolol that was prescribed.  Wait until this acute illness resolves and then the need for that can be reevaluated.

## 2022-08-19 ENCOUNTER — OFFICE VISIT (OUTPATIENT)
Dept: CARDIOLOGY | Facility: CLINIC | Age: 27
End: 2022-08-19
Attending: NURSE PRACTITIONER
Payer: COMMERCIAL

## 2022-08-19 VITALS
HEART RATE: 81 BPM | DIASTOLIC BLOOD PRESSURE: 78 MMHG | HEIGHT: 68 IN | BODY MASS INDEX: 32.4 KG/M2 | RESPIRATION RATE: 14 BRPM | SYSTOLIC BLOOD PRESSURE: 114 MMHG | WEIGHT: 213.8 LBS

## 2022-08-19 DIAGNOSIS — R55 POSTURAL DIZZINESS WITH NEAR SYNCOPE: ICD-10-CM

## 2022-08-19 DIAGNOSIS — R42 POSTURAL DIZZINESS WITH NEAR SYNCOPE: ICD-10-CM

## 2022-08-19 DIAGNOSIS — R03.0 ELEVATED BP WITHOUT DIAGNOSIS OF HYPERTENSION: ICD-10-CM

## 2022-08-19 DIAGNOSIS — G90.A POTS (POSTURAL ORTHOSTATIC TACHYCARDIA SYNDROME): ICD-10-CM

## 2022-08-19 DIAGNOSIS — R07.89 OTHER CHEST PAIN: Primary | ICD-10-CM

## 2022-08-19 LAB
C REACTIVE PROTEIN LHE: 0.2 MG/DL (ref 0–?)
ERYTHROCYTE [SEDIMENTATION RATE] IN BLOOD BY WESTERGREN METHOD: 6 MM/HR (ref 0–20)

## 2022-08-19 PROCEDURE — 36415 COLL VENOUS BLD VENIPUNCTURE: CPT | Performed by: INTERNAL MEDICINE

## 2022-08-19 PROCEDURE — 86140 C-REACTIVE PROTEIN: CPT | Performed by: INTERNAL MEDICINE

## 2022-08-19 PROCEDURE — 85652 RBC SED RATE AUTOMATED: CPT | Performed by: INTERNAL MEDICINE

## 2022-08-19 PROCEDURE — 99204 OFFICE O/P NEW MOD 45 MIN: CPT | Performed by: INTERNAL MEDICINE

## 2022-08-19 NOTE — PATIENT INSTRUCTIONS
Ms. Antonia Marc,     It was a pleasure to see you in the office today. My recommendations for you include:   1. Fluids, gatorade, knee high compression stockings, more salt  2. Try the propranolol  3. Echocardiogram  4. Zio patch results  5. labs     Please do not hesitate to call the [facility] clinic with any questions or concerns at [phone].    Sincerely,     Kimberly Bunch MD

## 2022-08-19 NOTE — LETTER
8/19/2022    Roma Ye, ROS CNP  [address] [place] [address]    RE: Antonia Marc       Dear Colleague,     I had the pleasure of seeing Antonia Marc in the [facility].    HEART CARE ENCOUNTER CONSULTATON NOTE      [facility]  [phone]      Assessment/Recommendations   Assessment:  1.  Chest pain: May be pericarditis given possible viral infection with low-grade fever and now with chest tightness worse with lying flat.  Check ESR and CRP as well as echocardiogram  2.  Presyncope: Seems orthostatic in nature.  We discussed conservative measures today  3.  Headaches: Primary prescribed a low-dose propanolol to see if this would help    Plan:  1.  May try low-dose propanolol which may help with her headaches and episodic tachycardia  2.  We will obtain results of her Zio patch she is wearing  3.  Echocardiogram to evaluate for structural heart disease, ESR and CRP  4.  Compression stockings, increase fluids, increase salt intake       History of Present Illness/Subjective    HPI: Antonia Marc is a 26 year old female with history of anxiety who I am seeing today for initial consultation.  A couple weeks ago she was at work.  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  She felt lightheaded and like she is going to pass out.  Her coworker noticed that she appeared pale and caught her before she fell to the ground.  She was evaluated in ED in [place] and diagnosed with migraines.  She did have a headache all that day.  Since then she has had recurrent symptoms.  Mainly with standing or getting up from a sitting position.  She feels numbness and tingling in her hands and legs and feels like she is going to pass out.  She went to the ED for evaluation a few days ago and was found to have a fever of 100.6.  She is also noticed chest discomfort as well.  Feels like a tightness across her chest that at times can last almost a whole day.  It is  "worse with lying flat.  She has had recurrent headaches but the headaches seem to be improving now.  She is prescribed a low-dose of propanolol which she has not started as of yet.  I checked her heart rate and blood pressure standing today and it was unchanged.  Per the ED note recently no drops in blood pressure.  Her primary notes that she had issues with heart rate elevation with standing.         Physical Examination  Review of Systems   Vitals: /78 (BP Location: Left arm, Patient Position: Sitting, Cuff Size: Adult Regular)   Pulse 81   Resp 14   Ht 1.727 m (5' 8\")   Wt 97 kg (213 lb 12.8 oz)   LMP 2022   BMI 32.51 kg/m    BMI= Body mass index is 32.51 kg/m .  Wt Readings from Last 3 Encounters:   22 97 kg (213 lb 12.8 oz)   22 95.3 kg (210 lb)   22 97.1 kg (214 lb)       General Appearance:   no distress, normal body habitus   ENT/Mouth: membranes moist, no oral lesions or bleeding gums.      EYES:  no scleral icterus, normal conjunctivae   Neck: no carotid bruits or thyromegaly   Chest/Lungs:   lungs are clear to auscultation   Cardiovascular:   Regular. Normal first and second heart sounds with no murmurs  no edema bilaterally    Abdomen:  no organomegaly, masses, bruits, or tenderness; bowel sounds are present   Extremities: no cyanosis or clubbing   Skin: no xanthelasma, warm.    Neurologic: normal  bilateral, no tremors     Psychiatric: alert and oriented x3, calm        Please refer above for cardiac ROS details.        Medical History  Surgical History Family History Social History   Past Medical History:   Diagnosis Date     Closed fracture of unspecified part of radius with ulna(813.83)      Other diseases of trachea and bronchus, not elsewhere classified     tracheomalacia as a       Past Surgical History:   Procedure Laterality Date     ESOPHAGOSCOPY, GASTROSCOPY, DUODENOSCOPY (EGD), COMBINED N/A 10/19/2018    Procedure: COMBINED ESOPHAGOSCOPY, " GASTROSCOPY, DUODENOSCOPY (EGD), BIOPSY SINGLE OR MULTIPLE;  Surgeon: eHraclio Ziegler DO;  Location: [facility]     TONSILLECTOMY ADULT Bilateral 7/23/2018    Procedure: TONSILLECTOMY ADULT;  Bilateral Tonsillectomy;  Surgeon: Galilea Good MD;  Location: [facility]     Family History   Problem Relation Age of Onset     Diabetes Mother      Hypertension Mother      Diabetes Father      Hypertension Father      Crohn's Disease Father      Hypertension Maternal Grandmother      Anxiety Disorder Maternal Grandmother      Hypertension Maternal Grandfather      Diabetes Maternal Grandfather      Anxiety Disorder Maternal Grandfather         Social History     Socioeconomic History     Marital status: Single     Spouse name: Not on file     Number of children: Not on file     Years of education: Not on file     Highest education level: Not on file   Occupational History     Not on file   Tobacco Use     Smoking status: Never Smoker     Smokeless tobacco: Never Used     Tobacco comment: no exporure   Substance and Sexual Activity     Alcohol use: No     Drug use: No     Sexual activity: Never   Other Topics Concern     Parent/sibling w/ CABG, MI or angioplasty before 65F 55M? Not Asked   Social History Narrative     Not on file     Social Determinants of Health     Financial Resource Strain: Not on file   Food Insecurity: Not on file   Transportation Needs: Not on file   Physical Activity: Not on file   Stress: Not on file   Social Connections: Not on file   Intimate Partner Violence: Not on file   Housing Stability: Not on file           Medications  Allergies   Current Outpatient Medications   Medication Sig Dispense Refill     topiramate (TOPAMAX) 25 MG tablet Take one tablet at bedtime for 5 days, then increase to 2 tablets for 5 days, then increase to 3 tablets for 5 days, then 4 tablets nightly. 120 tablet 1     hydrOXYzine (ATARAX) 25 MG tablet Take 1 tablet (25 mg) by mouth 3 times daily as needed for anxiety  (Patient not taking: Reported on 8/19/2022) 60 tablet 1     imiquimod (ALDARA) 5 % external cream Apply a small sized amount to warts or molluscum three times weekly at bedtime.   Wash off after 8 hours.   May use for up to 16 weeks. (Patient not taking: No sig reported) 12 packet 3     prochlorperazine (COMPAZINE) 5 MG tablet Take 5 mg by mouth every 8 hours as needed for nausea or vomiting (2 tabs every 9 hours) (Patient not taking: No sig reported)       propranolol (INDERAL) 10 MG tablet Take 1 tablet (10 mg) by mouth 2 times daily (Patient not taking: Reported on 8/19/2022) 60 tablet 1     SUMAtriptan (IMITREX) 25 MG tablet Take 1 tablet (25 mg) by mouth at onset of headache for migraine May repeat in 2 hours. Max 8 tablets/24 hours. (Patient not taking: No sig reported) 30 tablet 1       Allergies   Allergen Reactions     Amoxicillin Hives     Oxycodone Nausea and Vomiting          Lab Results    Chemistry/lipid CBC Cardiac Enzymes/BNP/TSH/INR   No results for input(s): CHOL, HDL, LDL, TRIG, CHOLHDLRATIO in the last 78298 hours.  No results for input(s): LDL in the last 88218 hours.  Recent Labs   Lab Test 08/17/22  1227      POTASSIUM 4.0   CHLORIDE 111*   CO2 24   GLC 95   BUN 12   CR 0.74   GFRESTIMATED >90   NAVDEEP 8.8     Recent Labs   Lab Test 08/17/22  1227 01/23/21  2335 10/08/19  1518   CR 0.74 0.81 0.79     No results for input(s): A1C in the last 41054 hours.       Recent Labs   Lab Test 08/17/22  1227   WBC 6.8   HGB 15.2   HCT 44.7   MCV 85        Recent Labs   Lab Test 08/17/22  1227 01/23/21  2335 01/22/21  1817   HGB 15.2 13.2 13.4    No results for input(s): TROPONINI in the last 16756 hours.  Recent Labs   Lab Test 08/17/22  1753   NTBNPI 149     Recent Labs   Lab Test 08/17/22  1753   TSH 1.67     Recent Labs   Lab Test 05/29/17  1820 05/29/17  1740   INR 1.14 Canceled, Test credited   Unsatisfactory specimen - hemolyzed  Notified HERMAN Meraz 5/29/17 0016 SOTERO Harris  MD Julio C      Thank you for allowing me to participate in the care of your patient.      Sincerely,     Kimberly Bunch MD     [facility]  cc:   ROS Thornton CNP  [address] [place] [address]

## 2022-08-19 NOTE — PROGRESS NOTES
HEART CARE ENCOUNTER CONSULTATON TAQUERIA WINSTON [facility]  [phone]      Assessment/Recommendations   Assessment:  1.  Chest pain: May be pericarditis given possible viral infection with low-grade fever and now with chest tightness worse with lying flat.  Check ESR and CRP as well as echocardiogram  2.  Presyncope: Seems orthostatic in nature.  We discussed conservative measures today  3.  Headaches: Primary prescribed a low-dose propanolol to see if this would help    Plan:  1.  May try low-dose propanolol which may help with her headaches and episodic tachycardia  2.  We will obtain results of her Zio patch she is wearing  3.  Echocardiogram to evaluate for structural heart disease, ESR and CRP  4.  Compression stockings, increase fluids, increase salt intake       History of Present Illness/Subjective    HPI: Antonia Marc is a 26 year old female with history of anxiety who I am seeing today for initial consultation.  A couple weeks ago she was at work.  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  She felt lightheaded and like she is going to pass out.  Her coworker noticed that she appeared pale and caught her before she fell to the ground.  She was evaluated in ED in [place] and diagnosed with migraines.  She did have a headache all that day.  Since then she has had recurrent symptoms.  Mainly with standing or getting up from a sitting position.  She feels numbness and tingling in her hands and legs and feels like she is going to pass out.  She went to the ED for evaluation a few days ago and was found to have a fever of 100.6.  She is also noticed chest discomfort as well.  Feels like a tightness across her chest that at times can last almost a whole day.  It is worse with lying flat.  She has had recurrent headaches but the headaches seem to be improving now.  She is prescribed a low-dose of propanolol which she has not started as of yet.  I checked her heart rate and  "blood pressure standing today and it was unchanged.  Per the ED note recently no drops in blood pressure.  Her primary notes that she had issues with heart rate elevation with standing.         Physical Examination  Review of Systems   Vitals: /78 (BP Location: Left arm, Patient Position: Sitting, Cuff Size: Adult Regular)   Pulse 81   Resp 14   Ht 1.727 m (5' 8\")   Wt 97 kg (213 lb 12.8 oz)   LMP 2022   BMI 32.51 kg/m    BMI= Body mass index is 32.51 kg/m .  Wt Readings from Last 3 Encounters:   22 97 kg (213 lb 12.8 oz)   22 95.3 kg (210 lb)   22 97.1 kg (214 lb)       General Appearance:   no distress, normal body habitus   ENT/Mouth: membranes moist, no oral lesions or bleeding gums.      EYES:  no scleral icterus, normal conjunctivae   Neck: no carotid bruits or thyromegaly   Chest/Lungs:   lungs are clear to auscultation   Cardiovascular:   Regular. Normal first and second heart sounds with no murmurs  no edema bilaterally    Abdomen:  no organomegaly, masses, bruits, or tenderness; bowel sounds are present   Extremities: no cyanosis or clubbing   Skin: no xanthelasma, warm.    Neurologic: normal  bilateral, no tremors     Psychiatric: alert and oriented x3, calm        Please refer above for cardiac ROS details.        Medical History  Surgical History Family History Social History   Past Medical History:   Diagnosis Date     Closed fracture of unspecified part of radius with ulna(813.83)      Other diseases of trachea and bronchus, not elsewhere classified     tracheomalacia as a       Past Surgical History:   Procedure Laterality Date     ESOPHAGOSCOPY, GASTROSCOPY, DUODENOSCOPY (EGD), COMBINED N/A 10/19/2018    Procedure: COMBINED ESOPHAGOSCOPY, GASTROSCOPY, DUODENOSCOPY (EGD), BIOPSY SINGLE OR MULTIPLE;  Surgeon: Heraclio Ziegler DO;  Location: [facility]     TONSILLECTOMY ADULT Bilateral 2018    Procedure: TONSILLECTOMY ADULT;  Bilateral " Tonsillectomy;  Surgeon: Galilea Good MD;  Location: [facility]     Family History   Problem Relation Age of Onset     Diabetes Mother      Hypertension Mother      Diabetes Father      Hypertension Father      Crohn's Disease Father      Hypertension Maternal Grandmother      Anxiety Disorder Maternal Grandmother      Hypertension Maternal Grandfather      Diabetes Maternal Grandfather      Anxiety Disorder Maternal Grandfather         Social History     Socioeconomic History     Marital status: Single     Spouse name: Not on file     Number of children: Not on file     Years of education: Not on file     Highest education level: Not on file   Occupational History     Not on file   Tobacco Use     Smoking status: Never Smoker     Smokeless tobacco: Never Used     Tobacco comment: no exporure   Substance and Sexual Activity     Alcohol use: No     Drug use: No     Sexual activity: Never   Other Topics Concern     Parent/sibling w/ CABG, MI or angioplasty before 65F 55M? Not Asked   Social History Narrative     Not on file     Social Determinants of Health     Financial Resource Strain: Not on file   Food Insecurity: Not on file   Transportation Needs: Not on file   Physical Activity: Not on file   Stress: Not on file   Social Connections: Not on file   Intimate Partner Violence: Not on file   Housing Stability: Not on file           Medications  Allergies   Current Outpatient Medications   Medication Sig Dispense Refill     topiramate (TOPAMAX) 25 MG tablet Take one tablet at bedtime for 5 days, then increase to 2 tablets for 5 days, then increase to 3 tablets for 5 days, then 4 tablets nightly. 120 tablet 1     hydrOXYzine (ATARAX) 25 MG tablet Take 1 tablet (25 mg) by mouth 3 times daily as needed for anxiety (Patient not taking: Reported on 8/19/2022) 60 tablet 1     imiquimod (ALDARA) 5 % external cream Apply a small sized amount to warts or molluscum three times weekly at bedtime.   Wash off after 8 hours.   May  use for up to 16 weeks. (Patient not taking: No sig reported) 12 packet 3     prochlorperazine (COMPAZINE) 5 MG tablet Take 5 mg by mouth every 8 hours as needed for nausea or vomiting (2 tabs every 9 hours) (Patient not taking: No sig reported)       propranolol (INDERAL) 10 MG tablet Take 1 tablet (10 mg) by mouth 2 times daily (Patient not taking: Reported on 8/19/2022) 60 tablet 1     SUMAtriptan (IMITREX) 25 MG tablet Take 1 tablet (25 mg) by mouth at onset of headache for migraine May repeat in 2 hours. Max 8 tablets/24 hours. (Patient not taking: No sig reported) 30 tablet 1       Allergies   Allergen Reactions     Amoxicillin Hives     Oxycodone Nausea and Vomiting          Lab Results    Chemistry/lipid CBC Cardiac Enzymes/BNP/TSH/INR   No results for input(s): CHOL, HDL, LDL, TRIG, CHOLHDLRATIO in the last 37812 hours.  No results for input(s): LDL in the last 90953 hours.  Recent Labs   Lab Test 08/17/22  1227      POTASSIUM 4.0   CHLORIDE 111*   CO2 24   GLC 95   BUN 12   CR 0.74   GFRESTIMATED >90   NAVDEEP 8.8     Recent Labs   Lab Test 08/17/22  1227 01/23/21  2335 10/08/19  1518   CR 0.74 0.81 0.79     No results for input(s): A1C in the last 99582 hours.       Recent Labs   Lab Test 08/17/22  1227   WBC 6.8   HGB 15.2   HCT 44.7   MCV 85        Recent Labs   Lab Test 08/17/22  1227 01/23/21  2335 01/22/21  1817   HGB 15.2 13.2 13.4    No results for input(s): TROPONINI in the last 75125 hours.  Recent Labs   Lab Test 08/17/22  1753   NTBNPI 149     Recent Labs   Lab Test 08/17/22  1753   TSH 1.67     Recent Labs   Lab Test 05/29/17  1820 05/29/17  1740   INR 1.14 Canceled, Test credited   Unsatisfactory specimen - hemolyzed  Notified HERMAN Meraz 5/29/17 1756 SOTERO Bunch MD

## 2022-08-22 ENCOUNTER — HOSPITAL ENCOUNTER (OUTPATIENT)
Dept: CARDIOLOGY | Facility: CLINIC | Age: 27
Discharge: HOME | End: 2022-08-22
Attending: INTERNAL MEDICINE | Admitting: INTERNAL MEDICINE
Payer: COMMERCIAL

## 2022-08-22 DIAGNOSIS — G90.A POTS (POSTURAL ORTHOSTATIC TACHYCARDIA SYNDROME): ICD-10-CM

## 2022-08-22 DIAGNOSIS — R55 POSTURAL DIZZINESS WITH NEAR SYNCOPE: ICD-10-CM

## 2022-08-22 DIAGNOSIS — R42 POSTURAL DIZZINESS WITH NEAR SYNCOPE: ICD-10-CM

## 2022-08-22 LAB — LVEF ECHO: NORMAL

## 2022-08-22 PROCEDURE — 93306 TTE W/DOPPLER COMPLETE: CPT | Mod: 26 | Performed by: INTERNAL MEDICINE

## 2022-08-22 PROCEDURE — 93306 TTE W/DOPPLER COMPLETE: CPT

## 2022-08-23 ENCOUNTER — TELEPHONE (OUTPATIENT)
Dept: FAMILY MEDICINE | Facility: CLINIC | Age: 27
End: 2022-08-23

## 2022-08-23 NOTE — TELEPHONE ENCOUNTER
Recommend stopping the medication. I also recommend she further discuss this with Cardiology who recently saw patient on 8/19 and prescribed the propanolol for next steps in her cares.     If remains symptomatic despite stopping the medication should be evaluated emergently.

## 2022-08-23 NOTE — TELEPHONE ENCOUNTER
Patient called stating that she is taking propanolol and is concerned about chest pain, sob, difficulty breathing.       Yudy MONTANO  Station

## 2022-08-23 NOTE — TELEPHONE ENCOUNTER
POD Dr. Davila,    S-(situation): medication reaction    B-(background): thinks propanolol is causing her SOA, CP, anxiety to the point of hyperventilating.  Being prescribed for SHARPE syndrome.  Started medication Propanolol 6 days ago    A-(assessment): medication reaction    R-(recommendations):  To hold medication propanolol until hearing back from us. Gisela ADAM RN

## 2022-08-24 NOTE — TELEPHONE ENCOUNTER
Left non-detailed message for patient to return a call to the clinic RN.       CHARMAINE Salinas RN

## 2022-08-25 ENCOUNTER — MYC MEDICAL ADVICE (OUTPATIENT)
Dept: FAMILY MEDICINE | Facility: CLINIC | Age: 27
End: 2022-08-25

## 2022-08-25 ENCOUNTER — APPOINTMENT (OUTPATIENT)
Dept: CT IMAGING | Facility: CLINIC | Age: 27
End: 2022-08-25
Attending: EMERGENCY MEDICINE
Payer: COMMERCIAL

## 2022-08-25 ENCOUNTER — HOSPITAL ENCOUNTER (EMERGENCY)
Facility: CLINIC | Age: 27
Discharge: HOME | End: 2022-08-25
Attending: EMERGENCY MEDICINE | Admitting: EMERGENCY MEDICINE
Payer: COMMERCIAL

## 2022-08-25 VITALS
DIASTOLIC BLOOD PRESSURE: 77 MMHG | HEIGHT: 68 IN | TEMPERATURE: 98.9 F | BODY MASS INDEX: 30.31 KG/M2 | RESPIRATION RATE: 16 BRPM | SYSTOLIC BLOOD PRESSURE: 131 MMHG | OXYGEN SATURATION: 99 % | WEIGHT: 200 LBS | HEART RATE: 70 BPM

## 2022-08-25 DIAGNOSIS — F41.1 GAD (GENERALIZED ANXIETY DISORDER): Primary | ICD-10-CM

## 2022-08-25 DIAGNOSIS — R07.89 ATYPICAL CHEST PAIN: ICD-10-CM

## 2022-08-25 DIAGNOSIS — R00.2 PALPITATIONS: Primary | ICD-10-CM

## 2022-08-25 DIAGNOSIS — R00.2 PALPITATIONS: ICD-10-CM

## 2022-08-25 DIAGNOSIS — F41.1 GAD (GENERALIZED ANXIETY DISORDER): ICD-10-CM

## 2022-08-25 LAB
ALBUMIN SERPL-MCNC: 3.7 G/DL (ref 3.4–5)
ALP SERPL-CCNC: 44 U/L (ref 40–150)
ALT SERPL W P-5'-P-CCNC: 30 U/L (ref 0–50)
ANION GAP SERPL CALCULATED.3IONS-SCNC: 7 MMOL/L (ref 3–14)
AST SERPL W P-5'-P-CCNC: 14 U/L (ref 0–45)
ATRIAL RATE - MUSE: 79 BPM
BASOPHILS # BLD AUTO: 0.1 10E3/UL (ref 0–0.2)
BASOPHILS NFR BLD AUTO: 1 %
BILIRUB SERPL-MCNC: 1.6 MG/DL (ref 0.2–1.3)
BUN SERPL-MCNC: 11 MG/DL (ref 7–30)
CALCIUM SERPL-MCNC: 9 MG/DL (ref 8.5–10.1)
CHLORIDE BLD-SCNC: 108 MMOL/L (ref 94–109)
CO2 SERPL-SCNC: 23 MMOL/L (ref 20–32)
CREAT SERPL-MCNC: 0.74 MG/DL (ref 0.52–1.04)
DIASTOLIC BLOOD PRESSURE - MUSE: NORMAL MMHG
EOSINOPHIL # BLD AUTO: 0 10E3/UL (ref 0–0.7)
EOSINOPHIL NFR BLD AUTO: 1 %
ERYTHROCYTE [DISTWIDTH] IN BLOOD BY AUTOMATED COUNT: 11.8 % (ref 10–15)
GFR SERPL CREATININE-BSD FRML MDRD: >90 ML/MIN/1.73M2
GLUCOSE BLD-MCNC: 90 MG/DL (ref 70–99)
HCG SERPL QL: NEGATIVE
HCT VFR BLD AUTO: 42.8 % (ref 35–47)
HGB BLD-MCNC: 14.6 G/DL (ref 11.7–15.7)
HOLD SPECIMEN: NORMAL
IMM GRANULOCYTES # BLD: 0 10E3/UL
IMM GRANULOCYTES NFR BLD: 0 %
INTERPRETATION ECG - MUSE: NORMAL
LIPASE SERPL-CCNC: 102 U/L (ref 73–393)
LYMPHOCYTES # BLD AUTO: 1.4 10E3/UL (ref 0.8–5.3)
LYMPHOCYTES NFR BLD AUTO: 23 %
MCH RBC QN AUTO: 28.5 PG (ref 26.5–33)
MCHC RBC AUTO-ENTMCNC: 34.1 G/DL (ref 31.5–36.5)
MCV RBC AUTO: 83 FL (ref 78–100)
MONOCYTES # BLD AUTO: 0.3 10E3/UL (ref 0–1.3)
MONOCYTES NFR BLD AUTO: 5 %
NEUTROPHILS # BLD AUTO: 4.5 10E3/UL (ref 1.6–8.3)
NEUTROPHILS NFR BLD AUTO: 70 %
NRBC # BLD AUTO: 0 10E3/UL
NRBC BLD AUTO-RTO: 0 /100
P AXIS - MUSE: 60 DEGREES
PLATELET # BLD AUTO: 303 10E3/UL (ref 150–450)
POTASSIUM BLD-SCNC: 3.6 MMOL/L (ref 3.4–5.3)
PR INTERVAL - MUSE: 114 MS
PROT SERPL-MCNC: 7.2 G/DL (ref 6.8–8.8)
QRS DURATION - MUSE: 86 MS
QT - MUSE: 378 MS
QTC - MUSE: 433 MS
R AXIS - MUSE: 70 DEGREES
RBC # BLD AUTO: 5.13 10E6/UL (ref 3.8–5.2)
SODIUM SERPL-SCNC: 138 MMOL/L (ref 133–144)
SYSTOLIC BLOOD PRESSURE - MUSE: NORMAL MMHG
T AXIS - MUSE: 62 DEGREES
TROPONIN I SERPL HS-MCNC: <3 NG/L
VENTRICULAR RATE- MUSE: 79 BPM
WBC # BLD AUTO: 6.3 10E3/UL (ref 4–11)

## 2022-08-25 PROCEDURE — 93005 ELECTROCARDIOGRAM TRACING: CPT

## 2022-08-25 PROCEDURE — 250N000011 HC RX IP 250 OP 636: Performed by: EMERGENCY MEDICINE

## 2022-08-25 PROCEDURE — 84484 ASSAY OF TROPONIN QUANT: CPT | Performed by: EMERGENCY MEDICINE

## 2022-08-25 PROCEDURE — 250N000009 HC RX 250: Performed by: EMERGENCY MEDICINE

## 2022-08-25 PROCEDURE — 99285 EMERGENCY DEPT VISIT HI MDM: CPT | Mod: 25

## 2022-08-25 PROCEDURE — 96374 THER/PROPH/DIAG INJ IV PUSH: CPT | Mod: 59

## 2022-08-25 PROCEDURE — 83690 ASSAY OF LIPASE: CPT | Performed by: EMERGENCY MEDICINE

## 2022-08-25 PROCEDURE — 84703 CHORIONIC GONADOTROPIN ASSAY: CPT | Performed by: EMERGENCY MEDICINE

## 2022-08-25 PROCEDURE — 80053 COMPREHEN METABOLIC PANEL: CPT | Performed by: EMERGENCY MEDICINE

## 2022-08-25 PROCEDURE — 71275 CT ANGIOGRAPHY CHEST: CPT

## 2022-08-25 PROCEDURE — 85025 COMPLETE CBC W/AUTO DIFF WBC: CPT | Performed by: EMERGENCY MEDICINE

## 2022-08-25 PROCEDURE — 36415 COLL VENOUS BLD VENIPUNCTURE: CPT | Performed by: EMERGENCY MEDICINE

## 2022-08-25 RX ORDER — KETOROLAC TROMETHAMINE 15 MG/ML
15 INJECTION, SOLUTION INTRAMUSCULAR; INTRAVENOUS ONCE
Status: COMPLETED | OUTPATIENT
Start: 2022-08-25 | End: 2022-08-25

## 2022-08-25 RX ORDER — IOPAMIDOL 755 MG/ML
101 INJECTION, SOLUTION INTRAVASCULAR ONCE
Status: COMPLETED | OUTPATIENT
Start: 2022-08-25 | End: 2022-08-25

## 2022-08-25 RX ORDER — SERTRALINE HYDROCHLORIDE 25 MG/1
25 TABLET, FILM COATED ORAL DAILY
Qty: 30 TABLET | Refills: 3 | Status: SHIPPED | OUTPATIENT
Start: 2022-08-25 | End: 2022-11-15

## 2022-08-25 RX ADMIN — KETOROLAC TROMETHAMINE 15 MG: 15 INJECTION, SOLUTION INTRAMUSCULAR; INTRAVENOUS at 16:13

## 2022-08-25 RX ADMIN — IOPAMIDOL 101 ML: 755 INJECTION, SOLUTION INTRAVENOUS at 16:28

## 2022-08-25 RX ADMIN — SODIUM CHLORIDE 71 ML: 900 INJECTION INTRAVENOUS at 16:29

## 2022-08-25 ASSESSMENT — ENCOUNTER SYMPTOMS
NUMBNESS: 1
DIARRHEA: 0
NAUSEA: 1
VOMITING: 0
ABDOMINAL PAIN: 0

## 2022-08-25 ASSESSMENT — ACTIVITIES OF DAILY LIVING (ADL): ADLS_ACUITY_SCORE: 35

## 2022-08-25 NOTE — ED TRIAGE NOTES
Patient comes in via EMS from home.  She was at work when she started having chest pain and palpitations.  Over the last week she has had some palpitations, near syncope.  Recently discharge propanolol because it was making things worse.  Sinus arrhythmia for EMS.  324 aspirin.  Pain down to 3/10.  VSS

## 2022-08-25 NOTE — ED PROVIDER NOTES
History     Chief Complaint:  Chest pain.     The history is provided by the patient and a significant other.      Antonia Marc is a 26 year old female with history of anxiety who presents with recurrent left-sided chest pain. The patient states that she has been in the ER several times in the past month for similar symptoms. Within the last week, she has developed severe intermittent left-sided chest pain and palpitations. Her doctor prescribed propanolol, which she took for 6 days. She has worn a  Ziopatch for 1 week and just sent it back yesterday. Pain started to get worse, so she called her doctor and was told to stop taking propranolol (two days ago). She describes the pain as achy and dull, but is sometimes sharp. She states that walking worsens the pain, and laying down eases it. She additionally noted that walking makes her heart race (170 bpm on her apple watch), and that the pain lasts for several minutes at a time. She also noted nausea and numbness in her legs. She denies recent travel, smoking, taking any medication, abdominal pain, vomiting, diarrhea, and any family history.  Of note she was referred to cardiology for her ongoing symptoms and has an appointment a month.  She had an echo cardiogram performed this past week which showed maybe some right ventricle and left atrial enlargement.    XR Chest - 8/17/22  Electronic device overlies the left chest. Otherwise negative. The lungs are clear.    Labs - 8/17/22  CMP: chloride 111(H),  o/w WNL (Creatinine 0.74)   CBC: WBC 6.8, HGB 15.2,    Ddimer: 0.30  Troponin <3  TSH: 1.67  BNP: 149  HCG: Negative     Review of Systems   Cardiovascular: Positive for chest pain.   Gastrointestinal: Positive for nausea. Negative for abdominal pain, diarrhea and vomiting.   Neurological: Positive for numbness.       Allergies:  Amoxicillin  Oxycodone      Medications:    The patient is not currently taking any prescribed medications.    Past Medical  "History:    Recurrent tonsillitis  Depression  Anxiety  Irregular menstrual bleeding      Past Surgical History:    EGD, combined  Tonsillectomy     Family History:    Mother: diabetes, hypertension  Father: diabetes, hypertension, Crohn's disease     Social History:  Presents to the ED with her significant other and her boss.     Physical Exam     Patient Vitals for the past 24 hrs:   BP Temp Temp src Pulse Resp SpO2 Height Weight   08/25/22 1755 131/77 -- -- 70 -- 99 % -- --   08/25/22 1754 131/77 -- -- 55 -- 98 % -- --   08/25/22 1411 (!) 144/67 98.9  F (37.2  C) Oral 66 16 100 % 1.727 m (5' 8\") 90.7 kg (200 lb)       Physical Exam    Physical Exam   Constitutional:  Patient is oriented to person, place, and time. They appear well-developed and well-nourished. Mild distress secondary to left-sided chest pain    HENT:   Mouth/Throat:   Oropharynx is clear and moist.   Eyes:    Conjunctivae normal and EOM are normal. Pupils are equal, round, and reactive to light.   Neck:    Normal range of motion.   Cardiovascular: Normal rate, regular rhythm and normal heart sounds.  Exam reveals no gallop and no friction rub.  No murmur heard.  Pulmonary/Chest:  Effort normal and breath sounds normal. Patient has no wheezes. Patient has no rales. Reproducible pain on lefts side of sternum from palpations.  Abdominal:   Soft. Bowel sounds are normal. Patient exhibits no mass. There is no tenderness. There is no rebound and no guarding.   Musculoskeletal:  Normal range of motion. Patient exhibits no edema.   Neurological:   Patient is alert and oriented to person, place, and time. Patient has normal strength. No cranial nerve deficit or sensory deficit. GCS 15  Skin:   Skin is warm and dry. No rash noted. No erythema.   Psychiatric:   Patient has a normal mood and affect. Patient's behavior is normal. Judgment and thought content normal.         Emergency Department Course   ECG  ECG taken at 1406, ECG read at 1410 by " Brian  Normal sinus rhythm with sinus arrhythmia. Normal ECG  Rate 79 bpm. FL interval 114 ms. QRS duration 86 ms. QT/QTc 378/433 ms. P-R-T axes 60 70 62.     Imaging:  CT Chest Pulmonary Embolism w Contrast   Final Result   IMPRESSION:   1.  No acute findings in the chest. No acute pulmonary embolism.        Report per radiology    Laboratory:  Labs Ordered and Resulted from Time of ED Arrival to Time of ED Departure   COMPREHENSIVE METABOLIC PANEL - Abnormal       Result Value    Sodium 138      Potassium 3.6      Chloride 108      Carbon Dioxide (CO2) 23      Anion Gap 7      Urea Nitrogen 11      Creatinine 0.74      Calcium 9.0      Glucose 90      Alkaline Phosphatase 44      AST 14      ALT 30      Protein Total 7.2      Albumin 3.7      Bilirubin Total 1.6 (*)     GFR Estimate >90     TROPONIN I - Normal    Troponin I High Sensitivity <3     LIPASE - Normal    Lipase 102     HCG QUALITATIVE PREGNANCY - Normal    hCG Serum Qualitative Negative     CBC WITH PLATELETS AND DIFFERENTIAL    WBC Count 6.3      RBC Count 5.13      Hemoglobin 14.6      Hematocrit 42.8      MCV 83      MCH 28.5      MCHC 34.1      RDW 11.8      Platelet Count 303      % Neutrophils 70      % Lymphocytes 23      % Monocytes 5      % Eosinophils 1      % Basophils 1      % Immature Granulocytes 0      NRBCs per 100 WBC 0      Absolute Neutrophils 4.5      Absolute Lymphocytes 1.4      Absolute Monocytes 0.3      Absolute Eosinophils 0.0      Absolute Basophils 0.1      Absolute Immature Granulocytes 0.0      Absolute NRBCs 0.0         Emergency Department Course:         Reviewed:  I reviewed nursing notes, vitals and past medical history    Assessments:  1600 I obtained history and examined the patient as noted above.   1719 I rechecked the patient and explained findings. Prepared for discharge.          Interventions:  1613 Toradol 15 mg IV     Disposition:  The patient was discharged to home.     Impression & Plan         Medical  Decision Making:  Antonia Marc is a 26 yr. old female presenting with left-sided chest pain, this has been somewhat recurrent and problematic as well as palpitations over the past one to two weeks. She's seen her PCP, has worn a ziopatch, was seen in the ED on 8/17 as noted above for palpitations. Prior to that, she was seen up in [place] on 8/4 where she underwent stroke evaluation. Everything has been reassuringly normal. At this time, I do not see any emergent cause for her pain, but I feel that cardiology referral as put forth by her PCP is the next appropriate step. Her EKG is unremarkable here as is her cardiac enzyme, again D-Dimer was performed a week ago, do not feel that there is any new risk factors for PE. CT of her chest was performed, given she has had X-Rays in the past, and fortunately this shows no evidence of effusion, infiltrate mass, dissection, PE. Close follow up indicated, return to the emergency department if any new or concerning symptoms develop.    Covid-19  Antonia Marc was evaluated during a global COVID-19 pandemic, which necessitated consideration that the patient might be at risk for infection with the SARS-CoV-2 virus that causes COVID-19.   Applicable protocols for evaluation were followed during the patient's care.   COVID-19 was considered as part of the patient's evaluation.    Diagnosis:    ICD-10-CM    1. Atypical chest pain  R07.89    2. Palpitations  R00.2          Scribe Disclosure:  I, Girish High, am serving as a scribe at 4:00 PM on 8/25/2022 to document services personally performed by Dominique Brownlee MD based on my observations and the provider's statements to me.      Dominique Brownlee MD  08/25/22 3585

## 2022-08-26 DIAGNOSIS — R42 DIZZINESS: ICD-10-CM

## 2022-08-26 DIAGNOSIS — R55 POSTURAL DIZZINESS WITH NEAR SYNCOPE: Primary | ICD-10-CM

## 2022-08-26 DIAGNOSIS — G90.A POTS (POSTURAL ORTHOSTATIC TACHYCARDIA SYNDROME): ICD-10-CM

## 2022-08-26 DIAGNOSIS — R93.1 ABNORMAL ECHOCARDIOGRAM: ICD-10-CM

## 2022-08-26 DIAGNOSIS — R07.89 OTHER CHEST PAIN: ICD-10-CM

## 2022-08-26 DIAGNOSIS — R42 POSTURAL DIZZINESS WITH NEAR SYNCOPE: Primary | ICD-10-CM

## 2022-08-26 NOTE — TELEPHONE ENCOUNTER
Left non-detailed message for patient to return a call to the clinic RN.       Pt was seen and evaluated in ED yesterday for these symptoms.    CHARMAINE Salinas RN

## 2022-08-29 NOTE — TELEPHONE ENCOUNTER
See [employer] message regarding anxiety and medications. Please advise.    Thank you,  Sahra Poole RN

## 2022-08-30 ENCOUNTER — TELEPHONE (OUTPATIENT)
Dept: FAMILY MEDICINE | Facility: CLINIC | Age: 27
End: 2022-08-30

## 2022-08-30 RX ORDER — BUSPIRONE HYDROCHLORIDE 5 MG/1
5 TABLET ORAL 2 TIMES DAILY
Qty: 60 TABLET | Refills: 0 | Status: SHIPPED | OUTPATIENT
Start: 2022-08-30 | End: 2022-11-15

## 2022-08-30 RX ORDER — METOPROLOL SUCCINATE 25 MG/1
12.5 TABLET, EXTENDED RELEASE ORAL DAILY
Qty: 45 TABLET | Refills: 1 | Status: SHIPPED | OUTPATIENT
Start: 2022-08-30 | End: 2022-11-15

## 2022-08-30 NOTE — TELEPHONE ENCOUNTER
Sertraline prescribed 08-25-22. States has taken 5 doses, noticed onset pain behind eyes, photosensitivity, blurry vision, eadache last carley. States not sure if sx related to anxiety or side effect of sertaline.   See yesterday MyChart message.  Advised to hold sertraline. ED if sx persist/ worsen.  Forwarded message to KAREN Brandon RN

## 2022-08-30 NOTE — TELEPHONE ENCOUNTER
Responded in my chart, these symptoms should not be due to side effects from very low dose Sertraline. More likely patient experiencing migraine headaches. Recommend office visit if symptoms continue.    ROS Hansen CNP

## 2022-08-30 NOTE — TELEPHONE ENCOUNTER
Attempted to notify patient of message from PCP, no answer. Non-detailed message left to return call to clinic.    Emilie Vasquez RN  [facility]

## 2022-09-01 ENCOUNTER — TELEPHONE (OUTPATIENT)
Dept: CARDIOLOGY | Facility: CLINIC | Age: 27
End: 2022-09-01

## 2022-09-01 DIAGNOSIS — R55 POSTURAL DIZZINESS WITH NEAR SYNCOPE: Primary | ICD-10-CM

## 2022-09-01 DIAGNOSIS — R42 POSTURAL DIZZINESS WITH NEAR SYNCOPE: Primary | ICD-10-CM

## 2022-09-01 DIAGNOSIS — R07.89 OTHER CHEST PAIN: ICD-10-CM

## 2022-09-01 DIAGNOSIS — I47.29 NSVT (NONSUSTAINED VENTRICULAR TACHYCARDIA) (H): ICD-10-CM

## 2022-09-01 DIAGNOSIS — R93.1 ABNORMAL ECHOCARDIOGRAM: ICD-10-CM

## 2022-09-01 NOTE — TELEPHONE ENCOUNTER
----- Message from Kimberly Bunch MD sent at 8/19/2022 10:42 AM CDT -----  Patient wearing zio patch ordered by primary being turned in Wednesday - just want to make sure I get results

## 2022-09-01 NOTE — TELEPHONE ENCOUNTER
Please review event monitor results (preliminary report) scanned to chart - cardMRI sched on 9-19-22 - follow-up sched on 9-22-22 - any new orders at this time?  mg

## 2022-09-02 ENCOUNTER — OFFICE VISIT (OUTPATIENT)
Dept: FAMILY MEDICINE | Facility: CLINIC | Age: 27
End: 2022-09-02
Payer: COMMERCIAL

## 2022-09-02 ENCOUNTER — NURSE TRIAGE (OUTPATIENT)
Dept: FAMILY MEDICINE | Facility: CLINIC | Age: 27
End: 2022-09-02

## 2022-09-02 VITALS
HEART RATE: 95 BPM | TEMPERATURE: 99.2 F | DIASTOLIC BLOOD PRESSURE: 84 MMHG | OXYGEN SATURATION: 98 % | WEIGHT: 207 LBS | BODY MASS INDEX: 31.47 KG/M2 | SYSTOLIC BLOOD PRESSURE: 140 MMHG

## 2022-09-02 DIAGNOSIS — F41.1 GAD (GENERALIZED ANXIETY DISORDER): Primary | ICD-10-CM

## 2022-09-02 DIAGNOSIS — F40.240 CLAUSTROPHOBIA: ICD-10-CM

## 2022-09-02 DIAGNOSIS — G90.A POTS (POSTURAL ORTHOSTATIC TACHYCARDIA SYNDROME): ICD-10-CM

## 2022-09-02 PROCEDURE — 99213 OFFICE O/P EST LOW 20 MIN: CPT | Performed by: NURSE PRACTITIONER

## 2022-09-02 RX ORDER — DIAZEPAM 10 MG
TABLET ORAL
Qty: 1 TABLET | Refills: 0 | Status: SHIPPED | OUTPATIENT
Start: 2022-09-02 | End: 2022-09-27

## 2022-09-02 ASSESSMENT — ANXIETY QUESTIONNAIRES
2. NOT BEING ABLE TO STOP OR CONTROL WORRYING: NEARLY EVERY DAY
4. TROUBLE RELAXING: NEARLY EVERY DAY
IF YOU CHECKED OFF ANY PROBLEMS ON THIS QUESTIONNAIRE, HOW DIFFICULT HAVE THESE PROBLEMS MADE IT FOR YOU TO DO YOUR WORK, TAKE CARE OF THINGS AT HOME, OR GET ALONG WITH OTHER PEOPLE: EXTREMELY DIFFICULT
GAD7 TOTAL SCORE: 21
7. FEELING AFRAID AS IF SOMETHING AWFUL MIGHT HAPPEN: NEARLY EVERY DAY
5. BEING SO RESTLESS THAT IT IS HARD TO SIT STILL: NEARLY EVERY DAY
7. FEELING AFRAID AS IF SOMETHING AWFUL MIGHT HAPPEN: NEARLY EVERY DAY
1. FEELING NERVOUS, ANXIOUS, OR ON EDGE: NEARLY EVERY DAY
GAD7 TOTAL SCORE: 21
3. WORRYING TOO MUCH ABOUT DIFFERENT THINGS: NEARLY EVERY DAY
8. IF YOU CHECKED OFF ANY PROBLEMS, HOW DIFFICULT HAVE THESE MADE IT FOR YOU TO DO YOUR WORK, TAKE CARE OF THINGS AT HOME, OR GET ALONG WITH OTHER PEOPLE?: EXTREMELY DIFFICULT
GAD7 TOTAL SCORE: 21
6. BECOMING EASILY ANNOYED OR IRRITABLE: NEARLY EVERY DAY

## 2022-09-02 ASSESSMENT — PAIN SCALES - GENERAL: PAINLEVEL: NO PAIN (0)

## 2022-09-02 NOTE — TELEPHONE ENCOUNTER
I recommend to keep appointment with Tasha Mathis today at 1 PM. Might need to increase Metoprolol to 25-50 mg based on evaluation and schedule follow up appointment with cardiologist. If Tasha will think that ER evaluation will be more appropriate they will transfer patient to ER here, but I think clinic visit is appropriate as the first step.      Echo showed possible borderline enlargement of the right ventricle Patient have normal right  ventricular systolic function. Cardiologist recommended MRI for better evaluation of patient's heart.      ROS Hansen CNP

## 2022-09-02 NOTE — TELEPHONE ENCOUNTER
2nd level triage:YES     S-(situation): tachycardia, anxiety    B-(background): hx of anxiety, for months now. On multiple medications for anxiety-has not missed any doses.  Getting depressed and is unable to eat due to anxiety.  Heart rate ranging from 180's-220's and is feeling pretty horrible.  Was on propranolol and seemed to be working pretty good controlling her heart rate, but had to be discontinued due to blurry vision.  Has started metoprolol on 8/31/22 and that medication doesn't seem to be working as well.  Monitor for about a week and there were 17 episodes of tachycardia with HR around and over 200.  Patient sitting around this am and HR was 200.  Patient freaking out as the cardiologist told her she needs an mri and other tests.        A-(assessment):.  Anxiety is so bad.  Cant work or eat or do anything.  Patient was in ER a week ago and has had appointment with cardiologist on 8/19/22.     R-(recommendations): Protocol recommends ER, but this RN wanted to run it past the primary care provider.  Patient has an appointment today at 1:30.        Next 5 appointments (look out 90 days)    Sep 02, 2022  1:30 PM  (Arrive by 1:10 PM)  Provider Visit with ROS Iraheta CNP  [facility] ([facility] [place] )  Arrive at: Clinic [address] [address]  [phone]

## 2022-09-02 NOTE — PROGRESS NOTES
"  Assessment & Plan     JAZZY (generalized anxiety disorder)  Seems to be significantly worsening, however she feels that her anxiety is related to the uncertainty of her cardiac diagnoses. She is intermittently tearful, angry then calm during the visit, but ultimately left tearful. She was prescribed buspirone to use in place of sertraline last week, however did not stop sertraline and has not started buspirone. At this point, I think using both medications may be helpful as she is experiencing significant anxiety. It is unclear to me if her chest pain is related to her anxiety or another process. Both she and her mother ask repeatedly during the visit if all of her symptoms can be related to anxiety. We discussed at length that I do not believe the POTS is related to anxiety given rates up to 230 on Zio patch.    POTS (postural orthostatic tachycardia syndrome)  Discussed at length that she should start metoprolol for rate control as well as continue cardiology workup as previously planned. She and her mother are adamant that she will not tolerate an MRI with Valium. They believe this to be true because her mother could not tolerate an MRI with Valium. Discussed that I think it is worth a try to see if she can have the test with Valium, and they seem quite reluctant but agree.     Claustrophobia    - diazepam (VALIUM) 10 MG tablet; Take one half tablet one hour prior to MRI. Bring the other half with you to the appointment.    BMI:   Estimated body mass index is 31.47 kg/m  as calculated from the following:    Height as of 8/25/22: 1.727 m (5' 8\").    Weight as of this encounter: 93.9 kg (207 lb).       Patient Instructions   Start metoprolol.  Start buspirone.  Get MRI as planned - Valium sent.      Return in about 1 month (around 10/2/2022) for worsening or continued symptoms.    ROS Greene CNP  [facility]    Aquilino Knight is a 26 year old accompanied by her mother, " presenting for the following health issues:  Anxiety      History of Present Illness       Mental Health Follow-up:  Patient presents to follow-up on Anxiety.    Patient's anxiety since last visit has been:  Worse  The patient is having other symptoms associated with anxiety.  Any significant life events: health concerns  Patient is feeling anxious or having panic attacks.  Patient has no concerns about alcohol or drug use.    She eats 0-1 servings of fruits and vegetables daily.She consumes 1 sweetened beverage(s) daily.She exercises with enough effort to increase her heart rate 60 or more minutes per day.  She exercises with enough effort to increase her heart rate 5 days per week.   She is taking medications regularly.  Today's JAZZY-7 Score: 21     She is currently having multiple panic attacks a day and is concerned with her recent test results regarding her heart.    Above HPI reviewed. Additionally, patient has been seen multiple times in August in the emergency department, primary care and also by cardiology.  Has been having significant anxiety as well as tachycardia.  Was recently diagnosed with POTS.  She was seen by cardiology several weeks ago, echocardiogram was ordered and showed possible RV enlargement, cardiac MRI was ordered and is scheduled on September 17.  She also recently had a Zio patch which did show tachycardia with a rate up to 231.  She has also been having intermittent chest pain.  Initially, she was prescribed propranolol for rate control and also for frequent headaches, however as she had intolerable side effects of this medication so it was stopped.  She was recently prescribed metoprolol for rate control, however she has not yet started this because she googled side effects and was fearful that she would have an MI or heart failure related to the medication, however she also didn't understand why she was prescribed this.  She was also recently started on sertraline for her anxiety, and  and had what she believed to be side effects from this medication.  She contacted her primary care provider who did not believe side effects were related to sertraline and offered her buspirone to try instead.  She did not stop the sertraline and has not started buspirone.    She presents to clinic today with her mother.  They are both very frustrated and angry, intermittently yelling that no one can tell them what is wrong with her and would like me today to determine if her symptoms are related to anxiety or cardiac issue.  Of note, the only other time I have seen this patient was in early August and it was related to anxiety and migraine headaches.      Review of Systems   Constitutional, HEENT, cardiovascular, pulmonary, gi and gu systems are negative, except as otherwise noted.      Objective    BP (!) 140/84 (BP Location: Right arm, Patient Position: Sitting, Cuff Size: Adult Large)   Pulse 95   Temp 99.2  F (37.3  C) (Tympanic)   Wt 93.9 kg (207 lb)   LMP 07/08/2022   SpO2 98%   BMI 31.47 kg/m    Body mass index is 31.47 kg/m .  Physical Exam  Vitals and nursing note reviewed.   Constitutional:       General: She is not in acute distress.     Appearance: Normal appearance.   HENT:      Head: Normocephalic and atraumatic.      Mouth/Throat:      Mouth: Mucous membranes are moist.   Cardiovascular:      Rate and Rhythm: Normal rate.   Pulmonary:      Effort: Pulmonary effort is normal.   Musculoskeletal:      Cervical back: Neck supple.   Skin:     General: Skin is warm and dry.   Neurological:      General: No focal deficit present.      Mental Status: She is alert.   Psychiatric:         Mood and Affect: Mood is anxious. Affect is tearful.         Speech: Speech is rapid and pressured.         Behavior: Behavior normal.

## 2022-09-02 NOTE — TELEPHONE ENCOUNTER
Reached out to patient x3-goes straight to voicemail.  Left detailed VM for patient instructing she can come to clinic appt today and call RN at [phone] with any questions.  Will send MyChart as well.     Mami Alfaro RN  [facility]

## 2022-09-06 NOTE — TELEPHONE ENCOUNTER
Spoke with her about the monitor results.  She did have 17 beats of NSVT on her event monitor.  I spoke with EP about this as well.  When I talked to her she states that she is having a lot of chest pain.  I would like to change her cardiac MRI to a cardiac MR stress test.  Let me know if you need help with this change.

## 2022-09-07 NOTE — TELEPHONE ENCOUNTER
"[facility] rec'd 9-7-22 @ 1026:  Mariella Wade Maureen, RN  Okay, I was able to switch her appt to the stress mri at the same time.       Here is the answer from one of the non-invasive nurses regarding the timing of the Valium - \"Takes valium after she arrives and instructed by our RN.  \"     "

## 2022-09-07 NOTE — TELEPHONE ENCOUNTER
Phone call to patient - confirmed appt for stress cardMRI and instructed her to bring Valium with her to appt so nurse can determine timing of administrations - patient verbalized understanding and agreed with plan.  mg

## 2022-09-07 NOTE — TELEPHONE ENCOUNTER
New order placed per protocol - msg sent to sched (Mariella) with request to reschedule cardMRI to stress cardMRI.  mg

## 2022-09-19 ENCOUNTER — HOSPITAL ENCOUNTER (OUTPATIENT)
Dept: MRI IMAGING | Facility: HOSPITAL | Age: 27
Discharge: HOME | End: 2022-09-19
Attending: INTERNAL MEDICINE
Payer: COMMERCIAL

## 2022-09-19 VITALS — OXYGEN SATURATION: 98 % | SYSTOLIC BLOOD PRESSURE: 133 MMHG | HEART RATE: 99 BPM | DIASTOLIC BLOOD PRESSURE: 70 MMHG

## 2022-09-19 DIAGNOSIS — I47.29 NSVT (NONSUSTAINED VENTRICULAR TACHYCARDIA) (H): ICD-10-CM

## 2022-09-19 DIAGNOSIS — R55 POSTURAL DIZZINESS WITH NEAR SYNCOPE: ICD-10-CM

## 2022-09-19 DIAGNOSIS — R93.1 ABNORMAL ECHOCARDIOGRAM: ICD-10-CM

## 2022-09-19 DIAGNOSIS — R07.89 OTHER CHEST PAIN: ICD-10-CM

## 2022-09-19 DIAGNOSIS — R42 POSTURAL DIZZINESS WITH NEAR SYNCOPE: ICD-10-CM

## 2022-09-19 LAB
ATRIAL RATE - MUSE: 83 BPM
ATRIAL RATE - MUSE: 84 BPM
DIASTOLIC BLOOD PRESSURE - MUSE: NORMAL MMHG
DIASTOLIC BLOOD PRESSURE - MUSE: NORMAL MMHG
INTERPRETATION ECG - MUSE: NORMAL
INTERPRETATION ECG - MUSE: NORMAL
P AXIS - MUSE: 39 DEGREES
P AXIS - MUSE: 61 DEGREES
PR INTERVAL - MUSE: 112 MS
PR INTERVAL - MUSE: 118 MS
QRS DURATION - MUSE: 92 MS
QRS DURATION - MUSE: 92 MS
QT - MUSE: 370 MS
QT - MUSE: 378 MS
QTC - MUSE: 434 MS
QTC - MUSE: 446 MS
R AXIS - MUSE: 47 DEGREES
R AXIS - MUSE: 56 DEGREES
SYSTOLIC BLOOD PRESSURE - MUSE: NORMAL MMHG
SYSTOLIC BLOOD PRESSURE - MUSE: NORMAL MMHG
T AXIS - MUSE: 42 DEGREES
T AXIS - MUSE: 49 DEGREES
VENTRICULAR RATE- MUSE: 83 BPM
VENTRICULAR RATE- MUSE: 84 BPM

## 2022-09-19 PROCEDURE — 75563 CARD MRI W/STRESS IMG & DYE: CPT

## 2022-09-19 PROCEDURE — A9585 GADOBUTROL INJECTION: HCPCS | Performed by: INTERNAL MEDICINE

## 2022-09-19 PROCEDURE — 250N000011 HC RX IP 250 OP 636: Performed by: INTERNAL MEDICINE

## 2022-09-19 PROCEDURE — 93018 CV STRESS TEST I&R ONLY: CPT | Performed by: INTERNAL MEDICINE

## 2022-09-19 PROCEDURE — 93005 ELECTROCARDIOGRAM TRACING: CPT

## 2022-09-19 PROCEDURE — 93016 CV STRESS TEST SUPVJ ONLY: CPT | Performed by: INTERNAL MEDICINE

## 2022-09-19 PROCEDURE — 93010 ELECTROCARDIOGRAM REPORT: CPT | Mod: 77 | Performed by: INTERNAL MEDICINE

## 2022-09-19 PROCEDURE — 93017 CV STRESS TEST TRACING ONLY: CPT | Performed by: INTERNAL MEDICINE

## 2022-09-19 PROCEDURE — 255N000002 HC RX 255 OP 636: Performed by: INTERNAL MEDICINE

## 2022-09-19 PROCEDURE — 75563 CARD MRI W/STRESS IMG & DYE: CPT | Mod: 26 | Performed by: INTERNAL MEDICINE

## 2022-09-19 PROCEDURE — 999N000054 HC STATISTIC EKG NON-CHARGEABLE

## 2022-09-19 PROCEDURE — 93010 ELECTROCARDIOGRAM REPORT: CPT | Performed by: INTERNAL MEDICINE

## 2022-09-19 PROCEDURE — 999N000122 MR MYOCARDIUM  OVERREAD

## 2022-09-19 RX ORDER — REGADENOSON 0.08 MG/ML
0.4 INJECTION, SOLUTION INTRAVENOUS ONCE
Status: COMPLETED | OUTPATIENT
Start: 2022-09-19 | End: 2022-09-19

## 2022-09-19 RX ORDER — AMINOPHYLLINE 25 MG/ML
50 INJECTION, SOLUTION INTRAVENOUS
Status: DISCONTINUED | OUTPATIENT
Start: 2022-09-19 | End: 2022-09-19 | Stop reason: HOSPADM

## 2022-09-19 RX ORDER — GADOBUTROL 604.72 MG/ML
20 INJECTION INTRAVENOUS ONCE
Status: COMPLETED | OUTPATIENT
Start: 2022-09-19 | End: 2022-09-19

## 2022-09-19 RX ORDER — GADOBUTROL 604.72 MG/ML
20 INJECTION INTRAVENOUS ONCE
Status: DISCONTINUED | OUTPATIENT
Start: 2022-09-19 | End: 2022-09-20 | Stop reason: HOSPADM

## 2022-09-19 RX ADMIN — AMINOPHYLLINE 50 MG: 25 INJECTION, SOLUTION INTRAVENOUS at 10:30

## 2022-09-19 RX ADMIN — REGADENOSON 0.4 MG: 0.08 INJECTION, SOLUTION INTRAVENOUS at 10:24

## 2022-09-19 RX ADMIN — GADOBUTROL 20 ML: 604.72 INJECTION INTRAVENOUS at 10:42

## 2022-09-19 NOTE — ADDENDUM NOTE
Encounter addended by: Susi Moreno ARRT on: 9/19/2022 11:11 AM   Actions taken: Imaging Exam ended, Order list changed

## 2022-09-19 NOTE — ADDENDUM NOTE
Encounter addended by: Estefani Granados RN on: 9/19/2022 10:48 AM   Actions taken: Order list changed, LDA properties accepted, MAR administration edited, MAR administration accepted, Flowsheet accepted

## 2022-09-19 NOTE — PROGRESS NOTES
Having chest pain off and on for several months.  States having chest pain now due to anxiety non radiating and 7/10.  Took valium upon arrival to the department.  All questions answered.    Estefani Granados RN

## 2022-09-19 NOTE — ADDENDUM NOTE
Encounter addended by: Estefani Granados RN on: 9/19/2022 10:00 AM   Actions taken: Flowsheet accepted

## 2022-09-19 NOTE — ADDENDUM NOTE
Encounter addended by: Susi Moreno ARRT on: 9/19/2022 10:43 AM   Actions taken: Imaging Exam begun, Order list changed

## 2022-09-20 ENCOUNTER — TELEPHONE (OUTPATIENT)
Dept: CARDIOLOGY | Facility: CLINIC | Age: 27
End: 2022-09-20

## 2022-09-20 NOTE — TELEPHONE ENCOUNTER
Noted patient had EKG pre and post cardMRI 9-19-22 - results pending read - patient sched to follow-up on 9-22-22 - will await Dr. Bunch's review of results before returning call to patient.   mg

## 2022-09-20 NOTE — TELEPHONE ENCOUNTER
[facility]    Phone Message    May a detailed message be left on voicemail: yes     Reason for Call: Requesting Results   Name/type of test: ECG  Date of test: 9/19/22  Was test done at a location other than [facility] (Please fill in the location if not [facility])?: Yes: Patient has questions regarding results received on [facility]. Please call to discuss.       Action Taken: Other: cardiology    Travel Screening: Not Applicable

## 2022-09-21 DIAGNOSIS — I47.29 NSVT (NONSUSTAINED VENTRICULAR TACHYCARDIA) (H): Primary | ICD-10-CM

## 2022-09-21 NOTE — TELEPHONE ENCOUNTER
Response noted - refer to cardI results for follow-up.  mg Palaciosg rec'd 9-20-22 @ 8640:  I reviewed both EKGs from her MRI as well as her MRI results.  Everything appears normal.  She did have a 17 beat run of NSVT on her previous zio patch monitor.  I spoke with her about the results and recommend EP consult for the NSVT.  She is agreeable to this.      Kimberly Bunch MD

## 2022-09-23 ENCOUNTER — HOSPITAL ENCOUNTER (EMERGENCY)
Facility: CLINIC | Age: 27
Discharge: LEFT BEFORE DISCHARGE | End: 2022-09-23
Admitting: EMERGENCY MEDICINE
Payer: COMMERCIAL

## 2022-09-23 ENCOUNTER — TELEPHONE (OUTPATIENT)
Dept: CARDIOLOGY | Facility: CLINIC | Age: 27
End: 2022-09-23

## 2022-09-23 ENCOUNTER — NURSE TRIAGE (OUTPATIENT)
Dept: CARDIOLOGY | Facility: CLINIC | Age: 27
End: 2022-09-23

## 2022-09-23 VITALS
OXYGEN SATURATION: 100 % | TEMPERATURE: 97.8 F | HEART RATE: 67 BPM | BODY MASS INDEX: 31.83 KG/M2 | WEIGHT: 210 LBS | RESPIRATION RATE: 16 BRPM | SYSTOLIC BLOOD PRESSURE: 146 MMHG | DIASTOLIC BLOOD PRESSURE: 72 MMHG | HEIGHT: 68 IN

## 2022-09-23 LAB
ANION GAP SERPL CALCULATED.3IONS-SCNC: 12 MMOL/L (ref 7–15)
BASOPHILS # BLD AUTO: 0.1 10E3/UL (ref 0–0.2)
BASOPHILS NFR BLD AUTO: 1 %
BUN SERPL-MCNC: 9.1 MG/DL (ref 6–20)
CALCIUM SERPL-MCNC: 9.2 MG/DL (ref 8.6–10)
CHLORIDE SERPL-SCNC: 99 MMOL/L (ref 98–107)
CREAT SERPL-MCNC: 0.71 MG/DL (ref 0.51–0.95)
DEPRECATED HCO3 PLAS-SCNC: 24 MMOL/L (ref 22–29)
EOSINOPHIL # BLD AUTO: 0.1 10E3/UL (ref 0–0.7)
EOSINOPHIL NFR BLD AUTO: 2 %
ERYTHROCYTE [DISTWIDTH] IN BLOOD BY AUTOMATED COUNT: 11.9 % (ref 10–15)
GFR SERPL CREATININE-BSD FRML MDRD: >90 ML/MIN/1.73M2
GLUCOSE SERPL-MCNC: 85 MG/DL (ref 70–99)
HCG SERPL QL: NEGATIVE
HCT VFR BLD AUTO: 44.8 % (ref 35–47)
HGB BLD-MCNC: 14.8 G/DL (ref 11.7–15.7)
HOLD SPECIMEN: NORMAL
IMM GRANULOCYTES # BLD: 0 10E3/UL
IMM GRANULOCYTES NFR BLD: 0 %
LYMPHOCYTES # BLD AUTO: 2.2 10E3/UL (ref 0.8–5.3)
LYMPHOCYTES NFR BLD AUTO: 36 %
MCH RBC QN AUTO: 27.8 PG (ref 26.5–33)
MCHC RBC AUTO-ENTMCNC: 33 G/DL (ref 31.5–36.5)
MCV RBC AUTO: 84 FL (ref 78–100)
MONOCYTES # BLD AUTO: 0.4 10E3/UL (ref 0–1.3)
MONOCYTES NFR BLD AUTO: 6 %
NEUTROPHILS # BLD AUTO: 3.4 10E3/UL (ref 1.6–8.3)
NEUTROPHILS NFR BLD AUTO: 55 %
NRBC # BLD AUTO: 0 10E3/UL
NRBC BLD AUTO-RTO: 0 /100
PLATELET # BLD AUTO: 292 10E3/UL (ref 150–450)
POTASSIUM SERPL-SCNC: 3.7 MMOL/L (ref 3.4–5.3)
RBC # BLD AUTO: 5.32 10E6/UL (ref 3.8–5.2)
SODIUM SERPL-SCNC: 135 MMOL/L (ref 136–145)
TROPONIN T SERPL HS-MCNC: <6 NG/L
WBC # BLD AUTO: 6.3 10E3/UL (ref 4–11)

## 2022-09-23 PROCEDURE — 80048 BASIC METABOLIC PNL TOTAL CA: CPT | Performed by: EMERGENCY MEDICINE

## 2022-09-23 PROCEDURE — 85025 COMPLETE CBC W/AUTO DIFF WBC: CPT | Performed by: EMERGENCY MEDICINE

## 2022-09-23 PROCEDURE — 93005 ELECTROCARDIOGRAM TRACING: CPT

## 2022-09-23 PROCEDURE — 999N000104 HC STATISTIC NO CHARGE

## 2022-09-23 PROCEDURE — 36415 COLL VENOUS BLD VENIPUNCTURE: CPT | Performed by: EMERGENCY MEDICINE

## 2022-09-23 PROCEDURE — 84703 CHORIONIC GONADOTROPIN ASSAY: CPT | Performed by: EMERGENCY MEDICINE

## 2022-09-23 PROCEDURE — 84484 ASSAY OF TROPONIN QUANT: CPT | Performed by: EMERGENCY MEDICINE

## 2022-09-23 NOTE — TELEPHONE ENCOUNTER
[facility] Call Center    Phone Message    May a detailed message be left on voicemail: yes     Reason for Call: Other: Antonia called to inform her care team that, as of today, she has been experiencing chest pain, dyspnea, and it feels like she's going to faint. She is requesting a call back from her care team about what she should do. I also transferred her to triage. Please give Antonia a call back at [phone].    Thank you!  [facility]    Action Taken: Other: Cardiology    Travel Screening: Not Applicable

## 2022-09-23 NOTE — TELEPHONE ENCOUNTER
Triage directed to ED.  Pt at [facility] ED waiting area currently.  -moy

## 2022-09-23 NOTE — TELEPHONE ENCOUNTER
"Patient called with concerns of chest pain she has been having for \"quite a while.\" Episodes occur daily. She rates current pain now at an 8 or 9. No radiation of pain. When walking she feels she will pass out. Her heart beats fast. She will have episodes of SOB and feeling hot. Patient is unsure if related to anxiety as she was in ED two times before for the chest pain and nothing was found. She was told it was anxiety or panic attacks. She has been taking metoprolol succinate 25mg. Patient sees Dr. Bunch. She had an Echo 8/22 with possible enlargement of RV and Zio Patch 8/17 with VT. Recommended patient present to ED for further evaluation and she verbalized understanding.     1. LOCATION: \"Where does it hurt?\" Left side of chest to center.   2. RADIATION: \"Does the pain go anywhere else?\" (e.g., into neck, jaw, arms, back) No.   3. ONSET: \"When did the chest pain begin?\" (Minutes, hours or days) She says it has been every day for quite a while but feels more severe today.   4. PATTERN \"Does the pain come and go, or has it been constant since it started?\" \"Does it get worse with exertion?\" Daily episodes of chest pain.  5. DURATION: \"How long does it last\" (e.g., seconds, minutes, hours)  6. SEVERITY: \"How bad is the pain?\" (e.g., Scale 1-10; mild, moderate, or severe) Rates an 8 or 9.  - MILD (1-3): doesn't interfere with normal activities   - MODERATE (4-7): interferes with normal activities or awakens from sleep  - SEVERE (8-10): excruciating pain, unable to do any normal activities   7. CARDIAC RISK FACTORS: \"Do you have any history of heart problems or risk factors for heart disease?\" (e.g., angina, prior heart attack; diabetes, high blood pressure, high cholesterol, smoker, or strong family history of heart disease) Anxiety.  8. PULMONARY RISK FACTORS: \"Do you have any history of lung disease?\" (e.g., blood clots in lung, asthma, emphysema, birth control pills) None.   9. CAUSE: \"What do you think is " "causing the chest pain?\" Unsure if cardiac or anxiety-related.  10. OTHER SYMPTOMS: \"Do you have any other symptoms?\" (e.g., dizziness, nausea, vomiting, sweating, fever, difficulty breathing, cough) Heart beating fast, feels she may pass out with walking, episodes of SOB, feeling hot.       "

## 2022-09-23 NOTE — ED TRIAGE NOTES
Ongoing mid sternal chest pain for months. Seeing cardiologist. Recently wore holter monitor that showed several episodes of vtach. Seeing another cardiologist in few weeks. Today after climbing ladder at work, felt that heart rate was fast, climbed down ladder. Palpitations continued as well as increasing chest pain and shortness of breath. Patient reports that pain was so severe she almost passed out.

## 2022-09-26 ENCOUNTER — TELEPHONE (OUTPATIENT)
Dept: CARDIOLOGY | Facility: CLINIC | Age: 27
End: 2022-09-26

## 2022-09-26 LAB
ATRIAL RATE - MUSE: 61 BPM
DIASTOLIC BLOOD PRESSURE - MUSE: NORMAL MMHG
INTERPRETATION ECG - MUSE: NORMAL
P AXIS - MUSE: 44 DEGREES
PR INTERVAL - MUSE: 120 MS
QRS DURATION - MUSE: 100 MS
QT - MUSE: 432 MS
QTC - MUSE: 434 MS
R AXIS - MUSE: 51 DEGREES
SYSTOLIC BLOOD PRESSURE - MUSE: NORMAL MMHG
T AXIS - MUSE: 55 DEGREES
VENTRICULAR RATE- MUSE: 61 BPM

## 2022-09-26 NOTE — TELEPHONE ENCOUNTER
"Return call to patient who confirmed she left ED on 9-23-22 without being seen \"because she was told the wait could be 5 hrs and her dog was home alone in kennel\".    Attempted to reassure patient that 9-23-22 EKG was compared to 9-19-22 EKG and showed no changes - explained to patient that cardMRI results did not show scarring / infarct suggested by EKG machine - encouraged patient to follow-up with PCP manan anti-anxiety treatment and informed her that further recommendations would be provided by Dr. Baum at 9-27-22 consult.    Patient verbalized understanding after questions/concerns addressed and agreed with plan.  mg      "

## 2022-09-26 NOTE — TELEPHONE ENCOUNTER
[facility] Call Center    Phone Message    May a detailed message be left on voicemail: yes     Reason for Call: Symptoms or Concerns     If patient has red-flag symptoms, warm transfer to triage line    Current symptom or concern:   EKG results 09/23/22, SOB, Chest Pains    Patient did not want the red flag triage line and will wait for nurse to review results and discuss symptoms    Symptoms have been present for: Chest paint and SOB for weeks - per patient    Has patient previously been seen for this? Yes    By Dr Bunch    Date: 8/19/22    Are there any new or worsening symptoms? symptoms were worse on Friday she almost pasted out.      Action Taken: Other: Cardiology    Travel Screening: Not Applicable

## 2022-09-26 NOTE — TELEPHONE ENCOUNTER
Noted patient spoke to triage nurse on 9-23-22 @ 1974 - directed to ED for c/o same symptoms.    Per 9-23-22 ED encounter:  Giovanna Skaggs, RN   Registered Nurse   Emergency Medicine   ED Notes      Signed   Date of Service:  9/23/2022  6:53 PM   Creation Time:  9/23/2022  6:53 PM          Patient LWBS after triage. Declination form completed and placed in patient chart.

## 2022-09-27 ENCOUNTER — OFFICE VISIT (OUTPATIENT)
Dept: CARDIOLOGY | Facility: CLINIC | Age: 27
End: 2022-09-27
Payer: COMMERCIAL

## 2022-09-27 VITALS
RESPIRATION RATE: 16 BRPM | HEART RATE: 68 BPM | WEIGHT: 217 LBS | DIASTOLIC BLOOD PRESSURE: 80 MMHG | SYSTOLIC BLOOD PRESSURE: 122 MMHG | BODY MASS INDEX: 32.89 KG/M2 | HEIGHT: 68 IN

## 2022-09-27 DIAGNOSIS — I47.29 NSVT (NONSUSTAINED VENTRICULAR TACHYCARDIA) (H): Primary | ICD-10-CM

## 2022-09-27 PROCEDURE — 99204 OFFICE O/P NEW MOD 45 MIN: CPT | Performed by: INTERNAL MEDICINE

## 2022-09-27 NOTE — LETTER
2022    ROS Hansen CNP  [address] [facility] [address]    RE: Antonia Marc       Dear Colleague,     I had the pleasure of seeing Antonia Marc in the [facility].     [facility]  Cardiac Electrophysiology  [address]  [place] [address]   Office: [phone]  Fax: [phone]     Cardiac Electrophysiology Consultation    Patient: Antonia Marc   : 1995     Referring Provider: Kimberly Bunch MD  Primary Care Provider: Roma Ye APRN CNP    CHIEF COMPLAINT/REASON FOR CONSULTATION  Nonsustained ventricular tachycardia    Assessment/Recommendations   Antonia Marc is a 26 year old female with a prior episode of syncope, single known episode of nonsustained ventricular tachycardia, presyncope, headaches, anxiety, obesity referred by Dr. Bunch for consultation regarding NSVT.    Nonsustained ventricular tachycardia - 17 beats, maximum 231bpm, average 182bpm.  No co-existing structural heart disease.  The significance of her NSVT and relationship to her syncopal episode are unclear - in aggregate, the features suggest she is likely at low risk for major/sustained ventricular arrhythmias.  We will plan for the following:  - discontinue metoprolol XL 25mg daily  - 30 day FESTUS - we will contact her with results and coordinate further EP follow-up if needed  - continued follow-up and evaluation with her primary care team  - if ongoing symptoms without clear explanation, consider evaluation for dysautonomia    Follow up: as above         History of Present Illness   Antonia Marc is a 26 year old female with a prior episode of syncope, single known episode of nonsustained ventricular tachycardia, presyncope, headaches, anxiety, obesity referred by Dr. Bunch for consultation regarding NSVT.    Mrs. Marc had an episode of malaise and syncope vs near syncope while at work   - she underwent ER  "evaluation with note of no arrhythmias and normal ECG, she was diagnosed with possible migraine.  In subsequent primary care evaluation she was noted to be orthostatic and was told that she had POTS.  She has had chest tightness and low grade fevers to 100.6F.  She underwent echocardiogram 8/22/2022 showing LVFE 55-60% with borderline RV dilation without significant valvular disease.  She underwent Zio monitoring 8/17-23/2022 showing sinus rhythm with one 17 beat episode of NSVT maximum 231bpm, otherwise low ventricular ectopy burden. Cardiac MRI 9/19/2022 showing normal biventricular function.  She notes constant headaches, nausea, chest pain, dyspnea, lightheadedness.  She notes somewhat worsened symptoms on metoprolol XL.  She had previously felt quite well prior 8/2022.    She denies family history of sudden cardiac death.       Physical Examination  Review of Systems   VITALS: /80 (BP Location: Left arm, Patient Position: Sitting, Cuff Size: Adult Regular)   Pulse 68   Resp 16   Ht 1.727 m (5' 8\")   Wt 98.4 kg (217 lb)   BMI 32.99 kg/m    Wt Readings from Last 3 Encounters:   09/23/22 95.3 kg (210 lb)   09/02/22 93.9 kg (207 lb)   08/25/22 90.7 kg (200 lb)     CONSTITUTIONAL: well nourished, comfortable, no distress  EYES:  Conjunctivae pink, sclerae clear.    E/N/T:  Oral mucosa pink  RESPIRATORY:  Respiratory effort is normal  CARDIOVASCULAR:  normal S1 and S2  GASTROINTESTINAL:  Abdomen without masses or tenderness  EXTREMITIES:  No clubbing or cyanosis.    MUSCULOSKELETAL:  Overall grossly normal muscle strength  SKIN:  Overall, skin warm and dry, no lesions.  NEURO/PSYCH:  Oriented x 3 with normal affect.   Constitutional:  No weight loss or loss of appetite    Eyes:  No difficulty with vision, no double vision, no dry eyes  ENT:  No sore throat, difficulty swallowing; changes in hearing or tinnitus  Cardiovascular: As detailed above  Respiratory:  No cough  Musculoskeletal  No joint pain, " muscle aches  Neurologic:  No syncope, lightheadedness, fainting spells   Hematologic: No easy bruising, excessive bleeding tendency   Gastrointestinal:  No jaundice, abdominal pain or abdominal bloating  Genitourinary: No changes in urinary habits, no trouble urinating    Psychiatric: No anxiety or depression      Medical History  Surgical History   Past Medical History:   Diagnosis Date     Closed fracture of unspecified part of radius with ulna(813.83)      Other diseases of trachea and bronchus, not elsewhere classified     tracheomalacia as a      Past Surgical History:   Procedure Laterality Date     ESOPHAGOSCOPY, GASTROSCOPY, DUODENOSCOPY (EGD), COMBINED N/A 10/19/2018    Procedure: COMBINED ESOPHAGOSCOPY, GASTROSCOPY, DUODENOSCOPY (EGD), BIOPSY SINGLE OR MULTIPLE;  Surgeon: Heraclio Ziegler DO;  Location: [facility]     TONSILLECTOMY ADULT Bilateral 2018    Procedure: TONSILLECTOMY ADULT;  Bilateral Tonsillectomy;  Surgeon: Galilea Good MD;  Location: [facility]         Family History Social History   Family History   Problem Relation Age of Onset     Diabetes Mother      Hypertension Mother      Diabetes Father      Hypertension Father      Crohn's Disease Father      Hypertension Maternal Grandmother      Anxiety Disorder Maternal Grandmother      Hypertension Maternal Grandfather      Diabetes Maternal Grandfather      Anxiety Disorder Maternal Grandfather         Social History     Tobacco Use     Smoking status: Never Smoker     Smokeless tobacco: Never Used     Tobacco comment: no exporure   Substance Use Topics     Alcohol use: No     Drug use: No         Medications  Allergies     Current Outpatient Medications:      busPIRone (BUSPAR) 5 MG tablet, Take 1 tablet (5 mg) by mouth 2 times daily (Patient not taking: Reported on 2022), Disp: 60 tablet, Rfl: 0     diazepam (VALIUM) 10 MG tablet, Take one half tablet one hour prior to MRI. Bring the other half with you to the appointment.,  Disp: 1 tablet, Rfl: 0     hydrOXYzine (ATARAX) 25 MG tablet, Take 1 tablet (25 mg) by mouth 3 times daily as needed for anxiety (Patient not taking: Reported on 8/19/2022), Disp: 60 tablet, Rfl: 1     imiquimod (ALDARA) 5 % external cream, Apply a small sized amount to warts or molluscum three times weekly at bedtime.   Wash off after 8 hours.   May use for up to 16 weeks. (Patient not taking: No sig reported), Disp: 12 packet, Rfl: 3     metoprolol succinate ER (TOPROL XL) 25 MG 24 hr tablet, Take 0.5 tablets (12.5 mg) by mouth daily, Disp: 45 tablet, Rfl: 1     prochlorperazine (COMPAZINE) 5 MG tablet, Take 5 mg by mouth every 8 hours as needed for nausea or vomiting (2 tabs every 9 hours) (Patient not taking: No sig reported), Disp: , Rfl:      sertraline (ZOLOFT) 25 MG tablet, Take 1 tablet (25 mg) by mouth daily, Disp: 30 tablet, Rfl: 3     Allergies   Allergen Reactions     Amoxicillin Hives     Oxycodone Nausea and Vomiting          Lab Results    Chemistry CBC Cardiac Enzymes/BNP/TSH/INR   Recent Labs   Lab Test 09/23/22  1601   *   POTASSIUM 3.7   CHLORIDE 99   CO2 24   GLC 85   BUN 9.1   CR 0.71   GFRESTIMATED >90   NAVDEEP 9.2     Recent Labs   Lab Test 09/23/22  1601 08/25/22  1428 08/17/22  1227   CR 0.71 0.74 0.74          Recent Labs   Lab Test 09/23/22  1601   WBC 6.3   HGB 14.8   HCT 44.8   MCV 84        Recent Labs   Lab Test 09/23/22  1601 08/25/22  1428 08/17/22  1227   HGB 14.8 14.6 15.2    No results for input(s): TROPONINI in the last 48502 hours.  Recent Labs   Lab Test 08/17/22  1753   NTBNPI 149     Recent Labs   Lab Test 08/17/22  1753   TSH 1.67     Recent Labs   Lab Test 05/29/17  1820 05/29/17  1740   INR 1.14 Canceled, Test credited   Unsatisfactory specimen - hemolyzed  Notified HERMAN Meraz 5/29/17 1756            Data Review    ECGs (tracings independently reviewed)  9/23/2022 - SR with sinus arrhythmia, short UT, QRS 100ms.  No evidence of ventricular pre-excitation,  normal QTc, no epsilon waves, no Brugada pattern.    Zio monitoring 8/17/2022 to 8/23/2022 (independently reviewed)  Predominant underlying rhythm was sinus rhythm, 44 to 155bpm, average 75bpm.  1 episode of nonsustained ventricular tachycardia lasting 17 beats, maximum rate 231bpm (average 182bpm).  No sustained tachyarrhythmias.  No atrial fibrillation.  There were no pauses of greater than 3 seconds.  Rare supraventricular ectopic beats (<1%).  Rare premature ventricular contractions (<1%).  73 symptom triggers correlated mostly to sinus rhythm, though included NSVT episode and SVEs.        9/19/2022 cardiac MRI  1.  Normal left ventricular size, wall thickness and systolic function. The quantified left ventricular  ejection fraction is 66%.  No myocardial scar is identified.    2.  Normal right ventricular size and systolic function.  RVEF: 57%  3.  No significant valvular abnormalities.  4. Normal pericardium.    8/22/2022 TTE  Normal left ventricular size. Normal left ventricular systolic function. Left  ventricular ejection fraction estimated 55 to 60%. No regional wall motion  abnormality noted.  Possible borderline enlargement of the right ventricle. Normal right  ventricular systolic function suggested.  Mild left atrial enlargement  No hemodynamically significant valve abnormality.  Mild mitral regurgitation.  Mild tricuspid regurgitation. Estimate of RV systolic pressure is normal at 19  mmHg plus right atrial pressure.       Cc: Kimberly Bunch MD, Roma Ye APRN CNP    Maria Del Carmen Baum MD  9/27/2022  5:05 PM    Thank you for allowing me to participate in the care of your patient.      Sincerely,     Maria Del Carmen Baum MD     [facility]  cc:   Kimberly Bunch MD  [address] [facility] [address]  [address]  [place] [address]

## 2022-09-27 NOTE — PROGRESS NOTES
[facility]  Cardiac Electrophysiology  [address] [facility]  [place] [address]   Office: [phone]  Fax: [phone]     Cardiac Electrophysiology Consultation    Patient: Antonia Marc   : 1995     Referring Provider: Kimberly Bunch MD  Primary Care Provider: Roma Ye APRN CNP    CHIEF COMPLAINT/REASON FOR CONSULTATION  Nonsustained ventricular tachycardia    Assessment/Recommendations   Antonia Marc is a 26 year old female with a prior episode of syncope, single known episode of nonsustained ventricular tachycardia, presyncope, headaches, anxiety, obesity referred by Dr. Bunch for consultation regarding NSVT.    Nonsustained ventricular tachycardia - 17 beats, maximum 231bpm, average 182bpm.  No co-existing structural heart disease.  The significance of her NSVT and relationship to her syncopal episode are unclear - in aggregate, the features suggest she is likely at low risk for major/sustained ventricular arrhythmias.  We will plan for the following:  - discontinue metoprolol XL 25mg daily  - 30 day FESTUS - we will contact her with results and coordinate further EP follow-up if needed  - continued follow-up and evaluation with her primary care team  - if ongoing symptoms without clear explanation, consider evaluation for dysautonomia    Follow up: as above         History of Present Illness   Antonia Marc is a 26 year old female with a prior episode of syncope, single known episode of nonsustained ventricular tachycardia, presyncope, headaches, anxiety, obesity referred by Dr. Bunch for consultation regarding NSVT.    Mrs. Marc had an episode of malaise and syncope vs near syncope while at work   - she underwent ER evaluation with note of no arrhythmias and normal ECG, she was diagnosed with possible migraine.  In subsequent primary care evaluation she was noted to be orthostatic and was told that she had POTS.  She has had chest  "tightness and low grade fevers to 100.6F.  She underwent echocardiogram 8/22/2022 showing LVFE 55-60% with borderline RV dilation without significant valvular disease.  She underwent Zio monitoring 8/17-23/2022 showing sinus rhythm with one 17 beat episode of NSVT maximum 231bpm, otherwise low ventricular ectopy burden. Cardiac MRI 9/19/2022 showing normal biventricular function.  She notes constant headaches, nausea, chest pain, dyspnea, lightheadedness.  She notes somewhat worsened symptoms on metoprolol XL.  She had previously felt quite well prior 8/2022.    She denies family history of sudden cardiac death.       Physical Examination  Review of Systems   VITALS: /80 (BP Location: Left arm, Patient Position: Sitting, Cuff Size: Adult Regular)   Pulse 68   Resp 16   Ht 1.727 m (5' 8\")   Wt 98.4 kg (217 lb)   BMI 32.99 kg/m    Wt Readings from Last 3 Encounters:   09/23/22 95.3 kg (210 lb)   09/02/22 93.9 kg (207 lb)   08/25/22 90.7 kg (200 lb)     CONSTITUTIONAL: well nourished, comfortable, no distress  EYES:  Conjunctivae pink, sclerae clear.    E/N/T:  Oral mucosa pink  RESPIRATORY:  Respiratory effort is normal  CARDIOVASCULAR:  normal S1 and S2  GASTROINTESTINAL:  Abdomen without masses or tenderness  EXTREMITIES:  No clubbing or cyanosis.    MUSCULOSKELETAL:  Overall grossly normal muscle strength  SKIN:  Overall, skin warm and dry, no lesions.  NEURO/PSYCH:  Oriented x 3 with normal affect.   Constitutional:  No weight loss or loss of appetite    Eyes:  No difficulty with vision, no double vision, no dry eyes  ENT:  No sore throat, difficulty swallowing; changes in hearing or tinnitus  Cardiovascular: As detailed above  Respiratory:  No cough  Musculoskeletal  No joint pain, muscle aches  Neurologic:  No syncope, lightheadedness, fainting spells   Hematologic: No easy bruising, excessive bleeding tendency   Gastrointestinal:  No jaundice, abdominal pain or abdominal bloating  Genitourinary: No " changes in urinary habits, no trouble urinating    Psychiatric: No anxiety or depression      Medical History  Surgical History   Past Medical History:   Diagnosis Date     Closed fracture of unspecified part of radius with ulna(813.83)      Other diseases of trachea and bronchus, not elsewhere classified     tracheomalacia as a      Past Surgical History:   Procedure Laterality Date     ESOPHAGOSCOPY, GASTROSCOPY, DUODENOSCOPY (EGD), COMBINED N/A 10/19/2018    Procedure: COMBINED ESOPHAGOSCOPY, GASTROSCOPY, DUODENOSCOPY (EGD), BIOPSY SINGLE OR MULTIPLE;  Surgeon: Heraclio Ziegler DO;  Location: [facility]     TONSILLECTOMY ADULT Bilateral 2018    Procedure: TONSILLECTOMY ADULT;  Bilateral Tonsillectomy;  Surgeon: Galilea Good MD;  Location: [facility]         Family History Social History   Family History   Problem Relation Age of Onset     Diabetes Mother      Hypertension Mother      Diabetes Father      Hypertension Father      Crohn's Disease Father      Hypertension Maternal Grandmother      Anxiety Disorder Maternal Grandmother      Hypertension Maternal Grandfather      Diabetes Maternal Grandfather      Anxiety Disorder Maternal Grandfather         Social History     Tobacco Use     Smoking status: Never Smoker     Smokeless tobacco: Never Used     Tobacco comment: no exporure   Substance Use Topics     Alcohol use: No     Drug use: No         Medications  Allergies     Current Outpatient Medications:      busPIRone (BUSPAR) 5 MG tablet, Take 1 tablet (5 mg) by mouth 2 times daily (Patient not taking: Reported on 2022), Disp: 60 tablet, Rfl: 0     diazepam (VALIUM) 10 MG tablet, Take one half tablet one hour prior to MRI. Bring the other half with you to the appointment., Disp: 1 tablet, Rfl: 0     hydrOXYzine (ATARAX) 25 MG tablet, Take 1 tablet (25 mg) by mouth 3 times daily as needed for anxiety (Patient not taking: Reported on 2022), Disp: 60 tablet, Rfl: 1     imiquimod (ALDARA) 5  % external cream, Apply a small sized amount to warts or molluscum three times weekly at bedtime.   Wash off after 8 hours.   May use for up to 16 weeks. (Patient not taking: No sig reported), Disp: 12 packet, Rfl: 3     metoprolol succinate ER (TOPROL XL) 25 MG 24 hr tablet, Take 0.5 tablets (12.5 mg) by mouth daily, Disp: 45 tablet, Rfl: 1     prochlorperazine (COMPAZINE) 5 MG tablet, Take 5 mg by mouth every 8 hours as needed for nausea or vomiting (2 tabs every 9 hours) (Patient not taking: No sig reported), Disp: , Rfl:      sertraline (ZOLOFT) 25 MG tablet, Take 1 tablet (25 mg) by mouth daily, Disp: 30 tablet, Rfl: 3     Allergies   Allergen Reactions     Amoxicillin Hives     Oxycodone Nausea and Vomiting          Lab Results    Chemistry CBC Cardiac Enzymes/BNP/TSH/INR   Recent Labs   Lab Test 09/23/22  1601   *   POTASSIUM 3.7   CHLORIDE 99   CO2 24   GLC 85   BUN 9.1   CR 0.71   GFRESTIMATED >90   NAVDEEP 9.2     Recent Labs   Lab Test 09/23/22  1601 08/25/22  1428 08/17/22  1227   CR 0.71 0.74 0.74          Recent Labs   Lab Test 09/23/22  1601   WBC 6.3   HGB 14.8   HCT 44.8   MCV 84        Recent Labs   Lab Test 09/23/22  1601 08/25/22  1428 08/17/22  1227   HGB 14.8 14.6 15.2    No results for input(s): TROPONINI in the last 58556 hours.  Recent Labs   Lab Test 08/17/22  1753   NTBNPI 149     Recent Labs   Lab Test 08/17/22  1753   TSH 1.67     Recent Labs   Lab Test 05/29/17  1820 05/29/17  1740   INR 1.14 Canceled, Test credited   Unsatisfactory specimen - hemolyzed  Notified HERMAN Meraz 5/29/17 1756 SOTERO           Data Review    ECGs (tracings independently reviewed)  9/23/2022 - SR with sinus arrhythmia, short WV, QRS 100ms.  No evidence of ventricular pre-excitation, normal QTc, no epsilon waves, no Brugada pattern.    Zio monitoring 8/17/2022 to 8/23/2022 (independently reviewed)  Predominant underlying rhythm was sinus rhythm, 44 to 155bpm, average 75bpm.  1 episode of nonsustained  ventricular tachycardia lasting 17 beats, maximum rate 231bpm (average 182bpm).  No sustained tachyarrhythmias.  No atrial fibrillation.  There were no pauses of greater than 3 seconds.  Rare supraventricular ectopic beats (<1%).  Rare premature ventricular contractions (<1%).  73 symptom triggers correlated mostly to sinus rhythm, though included NSVT episode and SVEs.        9/19/2022 cardiac MRI  1.  Normal left ventricular size, wall thickness and systolic function. The quantified left ventricular  ejection fraction is 66%.  No myocardial scar is identified.    2.  Normal right ventricular size and systolic function.  RVEF: 57%  3.  No significant valvular abnormalities.  4. Normal pericardium.    8/22/2022 TTE  Normal left ventricular size. Normal left ventricular systolic function. Left  ventricular ejection fraction estimated 55 to 60%. No regional wall motion  abnormality noted.  Possible borderline enlargement of the right ventricle. Normal right  ventricular systolic function suggested.  Mild left atrial enlargement  No hemodynamically significant valve abnormality.  Mild mitral regurgitation.  Mild tricuspid regurgitation. Estimate of RV systolic pressure is normal at 19  mmHg plus right atrial pressure.       Cc: Kimberly Bunch MD, Roma Ye APRN CNP    Maria Del Carmen Baum MD  9/27/2022  5:05 PM

## 2022-09-29 ENCOUNTER — VIRTUAL VISIT (OUTPATIENT)
Dept: FAMILY MEDICINE | Facility: CLINIC | Age: 27
End: 2022-09-29
Payer: COMMERCIAL

## 2022-09-29 DIAGNOSIS — F41.1 GAD (GENERALIZED ANXIETY DISORDER): ICD-10-CM

## 2022-09-29 DIAGNOSIS — R42 DIZZY SPELLS: ICD-10-CM

## 2022-09-29 DIAGNOSIS — I47.10 NONSUSTAINED PAROXYSMAL SUPRAVENTRICULAR TACHYCARDIA (H): Primary | ICD-10-CM

## 2022-09-29 PROCEDURE — 99214 OFFICE O/P EST MOD 30 MIN: CPT | Mod: GT | Performed by: NURSE PRACTITIONER

## 2022-09-29 NOTE — PROGRESS NOTES
Antonia is a 26 year old who is being evaluated via a billable video visit.      How would you like to obtain your AVS? MyChart  If the video visit is dropped, the invitation should be resent by: Text to cell phone: [phone]  Will anyone else be joining your video visit? No        Assessment & Plan     NSVT (nonsustained supraventricular tachycardia) (H)  -possibly related to anxiety, POTS, autonomic dysfunction?   -patient will complete continuous event monitor   -patient is unable to work due to her symptoms, will start FMLA     Dizzy spells  -recommended good hydration  -will try compression stockings   -patient will remain off work for 4-6 weeks which will help to complete additional evaluation     JAZZY (generalized anxiety disorder)  -will do trial of Lorazepam to see if it will help to improve elevated HR, if Antonia symptoms will improve that I think anxiety can be one of the contributing factors of her symptoms  -she will continue to follow with cardiologist and possible see specialist regarding evaluation for autonomic dysfunction   - LORazepam (ATIVAN) 0.5 MG tablet; Take 1 tablet (0.5 mg) by mouth daily as needed for anxiety        ROS Hansen [facility]    Subjective   Antonia is a 26 year old pleasant female patient, presenting for the following health issues: positional dizziness, tachycardia, fatigue, headaches, anxiety. Symptoms ongoing for over 2 months, patient is following cardiology and electrophysiology. Patient just saw cardiologist on 9/27, was recommended continuous event monitor for 30 days and was told to stop Metoprolol.   Patient states when she tried Propranolol and then Metoprolol in the past she felt some improvement in her symptoms. Antonia reports that her average heart rate sometimes per her apple smart watch can be in 170-180', she states even resting HR is high around 150-160'. 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 Antonia does not think that her symptoms caused by anxiety as it was advised by multiple providers, she tried antianxiety medications: Zoloft and Buspar and did not fill any improvement. She is very nervous and stressing out about her symptoms and since \"nobody can tell me what is wrong with me\".     Dizziness      HPI     ED/UC Followup:    Facility:  [facility]   Date of visit: 9/23/22  Reason for visit: Chest Pain   Current Status: She still having elevated heart rate, headaches, nausea, fatigue and dizziness.         Review of Systems   Constitutional, HEENT, cardiovascular, pulmonary, gi and gu systems are negative, except as otherwise noted.      Objective           Vitals:  No vitals were obtained today due to virtual visit.    Physical Exam   GENERAL: Healthy, alert and no distress  EYES: Eyes grossly normal to inspection.  No discharge or erythema, or obvious scleral/conjunctival abnormalities.  RESP: No audible wheeze, cough, or visible cyanosis.  No visible retractions or increased work of breathing.    SKIN: Visible skin clear. No significant rash, abnormal pigmentation or lesions.  NEURO: Cranial nerves grossly intact.  Mentation and speech appropriate for age.  PSYCH: Mentation appears normal, affect normal/bright, judgement and insight intact, normal speech and appearance well-groomed.              Video-Visit Details    Video Start Time: 4.05 PM    Type of service:  Video Visit    Video End Time:4:37 PM    Originating Location (pt. Location): Home    Distant Location (provider location):  [facility]     Platform used for Video Visit: Georgie"

## 2022-09-29 NOTE — LETTER
[facility]  [address] [address]  Phone: [phone]    September 30, 2022        Antonia Marc  [address]  [place] [address]          To whom it may concern:    RE: Antonia Marc    Patient was seen and treated today at our clinic and missed work. The patient needs short term FLMA, about 4-6 weeks, due to ongoing illness. Patient is following cardiologist for evaluation.     Please contact me for questions or concerns.      Sincerely,        ROS Hansen CNP

## 2022-09-30 ENCOUNTER — MYC MEDICAL ADVICE (OUTPATIENT)
Dept: FAMILY MEDICINE | Facility: CLINIC | Age: 27
End: 2022-09-30

## 2022-09-30 DIAGNOSIS — R42 DIZZINESS: ICD-10-CM

## 2022-09-30 DIAGNOSIS — R51.9 NONINTRACTABLE EPISODIC HEADACHE, UNSPECIFIED HEADACHE TYPE: ICD-10-CM

## 2022-09-30 DIAGNOSIS — R55 SYNCOPE, UNSPECIFIED SYNCOPE TYPE: Primary | ICD-10-CM

## 2022-09-30 RX ORDER — LORAZEPAM 0.5 MG/1
0.5 TABLET ORAL DAILY PRN
Qty: 15 TABLET | Refills: 1 | Status: SHIPPED | OUTPATIENT
Start: 2022-09-30 | End: 2022-11-15

## 2022-10-16 ENCOUNTER — HEALTH MAINTENANCE LETTER (OUTPATIENT)
Age: 27
End: 2022-10-16

## 2022-10-17 ENCOUNTER — MYC MEDICAL ADVICE (OUTPATIENT)
Dept: FAMILY MEDICINE | Facility: CLINIC | Age: 27
End: 2022-10-17

## 2022-11-08 ENCOUNTER — MYC MEDICAL ADVICE (OUTPATIENT)
Dept: FAMILY MEDICINE | Facility: CLINIC | Age: 27
End: 2022-11-08

## 2022-11-09 ENCOUNTER — MYC MEDICAL ADVICE (OUTPATIENT)
Dept: FAMILY MEDICINE | Facility: CLINIC | Age: 27
End: 2022-11-09

## 2022-11-09 ENCOUNTER — TRANSFERRED RECORDS (OUTPATIENT)
Dept: HEALTH INFORMATION MANAGEMENT | Facility: CLINIC | Age: 27
End: 2022-11-09

## 2022-11-09 NOTE — LETTER
[facility]  [address] [address]  Phone: [phone]    November 10, 2022        Antonia Marc  [address] [place] [id number]          To whom it may concern:    RE: Antonia Marc    Patient may return to work on 11/10 with the following:  No restrictions    Please contact me for questions or concerns.      Sincerely,        ROS Hansen CNP

## 2022-11-10 NOTE — TELEPHONE ENCOUNTER
Roma,      Patient wanting RTW letter.  Started and pended.  Please see my chart message. Gisela ADAM RN

## 2022-11-15 ENCOUNTER — OFFICE VISIT (OUTPATIENT)
Dept: FAMILY MEDICINE | Facility: CLINIC | Age: 27
End: 2022-11-15
Payer: COMMERCIAL

## 2022-11-15 VITALS
OXYGEN SATURATION: 99 % | WEIGHT: 231.2 LBS | RESPIRATION RATE: 14 BRPM | BODY MASS INDEX: 35.04 KG/M2 | DIASTOLIC BLOOD PRESSURE: 80 MMHG | SYSTOLIC BLOOD PRESSURE: 132 MMHG | HEIGHT: 68 IN | HEART RATE: 77 BPM | TEMPERATURE: 98.9 F

## 2022-11-15 DIAGNOSIS — R09.81 NASAL CONGESTION: Primary | ICD-10-CM

## 2022-11-15 DIAGNOSIS — H90.6 MIXED HEARING LOSS, BILATERAL: ICD-10-CM

## 2022-11-15 DIAGNOSIS — R07.0 CHRONIC THROAT PAIN: ICD-10-CM

## 2022-11-15 DIAGNOSIS — H93.13 TINNITUS, BILATERAL: ICD-10-CM

## 2022-11-15 DIAGNOSIS — G89.29 CHRONIC THROAT PAIN: ICD-10-CM

## 2022-11-15 LAB
DEPRECATED S PYO AG THROAT QL EIA: NEGATIVE
FLUAV AG SPEC QL IA: NEGATIVE
FLUBV AG SPEC QL IA: NEGATIVE
GROUP A STREP BY PCR: NOT DETECTED
SARS-COV-2 RNA RESP QL NAA+PROBE: NEGATIVE

## 2022-11-15 PROCEDURE — 99213 OFFICE O/P EST LOW 20 MIN: CPT | Performed by: STUDENT IN AN ORGANIZED HEALTH CARE EDUCATION/TRAINING PROGRAM

## 2022-11-15 PROCEDURE — 87804 INFLUENZA ASSAY W/OPTIC: CPT | Performed by: STUDENT IN AN ORGANIZED HEALTH CARE EDUCATION/TRAINING PROGRAM

## 2022-11-15 PROCEDURE — U0005 INFEC AGEN DETEC AMPLI PROBE: HCPCS | Performed by: STUDENT IN AN ORGANIZED HEALTH CARE EDUCATION/TRAINING PROGRAM

## 2022-11-15 PROCEDURE — 87651 STREP A DNA AMP PROBE: CPT | Performed by: STUDENT IN AN ORGANIZED HEALTH CARE EDUCATION/TRAINING PROGRAM

## 2022-11-15 PROCEDURE — U0003 INFECTIOUS AGENT DETECTION BY NUCLEIC ACID (DNA OR RNA); SEVERE ACUTE RESPIRATORY SYNDROME CORONAVIRUS 2 (SARS-COV-2) (CORONAVIRUS DISEASE [COVID-19]), AMPLIFIED PROBE TECHNIQUE, MAKING USE OF HIGH THROUGHPUT TECHNOLOGIES AS DESCRIBED BY CMS-2020-01-R: HCPCS | Performed by: STUDENT IN AN ORGANIZED HEALTH CARE EDUCATION/TRAINING PROGRAM

## 2022-11-15 RX ORDER — MULTIPLE VITAMINS W/ MINERALS TAB 9MG-400MCG
1 TAB ORAL DAILY
COMMUNITY
End: 2024-02-05

## 2022-11-15 RX ORDER — IBUPROFEN 200 MG
200 TABLET ORAL EVERY 4 HOURS PRN
COMMUNITY
End: 2023-02-02

## 2022-11-15 RX ORDER — ACETAMINOPHEN 325 MG/1
325-650 TABLET ORAL EVERY 6 HOURS PRN
COMMUNITY

## 2022-11-15 RX ORDER — LORATADINE 10 MG/1
10 TABLET ORAL DAILY
Qty: 30 TABLET | Refills: 1 | Status: SHIPPED | OUTPATIENT
Start: 2022-11-15 | End: 2023-11-17

## 2022-11-15 RX ORDER — FLUTICASONE PROPIONATE 50 MCG
1 SPRAY, SUSPENSION (ML) NASAL DAILY
Qty: 16 G | Refills: 1 | Status: SHIPPED | OUTPATIENT
Start: 2022-11-15 | End: 2023-11-17

## 2022-11-15 ASSESSMENT — PAIN SCALES - GENERAL: PAINLEVEL: SEVERE PAIN (7)

## 2022-11-15 NOTE — PROGRESS NOTES
1. Nasal congestion with otitis media with effusion possible allergies vs post nasal drip given symptoms worse at night, however, patient without cough or cobblestoning on physical exam   2. Chronic throat pain, etiology unclear, doubt viral or bacterial infection, mastoiditis, retropharyngeal abscess, low suspicion for an isolated ipsilateral vagus nerve, or bulbar palsy, she is s/p tonsillectomy   - new prescription sent for fluticasone (FLONASE) 50 MCG/ACT nasal spray; Spray 1 spray into both nostrils daily  Dispense: 16 g; Refill: 1  - new prescription for loratadine (CLARITIN) 10 MG tablet; Take 1 tablet (10 mg) by mouth daily  Dispense: 30 tablet; Refill: 1  - encouraged patient to buy an air purifier at home    - ideally would have recommended pseudoephedrine, however, given history of SVT will avoid to decrease risk for triggering arrhythmia   - Influenza A & B Antigen - Clinic Collect  - Asymptomatic COVID-19 Virus (Coronavirus) by PCR Nose  - Group A Streptococcus PCR Throat Swab    3. Tinnitus, bilateral  4. Mixed hearing loss, bilateral  - Adult Audiology  Referral; Future    Follow up plan:   - if her labs come back normal (which they probably will) consider referral to ENT to further assess why the patient has chronic throat irritation, she may benefit from a laryngoscopy     Courtney Hamlin MD     Aquilino Knight is a 26 year old, presenting for the following health issues:  Ent Problem (Ear pressure/pain, painful swallowing and headache pressure)    Chief Complaint   Patient presents with     Ent Problem     Ear pressure/pain, painful swallowing and headache pressure     History of Present Illness       Reason for visit:  Ear pain in both ears but right is worse and throat pain  Symptom onset:  3-7 days ago  Symptoms include:  Ear and throat pain and headache - no coughing, last week she had a fever of 100.4 but not since then (stabbing pressure pain she gets in ears. currently pain is  "7/10 and at it's worst 10/10)  Symptom intensity:  Severe  Symptom progression:  Worsening  Had these symptoms before:  No  What makes it worse:  Nighttime is the worst  What makes it better:  Heat (has tried tylenol/ibuprofen - nothing helps the pain)    She eats 0-1 servings of fruits and vegetables daily.She consumes 2 sweetened beverage(s) daily.She exercises with enough effort to increase her heart rate 30 to 60 minutes per day.  She exercises with enough effort to increase her heart rate 5 days per week.   She is taking medications regularly.     Mom and sister at home both sick   Mom with ear and eye infection   Sister has had a cough for the past few months   Patient lives alone in an apartment   Had an Xray of neck 1 month ago which showed \"my neck looks like a leaning tower\"   Has been following up with multiple specialists including ortho, cardiologists, physical therapy, and chiropractors       Review of Systems   As above       Objective    /80 (BP Location: Left arm, Patient Position: Sitting, Cuff Size: Adult Large)   Pulse 77   Temp 98.9  F (37.2  C) (Tympanic)   Resp 14   Ht 1.727 m (5' 8\")   Wt 104.9 kg (231 lb 3.2 oz)   LMP  (LMP Unknown)   SpO2 99%   Breastfeeding No   BMI 35.15 kg/m    Body mass index is 35.15 kg/m .  Physical Exam  Constitutional:       General: She is not in acute distress.  HENT:      Head: Normocephalic and atraumatic. No raccoon eyes, Gaytan's sign or laceration.      Jaw: No tenderness, swelling or pain on movement.      Comments: No mandibular or mastoid tenderness to palpation bilaterally      Right Ear: External ear normal. There is impacted cerumen.      Left Ear: Ear canal and external ear normal. There is no impacted cerumen.      Ears:      Comments: Slightly cloudy behind left TM      Mouth/Throat:      Mouth: Mucous membranes are moist. No injury, lacerations, oral lesions or angioedema.      Dentition: Normal dentition. No gingival swelling, dental " "caries, dental abscesses or gum lesions.      Tongue: No lesions. Tongue does not deviate from midline.      Palate: No mass and lesions.      Pharynx: Oropharynx is clear. No oropharyngeal exudate or posterior oropharyngeal erythema.      Tonsils: No tonsillar exudate or tonsillar abscesses.      Comments: No uvular deviation   Although the right soft palate was somewhat lower than the left, there was symmetric rise of both palate when patient said \"ah\"   Eyes:      General: No scleral icterus.        Right eye: No discharge.         Left eye: No discharge.      Extraocular Movements: Extraocular movements intact.      Conjunctiva/sclera: Conjunctivae normal.   Neck:      Comments: Patient was able to fully extend and flex her neck without any worsening pain from her baseline   Cardiovascular:      Rate and Rhythm: Normal rate and regular rhythm.      Pulses: Normal pulses.      Heart sounds: Normal heart sounds. No murmur heard.    No friction rub. No gallop.   Pulmonary:      Effort: Pulmonary effort is normal. No respiratory distress.      Breath sounds: Normal breath sounds. No wheezing or rhonchi.   Abdominal:      Palpations: Abdomen is soft. There is no mass.      Tenderness: There is no abdominal tenderness.   Musculoskeletal:         General: Normal range of motion.      Cervical back: Full passive range of motion without pain, normal range of motion and neck supple. No rigidity.   Lymphadenopathy:      Cervical: No cervical adenopathy.   Skin:     General: Skin is warm.      Findings: No rash.   Neurological:      General: No focal deficit present.      Mental Status: She is alert and oriented to person, place, and time.      Motor: No weakness.      Gait: Gait normal.      Comments: No tongue fasciculations, raspy voice, or nasal speech    Psychiatric:         Mood and Affect: Mood normal.                    "

## 2022-11-15 NOTE — LETTER
November 15, 2022      Antonia Marc  [address] [place] [id number]        To Whom It May Concern:    Antonia Marc was seen in our clinic. She may return to work without restrictions on 11/21/22.       Sincerely,        PIO PEREZ MD

## 2022-11-15 NOTE — PATIENT INSTRUCTIONS
Hello! It was a pleasure seeing you today. Just some things we discussed at today's visit:     Congestion    Take Claritin   Flonase daily for at least 2 weeks   Tinnitus/Hearing Loss   Make an appointment with the audiologist for tinnitus workup and hearing screen       Sincerely,     Courtney Hamlin MD

## 2022-11-17 NOTE — RESULT ENCOUNTER NOTE
Louis Marc,     It was a pleasure speaking with you the other day. I have received and reviewed your lab results, and have the following recommendations:     Congratulations you tested negative for strep, covid, and the flu.        Sincerely,     Courtney Hamlin MD

## 2022-11-20 ENCOUNTER — HOSPITAL ENCOUNTER (EMERGENCY)
Facility: CLINIC | Age: 27
Discharge: HOME | End: 2022-11-21
Attending: EMERGENCY MEDICINE | Admitting: EMERGENCY MEDICINE
Payer: COMMERCIAL

## 2022-11-20 DIAGNOSIS — R51.9 NONINTRACTABLE HEADACHE, UNSPECIFIED CHRONICITY PATTERN, UNSPECIFIED HEADACHE TYPE: ICD-10-CM

## 2022-11-20 LAB
HOLD SPECIMEN: NORMAL

## 2022-11-20 PROCEDURE — 93005 ELECTROCARDIOGRAM TRACING: CPT

## 2022-11-20 PROCEDURE — 99285 EMERGENCY DEPT VISIT HI MDM: CPT | Mod: 25

## 2022-11-20 PROCEDURE — 96361 HYDRATE IV INFUSION ADD-ON: CPT

## 2022-11-20 PROCEDURE — 96374 THER/PROPH/DIAG INJ IV PUSH: CPT

## 2022-11-20 PROCEDURE — 96375 TX/PRO/DX INJ NEW DRUG ADDON: CPT

## 2022-11-20 RX ORDER — PROCHLORPERAZINE MALEATE 10 MG
10 TABLET ORAL ONCE
Status: DISCONTINUED | OUTPATIENT
Start: 2022-11-20 | End: 2022-11-20

## 2022-11-20 RX ORDER — DIPHENHYDRAMINE HCL 25 MG
25 CAPSULE ORAL ONCE
Status: DISCONTINUED | OUTPATIENT
Start: 2022-11-20 | End: 2022-11-20

## 2022-11-20 RX ORDER — SODIUM CHLORIDE 9 MG/ML
INJECTION, SOLUTION INTRAVENOUS CONTINUOUS
Status: DISCONTINUED | OUTPATIENT
Start: 2022-11-21 | End: 2022-11-21 | Stop reason: HOSPADM

## 2022-11-20 RX ORDER — DIPHENHYDRAMINE HYDROCHLORIDE 50 MG/ML
25 INJECTION INTRAMUSCULAR; INTRAVENOUS ONCE
Status: COMPLETED | OUTPATIENT
Start: 2022-11-21 | End: 2022-11-21

## 2022-11-21 ENCOUNTER — APPOINTMENT (OUTPATIENT)
Dept: CT IMAGING | Facility: CLINIC | Age: 27
End: 2022-11-21
Attending: EMERGENCY MEDICINE
Payer: COMMERCIAL

## 2022-11-21 VITALS
DIASTOLIC BLOOD PRESSURE: 89 MMHG | OXYGEN SATURATION: 98 % | HEART RATE: 70 BPM | RESPIRATION RATE: 16 BRPM | TEMPERATURE: 99 F | SYSTOLIC BLOOD PRESSURE: 134 MMHG

## 2022-11-21 PROCEDURE — 250N000011 HC RX IP 250 OP 636: Performed by: EMERGENCY MEDICINE

## 2022-11-21 PROCEDURE — 70450 CT HEAD/BRAIN W/O DYE: CPT

## 2022-11-21 PROCEDURE — 258N000003 HC RX IP 258 OP 636: Performed by: EMERGENCY MEDICINE

## 2022-11-21 RX ORDER — KETOROLAC TROMETHAMINE 15 MG/ML
15 INJECTION, SOLUTION INTRAMUSCULAR; INTRAVENOUS ONCE
Status: COMPLETED | OUTPATIENT
Start: 2022-11-21 | End: 2022-11-21

## 2022-11-21 RX ADMIN — PROCHLORPERAZINE EDISYLATE 10 MG: 5 INJECTION INTRAMUSCULAR; INTRAVENOUS at 00:11

## 2022-11-21 RX ADMIN — SODIUM CHLORIDE 1000 ML: 9 INJECTION, SOLUTION INTRAVENOUS at 00:11

## 2022-11-21 RX ADMIN — KETOROLAC TROMETHAMINE 15 MG: 15 INJECTION, SOLUTION INTRAMUSCULAR; INTRAVENOUS at 01:24

## 2022-11-21 RX ADMIN — DIPHENHYDRAMINE HYDROCHLORIDE 25 MG: 50 INJECTION, SOLUTION INTRAMUSCULAR; INTRAVENOUS at 00:11

## 2022-11-21 ASSESSMENT — ACTIVITIES OF DAILY LIVING (ADL): ADLS_ACUITY_SCORE: 35

## 2022-11-21 NOTE — ED TRIAGE NOTES
Sudden onset of severe headache and neck pain since yesterday.  Pt has a hx/o headaches but this headache is much more severe than normal.  Took Excedrin, motrin and tylenol cold and flu with no relief.

## 2022-11-21 NOTE — ED PROVIDER NOTES
History   Chief Complaint:  Headache       HPI   Antonia Marc is a 26 year old female with history of headaches who presented to the emergency department with headache.  She has a history of headaches since she was very young but has had a headache that is different than normal.  Is nauseous but no vomiting.  No fever.  No head trauma.  No neck pain.  No numbness, tingling or weakness.  No recent illness    Review of Systems  + Headache, nausea  Denies vomiting, fever  10 point review of systems was obtained and negative other than mentioned above.    Allergies:  Amoxicillin  Oxycodone    Medications:  acetaminophen (TYLENOL) 325 MG tablet  fluticasone (FLONASE) 50 MCG/ACT nasal spray  ibuprofen (ADVIL/MOTRIN) 200 MG tablet  loratadine (CLARITIN) 10 MG tablet  multivitamin w/minerals (MULTI-VITAMIN) tablet      Past Medical History:     Patient Active Problem List   Diagnosis     Flat feet     Irregular menstrual bleeding     Recurrent major depressive disorder, in remission (H)     Anxiety     JAZZY (generalized anxiety disorder)     Mild episode of recurrent major depressive disorder (H)     Recurrent tonsillitis      Past Surgical History:    Past Surgical History:   Procedure Laterality Date     ESOPHAGOSCOPY, GASTROSCOPY, DUODENOSCOPY (EGD), COMBINED N/A 10/19/2018    Procedure: COMBINED ESOPHAGOSCOPY, GASTROSCOPY, DUODENOSCOPY (EGD), BIOPSY SINGLE OR MULTIPLE;  Surgeon: Heraclio Ziegler DO;  Location: [facility]     TONSILLECTOMY ADULT Bilateral 7/23/2018    Procedure: TONSILLECTOMY ADULT;  Bilateral Tonsillectomy;  Surgeon: Galilea Good MD;  Location: [facility]        Family History:    Family History   Problem Relation Age of Onset     Diabetes Mother      Hypertension Mother      Diabetes Father      Hypertension Father      Crohn's Disease Father      Hypertension Maternal Grandmother      Anxiety Disorder Maternal Grandmother      Hypertension Maternal Grandfather      Diabetes Maternal Grandfather       Anxiety Disorder Maternal Grandfather        Social History:  In ED alone    Physical Exam     Patient Vitals for the past 24 hrs:   BP Temp Temp src Pulse Resp SpO2   11/20/22 1905 (!) 150/90 99  F (37.2  C) Oral 73 16 99 %       Physical Exam  General: Sitting up in bed  Eyes:  The pupils are equal and round    Conjunctivae and sclerae are normal  ENT:    Wearing a mask, no rashes felt  Neck:  Normal range of motion  CV:  Regular rate, regular rhythm    Skin warm and well perfused   Resp:  Non labored breathing on room air    No tachypnea    No cough heard  MS:  Normal muscular tone  Skin:  No rash or acute skin lesions noted  Neuro:   Awake, alert.      Gait normal    Sensation intact to light touch on bilateral upper and lower extremities    Speech is normal and fluent.    Face is symmetric.     Moves all extremities equally  Psych: Normal affect.  Appropriate interactions.    Emergency Department Course   ECG  ECG results from 09/23/22   EKG 12-lead, tracing only     Value    Systolic Blood Pressure     Diastolic Blood Pressure     Ventricular Rate 61    Atrial Rate 61    MT Interval 120    QRS Duration 100        QTc 434    P Axis 44    R AXIS 51    T Axis 55    Interpretation ECG      Sinus rhythm with sinus arrhythmia  Septal infarct , age undetermined  Abnormal ECG  When compared with ECG of 19-SEP-2022 11:19,  No significant change was found         Imaging:  Head CT w/o contrast   Final Result   IMPRESSION:   1.  No acute intracranial process.   2.  Left middle cranial fossa anterior aspect small arachnoid cyst.        Report per radiology    Emergency Department Course:      Reviewed:  I reviewed nursing notes, vitals and past medical history, care everywhere    Assessments:   I obtained history and examined the patient as noted above.    I rechecked the patient      Disposition:  The patient was discharged to home.     Impression & Plan     Medical Decision Making:  Antonia salazar a  26-year-old female who presents emergency department headache.  Patient looks well.  She has a nonfocal neurologic exam.  CT head done and no acute process.  She does have arachnoid cyst which has been there before on imaging.  I spoke to the radiologist who confirmed that it has been there before.  This is not the cause of her headache at this time given that has been there before.  Patient felt much better in the emergency department and headache almost gone.  There has been no fever or infectious symptoms to suggest meningitis.  There is no neck stiffness.  Doubt venous sinus thrombosis.  Doubt stroke.  Doubt subarachnoid hemorrhage.  I do not think LP is indicated.  Patient felt comfortable discharge home.    Diagnosis:    ICD-10-CM    1. Nonintractable headache, unspecified chronicity pattern, unspecified headache type  R51.9          MD Uday Yancey Maria Kristine, MD  11/21/22 0711

## 2022-11-25 ENCOUNTER — TELEPHONE (OUTPATIENT)
Dept: FAMILY MEDICINE | Facility: CLINIC | Age: 27
End: 2022-11-25

## 2022-11-25 ENCOUNTER — MYC MEDICAL ADVICE (OUTPATIENT)
Dept: FAMILY MEDICINE | Facility: CLINIC | Age: 27
End: 2022-11-25

## 2022-11-25 NOTE — TELEPHONE ENCOUNTER
Reason for Call:  Appointment Request    Patient requesting this type of appt: Chronic Diease Management/Medication/Follow-Up    Requested provider: Roma Ye    Reason patient unable to be scheduled: Not with their preferred provider    When does patient want to be seen/preferred time: 1-2 days    Comments:was seen in Er for migraines need a follow up appointment ASAP for work. In person or Video visit is fine     Could we send this information to you in [employer] or would you prefer to receive a phone call?:   Patient would like to be contacted via [employer]    Call taken on 11/25/2022 at 1:48 PM by Ivelisse Madrid

## 2022-11-28 NOTE — TELEPHONE ENCOUNTER
Patient was called via phone x2-see TE with this same request for letter, indicating patient needs a f/u appt.    It appears patient also saw Neurology at [facility] on 11/23/22 after her ER visit, so unclear why they didn't provide patient a letter.  Joselynt sent to patient.     Mami Alfaro RN  [facility]

## 2023-02-01 ENCOUNTER — TELEPHONE (OUTPATIENT)
Dept: FAMILY MEDICINE | Facility: CLINIC | Age: 28
End: 2023-02-01

## 2023-02-01 ENCOUNTER — OFFICE VISIT (OUTPATIENT)
Dept: URGENT CARE | Facility: URGENT CARE | Age: 28
End: 2023-02-01
Payer: COMMERCIAL

## 2023-02-01 VITALS
SYSTOLIC BLOOD PRESSURE: 118 MMHG | DIASTOLIC BLOOD PRESSURE: 72 MMHG | HEART RATE: 65 BPM | OXYGEN SATURATION: 100 % | TEMPERATURE: 98.1 F

## 2023-02-01 DIAGNOSIS — M26.609 TMJ (TEMPOROMANDIBULAR JOINT SYNDROME): Primary | ICD-10-CM

## 2023-02-01 PROCEDURE — 99213 OFFICE O/P EST LOW 20 MIN: CPT | Performed by: PHYSICIAN ASSISTANT

## 2023-02-01 RX ORDER — DICLOFENAC SODIUM 75 MG/1
75 TABLET, DELAYED RELEASE ORAL 3 TIMES DAILY PRN
Qty: 21 TABLET | Refills: 0 | Status: SHIPPED | OUTPATIENT
Start: 2023-02-01 | End: 2023-02-02

## 2023-02-01 RX ORDER — METHOCARBAMOL 750 MG/1
750 TABLET, FILM COATED ORAL 3 TIMES DAILY PRN
Qty: 21 TABLET | Refills: 0 | Status: SHIPPED | OUTPATIENT
Start: 2023-02-01 | End: 2023-05-08

## 2023-02-01 NOTE — PROGRESS NOTES
"Assessment & Plan     TMJ (temporomandibular joint syndrome)  Voltaren is prescribed today as needed for pain.  Robaxin also prescribed to help with muscle tension/muscle spasms.  Also recommended warm compresses.  Advised to keep monitoring symptoms.  We discussed red flag symptoms and when to seek emergent care.  Patient agrees with the plan.  - diclofenac (VOLTAREN) 75 MG EC tablet  Dispense: 21 tablet; Refill: 0  - methocarbamol (ROBAXIN) 750 MG tablet  Dispense: 21 tablet; Refill: 0      Return in about 1 week (around 2/8/2023) for Symptoms failing to improve.    Jenise España PA-C  [facility] ISSA Knight is a 27 year old female who presents to clinic today for the following health issues:  Chief Complaint   Patient presents with     Jaw Pain     Patient is a 27 yr old female who presents with jaw pain. Patient reports it started yesterday, and it is now \"locked\", cannot open mouth fully. Patient reports no injury to jaw     HPI    Patient is presenting to urgent care today with c/o bilateral jaw pain R>L. Onset of symptoms since yesterday. Denies any trauma or injury.  Symptoms are worsening. Patient reports she is unable to fully open her mouth. Had to leave work early today due to pain. Also notes dizziness. No visual changes. No HA. No n/t upper extremities. Treatment tried: Ibuprofen/tylenol-- helps some, but pain not managed.      Review of Systems  Constitutional, HEENT, cardiovascular, pulmonary, GI, , musculoskeletal, neuro, skin, endocrine and psych systems are negative, except as otherwise noted.      Objective    /72 (BP Location: Right arm, Patient Position: Chair, Cuff Size: Adult Regular)   Pulse 65   Temp 98.1  F (36.7  C) (Tympanic)   LMP 01/22/2023 (Within Days)   SpO2 100%   Physical Exam   GENERAL: healthy, alert and no distress  HENT: ear canals and TM's normal, mouth without ulcers or lesions, no facial swelling or erythema, " tenderness to palpation bilateral TMJ right more than the left, unable to fully open mouth due to pain  RESP:  Normal breathing effort  MS: no gross musculoskeletal defects noted, no edema

## 2023-02-01 NOTE — LETTER
February 1, 2023      Antonia Marc  [id number] [address]        To Whom It May Concern:    Antonia Marc  was seen on 2/1/2023  Please excuse her absence from work today due to acute medical condition.         Sincerely,        Jenise España PA-C

## 2023-02-01 NOTE — TELEPHONE ENCOUNTER
Pt calls and reports jaw will not open all the way. Has been having jaw pain but no recent injury. Pt is able to drink fluids so far. Offered ed/uc as no appts avail today. Pt does not want ed. Scheduled appt with primary tomorrow am with instructions to go to ed with worsening (locked completely shut or locked open or additional pain).    Frankie Izquierdo RN

## 2023-02-02 ENCOUNTER — TELEPHONE (OUTPATIENT)
Dept: FAMILY MEDICINE | Facility: CLINIC | Age: 28
End: 2023-02-02

## 2023-02-02 DIAGNOSIS — M26.609 TMJ (TEMPOROMANDIBULAR JOINT SYNDROME): Primary | ICD-10-CM

## 2023-02-02 RX ORDER — KETOROLAC TROMETHAMINE 10 MG/1
10 TABLET, FILM COATED ORAL EVERY 6 HOURS PRN
Qty: 20 TABLET | Refills: 0 | Status: SHIPPED | OUTPATIENT
Start: 2023-02-02 | End: 2023-05-06

## 2023-02-02 NOTE — TELEPHONE ENCOUNTER
Pt's mother, Reba, notified and verbalized understanding. Appt scheduled for next Friday 2/10/23, as pt will be out of town Sunday thru Thursday. Reba was advised to have pt seen again in UC or ED if any new or worsening symptoms.     Emilie Vasquez RN  [facility]

## 2023-02-02 NOTE — TELEPHONE ENCOUNTER
I recommend to try Toradol 10 mg every 6-8 hrs as needed for pain instead of Voltaren, I think it should work better, continue Robaxin. Follow up next week, okay to use same day appt, if no improvement still.     Roma Ye, ROS CNP

## 2023-04-01 ENCOUNTER — HEALTH MAINTENANCE LETTER (OUTPATIENT)
Age: 28
End: 2023-04-01

## 2023-04-03 ENCOUNTER — E-VISIT (OUTPATIENT)
Dept: FAMILY MEDICINE | Facility: CLINIC | Age: 28
End: 2023-04-03
Payer: COMMERCIAL

## 2023-04-03 DIAGNOSIS — H92.03 OTALGIA, BILATERAL: Primary | ICD-10-CM

## 2023-04-03 PROCEDURE — 99421 OL DIG E/M SVC 5-10 MIN: CPT | Performed by: NURSE PRACTITIONER

## 2023-04-03 RX ORDER — CEFDINIR 300 MG/1
300 CAPSULE ORAL 2 TIMES DAILY
Qty: 14 CAPSULE | Refills: 0 | Status: SHIPPED | OUTPATIENT
Start: 2023-04-03 | End: 2023-04-10

## 2023-04-03 NOTE — PATIENT INSTRUCTIONS
Thank you for choosing us for your care. Please schedule a clinic appointment if you not improving in 2-3 days.   You can schedule an appointment right here in [employer], or call [phone]

## 2023-04-05 ENCOUNTER — OFFICE VISIT (OUTPATIENT)
Dept: URGENT CARE | Facility: URGENT CARE | Age: 28
End: 2023-04-05
Payer: COMMERCIAL

## 2023-04-05 VITALS — RESPIRATION RATE: 16 BRPM | HEART RATE: 57 BPM | OXYGEN SATURATION: 100 % | TEMPERATURE: 97.9 F

## 2023-04-05 DIAGNOSIS — H69.91 DYSFUNCTION OF RIGHT EUSTACHIAN TUBE: Primary | ICD-10-CM

## 2023-04-05 DIAGNOSIS — H92.01 OTALGIA, RIGHT: ICD-10-CM

## 2023-04-05 PROCEDURE — 99213 OFFICE O/P EST LOW 20 MIN: CPT | Performed by: FAMILY MEDICINE

## 2023-04-05 NOTE — PATIENT INSTRUCTIONS
I think you have Eustachian tube dysfunction.  This is when the pressure on both sides of your ear drum can't equalize properly.    Afrin nasal spray:  2 sprays in both nostrils every morning and again about 30 minutes before bed every night.  Do this for 3 days, then take 2 days off.  You can repeat for another 3 days if needed.    Flonase or any other nasal steroid spray:  use per package instructions for the next 1-2 weeks.    Tylenol 2 tablets three times daily every day for 3 days.

## 2023-04-05 NOTE — PROGRESS NOTES
"  ICD-10-CM    1. Dysfunction of right eustachian tube  H69.81       2. Otalgia, right  H92.01         No evidence of any middle ear effusion on either side today.  Suspected discomfort related to ETD pressure imbalance.    Patient Instructions   I think you have Eustachian tube dysfunction.  This is when the pressure on both sides of your ear drum can't equalize properly.    Afrin nasal spray:  2 sprays in both nostrils every morning and again about 30 minutes before bed every night.  Do this for 3 days, then take 2 days off.  You can repeat for another 3 days if needed.    Flonase or any other nasal steroid spray:  use per package instructions for the next 1-2 weeks.    Tylenol 2 tablets three times daily every day for 3 days.        SUBJECTIVE:  Antonia Marc is a 27 year old female who presents to  for continued R ear pain.  Started Omnicef per virtual visit with primary provider, who diagnosed \"possible ear infection\" based on ear pain alone.  No fevers.  No ear drainage.  Hearing has been muffled.  Has felt a little off-balance at times as well.    OBJECTIVE:  Pulse 57   Temp 97.9  F (36.6  C) (Tympanic)   Resp 16   SpO2 100%   GEN: well-appearing, in NAD  ENT: TMs normal, canals normal, oral MMM, normal pharynx  Neck: no LAD    "

## 2023-05-06 ENCOUNTER — MYC REFILL (OUTPATIENT)
Dept: FAMILY MEDICINE | Facility: CLINIC | Age: 28
End: 2023-05-06

## 2023-05-06 DIAGNOSIS — M26.609 TMJ (TEMPOROMANDIBULAR JOINT SYNDROME): ICD-10-CM

## 2023-05-08 ENCOUNTER — E-VISIT (OUTPATIENT)
Dept: FAMILY MEDICINE | Facility: CLINIC | Age: 28
End: 2023-05-08

## 2023-05-08 DIAGNOSIS — M26.609 TMJ (TEMPOROMANDIBULAR JOINT SYNDROME): Primary | ICD-10-CM

## 2023-05-08 PROCEDURE — 99207 PR NON-BILLABLE SERV PER CHARTING: CPT | Performed by: NURSE PRACTITIONER

## 2023-05-08 RX ORDER — METHOCARBAMOL 750 MG/1
750 TABLET, FILM COATED ORAL 3 TIMES DAILY PRN
Qty: 21 TABLET | Refills: 0 | Status: SHIPPED | OUTPATIENT
Start: 2023-05-08 | End: 2023-11-17

## 2023-05-08 RX ORDER — KETOROLAC TROMETHAMINE 10 MG/1
10 TABLET, FILM COATED ORAL EVERY 6 HOURS PRN
Qty: 20 TABLET | Refills: 0 | Status: SHIPPED | OUTPATIENT
Start: 2023-05-08 | End: 2023-11-17

## 2023-05-08 NOTE — PATIENT INSTRUCTIONS
Thank you for choosing us for your care. I think an in-clinic visit would be best next steps based on your symptoms. I sent refills for Toradol and Robaxin, try these for few days, if no improvement  please schedule a clinic appointment; you won t be charged for this eVisit.      You can schedule an appointment right here in [facility], or call [phone]

## 2023-05-08 NOTE — TELEPHONE ENCOUNTER
Forwarding MyChart refill request for PCP for review.    Mami Alfaro RN  [facility]

## 2023-06-26 ENCOUNTER — TELEPHONE (OUTPATIENT)
Dept: PHARMACY | Facility: CLINIC | Age: 28
End: 2023-06-26

## 2023-06-26 NOTE — TELEPHONE ENCOUNTER
MTM referral from: Patient's insurance     MTM referral outreach attempt #1 on 6/26/23      Outcome: left voicemail to call the Medication Therapy Management (MTM) Coordinators scheduling line: [phone] to make an appointment    Jacque Boothe PharmD, [facility]  Pediatric Medication Therapy Management Pharmacist-Solid Organ Transplant  Pager: [phone]

## 2023-07-18 ENCOUNTER — E-VISIT (OUTPATIENT)
Dept: FAMILY MEDICINE | Facility: CLINIC | Age: 28
End: 2023-07-18
Payer: COMMERCIAL

## 2023-07-18 DIAGNOSIS — F41.1 GAD (GENERALIZED ANXIETY DISORDER): Primary | ICD-10-CM

## 2023-07-18 PROCEDURE — 99421 OL DIG E/M SVC 5-10 MIN: CPT | Performed by: NURSE PRACTITIONER

## 2023-07-18 RX ORDER — LORAZEPAM 0.5 MG/1
0.5 TABLET ORAL DAILY PRN
Qty: 10 TABLET | Refills: 0 | Status: SHIPPED | OUTPATIENT
Start: 2023-07-18 | End: 2023-11-17

## 2023-07-18 RX ORDER — CITALOPRAM HYDROBROMIDE 10 MG/1
10 TABLET ORAL DAILY
Qty: 30 TABLET | Refills: 2 | Status: SHIPPED | OUTPATIENT
Start: 2023-07-18 | End: 2023-08-03 | Stop reason: SINTOL

## 2023-07-18 RX ORDER — BUSPIRONE HYDROCHLORIDE 5 MG/1
5 TABLET ORAL 2 TIMES DAILY
Qty: 60 TABLET | Refills: 2 | Status: SHIPPED | OUTPATIENT
Start: 2023-07-18 | End: 2023-11-17

## 2023-07-18 ASSESSMENT — ANXIETY QUESTIONNAIRES
5. BEING SO RESTLESS THAT IT IS HARD TO SIT STILL: NOT AT ALL
7. FEELING AFRAID AS IF SOMETHING AWFUL MIGHT HAPPEN: NOT AT ALL
1. FEELING NERVOUS, ANXIOUS, OR ON EDGE: MORE THAN HALF THE DAYS
2. NOT BEING ABLE TO STOP OR CONTROL WORRYING: MORE THAN HALF THE DAYS
3. WORRYING TOO MUCH ABOUT DIFFERENT THINGS: MORE THAN HALF THE DAYS
4. TROUBLE RELAXING: MORE THAN HALF THE DAYS
GAD7 TOTAL SCORE: 10
GAD7 TOTAL SCORE: 10
6. BECOMING EASILY ANNOYED OR IRRITABLE: MORE THAN HALF THE DAYS

## 2023-07-19 ASSESSMENT — ANXIETY QUESTIONNAIRES: GAD7 TOTAL SCORE: 10

## 2023-07-26 ENCOUNTER — TELEPHONE (OUTPATIENT)
Dept: PHARMACY | Facility: OTHER | Age: 28
End: 2023-07-26

## 2023-07-26 NOTE — TELEPHONE ENCOUNTER
Patient referred to Medication Therapy Management (MTM) by their health insurance [employer]. Called today for scheduling, no answer. Left  with [facility] scheduling number [phone].

## 2023-08-02 ENCOUNTER — MYC MEDICAL ADVICE (OUTPATIENT)
Dept: FAMILY MEDICINE | Facility: CLINIC | Age: 28
End: 2023-08-02

## 2023-08-02 DIAGNOSIS — F41.1 GAD (GENERALIZED ANXIETY DISORDER): Primary | ICD-10-CM

## 2023-08-03 RX ORDER — SERTRALINE HYDROCHLORIDE 25 MG/1
25 TABLET, FILM COATED ORAL DAILY
Qty: 30 TABLET | Refills: 2 | Status: SHIPPED | OUTPATIENT
Start: 2023-08-03 | End: 2023-11-17

## 2023-10-11 ENCOUNTER — OFFICE VISIT (OUTPATIENT)
Dept: URGENT CARE | Facility: URGENT CARE | Age: 28
End: 2023-10-11
Payer: COMMERCIAL

## 2023-10-11 VITALS
BODY MASS INDEX: 36.57 KG/M2 | TEMPERATURE: 98 F | OXYGEN SATURATION: 100 % | DIASTOLIC BLOOD PRESSURE: 78 MMHG | RESPIRATION RATE: 18 BRPM | WEIGHT: 240.5 LBS | SYSTOLIC BLOOD PRESSURE: 120 MMHG | HEART RATE: 63 BPM

## 2023-10-11 DIAGNOSIS — R10.9 ACUTE LEFT FLANK PAIN: Primary | ICD-10-CM

## 2023-10-11 LAB
ALBUMIN UR-MCNC: NEGATIVE MG/DL
APPEARANCE UR: CLEAR
BILIRUB UR QL STRIP: NEGATIVE
COLOR UR AUTO: YELLOW
GLUCOSE UR STRIP-MCNC: NEGATIVE MG/DL
HGB UR QL STRIP: NEGATIVE
KETONES UR STRIP-MCNC: NEGATIVE MG/DL
LEUKOCYTE ESTERASE UR QL STRIP: NEGATIVE
NITRATE UR QL: NEGATIVE
PH UR STRIP: 6 [PH] (ref 5–7)
SP GR UR STRIP: 1.01 (ref 1–1.03)
UROBILINOGEN UR STRIP-ACNC: 0.2 E.U./DL

## 2023-10-11 PROCEDURE — 81003 URINALYSIS AUTO W/O SCOPE: CPT | Performed by: FAMILY MEDICINE

## 2023-10-11 PROCEDURE — 99213 OFFICE O/P EST LOW 20 MIN: CPT | Performed by: FAMILY MEDICINE

## 2023-10-12 NOTE — PROGRESS NOTES
ICD-10-CM    1. Acute left flank pain  R10.9 UA Macroscopic with reflex to Microscopic and Culture - Clinic Collect        Normal UA, so pyelo and kidney stone both much less likely.  Suspect possible gas pocket in splenic flexure causing discomfort, especially given slightly decreased bowel sounds on exam today.  No guarding or rebound to suggest need for emergent abdominal CT or other advanced imaging.  Offered abdominal x-ray but pt would like to hold off for now.    PLAN:  Try Gas-X on a schedule for the next couple of days to see if this helps with suspected gas pains.  Can also try some Miralax to help with possible slow transit right now.  Continue to monitor symptoms and reviewed what should prompt pt to follow up in clinic vs. ER.    SUBJECTIVE:  Antonia Marc is a 27 year old female who presents to [facility] with L upper abdominal/L flank pain that started yesterday.  No trauma to the area.  No dysuria or hematuria.  There is a family history of kidney stones.  She tried one dose of Gas-X yesterday.  No vomiting, constipation, or diarrhea.  Appetite has been a little decreased.  She has had no fevers.    OBJECTIVE:  /78   Pulse 63   Temp 98  F (36.7  C) (Tympanic)   Resp 18   Wt 109.1 kg (240 lb 8 oz)   LMP 10/09/2023 (Exact Date)   SpO2 100%   Breastfeeding No   BMI 36.57 kg/m    GEN: well-appearing, in NAD  Lungs:  CTAB  CV:  RRR, no murmur  Abd: soft, mildly tender LUQ and RLQ, no guarding, no rebound, bowel sounds present but slightly decreased.  Not distended.  No masses palpable.    Results for orders placed or performed in visit on 10/11/23   UA Macroscopic with reflex to Microscopic and Culture - Clinic Collect     Status: Normal    Specimen: Urine, Clean Catch   Result Value Ref Range    Color Urine Yellow Colorless, Straw, Light Yellow, Yellow    Appearance Urine Clear Clear    Glucose Urine Negative Negative mg/dL    Bilirubin Urine Negative Negative    Ketones Urine Negative  Negative mg/dL    Specific Gravity Urine 1.015 1.003 - 1.035    Blood Urine Negative Negative    pH Urine 6.0 5.0 - 7.0    Protein Albumin Urine Negative Negative mg/dL    Urobilinogen Urine 0.2 0.2, 1.0 E.U./dL    Nitrite Urine Negative Negative    Leukocyte Esterase Urine Negative Negative    Narrative    Microscopic not indicated

## 2023-10-24 ENCOUNTER — MYC MEDICAL ADVICE (OUTPATIENT)
Dept: PHARMACY | Facility: CLINIC | Age: 28
End: 2023-10-24

## 2023-10-24 ENCOUNTER — TELEPHONE (OUTPATIENT)
Dept: PHARMACY | Facility: OTHER | Age: 28
End: 2023-10-24

## 2023-10-24 NOTE — TELEPHONE ENCOUNTER
MTM referral from: Patient's insurance     MTM referral outreach attempt #3 on October 24, 2023       Outcome: unable to leave message, no voicemail. Mychart sent.    Shanika Varela, PharmD  MTM Pharmacist  [facility]

## 2023-10-24 NOTE — LETTER
November 9, 2023    To  Antonia Marc  [address] [address]    Your team at [facility] cares about your health. We have reviewed your chart and based on our findings; we are making the following recommendations to better manage your health.     You are in particular need of attention regarding the following:     Schedule a primary care office visit with your provider for a Pap Smear to screen for Cervical Cancer.    If you have already completed these items, please contact the clinic via phone or   [employer] so your care team can review and update your records. Thank you for   choosing [facility] for your healthcare needs. For any questions,   concerns, or to schedule an appointment please contact our clinic.    Healthy Regards,      Your [facility] Care Team

## 2023-11-06 ENCOUNTER — E-VISIT (OUTPATIENT)
Dept: FAMILY MEDICINE | Facility: CLINIC | Age: 28
End: 2023-11-06
Payer: COMMERCIAL

## 2023-11-06 DIAGNOSIS — R05.2 SUBACUTE COUGH: Primary | ICD-10-CM

## 2023-11-06 PROCEDURE — 99421 OL DIG E/M SVC 5-10 MIN: CPT | Performed by: NURSE PRACTITIONER

## 2023-11-06 RX ORDER — AZITHROMYCIN 250 MG/1
TABLET, FILM COATED ORAL
Qty: 6 TABLET | Refills: 0 | Status: SHIPPED | OUTPATIENT
Start: 2023-11-06 | End: 2023-11-17

## 2023-11-06 RX ORDER — BENZONATATE 100 MG/1
100 CAPSULE ORAL 3 TIMES DAILY PRN
Qty: 21 CAPSULE | Refills: 0 | Status: SHIPPED | OUTPATIENT
Start: 2023-11-06 | End: 2023-11-17

## 2023-11-06 NOTE — PATIENT INSTRUCTIONS
Thank you for choosing us for your care. I have placed an order for antibiotic prescription, Z-pack and cough medication, Tessalon so that you can start treatment. View your full visit summary for details by clicking on the link below. Your pharmacist will able to address any questions you may have about the medication.     If you're not feeling better within 5-7 days, please schedule an appointment.  You can schedule an appointment right here in [facility], or call [phone]  If the visit is for the same symptoms as your eVisit, we'll refund the cost of your eVisit if seen within seven days.      Z-pack, take 2 tablets on the first day and than 1 tablet daily for 4 more days  Tessalon 1 capsule 3 times daily as needed for cough    I hope you will feel better soon!    Please follow up in clinic, if still no improvement in 5-7 days    I will be out of office from November 11 th through November 20 th, if you need anything other providers in our clinic will cover for me during this time.     Have a good day and feel better soon!    Roma

## 2023-11-09 NOTE — TELEPHONE ENCOUNTER
Patient Quality Outreach    Patient is due for the following:   Cervical Cancer Screening - PAP Needed  Physical Preventive Adult Physical    Next Steps:   Schedule a Adult Preventative    Type of outreach:    Sent letter.    Next Steps:  Reach out within 90 days via Letter.    Max number of attempts reached: No. Will try again in 90 days if patient still on fail list.    Questions for provider review:    None           Antonia Nelson CMA  Chart routed to none.

## 2023-11-12 ENCOUNTER — MYC MEDICAL ADVICE (OUTPATIENT)
Dept: FAMILY MEDICINE | Facility: CLINIC | Age: 28
End: 2023-11-12

## 2023-11-13 ENCOUNTER — APPOINTMENT (OUTPATIENT)
Dept: GENERAL RADIOLOGY | Facility: CLINIC | Age: 28
End: 2023-11-13
Attending: FAMILY MEDICINE
Payer: COMMERCIAL

## 2023-11-13 ENCOUNTER — NURSE TRIAGE (OUTPATIENT)
Dept: NURSING | Facility: CLINIC | Age: 28
End: 2023-11-13

## 2023-11-13 ENCOUNTER — HOSPITAL ENCOUNTER (EMERGENCY)
Facility: CLINIC | Age: 28
Discharge: HOME | End: 2023-11-13
Attending: FAMILY MEDICINE | Admitting: FAMILY MEDICINE
Payer: COMMERCIAL

## 2023-11-13 VITALS
HEIGHT: 68 IN | HEART RATE: 95 BPM | TEMPERATURE: 98.4 F | DIASTOLIC BLOOD PRESSURE: 86 MMHG | RESPIRATION RATE: 16 BRPM | BODY MASS INDEX: 30.31 KG/M2 | WEIGHT: 200 LBS | SYSTOLIC BLOOD PRESSURE: 151 MMHG | OXYGEN SATURATION: 96 %

## 2023-11-13 DIAGNOSIS — L50.9 URTICARIA: ICD-10-CM

## 2023-11-13 DIAGNOSIS — I47.29 VENTRICULAR TACHYCARDIA, NON-SUSTAINED (H): ICD-10-CM

## 2023-11-13 DIAGNOSIS — R00.2 PALPITATIONS: ICD-10-CM

## 2023-11-13 DIAGNOSIS — R94.31 SHORTENED PR INTERVAL: ICD-10-CM

## 2023-11-13 LAB
ALBUMIN SERPL BCG-MCNC: 4.6 G/DL (ref 3.5–5.2)
ALBUMIN UR-MCNC: NEGATIVE MG/DL
ALP SERPL-CCNC: 82 U/L (ref 35–104)
ALT SERPL W P-5'-P-CCNC: 20 U/L (ref 0–50)
ANION GAP SERPL CALCULATED.3IONS-SCNC: 10 MMOL/L (ref 7–15)
APPEARANCE UR: CLEAR
AST SERPL W P-5'-P-CCNC: 17 U/L (ref 0–45)
BASOPHILS # BLD AUTO: 0.1 10E3/UL (ref 0–0.2)
BASOPHILS NFR BLD AUTO: 1 %
BILIRUB SERPL-MCNC: 0.8 MG/DL
BILIRUB UR QL STRIP: NEGATIVE
BUN SERPL-MCNC: 9.9 MG/DL (ref 6–20)
CALCIUM SERPL-MCNC: 9.6 MG/DL (ref 8.6–10)
CHLORIDE SERPL-SCNC: 105 MMOL/L (ref 98–107)
COLOR UR AUTO: COLORLESS
CREAT SERPL-MCNC: 0.77 MG/DL (ref 0.51–0.95)
DEPRECATED HCO3 PLAS-SCNC: 24 MMOL/L (ref 22–29)
EGFRCR SERPLBLD CKD-EPI 2021: >90 ML/MIN/1.73M2
EOSINOPHIL # BLD AUTO: 0 10E3/UL (ref 0–0.7)
EOSINOPHIL NFR BLD AUTO: 0 %
ERYTHROCYTE [DISTWIDTH] IN BLOOD BY AUTOMATED COUNT: 12.3 % (ref 10–15)
GLUCOSE SERPL-MCNC: 114 MG/DL (ref 70–99)
GLUCOSE UR STRIP-MCNC: NEGATIVE MG/DL
HCG SERPL QL: NEGATIVE
HCT VFR BLD AUTO: 44.9 % (ref 35–47)
HGB BLD-MCNC: 15.5 G/DL (ref 11.7–15.7)
HGB UR QL STRIP: NEGATIVE
IMM GRANULOCYTES # BLD: 0.1 10E3/UL
IMM GRANULOCYTES NFR BLD: 1 %
KETONES UR STRIP-MCNC: NEGATIVE MG/DL
LEUKOCYTE ESTERASE UR QL STRIP: NEGATIVE
LYMPHOCYTES # BLD AUTO: 1 10E3/UL (ref 0.8–5.3)
LYMPHOCYTES NFR BLD AUTO: 11 %
MCH RBC QN AUTO: 28.9 PG (ref 26.5–33)
MCHC RBC AUTO-ENTMCNC: 34.5 G/DL (ref 31.5–36.5)
MCV RBC AUTO: 84 FL (ref 78–100)
MONOCYTES # BLD AUTO: 0.1 10E3/UL (ref 0–1.3)
MONOCYTES NFR BLD AUTO: 1 %
MUCOUS THREADS #/AREA URNS LPF: PRESENT /LPF
NEUTROPHILS # BLD AUTO: 8.4 10E3/UL (ref 1.6–8.3)
NEUTROPHILS NFR BLD AUTO: 86 %
NITRATE UR QL: NEGATIVE
NRBC # BLD AUTO: 0 10E3/UL
NRBC BLD AUTO-RTO: 0 /100
PH UR STRIP: 7 [PH] (ref 5–7)
PLATELET # BLD AUTO: 327 10E3/UL (ref 150–450)
POTASSIUM SERPL-SCNC: 4.3 MMOL/L (ref 3.4–5.3)
PROT SERPL-MCNC: 7.4 G/DL (ref 6.4–8.3)
RBC # BLD AUTO: 5.37 10E6/UL (ref 3.8–5.2)
RBC URINE: 1 /HPF
SODIUM SERPL-SCNC: 139 MMOL/L (ref 135–145)
SP GR UR STRIP: 1 (ref 1–1.03)
TROPONIN T SERPL HS-MCNC: <6 NG/L
TSH SERPL DL<=0.005 MIU/L-ACNC: 1.62 UIU/ML (ref 0.3–4.2)
UROBILINOGEN UR STRIP-MCNC: NORMAL MG/DL
WBC # BLD AUTO: 9.7 10E3/UL (ref 4–11)
WBC URINE: 1 /HPF

## 2023-11-13 PROCEDURE — 71046 X-RAY EXAM CHEST 2 VIEWS: CPT

## 2023-11-13 PROCEDURE — 36415 COLL VENOUS BLD VENIPUNCTURE: CPT | Performed by: FAMILY MEDICINE

## 2023-11-13 PROCEDURE — 93005 ELECTROCARDIOGRAM TRACING: CPT

## 2023-11-13 PROCEDURE — 81001 URINALYSIS AUTO W/SCOPE: CPT | Performed by: FAMILY MEDICINE

## 2023-11-13 PROCEDURE — 99284 EMERGENCY DEPT VISIT MOD MDM: CPT | Mod: 25 | Performed by: FAMILY MEDICINE

## 2023-11-13 PROCEDURE — 80053 COMPREHEN METABOLIC PANEL: CPT | Performed by: FAMILY MEDICINE

## 2023-11-13 PROCEDURE — 85025 COMPLETE CBC W/AUTO DIFF WBC: CPT | Performed by: FAMILY MEDICINE

## 2023-11-13 PROCEDURE — 84703 CHORIONIC GONADOTROPIN ASSAY: CPT | Performed by: FAMILY MEDICINE

## 2023-11-13 PROCEDURE — 93010 ELECTROCARDIOGRAM REPORT: CPT | Performed by: FAMILY MEDICINE

## 2023-11-13 PROCEDURE — 84443 ASSAY THYROID STIM HORMONE: CPT | Performed by: FAMILY MEDICINE

## 2023-11-13 PROCEDURE — 84484 ASSAY OF TROPONIN QUANT: CPT | Performed by: FAMILY MEDICINE

## 2023-11-13 PROCEDURE — 99285 EMERGENCY DEPT VISIT HI MDM: CPT | Mod: 25

## 2023-11-13 ASSESSMENT — ACTIVITIES OF DAILY LIVING (ADL)
ADLS_ACUITY_SCORE: 35
ADLS_ACUITY_SCORE: 35

## 2023-11-13 ASSESSMENT — ENCOUNTER SYMPTOMS
BLOOD IN STOOL: 0
ABDOMINAL PAIN: 0
WHEEZING: 0
HEADACHES: 0
SORE THROAT: 0
CHILLS: 0
COUGH: 0
FEVER: 0
PALPITATIONS: 1
SHORTNESS OF BREATH: 1
NAUSEA: 1
FREQUENCY: 0
SINUS PRESSURE: 0
CONSTIPATION: 0
DIAPHORESIS: 0
DYSURIA: 0
VOMITING: 0
DIARRHEA: 0

## 2023-11-13 NOTE — ED TRIAGE NOTES
Pt arrives via [facility]. Concerned for allergic reaction to z-pack that she started 11//7. Symptoms started Friday; dizziness, weakness, headache, chest pains, hives. Pt took claritin last night with relief.      Triage Assessment (Adult)       Row Name 11/13/23 0840          Triage Assessment    Airway WDL WDL        Respiratory WDL    Respiratory WDL WDL        Skin Circulation/Temperature WDL    Skin Circulation/Temperature WDL WDL        Cardiac WDL    Cardiac WDL WDL        Peripheral/Neurovascular WDL    Peripheral Neurovascular WDL WDL        Cognitive/Neuro/Behavioral WDL    Cognitive/Neuro/Behavioral WDL WDL

## 2023-11-13 NOTE — DISCHARGE INSTRUCTIONS
ICD-10-CM    1. Urticaria  L50.9     unclear cause.  unclear if  this event was allergic, but not clear that this was allergy to azithromycin.  follow-up clinic. consider allergist regarding urticaria and prior reaction to penicillins      2. Palpitations  R00.2     unclear cause but EKG with short OH, ?delta wave  in V2, prior  non-sustained VT on zio 8/17/22. The use of tessalon and azithriomycin could have provoked  arrhythmia  again, but now the QTc is normal.   would recommend repeat visit with EP after repeat zio.

## 2023-11-13 NOTE — LETTER
November 13, 2023      To Whom It May Concern:      Antonia Chadwickvan was seen in our Emergency Department today, 11/13/23. She may return to work/school when improved.    Sincerely,        Mariella Lacey RN

## 2023-11-13 NOTE — ED PROVIDER NOTES
History     Chief Complaint   Patient presents with    Allergic Reaction    Anxiety     HPI  Antonia Marc is a 27 year old female who presents with history of prior events of arrhythmia and palpitations, and apparently may have had nonsustained V. tach on an event monitor in the past although I cannot see it in her record.  She was recently started on azithromycin for upper respiratory infection and treated for bronchitis over the phone related to this.  She has been having since starting the azithromycin episodes of palpitations, and a sense of generalized weakness, lightheadedness at times a sense of chest pain and dyspnea and fatigue.  This has been ongoing this last week.  She has no associated focal weakness.  She has no VTE risks known.  She is not on contraception and denies current pregnancy has had a recent last menstrual period    Denies recent prolonged travel (>3 hours) by car or plane, history or FHx of venous thromboembolism, recent surgery (last 4 weeks), active cancer history, hypercoagulable state, estrogen or other medications/conditions causing VTE or  new unilateral swelling or pain in the legs or calves.    She also has a history of anxiety and prior panic.  No obvious recent stressors.  She denies any tobacco alcohol or drug use.  Currently has no fever.  Cough somewhat persists.    Today she came in by ambulance as she was experiencing urticaria more diffusely that are now gone and occurred last night.  This morning she felt a sense of dyspnea.  Was given in the ambulance IV Benadryl and Solu-Medrol.    Allergies:  Allergies   Allergen Reactions    Amoxicillin Hives    Oxycodone Nausea and Vomiting    Celexa [Citalopram] Nausea       Problem List:    Patient Active Problem List    Diagnosis Date Noted    Recurrent tonsillitis 02/01/2018     Priority: Medium    Recurrent major depressive disorder, in remission (H24) 05/30/2017     Priority: Medium    Anxiety 05/30/2017     Priority:  Medium    JAZZY (generalized anxiety disorder) 05/30/2017     Priority: Medium    Mild episode of recurrent major depressive disorder (H24) 05/30/2017     Priority: Medium    Irregular menstrual bleeding 04/02/2012     Priority: Medium    Flat feet 09/02/2011     Priority: Medium        Past Medical History:    Past Medical History:   Diagnosis Date    Closed fracture of unspecified part of radius with ulna(813.83)     Other diseases of trachea and bronchus, not elsewhere classified        Past Surgical History:    Past Surgical History:   Procedure Laterality Date    ESOPHAGOSCOPY, GASTROSCOPY, DUODENOSCOPY (EGD), COMBINED N/A 10/19/2018    Procedure: COMBINED ESOPHAGOSCOPY, GASTROSCOPY, DUODENOSCOPY (EGD), BIOPSY SINGLE OR MULTIPLE;  Surgeon: Heraclio Ziegler DO;  Location: [facility]    TONSILLECTOMY ADULT Bilateral 7/23/2018    Procedure: TONSILLECTOMY ADULT;  Bilateral Tonsillectomy;  Surgeon: Galilea Good MD;  Location: [facility]       Family History:    Family History   Problem Relation Age of Onset    Diabetes Mother     Hypertension Mother     Diabetes Father     Hypertension Father     Crohn's Disease Father     Hypertension Maternal Grandmother     Anxiety Disorder Maternal Grandmother     Hypertension Maternal Grandfather     Diabetes Maternal Grandfather     Anxiety Disorder Maternal Grandfather        Social History:  Marital Status:  Single [1]  Social History     Tobacco Use    Smoking status: Never    Smokeless tobacco: Never    Tobacco comments:     no exporure   Vaping Use    Vaping Use: Never used   Substance Use Topics    Alcohol use: No    Drug use: No        Medications:    acetaminophen (TYLENOL) 325 MG tablet  azithromycin (ZITHROMAX) 250 MG tablet  benzonatate (TESSALON) 100 MG capsule  busPIRone (BUSPAR) 5 MG tablet  fluticasone (FLONASE) 50 MCG/ACT nasal spray  ketorolac (TORADOL) 10 MG tablet  loratadine (CLARITIN) 10 MG tablet  LORazepam (ATIVAN) 0.5 MG tablet  methocarbamol (ROBAXIN)  "750 MG tablet  multivitamin w/minerals (THERA-VIT-M) tablet  sertraline (ZOLOFT) 25 MG tablet          Review of Systems   Constitutional:  Negative for chills, diaphoresis and fever.   HENT:  Negative for ear pain, sinus pressure and sore throat.    Eyes:  Negative for visual disturbance.   Respiratory:  Positive for shortness of breath. Negative for cough and wheezing.    Cardiovascular:  Positive for chest pain and palpitations.   Gastrointestinal:  Positive for nausea. Negative for abdominal pain, blood in stool, constipation, diarrhea and vomiting.   Genitourinary:  Negative for dysuria, frequency and urgency.   Skin:  Negative for rash.   Neurological:  Negative for headaches.   All other systems reviewed and are negative.      Physical Exam   BP: (!) 142/82  Pulse: 73  Temp: 98.4  F (36.9  C)  Resp: 16  Height: 172.7 cm (5' 8\")  Weight: 90.7 kg (200 lb)  SpO2: 99 %      Physical Exam  Constitutional:       General: She is in acute distress.      Appearance: She is not diaphoretic.   Eyes:      Conjunctiva/sclera: Conjunctivae normal.   Cardiovascular:      Rate and Rhythm: Normal rate and regular rhythm.      Heart sounds: No murmur heard.  Pulmonary:      Effort: Pulmonary effort is normal. No respiratory distress.      Breath sounds: Normal breath sounds. No stridor. No wheezing or rhonchi.   Abdominal:      General: Abdomen is flat. There is no distension.      Palpations: Abdomen is soft. There is no mass.      Tenderness: There is no abdominal tenderness. There is no guarding.   Musculoskeletal:      Cervical back: Neck supple.      Right lower leg: No edema.      Left lower leg: No edema.   Skin:     Coloration: Skin is not pale.      Findings: No rash.   Neurological:      General: No focal deficit present.      Mental Status: She is alert.      Cranial Nerves: No cranial nerve deficit.      Sensory: No sensory deficit.      Motor: No weakness.      Coordination: Coordination normal.         ED Course "                 Procedures                EKG Interpretation:      Interpreted by Yasmani Izaguirre MD  EKG done at 0845 hrs. demonstrates a sinus rhythm 72 bpm with a normal axis.  No ST change.  No T wave changes.  Normal R progression and no Q waves.  Normal intervals.  This with exception of a NV of 118.  There is a possible delta wave in lead V2 alone.  No ectopy.  Impression sinus rhythm 72 bpm short NV and a possible isolated delta wave in V2 alone.    Critical Care time:  none               Results for orders placed or performed during the hospital encounter of 11/13/23 (from the past 24 hour(s))   CBC with platelets differential    Narrative    The following orders were created for panel order CBC with platelets differential.  Procedure                               Abnormality         Status                     ---------                               -----------         ------                     CBC with platelets and d...[[id number]]                                                   Please view results for these tests on the individual orders.   UA with Microscopic reflex to Culture    Specimen: Urine, Midstream   Result Value Ref Range    Color Urine Colorless Colorless, Straw, Light Yellow, Yellow    Appearance Urine Clear Clear    Glucose Urine Negative Negative mg/dL    Bilirubin Urine Negative Negative    Ketones Urine Negative Negative mg/dL    Specific Gravity Urine 1.003 1.003 - 1.035    Blood Urine Negative Negative    pH Urine 7.0 5.0 - 7.0    Protein Albumin Urine Negative Negative mg/dL    Urobilinogen Urine Normal Normal, 2.0 mg/dL    Nitrite Urine Negative Negative    Leukocyte Esterase Urine Negative Negative    Mucus Urine Present (A) None Seen /LPF    RBC Urine 1 <=2 /HPF    WBC Urine 1 <=5 /HPF    Narrative    Urine Culture not indicated       Medications - No data to display    Assessments & Plan (with Medical Decision Making)     MDM: Antonia Marc is a 27 year old female who presents  with cardiopulmonary symptoms palpitations after being treated with azithromycin empirically for bronchitis.  Arrives here with several symptoms she thought were allergic including urticaria on and off.  Has had prior similar reactions with amoxicillin.  No current urticaria now no airway compromise.  Has had episodic senses of chest pain shortness of breath generalized weakness over the course of this week.  Palpitations.  History of anxiety but also a history of an abnormal event monitor apparently demonstrating nonsustained V. tach for which she saw electrophysiology.  Her EKG today does show a possible delta wave isolated in V2 and azithromycin could potentially a provoked atypical rhythms.  Both azithromycin and Tessalon can cause QTc prolongation.  Although the QTc is normal today it may not of been 2 or 3 days ago when she was on her medications.  Will obtain based labs today and confirm no other findings.  Unclear the urticaria etiology and certainly there is an overlap with panic but would recommend discharging home with another ZIO monitor for 14 to 20 days and following up with cardiology EP    Unclear what the urticaria represent.  I see no other signs of allergic reaction.  I recommend follow-up.  Unclear if this was a reaction to the azithromycin.    I have reviewed the nursing notes.    I have reviewed the findings, diagnosis, plan and need for follow up with the patient.           Medical Decision Making  The patient's presentation was of moderate complexity (an acute illness with systemic symptoms).    The patient's evaluation involved:  ordering and/or review of 3+ test(s) in this encounter (see separate area of note for details)    The patient's management necessitated only low risk treatment.        New Prescriptions    No medications on file       Final diagnoses:   Urticaria - unclear cause.  unclear if  this event was allergic, but not clear that this was allergy to azithromycin.  follow-up  clinic. consider allergist regarding urticaria and prior reaction to penicillins   Palpitations - unclear cause but EKG with short MO, ?delta wave  in V2, prior  non-sustained VT on zio 8/17/22. The use of tessalon and azithriomycin could have provoked  arrhythmia  again, but now the QTc is normal.   would recommend repeat visit with EP after repeat zio.     Shortened MO interval   Ventricular tachycardia, non-sustained (H) - on Zio 2022 11/13/2023   [facility] EMERGENCY DEPT       Yasmani Izaguirre MD  11/13/23 1700

## 2023-11-13 NOTE — TELEPHONE ENCOUNTER
Nurse Triage SBAR    Is this a 2nd Level Triage? NO    Situation: Patient having trouble breathing, chest pressure, dizziness/fatigue, and hives after stopping Z-pack- ongoing for about 3 days.   Consent: not needed    Background: no travel and not pregnant    Assessment:   was put on a zpack- took 4 pills Started on the medication on 11/6/23 Stopped taking the antibiotic 11/8/23    Started feeling very weak and muscle fatigue  Trouble breathing and feeling like her heart is pound  Indicates she is very anxious and feels foggy  Dizzy    Notes hives returning and itchy, notes she is having issues breathing  She is noting wheezing  Indicates she is feeling pressure on her chest as well  Rating chest pain 7/10   - indicates that she went to bed with chest pain and discomfort      Notes symptoms have been progressing over the last 3 days   - notes getting worse  Denies a cardiac or pulmonary history         Claritin and tylenol helped with the itching some    Protocol Recommended Disposition:       Recommendation: Advised patient to call for an ambulance- patient indicates she is already in route to the ER right now- she was going to try to go back to [facility] but was still 40 min away. She is currently in [place] right now- provided with addressed for [facility] and patient will go there. While giving directions to the ER call was disconnected- writer left a message to call back on the priority line today if she needed additional direction.      911 recommended but patient was currently driving to the hospital during the call    Does the patient meet one of the following criteria for ADS visit consideration? 16+ years old, with an [facility] PCP     TIP  Providers, please consider if this condition is appropriate for management at one of our Acute and Diagnostic Services sites.     If patient is a good candidate, please use dotphrase <dot>triageresponse and select Refer to ADS to document.      Hemalatha Watson,  RN 6:52 AM 11/13/2023  Reason for Disposition   Chest pain   Chest pain lasting longer than 5 minutes and ANY of the following:    history of heart disease  (i.e., heart attack, bypass surgery, angina, angioplasty, CHF; not just a heart murmur)    described as crushing, pressure-like, or heavy    age > 50    age > 30 AND at least one cardiac risk factor (i.e., hypertension, diabetes, obesity, smoker or strong family history of heart disease)    not relieved with nitroglycerin    Additional Information   Negative: SEVERE difficulty breathing (e.g., struggling for each breath, speaks in single words)   Negative: [1] Breathing stopped AND [2] hasn't returned   Negative: Choking on something   Negative: Bluish (or gray) lips or face now   Negative: Difficult to awaken or acting confused (e.g., disoriented, slurred speech)   Negative: Passed out (i.e., lost consciousness, collapsed and was not responding)   Negative: Wheezing started suddenly after medicine, an allergic food or bee sting   Negative: Stridor (harsh sound while breathing in)   Negative: Slow, shallow and weak breathing   Negative: Sounds like a life-threatening emergency to the triager   Negative: SEVERE difficulty breathing (e.g., struggling for each breath, speaks in single words)   Negative: Difficult to awaken or acting confused (e.g., disoriented, slurred speech)   Negative: Shock suspected (e.g., cold/pale/clammy skin, too weak to stand, low BP, rapid pulse)   Negative: Passed out (i.e., lost consciousness, collapsed and was not responding)    Protocols used: Breathing Difficulty-A-AH, Chest Pain-A-AH

## 2023-11-14 ENCOUNTER — PATIENT OUTREACH (OUTPATIENT)
Dept: FAMILY MEDICINE | Facility: CLINIC | Age: 28
End: 2023-11-14

## 2023-11-14 ENCOUNTER — HOSPITAL ENCOUNTER (OUTPATIENT)
Dept: CARDIOLOGY | Facility: CLINIC | Age: 28
Discharge: HOME | End: 2023-11-14
Attending: FAMILY MEDICINE | Admitting: FAMILY MEDICINE
Payer: COMMERCIAL

## 2023-11-14 DIAGNOSIS — R94.31 SHORTENED PR INTERVAL: ICD-10-CM

## 2023-11-14 DIAGNOSIS — I47.29 VENTRICULAR TACHYCARDIA, NON-SUSTAINED (H): ICD-10-CM

## 2023-11-14 DIAGNOSIS — R00.2 PALPITATIONS: ICD-10-CM

## 2023-11-14 PROCEDURE — 93244 EXT ECG>48HR<7D REV&INTERPJ: CPT | Performed by: INTERNAL MEDICINE

## 2023-11-14 PROCEDURE — 93246 EXT ECG>7D<15D RECORDING: CPT

## 2023-11-17 ENCOUNTER — OFFICE VISIT (OUTPATIENT)
Dept: FAMILY MEDICINE | Facility: CLINIC | Age: 28
End: 2023-11-17
Payer: COMMERCIAL

## 2023-11-17 VITALS
HEIGHT: 68 IN | RESPIRATION RATE: 20 BRPM | DIASTOLIC BLOOD PRESSURE: 80 MMHG | WEIGHT: 237 LBS | TEMPERATURE: 99.3 F | BODY MASS INDEX: 35.92 KG/M2 | HEART RATE: 70 BPM | SYSTOLIC BLOOD PRESSURE: 136 MMHG | OXYGEN SATURATION: 98 %

## 2023-11-17 DIAGNOSIS — F33.0 MILD EPISODE OF RECURRENT MAJOR DEPRESSIVE DISORDER (H): ICD-10-CM

## 2023-11-17 DIAGNOSIS — F41.1 GAD (GENERALIZED ANXIETY DISORDER): Primary | ICD-10-CM

## 2023-11-17 DIAGNOSIS — I47.29 NSVT (NONSUSTAINED VENTRICULAR TACHYCARDIA) (H): ICD-10-CM

## 2023-11-17 PROCEDURE — 99214 OFFICE O/P EST MOD 30 MIN: CPT | Performed by: FAMILY MEDICINE

## 2023-11-17 RX ORDER — BUSPIRONE HYDROCHLORIDE 5 MG/1
5 TABLET ORAL 2 TIMES DAILY
Qty: 180 TABLET | Refills: 3 | Status: SHIPPED | OUTPATIENT
Start: 2023-11-17 | End: 2024-01-18

## 2023-11-17 ASSESSMENT — ANXIETY QUESTIONNAIRES
GAD7 TOTAL SCORE: 10
2. NOT BEING ABLE TO STOP OR CONTROL WORRYING: MORE THAN HALF THE DAYS
GAD7 TOTAL SCORE: 10
5. BEING SO RESTLESS THAT IT IS HARD TO SIT STILL: MORE THAN HALF THE DAYS
6. BECOMING EASILY ANNOYED OR IRRITABLE: NOT AT ALL
3. WORRYING TOO MUCH ABOUT DIFFERENT THINGS: MORE THAN HALF THE DAYS
IF YOU CHECKED OFF ANY PROBLEMS ON THIS QUESTIONNAIRE, HOW DIFFICULT HAVE THESE PROBLEMS MADE IT FOR YOU TO DO YOUR WORK, TAKE CARE OF THINGS AT HOME, OR GET ALONG WITH OTHER PEOPLE: SOMEWHAT DIFFICULT
1. FEELING NERVOUS, ANXIOUS, OR ON EDGE: MORE THAN HALF THE DAYS
7. FEELING AFRAID AS IF SOMETHING AWFUL MIGHT HAPPEN: NOT AT ALL
4. TROUBLE RELAXING: MORE THAN HALF THE DAYS

## 2023-11-17 ASSESSMENT — PATIENT HEALTH QUESTIONNAIRE - PHQ9
10. IF YOU CHECKED OFF ANY PROBLEMS, HOW DIFFICULT HAVE THESE PROBLEMS MADE IT FOR YOU TO DO YOUR WORK, TAKE CARE OF THINGS AT HOME, OR GET ALONG WITH OTHER PEOPLE: NOT DIFFICULT AT ALL
SUM OF ALL RESPONSES TO PHQ QUESTIONS 1-9: 0
SUM OF ALL RESPONSES TO PHQ QUESTIONS 1-9: 0

## 2023-11-17 ASSESSMENT — PAIN SCALES - GENERAL: PAINLEVEL: NO PAIN (0)

## 2023-11-17 NOTE — PATIENT INSTRUCTIONS
"Restart the buspirone 5mg twice a day, this is safe for the heart  MyCHart in 4-6 weeks with how this is going.    100mg or less caffeine a day is reasonable    Karen: CALM    \"Antonia, you are safe\"  Relaxation techniques, practice those, so you can use them when you need them.    Therapy: [facility]    Preventive Health Recommendations  Female Ages 26 - 39  Yearly exam:   See your health care provider every year in order to  Review health changes.   Discuss preventive care.    Review your medicines if you your doctor has prescribed any.    Until age 30: Get a Pap test every three years (more often if you have had an abnormal result).    After age 30: Talk to your doctor about whether you should have a Pap test every 3 years or have a Pap test with HPV screening every 5 years.   You do not need a Pap test if your uterus was removed (hysterectomy) and you have not had cancer.  You should be tested each year for STDs (sexually transmitted diseases), if you're at risk.   Talk to your provider about how often to have your cholesterol checked.  If you are at risk for diabetes, you should have a diabetes test (fasting glucose).  Shots: Get a flu shot each year. Get a tetanus shot every 10 years.   Nutrition:   Eat at least 5 servings of fruits and vegetables each day.  Eat whole-grain bread, whole-wheat pasta and brown rice instead of white grains and rice.  Get adequate Calcium and Vitamin D.     Lifestyle  Exercise at least 150 minutes a week (30 minutes a day, 5 days of the week). This will help you control your weight and prevent disease.  Limit alcohol to one drink per day.  No smoking.   Wear sunscreen to prevent skin cancer.  See your dentist every six months for an exam and cleaning.    "

## 2023-11-17 NOTE — PROGRESS NOTES
"  Assessment & Plan       JAZZY (generalized anxiety disorder): not controlled  Plan to start Buspar for anxiety in hopes that this my also help heart symptoms, has tried in the past with no side effects and good result. Also plan to check in with therapy. Risks and benefits of medication discussed and patient in agreement with plan.   - busPIRone (BUSPAR) 5 MG tablet; Take 1 tablet (5 mg) by mouth 2 times daily    NSVT (nonsustained ventricular tachycardia) (H)  Currently wearing Ziopatch. If results show abnormal rhythm plan to check in with cardiology. If rhythm is normal we can safely say symptoms are triggered by anxiety and work on getting anxiety under better control.     Mild episode of recurrent major depressive disorder (H24): not controlled  Start buspar. Also plan to check in with therapy. Risks and benefits of medication discussed and patient in agreement with plan.   - busPIRone (BUSPAR) 5 MG tablet; Take 1 tablet (5 mg) by mouth 2 times daily           MED REC REQUIRED  Post Medication Reconciliation Status:  Discharge medications reconciled, continue medications without change  BMI:   Estimated body mass index is 36.04 kg/m  as calculated from the following:    Height as of this encounter: 1.727 m (5' 8\").    Weight as of this encounter: 107.5 kg (237 lb).       See Patient Instructions  I edited this note to reflect my history, physical, assessment and plan.    Ramesh Ryan MD  [facility]    Aquilino Knight is a 27 year old, presenting for the following health issues:  Anxiety, ER F/U, Medication Reconciliation (Not taking anything on med list except for tylenol and multivitamin), and Health Maintenance (Declined immunizations today)        11/17/2023     3:21 PM   Additional Questions   Roomed by Debbie CHANG   Accompanied by sister- Stephanie         11/17/2023     3:21 PM   Patient Reported Additional Medications   Patient reports taking the following new medications none "       History of Present Illness       Mental Health Follow-up:  Patient presents to follow-up on Anxiety.    Patient's anxiety since last visit has been:  Bad  The patient is having other symptoms associated with anxiety.  Any significant life events: financial concerns and health concerns  Patient is feeling anxious or having panic attacks.  Patient has no concerns about alcohol or drug use.    She eats 2-3 servings of fruits and vegetables daily.She consumes 1 sweetened beverage(s) daily.She exercises with enough effort to increase her heart rate 30 to 60 minutes per day.  She exercises with enough effort to increase her heart rate 3 or less days per week.   She is taking medications regularly.     Was taking 300-400 mg caffeine daily   Quit cold turkey was panic attacks   Started drinking some caffeine (<100mg) again, seems to have improved   Have felt some palpitations, that causes anxiety to worsen   Rash from anxiety   Tried therapy in the past, was not helpful    Stressors with boyfriend and financial concerns     Tried buspar in the past  Lexparo: didn't like weight gain side effect  Sertraline increased headaches.  Ativan: didn't want to take it. Concerned with addiction potential.        11/23/2021     1:58 PM 8/8/2022     9:52 AM 11/17/2023     3:12 PM   PHQ   PHQ-9 Total Score 0 0 0   Q9: Thoughts of better off dead/self-harm past 2 weeks Not at all Not at all Not at all         9/2/2022     1:05 PM 7/18/2023     7:09 AM 11/17/2023     3:13 PM   JAZZY-7 SCORE   Total Score 21 (severe anxiety) 10 (moderate anxiety) 10 (moderate anxiety)   Total Score 21 10 10         ED/UC Followup:    Facility:  [facility]  Date of visit: 11/13/23  Reason for visit: allergic reaction  anxiety  Current Status: currently has ziopatch  Quit caffeine/energy drinks cold turkey- has restarted just a little caffeine daily      Review of Systems   Constitutional, HEENT, cardiovascular, pulmonary, gi and gu systems are negative, except  "as otherwise noted.      Objective    /80   Pulse 70   Temp 99.3  F (37.4  C) (Tympanic)   Resp 20   Ht 1.727 m (5' 8\")   Wt 107.5 kg (237 lb)   LMP 11/11/2023 (Exact Date)   SpO2 98%   BMI 36.04 kg/m    Body mass index is 36.04 kg/m .  Physical Exam   GENERAL: healthy, alert and no distress  EYES: Eyes grossly normal to inspection, PERRL and conjunctivae and sclerae normal  RESP: lungs clear to auscultation - no rales, rhonchi or wheezes  CV: regular rate and rhythm, normal S1 S2, no S3 or S4, no murmur, click or rub, no peripheral edema and peripheral pulses strong  PSYCH: mentation appears normal, affect normal/bright    Lyssa Escobar DNP Student               "

## 2023-11-19 ENCOUNTER — APPOINTMENT (OUTPATIENT)
Dept: ULTRASOUND IMAGING | Facility: CLINIC | Age: 28
End: 2023-11-19
Attending: EMERGENCY MEDICINE
Payer: COMMERCIAL

## 2023-11-19 ENCOUNTER — HOSPITAL ENCOUNTER (EMERGENCY)
Facility: CLINIC | Age: 28
Discharge: HOME | End: 2023-11-19
Attending: EMERGENCY MEDICINE | Admitting: EMERGENCY MEDICINE
Payer: COMMERCIAL

## 2023-11-19 VITALS
DIASTOLIC BLOOD PRESSURE: 84 MMHG | RESPIRATION RATE: 16 BRPM | SYSTOLIC BLOOD PRESSURE: 164 MMHG | HEART RATE: 91 BPM | TEMPERATURE: 99 F | OXYGEN SATURATION: 100 %

## 2023-11-19 DIAGNOSIS — F41.9 ANXIETY: ICD-10-CM

## 2023-11-19 DIAGNOSIS — R10.2 PELVIC PAIN IN FEMALE: ICD-10-CM

## 2023-11-19 LAB
ALBUMIN UR-MCNC: NEGATIVE MG/DL
APPEARANCE UR: CLEAR
BACTERIA #/AREA URNS HPF: ABNORMAL /HPF
BILIRUB UR QL STRIP: NEGATIVE
CLUE CELLS: NORMAL
COLOR UR AUTO: ABNORMAL
GLUCOSE UR STRIP-MCNC: NEGATIVE MG/DL
HCG UR QL: NEGATIVE
HGB UR QL STRIP: NEGATIVE
KETONES UR STRIP-MCNC: NEGATIVE MG/DL
LEUKOCYTE ESTERASE UR QL STRIP: NEGATIVE
NITRATE UR QL: NEGATIVE
PH UR STRIP: 6 [PH] (ref 5–7)
RBC URINE: 0 /HPF
SP GR UR STRIP: 1 (ref 1–1.03)
SQUAMOUS EPITHELIAL: <1 /HPF
TRICHOMONAS, WET PREP: NORMAL
UROBILINOGEN UR STRIP-MCNC: NORMAL MG/DL
WBC URINE: 1 /HPF
WBC'S/HIGH POWER FIELD, WET PREP: NORMAL
YEAST, WET PREP: NORMAL

## 2023-11-19 PROCEDURE — 87491 CHLMYD TRACH DNA AMP PROBE: CPT | Performed by: EMERGENCY MEDICINE

## 2023-11-19 PROCEDURE — 250N000013 HC RX MED GY IP 250 OP 250 PS 637: Performed by: EMERGENCY MEDICINE

## 2023-11-19 PROCEDURE — 87210 SMEAR WET MOUNT SALINE/INK: CPT | Performed by: EMERGENCY MEDICINE

## 2023-11-19 PROCEDURE — 99284 EMERGENCY DEPT VISIT MOD MDM: CPT | Mod: 25

## 2023-11-19 PROCEDURE — 87591 N.GONORRHOEAE DNA AMP PROB: CPT | Performed by: EMERGENCY MEDICINE

## 2023-11-19 PROCEDURE — 76856 US EXAM PELVIC COMPLETE: CPT

## 2023-11-19 PROCEDURE — 81001 URINALYSIS AUTO W/SCOPE: CPT | Performed by: EMERGENCY MEDICINE

## 2023-11-19 PROCEDURE — 81025 URINE PREGNANCY TEST: CPT | Performed by: EMERGENCY MEDICINE

## 2023-11-19 RX ORDER — IBUPROFEN 600 MG/1
600 TABLET, FILM COATED ORAL ONCE
Status: COMPLETED | OUTPATIENT
Start: 2023-11-19 | End: 2023-11-19

## 2023-11-19 RX ORDER — HYDROCODONE BITARTRATE AND ACETAMINOPHEN 5; 325 MG/1; MG/1
2 TABLET ORAL ONCE
Status: COMPLETED | OUTPATIENT
Start: 2023-11-19 | End: 2023-11-19

## 2023-11-19 RX ORDER — HYDROCODONE BITARTRATE AND ACETAMINOPHEN 5; 325 MG/1; MG/1
1 TABLET ORAL EVERY 6 HOURS PRN
Qty: 6 TABLET | Refills: 0 | Status: SHIPPED | OUTPATIENT
Start: 2023-11-19 | End: 2023-12-07

## 2023-11-19 RX ADMIN — IBUPROFEN 600 MG: 600 TABLET, FILM COATED ORAL at 21:37

## 2023-11-19 RX ADMIN — HYDROCODONE BITARTRATE AND ACETAMINOPHEN 2 TABLET: 5; 325 TABLET ORAL at 21:37

## 2023-11-19 ASSESSMENT — ACTIVITIES OF DAILY LIVING (ADL): ADLS_ACUITY_SCORE: 35

## 2023-11-20 ENCOUNTER — OFFICE VISIT (OUTPATIENT)
Dept: OBGYN | Facility: CLINIC | Age: 28
End: 2023-11-20
Payer: COMMERCIAL

## 2023-11-20 VITALS
WEIGHT: 239 LBS | SYSTOLIC BLOOD PRESSURE: 134 MMHG | DIASTOLIC BLOOD PRESSURE: 78 MMHG | HEIGHT: 68 IN | BODY MASS INDEX: 36.22 KG/M2

## 2023-11-20 DIAGNOSIS — N89.8 VAGINAL DISCHARGE: ICD-10-CM

## 2023-11-20 DIAGNOSIS — Z31.69 PRE-CONCEPTION COUNSELING: ICD-10-CM

## 2023-11-20 DIAGNOSIS — R10.2 PELVIC PAIN IN FEMALE: ICD-10-CM

## 2023-11-20 DIAGNOSIS — R30.0 DYSURIA: Primary | ICD-10-CM

## 2023-11-20 DIAGNOSIS — Z12.4 PAP SMEAR FOR CERVICAL CANCER SCREENING: ICD-10-CM

## 2023-11-20 LAB
ALBUMIN UR-MCNC: NEGATIVE MG/DL
APPEARANCE UR: CLEAR
BILIRUB UR QL STRIP: NEGATIVE
C TRACH DNA SPEC QL PROBE+SIG AMP: NEGATIVE
CLUE CELLS: NORMAL
COLOR UR AUTO: YELLOW
GLUCOSE UR STRIP-MCNC: NEGATIVE MG/DL
HGB UR QL STRIP: NEGATIVE
KETONES UR STRIP-MCNC: NEGATIVE MG/DL
LEUKOCYTE ESTERASE UR QL STRIP: NEGATIVE
N GONORRHOEA DNA SPEC QL NAA+PROBE: NEGATIVE
NITRATE UR QL: NEGATIVE
PH UR STRIP: 6.5 [PH] (ref 5–7)
SP GR UR STRIP: 1.01 (ref 1–1.03)
TRICHOMONAS, WET PREP: NORMAL
UROBILINOGEN UR STRIP-ACNC: 0.2 E.U./DL
WBC'S/HIGH POWER FIELD, WET PREP: NORMAL
YEAST, WET PREP: NORMAL

## 2023-11-20 PROCEDURE — 99203 OFFICE O/P NEW LOW 30 MIN: CPT | Performed by: FAMILY MEDICINE

## 2023-11-20 PROCEDURE — 81003 URINALYSIS AUTO W/O SCOPE: CPT | Performed by: FAMILY MEDICINE

## 2023-11-20 PROCEDURE — 87210 SMEAR WET MOUNT SALINE/INK: CPT | Performed by: FAMILY MEDICINE

## 2023-11-20 PROCEDURE — G0145 SCR C/V CYTO,THINLAYER,RESCR: HCPCS | Performed by: FAMILY MEDICINE

## 2023-11-20 RX ORDER — HYDROXYZINE PAMOATE 25 MG/1
25 CAPSULE ORAL SEE ADMIN INSTRUCTIONS
Qty: 30 CAPSULE | Refills: 1 | Status: SHIPPED | OUTPATIENT
Start: 2023-11-20 | End: 2024-02-05

## 2023-11-20 RX ORDER — PRENATAL VIT/IRON FUM/FOLIC AC 27MG-0.8MG
1 TABLET ORAL DAILY
Qty: 90 TABLET | Refills: 3 | Status: SHIPPED | OUTPATIENT
Start: 2023-11-20 | End: 2024-01-18

## 2023-11-20 NOTE — ED PROVIDER NOTES
History     Chief Complaint:  Pelvic Pain       HPI   Antonia Marc is a 27 year old female very low pelvic pain for 3days.  Getting worse.  Took tylenol and ibuprofen at home was relieving but no longer.  Has white discharge she is unsure if the normal for her.  No VB.  No abdominal pain.  No dysuria.  Was on abx a week back for bronchitis.  Also wearing holter monitor for unrelated.  No NVD.  No CP or SOB.  No fever.  Normal BM.        Independent Historian:        Review of External Notes:      Medications:    HYDROcodone-acetaminophen (NORCO) 5-325 MG tablet  acetaminophen (TYLENOL) 325 MG tablet  busPIRone (BUSPAR) 5 MG tablet  multivitamin w/minerals (THERA-VIT-M) tablet        Past Medical History:    Past Medical History:   Diagnosis Date    Closed fracture of unspecified part of radius with ulna(813.83)     Other diseases of trachea and bronchus, not elsewhere classified        Past Surgical History:    Past Surgical History:   Procedure Laterality Date    ESOPHAGOSCOPY, GASTROSCOPY, DUODENOSCOPY (EGD), COMBINED N/A 10/19/2018    Procedure: COMBINED ESOPHAGOSCOPY, GASTROSCOPY, DUODENOSCOPY (EGD), BIOPSY SINGLE OR MULTIPLE;  Surgeon: Heraclio Ziegler DO;  Location: [facility]    TONSILLECTOMY ADULT Bilateral 7/23/2018    Procedure: TONSILLECTOMY ADULT;  Bilateral Tonsillectomy;  Surgeon: Galilea Good MD;  Location: [facility]          Physical Exam   Patient Vitals for the past 24 hrs:   BP Temp Temp src Pulse Resp SpO2   11/19/23 1809 (!) 164/84 -- -- -- -- --   11/19/23 1807 -- 99  F (37.2  C) Temporal 91 16 100 %        Physical Exam  General: Patient is well appearing. No distress.  Head: Atraumatic.  Eyes: Conjunctivae and EOM are normal. No scleral icterus.  Neck: Normal range of motion. Neck supple.   Cardiovascular: Normal rate, regular rhythm, normal heart sounds and intact distal pulses.   Pulmonary/Chest: Breath sounds normal. No respiratory distress.  Abdominal: Soft. Bowel sounds are  normal. No distension. No tenderness. No rebound or guarding.   Musculoskeletal: Normal range of motion.  Skin: Warm and dry. No rash noted. Not diaphoretic.      Emergency Department Course   ECG      Imaging:  US Pelvis Cmplt w Transvag & Doppler LmtPel Duplex Limited   Final Result   IMPRESSION:     1.  Normal pelvic ultrasound.                 Report per radiology    Laboratory:  Labs Ordered and Resulted from Time of ED Arrival to Time of ED Departure   ROUTINE UA WITH MICROSCOPIC - Abnormal       Result Value    Color Urine Straw      Appearance Urine Clear      Glucose Urine Negative      Bilirubin Urine Negative      Ketones Urine Negative      Specific Gravity Urine 1.003      Blood Urine Negative      pH Urine 6.0      Protein Albumin Urine Negative      Urobilinogen Urine Normal      Nitrite Urine Negative      Leukocyte Esterase Urine Negative      Bacteria Urine Few (*)     RBC Urine 0      WBC Urine 1      Squamous Epithelials Urine <1     HCG QUALITATIVE URINE - Normal    hCG Urine Qualitative Negative     WET PREPARATION - Normal    Trichomonas Absent      Yeast Absent      Clue Cells Absent      WBCs/high power field None     CHLAMYDIA TRACHOMATIS/NEISSERIA GONORRHOEAE BY PCR        Procedures       Emergency Department Course & Assessments:             Interventions:  Medications   HYDROcodone-acetaminophen (NORCO) 5-325 MG per tablet 2 tablet (has no administration in time range)   ibuprofen (ADVIL/MOTRIN) tablet 600 mg (has no administration in time range)   HYDROcodone-acetaminophen (NORCO) 5-325 MG per tablet 2 tablet (2 tablets Oral Not Given 11/19/23 2040)   ibuprofen (ADVIL/MOTRIN) tablet 600 mg (600 mg Oral Vaccine Refused 11/19/23 2053)        Assessments:      Independent Interpretation (X-rays, CTs, rhythm strip):      Consultations/Discussion of Management or Tests:         Social Determinants of Health affecting care:       Disposition:  The patient was discharged to home.      Impression & Plan        Medical Decision Making:  Pt low pelvic pain no red flags on abd not uncomfortable appearing in room.  Has vaginal discharge no bleeding labs and swabs as above.  Also Pelvic US undertaken for how low the pain is.  Results as above all normal.  She states high anxiety and that may be effecting her pelvic floor.  Also she is 27 and never knew or was told that she needs yearly obgyn as part of proper female medical care.  Never had a pelvic exam in her life.  We discussed this at length that obgyn needs to bee seen for this pelvic pain and that her first pelvic exam needs to be done by them with likely pap.  .      Diagnosis:    ICD-10-CM    1. Pelvic pain in female  R10.2       2. Anxiety  F41.9            Discharge Medications:  New Prescriptions    HYDROCODONE-ACETAMINOPHEN (NORCO) 5-325 MG TABLET    Take 1 tablet by mouth every 6 hours as needed for pain            11/19/2023   Sea Ortiz MD Stevens, Andrew C, MD  11/19/23 6394

## 2023-11-20 NOTE — PATIENT INSTRUCTIONS
Will notify of result   Dr. Sharmaine Llanos, DO    Obstetrics and Gynecology  [facility] [place] [facility] [place]

## 2023-11-20 NOTE — ED NOTES
"Pt concurs with triage note - she further explains she has never had this pain before - \"It's like a pressure\". Pt last took tylenol and ibuprofen 6 hours ago. Her s/o is by her side. Pt moves slow when walking, notes discomfort with sitting. Pt is alert, orient and appropriate. Her skin is warm and dry.  Pt changing and prepped for pelvic exam. Her s/o is by her side.   "

## 2023-11-20 NOTE — PROGRESS NOTES
SUBJECTIVE:  Antonia Marc is an 27 year old   woman who presents for   gynecology consult for pelvic pain 3 days onset.  Having pain with intercourse.     Patient's last menstrual period was 2023 (exact date). Periods are regular q 28-30 days, lasting   3 days. Menarche @ age teenager, Dysmenorrhea:mild, occurring premenstrually and first 1-2 days of flow. Cyclic symptoms   include none. No intermenstrual bleeding,   spotting, or discharge.    Current contraception: none  History of abnormal Pap smear: No  Family history of uterine or ovarian cancer: No  History of abnormal mammogram: No  Family history of breast cancer: No        Past Medical History:   Diagnosis Date    Closed fracture of unspecified part of radius with ulna(813.83)     Other diseases of trachea and bronchus, not elsewhere classified     tracheomalacia as a            Family History   Problem Relation Age of Onset    Diabetes Mother     Hypertension Mother     Diabetes Father     Hypertension Father     Crohn's Disease Father     Hypertension Maternal Grandmother     Anxiety Disorder Maternal Grandmother     Hypertension Maternal Grandfather     Diabetes Maternal Grandfather     Anxiety Disorder Maternal Grandfather        Past Surgical History:   Procedure Laterality Date    ESOPHAGOSCOPY, GASTROSCOPY, DUODENOSCOPY (EGD), COMBINED N/A 10/19/2018    Procedure: COMBINED ESOPHAGOSCOPY, GASTROSCOPY, DUODENOSCOPY (EGD), BIOPSY SINGLE OR MULTIPLE;  Surgeon: Heraclio Ziegler DO;  Location: [facility]    TONSILLECTOMY ADULT Bilateral 2018    Procedure: TONSILLECTOMY ADULT;  Bilateral Tonsillectomy;  Surgeon: Galilea Good MD;  Location: [facility]       Current Outpatient Medications   Medication    acetaminophen (TYLENOL) 325 MG tablet    busPIRone (BUSPAR) 5 MG tablet    HYDROcodone-acetaminophen (NORCO) 5-325 MG tablet    multivitamin w/minerals (THERA-VIT-M) tablet     No current facility-administered medications for this  "visit.     Allergies   Allergen Reactions    Amoxicillin Hives    Oxycodone Nausea and Vomiting    Celexa [Citalopram] Nausea       Social History     Tobacco Use    Smoking status: Never    Smokeless tobacco: Never    Tobacco comments:     no exporure   Substance Use Topics    Alcohol use: No       Review Of Systems  Ears/Nose/Throat: negative  Respiratory: No shortness of breath, dyspnea on exertion, cough, or hemoptysis  Cardiovascular: negative  Gastrointestinal: negative  Genitourinary: negative  Constitutional, HEENT, cardiovascular, pulmonary, GI, , musculoskeletal, neuro, skin, endocrine and psych systems are negative, except as otherwise noted.    OBJECTIVE:  /78   Ht 1.727 m (5' 8\")   Wt 108.4 kg (239 lb)   LMP 2023 (Exact Date)   BMI 36.34 kg/m    General appearance: healthy, alert, and no distress  Skin: Skin color, texture, turgor normal. No rashes or lesions.  Ears: negative  Nose/Sinuses: Nares normal. Septum midline. Mucosa normal. No drainage or sinus tenderness.  Oropharynx: Lips, mucosa, and tongue normal. Teeth and gums normal.  Neck: Neck supple. No adenopathy. Thyroid symmetric, normal size,, Carotids without bruits.  Lungs: negative, Percussion normal. Good diaphragmatic excursion. Lungs clear  Heart: negative, PMI normal. No lifts, heaves, or thrills. RRR. No murmurs, clicks gallops or rub  Breasts: deferred  Abdomen: Abdomen soft, non-tender. BS normal. No masses, organomegaly  Pelvic: Pelvic:  Pelvic examination with  pap/no Gonorrhea and Chlamydia   including  External genitalia normal   and vagina normal rugatted not atrophic  Examination of urethra  normal no masses, tenderness, scarring  bladder, no masses or tenderness  Cervix no lesions or discharge  Bimanual exam with   Uterus 6 weeks size, mid position, mobile,no-tenderness, normal no descent   Adnexa/parametria   normal no masses     ASSESSMENT:  Antonia Marc is an 27 year old   woman who presents for "   gynecology consult for pelvic pain 3 days onset.  Having pain with intercourse.     PLAN:  Dx:  1)  Pelvic pain: normal ultrasound, neg ua, Gonorrhea  Chlamydia wet prep   Recheck UA today.  Noting pain with intercourse that has just occurred.   Recently noted pain with intercourse. This all started 3 days ago and seemed to   Come on with the onset of overall anxiety  Discussed laparoscopy for endometriosis evaluation     2)  Irregular heart symptoms: Holter monitor in place for 2 weeks   3)  pap smear completed   4) anxiety:  continue buspar, add vistaril prn 3 x daily or just at bedtime   5) wet prep pending due to vaginal  discharge   6) contraception:  does not desire prevention, add prenatal       Labs were reviewed in [facility]   Imaging was reviewed in Epic   Tests and documents were reviewed.   Discussion of management or test interpretation   Diagnosis or treatment significantly limited by social determinant         Dr. Sharmaine Llanos, DO    Obstetrics and Gynecology  [facility] and [place]

## 2023-11-20 NOTE — ED TRIAGE NOTES
Patient reports severe pelvic pain intermittently for the last 3 days. Denies pregnancy. Denies abnormal bleeding. Reports some white vaginal discharge. ABCs intact in triage.

## 2023-11-24 LAB
BKR LAB AP GYN ADEQUACY: NORMAL
BKR LAB AP GYN INTERPRETATION: NORMAL
BKR LAB AP HPV REFLEX: NORMAL
BKR LAB AP PREVIOUS ABNORMAL: NORMAL
PATH REPORT.COMMENTS IMP SPEC: NORMAL
PATH REPORT.COMMENTS IMP SPEC: NORMAL
PATH REPORT.RELEVANT HX SPEC: NORMAL

## 2023-11-30 ENCOUNTER — TELEPHONE (OUTPATIENT)
Dept: FAMILY MEDICINE | Facility: CLINIC | Age: 28
End: 2023-11-30

## 2023-11-30 NOTE — TELEPHONE ENCOUNTER
Symptoms    Describe your symptoms: patient called stating that she has been dealing with a cough for several weeks-11/06/2023, prescribed tessalon , which brought her the the ED due to EKG being abnormal. Patient stopped antibiotic and tessalon. Patient reports she is having problems with her pelvic floor due the coughing.     Productive cough, causing pelvic pain- reports problem when using bathroom.     Any pain: Yes: see above    How long have you been having symptoms: 11/06/23      Have you been seen for this:  Yes: in clinic and in the ED x2    Appointment offered?: nothing available      Preferred Pharmacy:       [id number] - [place], [place] - [address]  AT [facility] [address] [facility] [address]  Phone: [phone] Fax: [phone]      Could we [facility] this [facility] you [facility] or would you prefer to receive a phone call?:   Patient would prefer a phone call   Okay to leave a detailed message?: Yes at Home number on file [phone] (home)

## 2023-12-01 NOTE — TELEPHONE ENCOUNTER
Left non-detailed message for patient to return a call to the clinic RN.     Pt does have appt with PCP on 12/13/23.  Needs further triage to determine if pt needs to be seen sooner.  CHARMAINE Salinas RN

## 2023-12-04 NOTE — TELEPHONE ENCOUNTER
Attempted to reach pt but no answer.      Pt is active in [facility].  Message sent asking pt to call back if ongoing questions or concerns regarding cough and pelvic floor issue.    Anne Salinas RN

## 2023-12-04 NOTE — TELEPHONE ENCOUNTER
Left message on answering machine for patient to call back.     Rebekah PEACOCK RN  [facility]  [phone]

## 2023-12-07 ENCOUNTER — OFFICE VISIT (OUTPATIENT)
Dept: FAMILY MEDICINE | Facility: CLINIC | Age: 28
End: 2023-12-07
Payer: COMMERCIAL

## 2023-12-07 VITALS
SYSTOLIC BLOOD PRESSURE: 126 MMHG | BODY MASS INDEX: 36.98 KG/M2 | TEMPERATURE: 98 F | OXYGEN SATURATION: 100 % | WEIGHT: 244 LBS | RESPIRATION RATE: 16 BRPM | HEIGHT: 68 IN | HEART RATE: 76 BPM | DIASTOLIC BLOOD PRESSURE: 82 MMHG

## 2023-12-07 DIAGNOSIS — R94.31 SHORTENED PR INTERVAL: ICD-10-CM

## 2023-12-07 DIAGNOSIS — F41.1 GAD (GENERALIZED ANXIETY DISORDER): ICD-10-CM

## 2023-12-07 DIAGNOSIS — R10.2 PELVIC PAIN IN FEMALE: Primary | ICD-10-CM

## 2023-12-07 PROCEDURE — 93000 ELECTROCARDIOGRAM COMPLETE: CPT | Performed by: NURSE PRACTITIONER

## 2023-12-07 PROCEDURE — 99214 OFFICE O/P EST MOD 30 MIN: CPT | Mod: 25 | Performed by: NURSE PRACTITIONER

## 2023-12-07 ASSESSMENT — PAIN SCALES - GENERAL: PAINLEVEL: EXTREME PAIN (8)

## 2023-12-07 NOTE — PROGRESS NOTES
Assessment & Plan     Pelvic pain in female  -discussed possible etiology, since pain started after acute cough and constipation, I advised patient that her pain can be due to straining of the stomach muscles   -recommended to start fiber supplement  -I also reviewed recent pelvic US results which were normal, I advised patient to call, or mychart me if her pain still not improving in 2-3 weeks, in this case will consider OB GYN referral. We also discussed possible endometriosis, but since her pain is not chronic I do not think that endometriosis is the reason of her pain.     Shortened HI interval  -we discussed recent and prior EKG results showing short HI interval. I advised patient that no further workup is indicated and I reassured her since her QRS complexes normal and I do not see any delta waves on her EKG no need to concern. Since she had side effects from Z-pack-developed tachycardia and palpitations < I advised Antonia to avoid certain medications that can increase risk of QT prolongation including Azithromycin, Erythromycin, Celexa, Lexapro, etc.   - EKG 12-lead complete w/read - Clinics    JAZZY (generalized anxiety disorder)  -continue Buspar and Vistaril   -if patient will need selective serotonin reuptake inhibitor in the future to help with anxiety I would recommended ROS [facility]    Aquilino Knight is a 27 year old, presenting for the following health issues:  Pelvic Pain        12/7/2023     3:10 PM   Additional Questions   Roomed by Farnaz MEZA MA   Accompanied by Self       History of Present Illness       Reason for visit:  Pelvic floor issues/pain  Symptom onset:  3-4 weeks ago  Symptoms include:  Pelvic floor issues  Symptom intensity:  Moderate  Symptom progression:  Staying the same  Had these symptoms before:  No  What makes it worse:  Sitting, straining to go to the bathroom  What makes it better:  Aleeve for the pain    She  "eats 2-3 servings of fruits and vegetables daily.She consumes 1 sweetened beverage(s) daily.She exercises with enough effort to increase her heart rate 20 to 29 minutes per day.  She exercises with enough effort to increase her heart rate 3 or less days per week.   She is taking medications regularly.       Was in the ER on 11/19/2023 for pelvic pain.  Saw OB/GYN on 11/20/2023.  Had extensive work-up at both visits.  Still having pelvic pain, hasn't gotten better.    Cough, has finally gone away about 4 days ago      Review of Systems   Constitutional, HEENT, cardiovascular, pulmonary, gi and gu systems are negative, except as otherwise noted.      Objective    /82 (BP Location: Right arm, Patient Position: Chair, Cuff Size: Adult Large)   Pulse 76   Temp 98  F (36.7  C) (Tympanic)   Resp 16   Ht 1.727 m (5' 8\")   Wt 110.7 kg (244 lb)   LMP 11/11/2023 (Exact Date)   SpO2 100%   BMI 37.10 kg/m    Body mass index is 37.1 kg/m .  Physical Exam   GENERAL: healthy, alert and no distress  EYES: Eyes grossly normal to inspection, PERRL and conjunctivae and sclerae normal  RESP: lungs clear to auscultation - no rales, rhonchi or wheezes  CV: regular rate and rhythm, normal S1 S2, no S3 or S4, no murmur, click or rub, no peripheral edema and peripheral pulses strong  ABDOMEN: soft, nontender, no hepatosplenomegaly, no masses and bowel sounds normal  SKIN: no suspicious lesions or rashes  NEURO: Normal strength and tone, mentation intact and speech normal  PSYCH: mentation appears normal, affect normal/bright                      "

## 2023-12-07 NOTE — PATIENT INSTRUCTIONS
Lets try Omeprazole 20 mg daily for 2 weeks if you able to tolerate  You can also try DARLENE's as needed for abdominal pain and bloating    Try fiber supplement, increase water intake this should help with constipation    Continue Vistaril as needed    Continue Buspar    You still have short MI interval which can potentially increase risk of arrhythmia, recommend to avoid medications that can increase risk of QT prolongation such as Celexa, Z-pack, Lexapro, Zofran.

## 2023-12-13 ENCOUNTER — OFFICE VISIT (OUTPATIENT)
Dept: CARDIOLOGY | Facility: CLINIC | Age: 28
End: 2023-12-13
Attending: FAMILY MEDICINE
Payer: COMMERCIAL

## 2023-12-13 VITALS
DIASTOLIC BLOOD PRESSURE: 68 MMHG | SYSTOLIC BLOOD PRESSURE: 110 MMHG | HEART RATE: 77 BPM | BODY MASS INDEX: 36.22 KG/M2 | WEIGHT: 239 LBS | OXYGEN SATURATION: 98 % | HEIGHT: 68 IN

## 2023-12-13 DIAGNOSIS — E66.09 CLASS 2 OBESITY DUE TO EXCESS CALORIES WITHOUT SERIOUS COMORBIDITY WITH BODY MASS INDEX (BMI) OF 36.0 TO 36.9 IN ADULT: ICD-10-CM

## 2023-12-13 DIAGNOSIS — E66.812 CLASS 2 OBESITY DUE TO EXCESS CALORIES WITHOUT SERIOUS COMORBIDITY WITH BODY MASS INDEX (BMI) OF 36.0 TO 36.9 IN ADULT: ICD-10-CM

## 2023-12-13 DIAGNOSIS — I47.29 VENTRICULAR TACHYCARDIA, NON-SUSTAINED (H): Primary | ICD-10-CM

## 2023-12-13 DIAGNOSIS — R00.2 PALPITATIONS: ICD-10-CM

## 2023-12-13 DIAGNOSIS — R94.31 SHORTENED PR INTERVAL: ICD-10-CM

## 2023-12-13 PROCEDURE — 99215 OFFICE O/P EST HI 40 MIN: CPT | Performed by: INTERNAL MEDICINE

## 2023-12-13 NOTE — PROGRESS NOTES
CARDIOLOGY CLINIC FOLLOW-UP NOTE      REASON FOR VISIT:   Follow-up palpitations, NSVT    PRIMARY CARE PHYSICIAN:  Roma Ye        History of Present Illness   Antonia Marc is an extremely pleasant 27 year old female here for follow-up after a recent ER visit.  She was previously seen by Leandro Bunch and Bautista at [facility] in 2022.  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  Of note her 8/2022 TTE suggested possible RV enlargement, but her cardiac MRI in 9/2022 was entirely normal.  She is a never smoker.  She has no family history of sudden cardiac death, and her only family history of arrhythmia is her grandfather having atrial fibrillation.    She was initially evaluated in the fall 2022 at [facility] due to palpitations.  Her Zio patch in August 2022, which I personally reviewed, showed a single episode of NSVT lasting 17 beats up to 231 bpm.  For evaluation of this she had an echocardiogram which was read as possible RV enlargement but a follow-up cardiac MRI was normal.  She was managed conservatively by Dr. Baum at that point, and tells me that her symptoms initially improved, but over the past few months they have once again worsened.  She notes that when she gets the palpitations she overall feels unwell, lightheaded, etc.  No syncopal episodes recently.  She did eventually present to the emergency department in November 2023 after a particularly bad episode, and workup done there showed no significant lab abnormalities.  ECG showed a borderline short AK interval and was read as having a delta wave consistent with Sonal-Parkinson-White syndrome by her ER physician.  On my review I do not think there is a definite delta wave.  She saw her PCP for follow-up who recommended she follow-up with cardiology and also  wear a repeat Zio patch.  She tells me that she did not have any severe palpitation episodes while wearing this, and her Zio patch in 11/2023 was unremarkable with only occasional ectopic beats.  Of note, she has been told that she needs to avoid any QTc prolonging medication out of concern that her palpitations may be triggered by R-on-T phenomenon due to QTc prolongation given that her symptoms worsened after taking a Z-Jesus.    As part of today's visit, I personally reviewed her most recent several ECGs and her several most recent Zio patches.  I also reviewed her ER labs and the reports of her echocardiogram and cardiac MRI.      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  We discussed in detail her palpitations, the workup that she has had to this point, and the effect that all of this is having on her quality of life.  At this point I do not have a definite answer for what is causing her most highly symptomatic palpitations.  I think that it is possible that she has intermittent episodes of significant dysrhythmia, even including paroxysmal VT, but that this is overall less likely.  Given her structurally normal heart on cardiac MRI, lack of family history, and unremarkable heart monitors/ECGs outside of the single NSVT episode on her 8/2022 monitor, I think that it is entirely possible that our collective focus on her heart and her potential heart problems has triggered significant heart related anxiety, thereby causing her to focus more on any sensations in her chest and triggering more symptomatic palpitations.  In addition, we are now seeing that certain medications are being restricted from use for her given a theoretical concern about  problematic QTc prolongation, though this has not actually been demonstrated as a problem for her to this point.    Given all of this, we talked about options for more definitive diagnosis, including additional outpatient cardiac monitoring or implantable loop recorder placement.  Ultimately, I think that ILR placement would actually be beneficial in her case, for 2 reasons.  First, we certainly may uncover significant dysrhythmias, including paroxysmal VT, which would be important to diagnose and appropriately manage.  Equally important, however, I think is the potential reassurance that this may give us and all of her medical team that her heart is actually quite healthy.  If we are able to monitor her for a prolonged period of time and no significant rhythm abnormalities are seen, I think that this can give her some reassurance that her palpitations are not worrisome, and also her medical team will feel more comfortable prescribing any necessary medications which she may need down the line, including SSRIs, etc.  We talked in detail about the risks, benefits, and alternatives to ILR placement, and she agreed to proceed.      -ILR placement ordered  -Routine monitoring through the device clinic  -Further plans pending results of ILR monitoring      Follow-up: 3 months with [facility], or sooner as needed      On the date of the patient's visit, I spent a total of 42 minutes reviewing the patient's chart; interviewing, examining, and counseling the patient; coordinating with other providers as necessary, entering orders, and documenting in the medical chart.      Jamison Pineda MD  Interventional Cardiology  December 13, 2023        Medications   Current Outpatient Medications   Medication    acetaminophen (TYLENOL) 325 MG tablet    busPIRone (BUSPAR) 5 MG tablet    multivitamin w/minerals (THERA-VIT-M) tablet    hydrOXYzine (VISTARIL) 25 MG capsule    Prenatal Vit-Fe Fumarate-FA (PRENATAL MULTIVITAMIN W/IRON) 27-0.8  MG tablet     No current facility-administered medications for this visit.     Allergies   Allergies   Allergen Reactions    Azithromycin Palpitations    Amoxicillin Hives    Oxycodone Nausea and Vomiting    Celexa [Citalopram] Nausea         Physical Exam       BP: 110/68 Pulse: 77     SpO2: 98 %      Vital Signs with Ranges  Pulse:  [77] 77  BP: (110)/(68) 110/68  SpO2:  [98 %] 98 %  239 lbs 0 oz    Constitutional: Well-appearing, no acute distress  Respiratory: Normal respiratory effort, CTAB  Cardiovascular: RRR, no m/r/g.  JVP < 7 cm H2O.  There is no LE edema.  Normal carotid upstrokes, no carotid bruits.

## 2023-12-13 NOTE — LETTER
12/13/2023    Roma Ye, APRN CNP  [address] [place] [address]    RE: Antonia Marc       Dear Colleague,     I had the pleasure of seeing Antonia Marc in the [facility].  CARDIOLOGY CLINIC FOLLOW-UP NOTE      REASON FOR VISIT:   Follow-up palpitations, NSVT    PRIMARY CARE PHYSICIAN:  Roma Ye        History of Present Illness  Antonia Marc is an extremely pleasant 27 year old female here for follow-up after a recent ER visit.  She was previously seen by Leandro Bunch and Bautista at [facility] in 2022.  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  Of note her 8/2022 TTE suggested possible RV enlargement, but her cardiac MRI in 9/2022 was entirely normal.  She is a never smoker.  She has no family history of sudden cardiac death, and her only family history of arrhythmia is her grandfather having atrial fibrillation.    She was initially evaluated in the fall 2022 at [facility] due to palpitations.  Her Zio patch in August 2022, which I personally reviewed, showed a single episode of NSVT lasting 17 beats up to 231 bpm.  For evaluation of this she had an echocardiogram which was read as possible RV enlargement but a follow-up cardiac MRI was normal.  She was managed conservatively by Dr. Baum at that point, and tells me that her symptoms initially improved, but over the past few months they have once again worsened.  She notes that when she gets the palpitations she overall feels unwell, lightheaded, etc.  No syncopal episodes recently.  She did eventually present to the emergency department in November 2023 after a particularly bad episode, and workup done there showed no significant lab abnormalities.  ECG showed a borderline short GA interval and was read as having a delta wave  consistent with Sonal-Parkinson-White syndrome by her ER physician.  On my review I do not think there is a definite delta wave.  She saw her PCP for follow-up who recommended she follow-up with cardiology and also wear a repeat Zio patch.  She tells me that she did not have any severe palpitation episodes while wearing this, and her Zio patch in 11/2023 was unremarkable with only occasional ectopic beats.  Of note, she has been told that she needs to avoid any QTc prolonging medication out of concern that her palpitations may be triggered by R-on-T phenomenon due to QTc prolongation given that her symptoms worsened after taking a Z-Jesus.    As part of today's visit, I personally reviewed her most recent several ECGs and her several most recent Zio patches.  I also reviewed her ER labs and the reports of her echocardiogram and cardiac MRI.      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  We discussed in detail her palpitations, the workup that she has had to this point, and the effect that all of this is having on her quality of life.  At this point I do not have a definite answer for what is causing her most highly symptomatic palpitations.  I think that it is possible that she has intermittent episodes of significant dysrhythmia, even including paroxysmal VT, but that this is overall less likely.  Given her structurally normal heart on cardiac MRI, lack of family history, and unremarkable heart monitors/ECGs outside of the single NSVT episode on her 8/2022 monitor, I think that it is entirely possible that our collective focus on her heart and her potential heart problems has triggered significant heart related anxiety, thereby causing her to  focus more on any sensations in her chest and triggering more symptomatic palpitations.  In addition, we are now seeing that certain medications are being restricted from use for her given a theoretical concern about problematic QTc prolongation, though this has not actually been demonstrated as a problem for her to this point.    Given all of this, we talked about options for more definitive diagnosis, including additional outpatient cardiac monitoring or implantable loop recorder placement.  Ultimately, I think that ILR placement would actually be beneficial in her case, for 2 reasons.  First, we certainly may uncover significant dysrhythmias, including paroxysmal VT, which would be important to diagnose and appropriately manage.  Equally important, however, I think is the potential reassurance that this may give us and all of her medical team that her heart is actually quite healthy.  If we are able to monitor her for a prolonged period of time and no significant rhythm abnormalities are seen, I think that this can give her some reassurance that her palpitations are not worrisome, and also her medical team will feel more comfortable prescribing any necessary medications which she may need down the line, including SSRIs, etc.  We talked in detail about the risks, benefits, and alternatives to ILR placement, and she agreed to proceed.      -ILR placement ordered  -Routine monitoring through the device clinic  -Further plans pending results of ILR monitoring      Follow-up: 3 months with [facility], or sooner as needed      On the date of the patient's visit, I spent a total of 42 minutes reviewing the patient's chart; interviewing, examining, and counseling the patient; coordinating with other providers as necessary, entering orders, and documenting in the medical chart.      Jamison Pineda MD  Interventional Cardiology  December 13, 2023        Medications  Current Outpatient Medications   Medication    acetaminophen  (TYLENOL) 325 MG tablet    busPIRone (BUSPAR) 5 MG tablet    multivitamin w/minerals (THERA-VIT-M) tablet    hydrOXYzine (VISTARIL) 25 MG capsule    Prenatal Vit-Fe Fumarate-FA (PRENATAL MULTIVITAMIN W/IRON) 27-0.8 MG tablet     No current facility-administered medications for this visit.     Allergies  Allergies   Allergen Reactions    Azithromycin Palpitations    Amoxicillin Hives    Oxycodone Nausea and Vomiting    Celexa [Citalopram] Nausea         Physical Exam      BP: 110/68 Pulse: 77     SpO2: 98 %      Vital Signs with Ranges  Pulse:  [77] 77  BP: (110)/(68) 110/68  SpO2:  [98 %] 98 %  239 lbs 0 oz    Constitutional: Well-appearing, no acute distress  Respiratory: Normal respiratory effort, CTAB  Cardiovascular: RRR, no m/r/g.  JVP < 7 cm H2O.  There is no LE edema.  Normal carotid upstrokes, no carotid bruits.      Thank you for allowing me to participate in the care of your patient.      Sincerely,     Jamison Pineda MD     [facility]  cc:   Yasmani Izaguirre MD  [address] [place] [address]

## 2023-12-29 ENCOUNTER — E-VISIT (OUTPATIENT)
Dept: URGENT CARE | Facility: CLINIC | Age: 28
End: 2023-12-29
Payer: COMMERCIAL

## 2023-12-29 ENCOUNTER — TELEPHONE (OUTPATIENT)
Dept: FAMILY MEDICINE | Facility: CLINIC | Age: 28
End: 2023-12-29

## 2023-12-29 DIAGNOSIS — U07.1 COVID: Primary | ICD-10-CM

## 2023-12-29 PROCEDURE — 99207 PR NON-BILLABLE SERV PER CHARTING: CPT | Performed by: EMERGENCY MEDICINE

## 2023-12-29 NOTE — TELEPHONE ENCOUNTER
Pt is frustrated because she could not find a virtual appt today.  She has Covid.  She said she did not qualify for Paxlovid.  Her main symptom is sinus and eye pressure on the right side of her face.  No fever  No shortness of breath.  NO pain anywhere except for sinus pressure.  She will treat with home measures, steam to loosen her sinuses.  She will report to ER if any respiratory distress.

## 2023-12-29 NOTE — TELEPHONE ENCOUNTER
RN COVID TREATMENT VISIT  12/29/23      The patient has been triaged and does not require a higher level of care.    Antonia Marc  28 year old  Current weight? 230lbs    Has the patient been seen by a primary care provider at an [facility] or [facility] within the past two years? Yes.   Have you been in close proximity to/do you have a known exposure to a person with a confirmed case of influenza? No.     General treatment eligibility:  Date of positive COVID test (PCR or at home)?  12/28/2023/    Are you or have you been hospitalized for this COVID-19 infection? No.   Have you received monoclonal antibodies or antiviral treatment for COVID-19 since this positive test? No.   Do you have any of the following conditions that place you at risk of being very sick from COVID-19?   - Mental health disorders including mood disorders, depression, schizophrenia spectrum disorders   Yes, patient has at least one high risk condition as noted above.     Current COVID symptoms:   - fever or chills  - cough  - shortness of breath or difficulty breathing  - fatigue  - muscle or body aches  - headache  - new loss of taste or smell  - sore throat  - congestion or runny nose  - nausea or vomiting  - diarrhea  Yes. Patient has at least one symptom as selected.     How many days since symptoms started? 5 days or less. Established patient, 12 years or older weighing at least 88.2 lbs, who has symptoms that started in the past 5 days, has not been hospitalized nor received treatment already, and is at risk for being very sick from COVID-19.     Treatment eligibility by RN:  Are you currently pregnant or nursing? No  Do you have a clinically significant hypersensitivity to nirmatrelvir or ritonavir, or toxic epidermal necrolysis (TEN) or Ortiz-Shawn Syndrome? No  Do you have a history of hepatitis, any hepatic impairment on the Problem List (such as Child-Walsh Class C, cirrhosis, fatty liver disease,  alcoholic liver disease), or was the last liver lab (hepatic panel, ALT, AST, ALK Phos, bilirubin) elevated in the past 6 months? No  Do you have any history of severe renal impairment (eGFR < 30mL/min)? No    Is patient eligible to continue? Yes, patient meets all eligibility requirements for the RN COVID treatment (as denoted by all no responses above).     Current Outpatient Medications   Medication Sig Dispense Refill    acetaminophen (TYLENOL) 325 MG tablet Take 325-650 mg by mouth every 6 hours as needed for mild pain      busPIRone (BUSPAR) 5 MG tablet Take 1 tablet (5 mg) by mouth 2 times daily 180 tablet 3    hydrOXYzine (VISTARIL) 25 MG capsule Take 1 capsule (25 mg) by mouth See Admin Instructions (Patient not taking: Reported on 12/13/2023) 30 capsule 1    multivitamin w/minerals (THERA-VIT-M) tablet Take 1 tablet by mouth daily      Prenatal Vit-Fe Fumarate-FA (PRENATAL MULTIVITAMIN W/IRON) 27-0.8 MG tablet Take 1 tablet by mouth daily (Patient not taking: Reported on 12/7/2023) 90 tablet 3       Medications from List 1 of the standing order (on medications that exclude the use of Paxlovid) that patient is taking: NONE. Is patient taking Isela's Wort? No  Is patient taking Carter's Wort or any meds from List 1? No.   Medications from List 2 of the standing order (on meds that provider needs to adjust) that patient is taking: buspirone (Buspar), explained a provider visit is necessary to discuss medication adjustments while taking Paxlovid. Is patient on any of the meds from List 2? Yes. Patient will be scheduled or transferred to a  at the end of this call.   Rebekah Oates RN

## 2023-12-30 ENCOUNTER — NURSE TRIAGE (OUTPATIENT)
Dept: NURSING | Facility: CLINIC | Age: 28
End: 2023-12-30

## 2023-12-30 NOTE — TELEPHONE ENCOUNTER
Pt calling with concerns about;    Sinus congestion X 10 days   Headache constant   Mucus is yellow, thick   Pain around the bilateral eyes, cheeks X 1week getting progressively worse  Intermittent chills    Denies;  Fever - although has not taken temp  Difficulty breathing/SOB  Redness/swelling on cheek/forehead/around eyes    According to the protocol, patient should see a HCP within 3 days.  Care advice given. Patient verbalizes understanding and agrees with plan of care.     Vivi Lafleur RN, Nurse Advisor 5:29 PM 12/30/2023  Reason for Disposition   [1] Sinus congestion (pressure, fullness) AND [2] present > 10 days    Additional Information   Negative: SEVERE difficulty breathing (e.g., struggling for each breath, speaks in single words)   Negative: Sounds like a life-threatening emergency to the triager   Negative: [1] Sinus infection AND [2] taking an antibiotic AND [3] symptoms continue   Negative: [1] Difficulty breathing AND [2] not from stuffy nose (e.g., not relieved by cleaning out the nose)   Negative: [1] SEVERE headache AND [2] fever   Negative: [1] Redness or swelling on the cheek, forehead or around the eye AND [2] fever   Negative: Fever > 104 F (40 C)   Negative: Patient sounds very sick or weak to the triager   Negative: [1] SEVERE pain AND [2] not improved 2 hours after pain medicine   Negative: [1] Redness or swelling on the cheek, forehead or around the eye AND [2] no fever   Negative: [1] Fever > 101 F (38.3 C) AND [2] age > 60 years   Negative: [1] Fever > 100.0 F (37.8 C) AND [2] bedridden (e.g., CVA, chronic illness, recovering from surgery)   Negative: [1] Fever > 100.0 F (37.8 C) AND [2] diabetes mellitus or weak immune system (e.g., HIV positive, cancer chemo, splenectomy, organ transplant, chronic steroids)   Negative: Fever present > 3 days (72 hours)   Negative: [1] Fever returns after gone for over 24 hours AND [2] symptoms worse or not improved   Negative: [1] Sinus pain (not  just congestion) AND [2] fever   Negative: Earache    Protocols used: Sinus Pain or Congestion-A-AH

## 2024-01-03 ENCOUNTER — OFFICE VISIT (OUTPATIENT)
Dept: URGENT CARE | Facility: URGENT CARE | Age: 29
End: 2024-01-03
Payer: COMMERCIAL

## 2024-01-03 VITALS
DIASTOLIC BLOOD PRESSURE: 74 MMHG | RESPIRATION RATE: 14 BRPM | HEART RATE: 85 BPM | TEMPERATURE: 99.3 F | WEIGHT: 200 LBS | OXYGEN SATURATION: 100 % | SYSTOLIC BLOOD PRESSURE: 118 MMHG | BODY MASS INDEX: 30.41 KG/M2

## 2024-01-03 DIAGNOSIS — J01.90 ACUTE SINUSITIS WITH SYMPTOMS > 10 DAYS: Primary | ICD-10-CM

## 2024-01-03 PROCEDURE — 99213 OFFICE O/P EST LOW 20 MIN: CPT | Performed by: PHYSICIAN ASSISTANT

## 2024-01-03 RX ORDER — DOXYCYCLINE 100 MG/1
100 CAPSULE ORAL 2 TIMES DAILY
Qty: 14 CAPSULE | Refills: 0 | Status: SHIPPED | OUTPATIENT
Start: 2024-01-03 | End: 2024-01-10

## 2024-01-03 NOTE — PROGRESS NOTES
Assessment & Plan     Acute sinusitis with symptoms > 10 days  Patient is allergic to penicillin and Zithromax.  Doxycycline is prescribed today.  Patient educational information provided regarding course of symptoms.  Advised to keep monitoring symptoms.  Tylenol/Motrin as needed for headache.  OTC decongestant as needed.  Follow-up if any worsening symptoms.  Patient agrees.  - doxycycline hyclate (VIBRAMYCIN) 100 MG capsule  Dispense: 14 capsule; Refill: 0         Return in about 10 days (around 1/13/2024) for Symptoms failing to improve.    Jenise España PA-C  [facility] ISSA Knight is a 28 year old female who presents to clinic today for the following health issues:  Chief Complaint   Patient presents with    Sinus Problem     POS home covid test on 12/27  ---NOW still has sinus issues, pressure, congestion, eyes hurt, runny nose, still having tasted and smell issus x  3 weeks or so - took Tylenol cold an flu, afrin spray     HPI      Sinus problem    Onset of symptoms was  3 weeks ago.  Course of illness is worsening.    Severity moderate  Current and Associated symptoms: sinus pain/congestion, sinus HA, LGF.  No cough  Treatment measures tried include Tylenol/Ibuprofen, Afrin, Sudafed.  Predisposing factors include recent illness: recent Covid infection.      Review of Systems  Constitutional, HEENT, cardiovascular, pulmonary, GI, , musculoskeletal, neuro, skin, endocrine and psych systems are negative, except as otherwise noted.      Objective    /74 (BP Location: Right arm, Patient Position: Chair, Cuff Size: Adult Regular)   Pulse 85   Temp 99.3  F (37.4  C) (Oral)   Resp 14   Wt 90.7 kg (200 lb)   LMP 11/11/2023 (Exact Date)   SpO2 100%   BMI 30.41 kg/m    Physical Exam   GENERAL: healthy, alert and no distress  HENT: ear canals and TM's normal, nose with boggy turbinates,  Sinuses are tender to percussion, mouth without ulcers or  lesions  RESP: lungs clear to auscultation - no rales, rhonchi or wheezes  CV: regular rate and rhythm, normal S1 S2  MS: no gross musculoskeletal defects noted, no edema

## 2024-01-06 ENCOUNTER — NURSE TRIAGE (OUTPATIENT)
Dept: NURSING | Facility: CLINIC | Age: 29
End: 2024-01-06
Payer: COMMERCIAL

## 2024-01-07 NOTE — TELEPHONE ENCOUNTER
"\"Diagnosed with sinus infection in  a few days ago: on antibiotic for this. I am having severe pain in the corner of my right eye--it began 3 days ago and is getting worse. Taking Sudafed and eye drops (Systane) for dry eyes. Shooting pain in right cheek to the right eye. Currently pain =\"8\". It has been at this level all day today. No fever noted. I have tried Tylenol, Tylenol Sinus and it doesn't help\". No drainage from the eye. No change in vision.     Triaged to a disposition of Go to ED now, which she agrees to do.    Jazmine Bueno RN Triage Nurse Advisor 10:44 PM 1/6/2024  Reason for Disposition   SEVERE eye pain    Additional Information   Negative: Followed an eye injury   Negative: Eye pain from chemical in the eye   Negative: Eye pain from foreign body in eye   Negative: [1] Tender, red lump or pimple AND [2] located along the eyelid margin   Negative: Has sinus pain or pressure    Protocols used: Eye Pain and Other Symptoms-A-AH    "

## 2024-01-08 ENCOUNTER — E-VISIT (OUTPATIENT)
Dept: FAMILY MEDICINE | Facility: CLINIC | Age: 29
End: 2024-01-08
Payer: COMMERCIAL

## 2024-01-08 DIAGNOSIS — J01.90 ACUTE SINUSITIS TREATED WITH ANTIBIOTICS IN THE PAST 60 DAYS: ICD-10-CM

## 2024-01-08 DIAGNOSIS — H69.90 DYSFUNCTION OF EUSTACHIAN TUBE, UNSPECIFIED LATERALITY: Primary | ICD-10-CM

## 2024-01-08 PROCEDURE — 99421 OL DIG E/M SVC 5-10 MIN: CPT | Performed by: NURSE PRACTITIONER

## 2024-01-08 RX ORDER — PREDNISONE 20 MG/1
20 TABLET ORAL 2 TIMES DAILY
Qty: 10 TABLET | Refills: 0 | Status: SHIPPED | OUTPATIENT
Start: 2024-01-08 | End: 2024-01-11

## 2024-01-08 RX ORDER — DOXYCYCLINE 100 MG/1
100 CAPSULE ORAL 2 TIMES DAILY
Qty: 14 CAPSULE | Refills: 0 | Status: SHIPPED | OUTPATIENT
Start: 2024-01-08 | End: 2024-01-15

## 2024-01-08 NOTE — PATIENT INSTRUCTIONS
Thank you for choosing us for your care. I have placed an order for a prescription so that you can start treatment. View your full visit summary for details by clicking on the link below. Your pharmacist will able to address any questions you may have about the medication.     If you're not feeling better within 5-7 days, please schedule an appointment.  You can schedule an appointment right here in [facility], or call [phone]  If the visit is for the same symptoms as your eVisit, we'll refund the cost of your eVisit if seen within seven days.

## 2024-01-09 ENCOUNTER — HOSPITAL ENCOUNTER (EMERGENCY)
Facility: CLINIC | Age: 29
Discharge: HOME | End: 2024-01-09
Attending: STUDENT IN AN ORGANIZED HEALTH CARE EDUCATION/TRAINING PROGRAM | Admitting: STUDENT IN AN ORGANIZED HEALTH CARE EDUCATION/TRAINING PROGRAM
Payer: COMMERCIAL

## 2024-01-09 VITALS
TEMPERATURE: 98.1 F | RESPIRATION RATE: 20 BRPM | DIASTOLIC BLOOD PRESSURE: 112 MMHG | SYSTOLIC BLOOD PRESSURE: 174 MMHG | HEART RATE: 60 BPM | OXYGEN SATURATION: 99 %

## 2024-01-09 DIAGNOSIS — H69.93 DYSFUNCTION OF BOTH EUSTACHIAN TUBES: ICD-10-CM

## 2024-01-09 LAB
ANION GAP SERPL CALCULATED.3IONS-SCNC: 12 MMOL/L (ref 7–15)
BASOPHILS # BLD AUTO: 0 10E3/UL (ref 0–0.2)
BASOPHILS NFR BLD AUTO: 0 %
BUN SERPL-MCNC: 9.6 MG/DL (ref 6–20)
CALCIUM SERPL-MCNC: 9.9 MG/DL (ref 8.6–10)
CHLORIDE SERPL-SCNC: 103 MMOL/L (ref 98–107)
CREAT SERPL-MCNC: 0.72 MG/DL (ref 0.51–0.95)
DEPRECATED HCO3 PLAS-SCNC: 26 MMOL/L (ref 22–29)
EGFRCR SERPLBLD CKD-EPI 2021: >90 ML/MIN/1.73M2
EOSINOPHIL # BLD AUTO: 0 10E3/UL (ref 0–0.7)
EOSINOPHIL NFR BLD AUTO: 0 %
ERYTHROCYTE [DISTWIDTH] IN BLOOD BY AUTOMATED COUNT: 12.3 % (ref 10–15)
FLUAV RNA SPEC QL NAA+PROBE: NEGATIVE
FLUBV RNA RESP QL NAA+PROBE: NEGATIVE
GLUCOSE SERPL-MCNC: 107 MG/DL (ref 70–99)
HCT VFR BLD AUTO: 42.7 % (ref 35–47)
HGB BLD-MCNC: 14.5 G/DL (ref 11.7–15.7)
HOLD SPECIMEN: NORMAL
HOLD SPECIMEN: NORMAL
IMM GRANULOCYTES # BLD: 0.1 10E3/UL
IMM GRANULOCYTES NFR BLD: 1 %
LYMPHOCYTES # BLD AUTO: 2.4 10E3/UL (ref 0.8–5.3)
LYMPHOCYTES NFR BLD AUTO: 21 %
MCH RBC QN AUTO: 28.7 PG (ref 26.5–33)
MCHC RBC AUTO-ENTMCNC: 34 G/DL (ref 31.5–36.5)
MCV RBC AUTO: 85 FL (ref 78–100)
MONOCYTES # BLD AUTO: 0.4 10E3/UL (ref 0–1.3)
MONOCYTES NFR BLD AUTO: 3 %
NEUTROPHILS # BLD AUTO: 8.2 10E3/UL (ref 1.6–8.3)
NEUTROPHILS NFR BLD AUTO: 75 %
NRBC # BLD AUTO: 0 10E3/UL
NRBC BLD AUTO-RTO: 0 /100
PLATELET # BLD AUTO: 361 10E3/UL (ref 150–450)
POTASSIUM SERPL-SCNC: 3.9 MMOL/L (ref 3.4–5.3)
RBC # BLD AUTO: 5.05 10E6/UL (ref 3.8–5.2)
RSV RNA SPEC NAA+PROBE: NEGATIVE
SARS-COV-2 RNA RESP QL NAA+PROBE: NEGATIVE
SODIUM SERPL-SCNC: 141 MMOL/L (ref 135–145)
WBC # BLD AUTO: 11.1 10E3/UL (ref 4–11)

## 2024-01-09 PROCEDURE — 85025 COMPLETE CBC W/AUTO DIFF WBC: CPT | Performed by: EMERGENCY MEDICINE

## 2024-01-09 PROCEDURE — 85025 COMPLETE CBC W/AUTO DIFF WBC: CPT | Performed by: STUDENT IN AN ORGANIZED HEALTH CARE EDUCATION/TRAINING PROGRAM

## 2024-01-09 PROCEDURE — 80048 BASIC METABOLIC PNL TOTAL CA: CPT | Performed by: STUDENT IN AN ORGANIZED HEALTH CARE EDUCATION/TRAINING PROGRAM

## 2024-01-09 PROCEDURE — 80048 BASIC METABOLIC PNL TOTAL CA: CPT | Performed by: EMERGENCY MEDICINE

## 2024-01-09 PROCEDURE — 36415 COLL VENOUS BLD VENIPUNCTURE: CPT | Performed by: EMERGENCY MEDICINE

## 2024-01-09 PROCEDURE — 99283 EMERGENCY DEPT VISIT LOW MDM: CPT

## 2024-01-09 PROCEDURE — 87637 SARSCOV2&INF A&B&RSV AMP PRB: CPT | Performed by: EMERGENCY MEDICINE

## 2024-01-09 RX ORDER — CETIRIZINE HYDROCHLORIDE 10 MG/1
10 TABLET ORAL DAILY
Qty: 15 TABLET | Refills: 0 | Status: SHIPPED | OUTPATIENT
Start: 2024-01-09 | End: 2024-01-18

## 2024-01-09 RX ORDER — FLUTICASONE PROPIONATE 50 MCG
1 SPRAY, SUSPENSION (ML) NASAL DAILY
Qty: 15.8 ML | Refills: 0 | Status: SHIPPED | OUTPATIENT
Start: 2024-01-09 | End: 2024-01-18

## 2024-01-09 ASSESSMENT — ACTIVITIES OF DAILY LIVING (ADL): ADLS_ACUITY_SCORE: 33

## 2024-01-09 NOTE — DISCHARGE INSTRUCTIONS
Suspected fluid behind the ears bilaterally due to the ongoing infection.  Please take the prescribed antibiotics that you have been provided along with a steroid.  I have also sent nasal spray and antihistamine to your pharmacy to help with symptomatic relief.    ENT referral was provided today.  They will call to help coordinate scheduling of this.  Follow-up with your primary care provider as needed

## 2024-01-09 NOTE — ED PROVIDER NOTES
History     Chief Complaint:  Otalgia and Fever       HPI   Antonia Marc is a 28 year old female with a history of who presents in the ED today due to ear pain. The patient notes that she began taking antibiotic for her sinus and that she had COVID 7 days ago.She disclosed that she has had a fever of 100. She reports that she was on a plane 3 days ago and she had very bad ear pressure that is muffling her hearing and she can hear herself swallowing. The patient also notes that she also has an inflamed jaw that is causing more ear irritation. She has been taking Tylenol and Ibuprofen around the clock to manage her pain as well as Sudafed, the patient disclosed that she has 2 more antibiotic pills left before beginning her next series.The patient denies ear drainage.    Independent Historian:    None- The Patient only     Review of External Notes:  Reviewed ED visit from yesterday -prednisone Rx provided along with doxycycline for ongoing sinusitis infection  Reviewed office visit from 1/3/2024 -acute sinusitis with Rx for doxycycline    Medications:    Buspar   Vibramycin   Vistaril  Deltasone     Past Medical History:    Closed fracture   Other diseases of trachea and bronchus    Past Surgical History:    Esophagoscopy Gastroscopy Duodenoscopy   Tonsillectomy      Physical Exam   Patient Vitals for the past 24 hrs:   BP Temp Temp src Pulse Resp SpO2   01/09/24 1440 (!) 174/112 98.1  F (36.7  C) Temporal 60 20 99 %       Physical Exam  General: Alert and cooperative with exam. Patient in no apparent distress. Normal mentation.  Head:  Scalp is NC/AT  Eyes:  No scleral icterus, PERRL  ENT:  Fluid noted posterior to bilateral tympanic membranes.  No erythema or bulging present to suggest otitis media.  Canals are normal.  No evidence of perforation.  Neck:  Normal range of motion without rigidity.  CV:  Regular rate and rhythm    No pathologic murmur   Resp:  Breath sounds are clear bilaterally    Non-labored,  no retractions or accessory muscle use  GI:  Abdomen is soft, no distension, no tenderness. No peritoneal signs  MS:  No lower extremity edema   Skin:  Warm and dry, No rash or lesions noted.  Neuro:  Oriented x 3. No gross motor deficits.      Emergency Department Course     Laboratory:  Labs Ordered and Resulted from Time of ED Arrival to Time of ED Departure   BASIC METABOLIC PANEL - Abnormal       Result Value    Sodium 141      Potassium 3.9      Chloride 103      Carbon Dioxide (CO2) 26      Anion Gap 12      Urea Nitrogen 9.6      Creatinine 0.72      GFR Estimate >90      Calcium 9.9      Glucose 107 (*)    CBC WITH PLATELETS AND DIFFERENTIAL - Abnormal    WBC Count 11.1 (*)     RBC Count 5.05      Hemoglobin 14.5      Hematocrit 42.7      MCV 85      MCH 28.7      MCHC 34.0      RDW 12.3      Platelet Count 361      % Neutrophils 75      % Lymphocytes 21      % Monocytes 3      % Eosinophils 0      % Basophils 0      % Immature Granulocytes 1      NRBCs per 100 WBC 0      Absolute Neutrophils 8.2      Absolute Lymphocytes 2.4      Absolute Monocytes 0.4      Absolute Eosinophils 0.0      Absolute Basophils 0.0      Absolute Immature Granulocytes 0.1      Absolute NRBCs 0.0     INFLUENZA A/B, RSV, & SARS-COV2 PCR - Normal    Influenza A PCR Negative      Influenza B PCR Negative      RSV PCR Negative      SARS CoV2 PCR Negative        Emergency Department Course & Assessments:    Interventions:  Medications - No data to display     Assessments:  1700 I obtained history and examined the patient as noted above.    Independent Interpretation (X-rays, CTs, rhythm strip):  None    Consultations/Discussion of Management or Tests:  None    Social Determinants of Health affecting care:  None     Disposition:  The patient was discharged to home.     Impression & Plan    CMS Diagnoses: None    Medical Decision Making:  Antonia Marc is a 28 year old female who presents with hearing concerns.  Patient has been  having muffled hearing for the past several days since recent air travel.  On exam, bilateral tympanic membranes are intact without evidence of perforation.  There is fluid posterior to the tympanic membrane bilaterally however no erythema or bulging to suggest bacterial otitis media.  Her canals are normal.  Suspect ongoing eustachian tube dysfunction in the setting of diagnosed acute sinusitis.  She was recently started on doxycycline with prolonged course due to her ongoing symptoms.  Will send decongestant and antihistamine Rx to patient's pharmacy for symptomatic relief.  Also provided referral to ENT for further evaluation should symptoms be persistent in nature.  There is no evidence of tympanic membrane perforation, otitis media, otitis externa, mastoiditis on exam and I feel patient is safe for discharge home.  Discussed reasons to return to ER.  All questions answered.    Diagnosis:    ICD-10-CM    1. Dysfunction of both eustachian tubes  H69.93 Adult ENT  Referral         Discharge Medications:  Discharge Medication List as of 1/9/2024  5:12 PM        START taking these medications    Details   cetirizine (ZYRTEC) 10 MG tablet Take 1 tablet (10 mg) by mouth daily, Disp-15 tablet, R-0, E-Prescribe      fluticasone (FLONASE) 50 MCG/ACT nasal spray Spray 1 spray into both nostrils daily, Disp-15.8 mL, R-0, E-Prescribe            Scribe Disclosure:    I, Mauri Baker, am serving as a scribe at 5:07 PM on 1/9/2024 to document services personally performed by Giovanna Bustamante PA-C based on my observations and the provider's statements to me.    1/9/2024   Giovanna Bustamante PA-C Snell, Lauren R, PA-C  01/09/24 0730

## 2024-01-09 NOTE — ED TRIAGE NOTES
Pt reports having a sinus infection, given abx x4 days ago. Had plane ride 2 days ago and now is having significant bilateral ear pain, decreased hearing, and jaw pain. COVID+ 2 weeks ago. Fevers up to 102 at home. ABCs intact.

## 2024-01-11 ENCOUNTER — TELEPHONE (OUTPATIENT)
Dept: FAMILY MEDICINE | Facility: CLINIC | Age: 29
End: 2024-01-11
Payer: COMMERCIAL

## 2024-01-11 NOTE — TELEPHONE ENCOUNTER
Left this detailed message on patient's voicemail.  The voicemail prompt identified patient as the  of this message.  Also, sent provider's reply to pt in MyChart.  Anne Salinas RN

## 2024-01-11 NOTE — TELEPHONE ENCOUNTER
Pt called asking if okay to stop oral prednisone that was prescribed on 1/8/24 for sinusitis?  Pt says she does not like how she feels on it.  Pt says she feel more depressed on it.  Also, pt is taking ibuprofen with good relief and she read that prednisone can react with ibuprofen.  Pt prefers to continue ibuprofen for pain relief.    Prednisone is due to be complete on 1/13/24.    Pt believes sinus infection symptoms are improving on doxycycline.  Reminded pt of importance to complete entire course.    Routed to provider regarding question about prednisone.   Pt asks to be called back today.    Anne Salinas RN

## 2024-01-12 ENCOUNTER — OFFICE VISIT (OUTPATIENT)
Dept: URGENT CARE | Facility: URGENT CARE | Age: 29
End: 2024-01-12
Payer: COMMERCIAL

## 2024-01-12 VITALS
OXYGEN SATURATION: 100 % | HEART RATE: 75 BPM | DIASTOLIC BLOOD PRESSURE: 70 MMHG | TEMPERATURE: 97.9 F | BODY MASS INDEX: 30.41 KG/M2 | WEIGHT: 200 LBS | SYSTOLIC BLOOD PRESSURE: 120 MMHG

## 2024-01-12 DIAGNOSIS — H69.92 DYSFUNCTION OF LEFT EUSTACHIAN TUBE: Primary | ICD-10-CM

## 2024-01-12 PROCEDURE — 99214 OFFICE O/P EST MOD 30 MIN: CPT | Performed by: PHYSICIAN ASSISTANT

## 2024-01-12 RX ORDER — FLUTICASONE PROPIONATE 50 MCG
1 SPRAY, SUSPENSION (ML) NASAL DAILY
Qty: 16 G | Refills: 0 | Status: SHIPPED | OUTPATIENT
Start: 2024-01-12 | End: 2024-01-18

## 2024-01-12 ASSESSMENT — ENCOUNTER SYMPTOMS
RHINORRHEA: 1
SINUS PAIN: 0
SINUS PRESSURE: 0

## 2024-01-12 NOTE — PATIENT INSTRUCTIONS
1) Start daily Flonase, use on regular basis to clear up ear symptoms.     2) I don't see any sign of bacterial infection, no need to use second course of antibiotics.

## 2024-01-12 NOTE — LETTER
January 12, 2024      Antonia Marc  [address] [address]        To Whom It May Concern:    Antonia Marc  was seen today.  Please excuse her for missed work for the past 3 day due to illness.        Sincerely,        Fidelina Cornejo PA-C

## 2024-01-12 NOTE — PROGRESS NOTES
Assessment & Plan:        ICD-10-CM    1. Dysfunction of left eustachian tube  H69.92 fluticasone (FLONASE) 50 MCG/ACT nasal spray            Plan/Clinical Decision Making:    Patient with acute left ear pain. Had recent sinusitis after URI and covid infections. Finished course of doxycycline 2 days ago. Had resolution of purulent drainage and improvement of sinus symptoms. Since she had flight 5 days ago having muffled hearing and ear pain. Was started on second course of doxycyline, which she started yesterday and prednisone. Stopped prednisone due to side effects.     Today sinus symptoms much better. Ear pain and muffled hearing continues. Normal ear exam. Symptoms consistent with ETD. Recommend using flonase daily. Can try popping ears.   No sign of ongoing sinusitis and no ear infection. I don't feel the antibiotic course would be of benefit at this time.     Patient Instructions   1) Start daily Flonase, use on regular basis to clear up ear symptoms.     2) I don't see any sign of bacterial infection, no need to use second course of antibiotics.       Return if symptoms worsen or fail to improve, for in 3-5 days.     At the end of the encounter, I discussed results, diagnosis, medications. Discussed red flags for immediate return to clinic/ER, as well as indications for follow up if no improvement. Patient understood and agreed to plan. Patient was stable for discharge.        Fidelina Cornejo PA-C on 1/12/2024 at 2:44 PM      32 minutes spent on the date of the encounter doing chart review, history and exam, documentation and further activities per the note      Subjective:     HPI:    Antonia is a 28 year old female who presents to clinic today for the following health issues:  Chief Complaint   Patient presents with    Urgent Care     Reaction to prednisone that was given for sinus infection this week, has stopped this med yesterday, still on doxycycline, left ear pain is severe, feeling like fluid is  in ear,   Recent ER visit for this, X3 weeks sinus concern,   Taking tylenol and ibuprofen - not helping      HPI    Patient developed ST on 23. Developed swollen lymph nodes. Was taking OTC sinus medications. She then developed covid. Was positive on 23. Seen on 1/3/24 for sinusitis and treated with doxycycline. Finish with antibiotic. She states sinus pain and purulent drainage resolved about 3-4 days ago.   Had flight on  and increased pressure on ears with bad pain. Had on 24 evisit and prednisone and another course of doxycycline prescribed.   Was seen in ED on 24. Dx with ETD. Zyrtec and Flonase recommended. She didn't realize what the recommendations were. Hasn't started those.       Review of Systems   HENT:  Positive for congestion (improved), ear pain and rhinorrhea (improving, clear drainage). Negative for ear discharge, sinus pressure and sinus pain.          Patient Active Problem List   Diagnosis    Flat feet    Irregular menstrual bleeding    Recurrent major depressive disorder, in remission (H24)    Anxiety    JAZZY (generalized anxiety disorder)    Mild episode of recurrent major depressive disorder (H24)    Recurrent tonsillitis    NSVT (nonsustained ventricular tachycardia) (H)        Past Medical History:   Diagnosis Date    Closed fracture of unspecified part of radius with ulna(813.83)     Other diseases of trachea and bronchus, not elsewhere classified     tracheomalacia as a         Social History     Tobacco Use    Smoking status: Never     Passive exposure: Never    Smokeless tobacco: Never    Tobacco comments:     no exporure   Substance Use Topics    Alcohol use: No             Objective:     Vitals:    24 1428   BP: 120/70   Pulse: 75   Temp: 97.9  F (36.6  C)   TempSrc: Tympanic   SpO2: 100%   Weight: 90.7 kg (200 lb)         Physical Exam   EXAM:   Pleasant, alert, appropriate appearance. NAD.  Head Exam: Normocephalic, atraumatic.  Eye Exam:   non  icteric/injection.    Ear Exam: TMs grey without bulging. Normal canals.  Normal pinna.  Nose Exam: Normal external nose.    OroPharynx Exam:  Moist mucous membranes. No erythema, pharynx without exudate or hypertrophy.  Neck/Thyroid Exam:  No LAD.    Chest/Respiratory Exam: CTAB.  Cardiovascular Exam: RRR.     Results:  No results found for any visits on 01/12/24.

## 2024-01-18 ENCOUNTER — MYC REFILL (OUTPATIENT)
Dept: FAMILY MEDICINE | Facility: CLINIC | Age: 29
End: 2024-01-18

## 2024-01-18 ENCOUNTER — OFFICE VISIT (OUTPATIENT)
Dept: FAMILY MEDICINE | Facility: CLINIC | Age: 29
End: 2024-01-18
Payer: COMMERCIAL

## 2024-01-18 VITALS
DIASTOLIC BLOOD PRESSURE: 80 MMHG | BODY MASS INDEX: 35.92 KG/M2 | RESPIRATION RATE: 18 BRPM | OXYGEN SATURATION: 100 % | HEIGHT: 68 IN | TEMPERATURE: 97.9 F | WEIGHT: 237 LBS | SYSTOLIC BLOOD PRESSURE: 120 MMHG | HEART RATE: 95 BPM

## 2024-01-18 DIAGNOSIS — H69.93 DYSFUNCTION OF BOTH EUSTACHIAN TUBES: ICD-10-CM

## 2024-01-18 DIAGNOSIS — F41.1 GAD (GENERALIZED ANXIETY DISORDER): ICD-10-CM

## 2024-01-18 DIAGNOSIS — J34.89 SINUS PRESSURE: ICD-10-CM

## 2024-01-18 DIAGNOSIS — H04.123 DRY EYES: Primary | ICD-10-CM

## 2024-01-18 DIAGNOSIS — F41.9 ANXIETY: ICD-10-CM

## 2024-01-18 DIAGNOSIS — Z51.81 MEDICATION MONITORING ENCOUNTER: ICD-10-CM

## 2024-01-18 DIAGNOSIS — F33.0 MILD EPISODE OF RECURRENT MAJOR DEPRESSIVE DISORDER (H): ICD-10-CM

## 2024-01-18 PROBLEM — Z13.6 CARDIOVASCULAR SCREENING; LDL GOAL LESS THAN 160: Status: ACTIVE | Noted: 2024-01-18

## 2024-01-18 PROBLEM — I47.29 NSVT (NONSUSTAINED VENTRICULAR TACHYCARDIA) (H): Status: RESOLVED | Noted: 2023-11-17 | Resolved: 2024-01-18

## 2024-01-18 LAB
BASOPHILS # BLD AUTO: 0 10E3/UL (ref 0–0.2)
BASOPHILS NFR BLD AUTO: 0 %
EOSINOPHIL # BLD AUTO: 0.2 10E3/UL (ref 0–0.7)
EOSINOPHIL NFR BLD AUTO: 2 %
ERYTHROCYTE [DISTWIDTH] IN BLOOD BY AUTOMATED COUNT: 12.3 % (ref 10–15)
ERYTHROCYTE [SEDIMENTATION RATE] IN BLOOD BY WESTERGREN METHOD: 3 MM/HR (ref 0–20)
HCT VFR BLD AUTO: 43.9 % (ref 35–47)
HGB BLD-MCNC: 14.8 G/DL (ref 11.7–15.7)
IMM GRANULOCYTES # BLD: 0 10E3/UL
IMM GRANULOCYTES NFR BLD: 0 %
LYMPHOCYTES # BLD AUTO: 2.6 10E3/UL (ref 0.8–5.3)
LYMPHOCYTES NFR BLD AUTO: 36 %
MCH RBC QN AUTO: 28.6 PG (ref 26.5–33)
MCHC RBC AUTO-ENTMCNC: 33.7 G/DL (ref 31.5–36.5)
MCV RBC AUTO: 85 FL (ref 78–100)
MONOCYTES # BLD AUTO: 0.3 10E3/UL (ref 0–1.3)
MONOCYTES NFR BLD AUTO: 4 %
NEUTROPHILS # BLD AUTO: 4 10E3/UL (ref 1.6–8.3)
NEUTROPHILS NFR BLD AUTO: 57 %
PLATELET # BLD AUTO: 352 10E3/UL (ref 150–450)
RBC # BLD AUTO: 5.17 10E6/UL (ref 3.8–5.2)
WBC # BLD AUTO: 7 10E3/UL (ref 4–11)

## 2024-01-18 PROCEDURE — 85025 COMPLETE CBC W/AUTO DIFF WBC: CPT | Performed by: FAMILY MEDICINE

## 2024-01-18 PROCEDURE — 84443 ASSAY THYROID STIM HORMONE: CPT | Performed by: FAMILY MEDICINE

## 2024-01-18 PROCEDURE — 36415 COLL VENOUS BLD VENIPUNCTURE: CPT | Performed by: FAMILY MEDICINE

## 2024-01-18 PROCEDURE — 99214 OFFICE O/P EST MOD 30 MIN: CPT | Performed by: FAMILY MEDICINE

## 2024-01-18 PROCEDURE — 84439 ASSAY OF FREE THYROXINE: CPT | Performed by: FAMILY MEDICINE

## 2024-01-18 PROCEDURE — 85652 RBC SED RATE AUTOMATED: CPT | Performed by: FAMILY MEDICINE

## 2024-01-18 PROCEDURE — 86235 NUCLEAR ANTIGEN ANTIBODY: CPT | Performed by: FAMILY MEDICINE

## 2024-01-18 PROCEDURE — 80053 COMPREHEN METABOLIC PANEL: CPT | Performed by: FAMILY MEDICINE

## 2024-01-18 PROCEDURE — 86140 C-REACTIVE PROTEIN: CPT | Performed by: FAMILY MEDICINE

## 2024-01-18 RX ORDER — BUSPIRONE HYDROCHLORIDE 10 MG/1
10 TABLET ORAL 3 TIMES DAILY
Qty: 90 TABLET | Refills: 3 | Status: SHIPPED | OUTPATIENT
Start: 2024-01-18 | End: 2024-02-05

## 2024-01-18 RX ORDER — PREDNISOLONE ACETATE 10 MG/ML
SUSPENSION/ DROPS OPHTHALMIC
COMMUNITY
Start: 2024-01-02 | End: 2024-01-18

## 2024-01-18 ASSESSMENT — PATIENT HEALTH QUESTIONNAIRE - PHQ9
SUM OF ALL RESPONSES TO PHQ QUESTIONS 1-9: 5
10. IF YOU CHECKED OFF ANY PROBLEMS, HOW DIFFICULT HAVE THESE PROBLEMS MADE IT FOR YOU TO DO YOUR WORK, TAKE CARE OF THINGS AT HOME, OR GET ALONG WITH OTHER PEOPLE: SOMEWHAT DIFFICULT
SUM OF ALL RESPONSES TO PHQ QUESTIONS 1-9: 5

## 2024-01-18 ASSESSMENT — ANXIETY QUESTIONNAIRES
5. BEING SO RESTLESS THAT IT IS HARD TO SIT STILL: MORE THAN HALF THE DAYS
2. NOT BEING ABLE TO STOP OR CONTROL WORRYING: MORE THAN HALF THE DAYS
GAD7 TOTAL SCORE: 12
6. BECOMING EASILY ANNOYED OR IRRITABLE: MORE THAN HALF THE DAYS
1. FEELING NERVOUS, ANXIOUS, OR ON EDGE: MORE THAN HALF THE DAYS
GAD7 TOTAL SCORE: 12
4. TROUBLE RELAXING: MORE THAN HALF THE DAYS
GAD7 TOTAL SCORE: 12
IF YOU CHECKED OFF ANY PROBLEMS ON THIS QUESTIONNAIRE, HOW DIFFICULT HAVE THESE PROBLEMS MADE IT FOR YOU TO DO YOUR WORK, TAKE CARE OF THINGS AT HOME, OR GET ALONG WITH OTHER PEOPLE: VERY DIFFICULT
7. FEELING AFRAID AS IF SOMETHING AWFUL MIGHT HAPPEN: NOT AT ALL
3. WORRYING TOO MUCH ABOUT DIFFERENT THINGS: MORE THAN HALF THE DAYS
8. IF YOU CHECKED OFF ANY PROBLEMS, HOW DIFFICULT HAVE THESE MADE IT FOR YOU TO DO YOUR WORK, TAKE CARE OF THINGS AT HOME, OR GET ALONG WITH OTHER PEOPLE?: VERY DIFFICULT
7. FEELING AFRAID AS IF SOMETHING AWFUL MIGHT HAPPEN: NOT AT ALL

## 2024-01-18 NOTE — LETTER
January 29, 2024      Antonia Marc  [address] [facility] [address]  [facility] [address]        Dear ,    We are writing to inform you of your test results.    Labs are overall negative     We advise:     Continue current cares   Follow up with your primary provider     For additional lab test information, [email or web address] is an excellent reference.     Resulted Orders   SSA Ro GLADYS Antibody IgG   Result Value Ref Range    SSA Merry IgG Instrument Value <0.5 <7.0 U/mL    SSA (Ro) Antibody IgG Negative Negative   SSB La GLADYS Antibody IgG   Result Value Ref Range    SSB Merry IgG Instrument Value <0.6 <7.0 U/mL    SSB (La) Antibody IgG Negative Negative   ESR: Erythrocyte sedimentation rate   Result Value Ref Range    Erythrocyte Sedimentation Rate 3 0 - 20 mm/hr   CRP, inflammation   Result Value Ref Range    CRP Inflammation <3.00 <5.00 mg/L   TSH   Result Value Ref Range    TSH 1.32 0.30 - 4.20 uIU/mL   T4, free   Result Value Ref Range    Free T4 1.49 0.90 - 1.70 ng/dL   Comprehensive metabolic panel (BMP + Alb, Alk Phos, ALT, AST, Total. Bili, TP)   Result Value Ref Range    Sodium 139 135 - 145 mmol/L      Comment:      Reference intervals for this test were updated on 09/26/2023 to more accurately reflect our healthy population. There may be differences in the flagging of prior results with similar values performed with this method. Interpretation of those prior results can be made in the context of the updated reference intervals.     Potassium 4.5 3.4 - 5.3 mmol/L    Carbon Dioxide (CO2) 27 22 - 29 mmol/L    Anion Gap 9 7 - 15 mmol/L    Urea Nitrogen 11.6 6.0 - 20.0 mg/dL    Creatinine 0.85 0.51 - 0.95 mg/dL    GFR Estimate >90 >60 mL/min/1.73m2    Calcium 9.6 8.6 - 10.0 mg/dL    Chloride 103 98 - 107 mmol/L    Glucose 81 70 - 99 mg/dL    Alkaline Phosphatase 66 40 - 150 U/L      Comment:      Reference intervals for this test were updated on 11/14/2023 to more accurately reflect our healthy population. There  may be differences in the flagging of prior results with similar values performed with this method. Interpretation of those prior results can be made in the context of the updated reference intervals.    AST 15 0 - 45 U/L      Comment:      Reference intervals for this test were updated on 6/12/2023 to more accurately reflect our healthy population. There may be differences in the flagging of prior results with similar values performed with this method. Interpretation of those prior results can be made in the context of the updated reference intervals.    ALT 14 0 - 50 U/L      Comment:      Reference intervals for this test were updated on 6/12/2023 to more accurately reflect our healthy population. There may be differences in the flagging of prior results with similar values performed with this method. Interpretation of those prior results can be made in the context of the updated reference intervals.      Protein Total 7.1 6.4 - 8.3 g/dL    Albumin 4.6 3.5 - 5.2 g/dL    Bilirubin Total 1.0 <=1.2 mg/dL   CBC with platelets and differential   Result Value Ref Range    WBC Count 7.0 4.0 - 11.0 10e3/uL    RBC Count 5.17 3.80 - 5.20 10e6/uL    Hemoglobin 14.8 11.7 - 15.7 g/dL    Hematocrit 43.9 35.0 - 47.0 %    MCV 85 78 - 100 fL    MCH 28.6 26.5 - 33.0 pg    MCHC 33.7 31.5 - 36.5 g/dL    RDW 12.3 10.0 - 15.0 %    Platelet Count 352 150 - 450 10e3/uL    % Neutrophils 57 %    % Lymphocytes 36 %    % Monocytes 4 %    % Eosinophils 2 %    % Basophils 0 %    % Immature Granulocytes 0 %    Absolute Neutrophils 4.0 1.6 - 8.3 10e3/uL    Absolute Lymphocytes 2.6 0.8 - 5.3 10e3/uL    Absolute Monocytes 0.3 0.0 - 1.3 10e3/uL    Absolute Eosinophils 0.2 0.0 - 0.7 10e3/uL    Absolute Basophils 0.0 0.0 - 0.2 10e3/uL    Absolute Immature Granulocytes 0.0 <=0.4 10e3/uL       If you have any questions or concerns, please call the clinic at the number listed above.       Sincerely,            Luke Gruber,  M.D.

## 2024-01-18 NOTE — LETTER
January 18, 2024      Antonia Marc  [address]    [facility] [address]        To Whom It May Concern:    Antonia Marc was seen in our clinic today    Sincerely,               Luke Gruber MD, [facility]  [address] [place] [address]  [email or web address]   Office: [phone]  Fax: [phone]

## 2024-01-18 NOTE — PROGRESS NOTES
Assessment & Plan       ICD-10-CM    1. Dry eyes  H04.123 SSA Ro GLADYS Antibody IgG     SSB La GLADYS Antibody IgG     CBC with platelets and differential     ESR: Erythrocyte sedimentation rate     CRP, inflammation     TSH     T4, free     Comprehensive metabolic panel (BMP + Alb, Alk Phos, ALT, AST, Total. Bili, TP)     PRIMARY CARE FOLLOW-UP SCHEDULING      2. Dysfunction of both eustachian tubes  H69.93 CBC with platelets and differential     ESR: Erythrocyte sedimentation rate     CRP, inflammation     PRIMARY CARE FOLLOW-UP SCHEDULING      3. Sinus pressure  J34.89 CBC with platelets and differential     ESR: Erythrocyte sedimentation rate     CRP, inflammation     PRIMARY CARE FOLLOW-UP SCHEDULING      4. JAZZY (generalized anxiety disorder)  F41.1 TSH     T4, free     Comprehensive metabolic panel (BMP + Alb, Alk Phos, ALT, AST, Total. Bili, TP)     busPIRone (BUSPAR) 10 MG tablet     PRIMARY CARE FOLLOW-UP SCHEDULING      5. Anxiety  F41.9 TSH     T4, free     Comprehensive metabolic panel (BMP + Alb, Alk Phos, ALT, AST, Total. Bili, TP)     busPIRone (BUSPAR) 10 MG tablet     PRIMARY CARE FOLLOW-UP SCHEDULING      6. Mild episode of recurrent major depressive disorder (H24)  F33.0 TSH     T4, free     PRIMARY CARE FOLLOW-UP SCHEDULING      7. Medication monitoring encounter  Z51.81 SSA Ro GLADYS Antibody IgG     SSB La GLADYS Antibody IgG     CBC with platelets and differential     ESR: Erythrocyte sedimentation rate     CRP, inflammation     TSH     T4, free     Comprehensive metabolic panel (BMP + Alb, Alk Phos, ALT, AST, Total. Bili, TP)     PRIMARY CARE FOLLOW-UP SCHEDULING          Discussed treatment/modality options, including risk and benefits, she desires:    1) labs    2) start prednisolone ophth gtts    3) increase buspar    4) tylenol/ibuprofen    5) stop sudafed    All diagnosis above reviewed and noted above, otherwise stable.      See [facility] orders for further details.      MED REC REQUIRED  Post  "Medication Reconciliation Status: discharge medications reconciled and changed, per note/orders  BMI  Estimated body mass index is 36.04 kg/m  as calculated from the following:    Height as of this encounter: 1.727 m (5' 8\").    Weight as of this encounter: 107.5 kg (237 lb).   Weight management plan: diet and exercise    No follow-ups on file.   Follow-up Visit   Expected date:  Jan 25, 2024 (Approximate)      Follow Up Appointment Details:     Follow-up with whom?: PCP    Follow-Up for what?: Acute Issue Recheck    How?: In Person    Is this an as-needed follow-up?: Yes                   No LOS data to display    Doing chart review, history and exam, documentation and further activities as noted.           Luke Gruber MD, FAAFP     [facility]  [address] [place] [address]  [email or web address]   Office: [phone]  Fax: [phone]  Pager: [phone]       Aquilino Knight is a 28 year old, presenting for the following health issues:    Rt ear plugged    Left ear heart beat sensation    ST with LAD, COVID was diagnosed on 12/28, lost taste and smell, nasal congestion, no rhinitis initially, now yellow / green, sinus pressure, no PND, minimal cough, some f/c/s, some nausea    Seen at , placed on Doxycycline BID x 7 days, hx of migraines    Medication side effects - flonase = light sensitivity - zyrtec = never took, sudafed = no side effects, prednisone = anxious, off balance, felt weird    Dry eye - eye doctor wants    Anxiety Follow-Up  How are you doing with your anxiety since your last visit? Worsened because of eye issue  Are you having other symptoms that might be associated with anxiety? No  Have you had a significant life event? Health Concerns Eye issue  Are you feeling depressed? Yes:     Do you have any concerns with your use of alcohol or other drugs? No        8/8/2022     9:52 AM 11/17/2023     " 3:12 PM 1/18/2024     2:31 PM   PHQ   PHQ-9 Total Score 0 0 5   Q9: Thoughts of better off dead/self-harm past 2 weeks Not at all Not at all Not at all           7/18/2023     7:09 AM 11/17/2023     3:13 PM 1/18/2024     2:32 PM   JAZZY-7 SCORE   Total Score 10 (moderate anxiety) 10 (moderate anxiety) 12 (moderate anxiety)   Total Score 10 10 12       Social History     Tobacco Use    Smoking status: Never     Passive exposure: Never    Smokeless tobacco: Never    Tobacco comments:     no exporure   Vaping Use    Vaping Use: Never used   Substance Use Topics    Alcohol use: No    Drug use: No         7/18/2023     7:09 AM 11/17/2023     3:13 PM 1/18/2024     2:32 PM   JAZZY-7 SCORE   Total Score 10 (moderate anxiety) 10 (moderate anxiety) 12 (moderate anxiety)   Total Score 10 10 12         8/8/2022     9:52 AM 11/17/2023     3:12 PM 1/18/2024     2:31 PM   PHQ   PHQ-9 Total Score 0 0 5   Q9: Thoughts of better off dead/self-harm past 2 weeks Not at all Not at all Not at all     Acute Illness  Acute illness concerns: eye and ear problem  Onset/Duration: 4 weeks- 4 days ago sensitivity in eyes- patient saw eye doctor yesterday everything was fine- feels dizzy some vision changes at times  Symptoms:  Fever: No  Chills/Sweats: No  Headache (location?): with the eye issue  Sinus Pressure: YES  Conjunctivitis:  No  Ear Pain: YES- patient was on a plane and can not pop right ear  Rhinorrhea: No  Congestion: YES  Sore Throat: No  Cough: no  Wheeze: No  Decreased Appetite: No  Nausea: No  Vomiting: No  Diarrhea: No  Dysuria/Freq.: No  Dysuria or Hematuria: No  Fatigue/Achiness: No  Sick/Strep Exposure:   Therapies tried and outcome: was on antiboitics    Review of Systems  CONSTITUTIONAL: NEGATIVE for fever, chills, change in weight  INTEGUMENTARY/SKIN: NEGATIVE for worrisome rashes, moles or lesions  EYES: NEGATIVE for vision changes or irritation  ENT/MOUTH: NEGATIVE for ear, mouth and throat problems  RESP: NEGATIVE for  "significant cough or SOB  CV: NEGATIVE for chest pain, palpitations or peripheral edema  GI: NEGATIVE for nausea, abdominal pain, heartburn, or change in bowel habits  : NEGATIVE for frequency, dysuria, or hematuria  MUSCULOSKELETAL: NEGATIVE for significant arthralgias or myalgia  NEURO: NEGATIVE for weakness, dizziness or paresthesias  ENDOCRINE: NEGATIVE for temperature intolerance, skin/hair changes  HEME: NEGATIVE for bleeding problems  PSYCHIATRIC: NEGATIVE for changes in mood or affect      Objective    /80   Pulse 95   Temp 97.9  F (36.6  C) (Tympanic)   Resp 18   Ht 1.727 m (5' 8\")   Wt 107.5 kg (237 lb)   LMP 12/09/2023 (Approximate)   SpO2 100%   BMI 36.04 kg/m    Body mass index is 36.04 kg/m .    Physical Exam   GENERAL: alert and no distress  EYES: Eyes grossly normal to inspection, PERRL and conjunctivae and sclerae normal  HENT: ear canals and TM's normal, nose and mouth without ulcers or lesions  NECK: no adenopathy, no asymmetry, masses, or scars  RESP: lungs clear to auscultation - no rales, rhonchi or wheezes  CV: regular rate and rhythm, normal S1 S2, no S3 or S4, no murmur, click or rub, no peripheral edema  ABDOMEN: soft, nontender, no hepatosplenomegaly, no masses and bowel sounds normal  MS: no gross musculoskeletal defects noted, no edema  SKIN: no suspicious lesions or rashes  NEURO: Normal strength and tone, mentation intact and speech normal  PSYCH: mentation appears normal, affect normal/bright  LYMPH: no cervical, supraclavicular, axillary, or inguinal adenopathy    Labs pending      Signed Electronically by:            Luke Gruber MD, FAAFP     [facility]  [address] [place] [address]  [email or web address]   Office: [phone]  Fax: [phone]       Answers submitted by the patient for this visit:  Patient Health Questionnaire (Submitted on 1/18/2024)  If you checked off any " problems, how difficult have these problems made it for you to do your work, take care of things at home, or get along with other people?: Somewhat difficult  PHQ9 TOTAL SCORE: 5  JAZZY-7 (Submitted on 1/18/2024)  JAZZY 7 TOTAL SCORE: 12

## 2024-01-19 ENCOUNTER — E-VISIT (OUTPATIENT)
Dept: FAMILY MEDICINE | Facility: CLINIC | Age: 29
End: 2024-01-19
Payer: COMMERCIAL

## 2024-01-19 ENCOUNTER — TELEPHONE (OUTPATIENT)
Dept: FAMILY MEDICINE | Facility: CLINIC | Age: 29
End: 2024-01-19

## 2024-01-19 DIAGNOSIS — G44.209 TENSION HEADACHE: Primary | ICD-10-CM

## 2024-01-19 DIAGNOSIS — G44.209 TENSION HEADACHE: ICD-10-CM

## 2024-01-19 DIAGNOSIS — H04.123 DRY EYES: ICD-10-CM

## 2024-01-19 LAB
ALBUMIN SERPL BCG-MCNC: 4.6 G/DL (ref 3.5–5.2)
ALP SERPL-CCNC: 66 U/L (ref 40–150)
ALT SERPL W P-5'-P-CCNC: 14 U/L (ref 0–50)
ANION GAP SERPL CALCULATED.3IONS-SCNC: 9 MMOL/L (ref 7–15)
AST SERPL W P-5'-P-CCNC: 15 U/L (ref 0–45)
BILIRUB SERPL-MCNC: 1 MG/DL
BUN SERPL-MCNC: 11.6 MG/DL (ref 6–20)
CALCIUM SERPL-MCNC: 9.6 MG/DL (ref 8.6–10)
CHLORIDE SERPL-SCNC: 103 MMOL/L (ref 98–107)
CREAT SERPL-MCNC: 0.85 MG/DL (ref 0.51–0.95)
CRP SERPL-MCNC: <3 MG/L
DEPRECATED HCO3 PLAS-SCNC: 27 MMOL/L (ref 22–29)
EGFRCR SERPLBLD CKD-EPI 2021: >90 ML/MIN/1.73M2
GLUCOSE SERPL-MCNC: 81 MG/DL (ref 70–99)
POTASSIUM SERPL-SCNC: 4.5 MMOL/L (ref 3.4–5.3)
PROT SERPL-MCNC: 7.1 G/DL (ref 6.4–8.3)
SODIUM SERPL-SCNC: 139 MMOL/L (ref 135–145)
T4 FREE SERPL-MCNC: 1.49 NG/DL (ref 0.9–1.7)
TSH SERPL DL<=0.005 MIU/L-ACNC: 1.32 UIU/ML (ref 0.3–4.2)

## 2024-01-19 PROCEDURE — 99421 OL DIG E/M SVC 5-10 MIN: CPT | Performed by: NURSE PRACTITIONER

## 2024-01-19 RX ORDER — PREDNISOLONE ACETATE 10 MG/ML
1-2 SUSPENSION/ DROPS OPHTHALMIC EVERY 4 HOURS
Qty: 5 ML | Refills: 0 | Status: SHIPPED | OUTPATIENT
Start: 2024-01-19 | End: 2024-01-23

## 2024-01-19 RX ORDER — CELECOXIB 100 MG/1
100-200 CAPSULE ORAL 2 TIMES DAILY PRN
Qty: 60 CAPSULE | Refills: 0 | Status: SHIPPED | OUTPATIENT
Start: 2024-01-19 | End: 2024-01-19

## 2024-01-19 RX ORDER — METHOCARBAMOL 500 MG/1
500 TABLET, FILM COATED ORAL 3 TIMES DAILY PRN
Qty: 30 TABLET | Refills: 1 | Status: SHIPPED | OUTPATIENT
Start: 2024-01-19 | End: 2024-02-05

## 2024-01-19 RX ORDER — CELECOXIB 100 MG/1
100-200 CAPSULE ORAL 2 TIMES DAILY PRN
Qty: 60 CAPSULE | Refills: 0 | Status: SHIPPED | OUTPATIENT
Start: 2024-01-19 | End: 2024-01-23

## 2024-01-19 RX ORDER — METHOCARBAMOL 500 MG/1
500 TABLET, FILM COATED ORAL 3 TIMES DAILY PRN
Qty: 30 TABLET | Refills: 1 | Status: SHIPPED | OUTPATIENT
Start: 2024-01-19 | End: 2024-01-19

## 2024-01-19 RX ORDER — PREDNISOLONE ACETATE 10 MG/ML
1-2 SUSPENSION/ DROPS OPHTHALMIC EVERY 4 HOURS
Qty: 5 ML | Refills: 0 | Status: SHIPPED | OUTPATIENT
Start: 2024-01-19 | End: 2024-01-19

## 2024-01-19 NOTE — PATIENT INSTRUCTIONS
Thank you for choosing us for your care. I have placed an order for a prescription of Celebrex, take 100-200 mg every 12 hrs as needed and Robaxin (muscle relaxer) take 500 mg 3 times daily as needed for headaches so that you can start treatment. View your full visit summary for details by clicking on the link below. Celebrex is preferred NSAID for patient with history of gastritis.     Your pharmacist will able to address any questions you may have about the medication.     If you're not feeling better within 5-7 days, please schedule an appointment.  You can schedule an appointment right here in [facility], or call [phone]  If the visit is for the same symptoms as your eVisit, we'll refund the cost of your eVisit if seen within seven days.

## 2024-01-19 NOTE — LETTER
January 19, 2024      Antonia WINSTON Monico  [address] [address]        To Whom It May Concern:    Antonia Marc was evaluated in our clinic. Please excuse patient from missing work from 1/15-1/21 due to illness.     Sincerely,      ROS Hansen CNP

## 2024-01-19 NOTE — TELEPHONE ENCOUNTER
Pt calling for meds that were filled today, pharmacy did not receive them . report  shows that transmission failed. Meds resent  Veda León RN on 1/19/2024 at 3:39 PM

## 2024-01-19 NOTE — TELEPHONE ENCOUNTER
Pending Prescriptions:                       Disp   Refills    prednisoLONE acetate (PRED FORTE) 1 % opht*                  Routing refill request to provider for review/approval because:  Drug not on the [facility] refill protocol       Patient comment: Didnt take the first one picked it up cant find the bottle. Need for inflammation in my cornea. Having sensitivity to light from it.     Emilie Vasquez RN  [facility]

## 2024-01-21 ENCOUNTER — TELEPHONE (OUTPATIENT)
Dept: FAMILY MEDICINE | Facility: CLINIC | Age: 29
End: 2024-01-21
Payer: COMMERCIAL

## 2024-01-21 NOTE — TELEPHONE ENCOUNTER
Prior Authorization Retail Medication Request    Medication/Dose: celecoxib  Diagnosis and ICD code (if different than what is on RX):    New/renewal/insurance change PA/secondary ins. PA:  Previously Tried and Failed:  toradol tab/inj; ibuprofen; advil; motrin; voltaren   Rationale:      Insurance   Primary: [phone]  Insurance ID:  [id number]    Secondary (if applicable):  Insurance ID:      Pharmacy Information (if different than what is on RX)  Name:    Phone:    Fax:

## 2024-01-23 ENCOUNTER — E-VISIT (OUTPATIENT)
Dept: FAMILY MEDICINE | Facility: CLINIC | Age: 29
End: 2024-01-23
Payer: COMMERCIAL

## 2024-01-23 ENCOUNTER — TELEPHONE (OUTPATIENT)
Dept: FAMILY MEDICINE | Facility: CLINIC | Age: 29
End: 2024-01-23

## 2024-01-23 DIAGNOSIS — G44.209 TENSION HEADACHE: Primary | ICD-10-CM

## 2024-01-23 DIAGNOSIS — H04.123 DRY EYES: ICD-10-CM

## 2024-01-23 LAB
ENA SS-A AB SER IA-ACNC: <0.5 U/ML
ENA SS-A AB SER IA-ACNC: NEGATIVE
ENA SS-B IGG SER IA-ACNC: <0.6 U/ML
ENA SS-B IGG SER IA-ACNC: NEGATIVE

## 2024-01-23 PROCEDURE — 99421 OL DIG E/M SVC 5-10 MIN: CPT | Performed by: NURSE PRACTITIONER

## 2024-01-23 RX ORDER — NAPROXEN 500 MG/1
500 TABLET ORAL 2 TIMES DAILY WITH MEALS
Qty: 60 TABLET | Refills: 1 | Status: SHIPPED | OUTPATIENT
Start: 2024-01-23 | End: 2024-02-05

## 2024-01-23 NOTE — TELEPHONE ENCOUNTER
Roma  Pt sends message on e-visit for you. Would like to extend work note to 1-26.  Frankie Izquierdo, RN        Louis Brandon,     I left work today and went to an eye doctor at [facility] in [place] to have them look at my eyes. They are extremely dry and inflamed and the nurse there said that the computer is really bad for my eyes. I tried to work today and I couldn t even do it after just a couple hours. I m thinking I need a little time off to rest my eyes and [facility] couldn t write me a note. I have a follow up appointment on Friday 1-26-24 and was wondering if you could excuse me from work until my follow up appointment? Please let me know.      Also the pain medicine you had prescribed me required a PA so I was unable to . Is there something else you could prescribe or if it s easier to do a PA that work as well. I would just want to make sure whatever it is will be safe to take with my prednisolone acetate 1 eye drops.      Thank you!

## 2024-01-23 NOTE — LETTER
January 23, 2024      Antonia WINSTON Monico  [address] [place] [address]        To Whom It May Concern:    Antonia Marc was seen in our clinic. Please excuse patient from missing work on 1/22-02/02/24 due to illness.    Sincerely,      ROS Hansen CNP

## 2024-01-23 NOTE — TELEPHONE ENCOUNTER
Antonia states she went to use the prednisoLONE acetate (PRED FORTE) 1 % ophthalmic suspension last night for the first dose and after she did she dropped it in the sink and was afraid to use it again due to bacteria from the sink so she threw it out. Wondering if a new one can be sent in. Thank you!    Constance Lopez RN

## 2024-01-23 NOTE — ADDENDUM NOTE
Addended by: NAKIA CARR on: 1/23/2024 06:58 AM     Modules accepted: Orders    
Patient baseline mental status/Alert and oriented to person, place and time/Awake

## 2024-01-24 ENCOUNTER — MYC MEDICAL ADVICE (OUTPATIENT)
Dept: FAMILY MEDICINE | Facility: CLINIC | Age: 29
End: 2024-01-24
Payer: COMMERCIAL

## 2024-01-24 RX ORDER — PREDNISOLONE ACETATE 10 MG/ML
1-2 SUSPENSION/ DROPS OPHTHALMIC EVERY 4 HOURS
Qty: 5 ML | Refills: 0 | Status: SHIPPED | OUTPATIENT
Start: 2024-01-24 | End: 2024-01-24

## 2024-01-24 RX ORDER — NEOMYCIN POLYMYXIN B SULFATES AND DEXAMETHASONE 3.5; 10000; 1 MG/ML; [USP'U]/ML; MG/ML
1 SUSPENSION/ DROPS OPHTHALMIC 2 TIMES DAILY
Qty: 5 ML | Refills: 0 | Status: SHIPPED | OUTPATIENT
Start: 2024-01-24 | End: 2024-04-17

## 2024-01-24 NOTE — TELEPHONE ENCOUNTER
Left non-detailed message for patient to return call to clinic.   *pt active in [employer] so msg was sent.  Closing encounter.  Nicole Ordaz RN

## 2024-01-24 NOTE — TELEPHONE ENCOUNTER
KARLEE on patient VM, asked patient to call back if she would like appt to see Disha Santana on 1/24/2024 at 10:39 AM

## 2024-01-24 NOTE — TELEPHONE ENCOUNTER
Roma  Pt calls to make sure it is ok to take the neomycin eye gtts after using the prednisolone gtts for 1 day. Pt is unsure why medication was changed but will use if ok after steroid drops.       Franike Izquierdo RN

## 2024-01-24 NOTE — TELEPHONE ENCOUNTER
Her first refill request was for Prednisolone drops what patient reported her ophthalmologist prescribed, but she lost her bottle and asked me to fill it, than she told me it was different eye drops  emely/poly/dex ophth susp 5ml that her other eye doctor prescribed and patient asked me to fill it because she dropped bottle in the sink    Yes, she can take the neomycin eye gtts after using the prednisolone gtts for 1 day.    IF STILL HAVING EYE ISSUES I RECOMMEND NATANAEL TO SEE HER EYE DOCTOR.      ROS Hansen CNP

## 2024-01-31 NOTE — TELEPHONE ENCOUNTER
Retail Pharmacy Prior Authorization Team - Phone: [phone]     PA Initiation    Medication: CELECOXIB 100 MG PO CAPS  Insurance Company: [facility] - Phone [phone] Fax [phone]  Pharmacy Filling the Rx:    Filling Pharmacy Phone:    Filling Pharmacy Fax:    Start Date: 1/31/2024

## 2024-02-01 NOTE — TELEPHONE ENCOUNTER
LISE Polanco for celecoxib has been denied.    Denial Reason(s): NOT AN FDA APPROVED DIAGNOSIS FOR THIS MEDICATION    Sahra Salgado

## 2024-02-04 ENCOUNTER — E-VISIT (OUTPATIENT)
Dept: FAMILY MEDICINE | Facility: CLINIC | Age: 29
End: 2024-02-04
Payer: COMMERCIAL

## 2024-02-04 DIAGNOSIS — F41.1 GAD (GENERALIZED ANXIETY DISORDER): Primary | ICD-10-CM

## 2024-02-04 PROCEDURE — 99421 OL DIG E/M SVC 5-10 MIN: CPT | Performed by: NURSE PRACTITIONER

## 2024-02-04 NOTE — LETTER
February 5, 2024      Antonia Marc  [address] [facility] [address]  [facility] [address]        To Whom It May Concern:    Antonia Marc was evaluated in our clinic through E visit. Patient can return to work today, or on 2/05/24, please allow patient to work shorter day today, or leave work earlier due to recent illness.     Sincerely,      ROS Hansen CNP

## 2024-02-05 ENCOUNTER — OFFICE VISIT (OUTPATIENT)
Dept: FAMILY MEDICINE | Facility: CLINIC | Age: 29
End: 2024-02-05
Payer: COMMERCIAL

## 2024-02-05 VITALS
DIASTOLIC BLOOD PRESSURE: 84 MMHG | BODY MASS INDEX: 37.13 KG/M2 | TEMPERATURE: 99 F | WEIGHT: 245 LBS | SYSTOLIC BLOOD PRESSURE: 135 MMHG | HEIGHT: 68 IN | OXYGEN SATURATION: 98 % | RESPIRATION RATE: 15 BRPM | HEART RATE: 88 BPM

## 2024-02-05 DIAGNOSIS — F41.1 GAD (GENERALIZED ANXIETY DISORDER): ICD-10-CM

## 2024-02-05 DIAGNOSIS — H04.123 DRY EYES: Primary | ICD-10-CM

## 2024-02-05 DIAGNOSIS — F41.9 ANXIETY: ICD-10-CM

## 2024-02-05 PROCEDURE — 99214 OFFICE O/P EST MOD 30 MIN: CPT | Performed by: PHYSICIAN ASSISTANT

## 2024-02-05 RX ORDER — BUSPIRONE HYDROCHLORIDE 10 MG/1
10 TABLET ORAL 2 TIMES DAILY
COMMUNITY
Start: 2024-02-05 | End: 2024-02-14

## 2024-02-05 RX ORDER — SERTRALINE HYDROCHLORIDE 25 MG/1
25 TABLET, FILM COATED ORAL DAILY
Qty: 90 TABLET | Refills: 0 | Status: SHIPPED | OUTPATIENT
Start: 2024-02-05 | End: 2024-02-05

## 2024-02-05 ASSESSMENT — ANXIETY QUESTIONNAIRES
3. WORRYING TOO MUCH ABOUT DIFFERENT THINGS: MORE THAN HALF THE DAYS
GAD7 TOTAL SCORE: 14
1. FEELING NERVOUS, ANXIOUS, OR ON EDGE: MORE THAN HALF THE DAYS
GAD7 TOTAL SCORE: 14
5. BEING SO RESTLESS THAT IT IS HARD TO SIT STILL: MORE THAN HALF THE DAYS
6. BECOMING EASILY ANNOYED OR IRRITABLE: MORE THAN HALF THE DAYS
8. IF YOU CHECKED OFF ANY PROBLEMS, HOW DIFFICULT HAVE THESE MADE IT FOR YOU TO DO YOUR WORK, TAKE CARE OF THINGS AT HOME, OR GET ALONG WITH OTHER PEOPLE?: EXTREMELY DIFFICULT
IF YOU CHECKED OFF ANY PROBLEMS ON THIS QUESTIONNAIRE, HOW DIFFICULT HAVE THESE PROBLEMS MADE IT FOR YOU TO DO YOUR WORK, TAKE CARE OF THINGS AT HOME, OR GET ALONG WITH OTHER PEOPLE: EXTREMELY DIFFICULT
7. FEELING AFRAID AS IF SOMETHING AWFUL MIGHT HAPPEN: MORE THAN HALF THE DAYS
4. TROUBLE RELAXING: MORE THAN HALF THE DAYS
7. FEELING AFRAID AS IF SOMETHING AWFUL MIGHT HAPPEN: MORE THAN HALF THE DAYS
2. NOT BEING ABLE TO STOP OR CONTROL WORRYING: MORE THAN HALF THE DAYS

## 2024-02-05 ASSESSMENT — ENCOUNTER SYMPTOMS: NERVOUS/ANXIOUS: 1

## 2024-02-05 ASSESSMENT — PAIN SCALES - GENERAL: PAINLEVEL: EXTREME PAIN (9)

## 2024-02-05 NOTE — LETTER
February 5, 2024      Antonia Marc  [address] [place] [id number]        To Whom It May Concern,       Patient was seen and treated today for ongoing acute medical concerns resulting in inability to attend work in her work place at various times over the last 2 weeks. Please excuse her during this time.     Sincerely,        Bryn Wolfe PA-C

## 2024-02-05 NOTE — PROGRESS NOTES
Assessment & Plan     JAZZY (generalized anxiety disorder)  Worsening. No change with buspar increase. To maintain bid dose given no side effects. Increase zoloft to 50 mg. Recheck with pcp in ~2 weeks. Of note, work note given per request though she states has not worked in ~2 weeks. Will likely need STD if job allows. To discuss this with pcp if needed.   - busPIRone (BUSPAR) 10 MG tablet; Take 1 tablet (10 mg) by mouth 2 times daily  - sertraline (ZOLOFT) 50 MG tablet; Take 1 tablet (50 mg) by mouth daily  Medication use and side effects discussed with the patient. Patient is in complete understanding and agreement with plan.     Anxiety  As above   - busPIRone (BUSPAR) 10 MG tablet; Take 1 tablet (10 mg) by mouth 2 times daily    Dry eyes  Ongoing. Sjogren's work up negative though not gold standard diagnostic testing. Given severity and multiple providers seen, will have consult with [place] [facility] for second opinion. Of note, consider allergic conjunctivitis. May attempt otc zaditor bid but caution given if symptoms worsening due to antihistamine side effects. Of note, should avoid atarax due to this.   - Adult Eye  Referral; Future    Subjective   Antonia is a 28 year old, presenting for the following health issues:  Eye Pain, Anxiety (Follow up), and Depression (Follow up)        2/5/2024     2:45 PM   Additional Questions   Roomed by Katey Cervantes     History of Present Illness       Mental Health Follow-up:  Patient presents to follow-up on Depression & Anxiety.Patient's depression since last visit has been:  Worse  The patient is having other symptoms associated with depression.  Patient's anxiety since last visit has been:  Worse  The patient is having other symptoms associated with anxiety.  Any significant life events: health concerns  Patient is feeling anxious or having panic attacks.  Patient has no concerns about alcohol or drug use.    She eats 2-3 servings of fruits  "and vegetables daily.She consumes 2 sweetened beverage(s) daily.She exercises with enough effort to increase her heart rate 20 to 29 minutes per day.  She exercises with enough effort to increase her heart rate 4 days per week.   She is taking medications regularly.         Medication Followup of busPIRone (BUSPAR) 10 MG    Taking Medication as prescribed: yes  Side Effects:  PT reports anxiety worsened after changing the dose.  Medication Helping Symptoms:  NO  Is taking 10 mg bid instead of prescribed tid.     Medication Followup of hydroxyzine 25 MG    Taking Medication as prescribed: yes  Side Effects:  None  Medication Helping Symptoms:  NO    Medication Followup of Sertraline 25 mg    Taking Medication as prescribed: Prescribed medication today.   Side Effects:  None  Medication Helping Symptoms:  not applicable      Concern - Eye pain bilateral     Onset: 2 weeks.  Description: PT reports going to multiple eye doctor visits.  Progression of Symptoms:     Accompanying Signs & Symptoms: Eye twitching and sensitivity to light,   PT reports pain is worse with anxiety.  Previous history of similar problem: None.            Objective    /84 (BP Location: Right arm, Patient Position: Sitting, Cuff Size: Adult Regular)   Pulse 88   Temp 99  F (37.2  C) (Oral)   Resp 15   Ht 1.727 m (5' 8\")   Wt 111.1 kg (245 lb)   LMP 01/31/2024 (Approximate)   SpO2 98%   Breastfeeding No   BMI 37.25 kg/m    Body mass index is 37.25 kg/m .  Physical Exam   GENERAL: alert and no distress  EYES: PERRL, EOMI, and visual fields normal  PSYCH: mentation appears normal, affect normal/bright          Signed Electronically by: Bryn Wolfe PA-C    "

## 2024-02-05 NOTE — PATIENT INSTRUCTIONS
Thank you for choosing us for your care. I have placed an order for a prescription so that you can start treatment. View your full visit summary for details by clicking on the link below. Your pharmacist will able to address any questions you may have about the medication.     If you're not feeling better within 5-7 days, please schedule an appointment.  You can schedule an appointment right here in [facility], or call [phone]  If the visit is for the same symptoms as your eVisit, we'll refund the cost of your eVisit if seen within seven days.

## 2024-02-06 ENCOUNTER — NURSE TRIAGE (OUTPATIENT)
Dept: OPTOMETRY | Facility: CLINIC | Age: 29
End: 2024-02-06

## 2024-02-06 ENCOUNTER — MYC MEDICAL ADVICE (OUTPATIENT)
Dept: FAMILY MEDICINE | Facility: CLINIC | Age: 29
End: 2024-02-06

## 2024-02-06 NOTE — TELEPHONE ENCOUNTER
Patient calling back.     States she absolutely needs a note for missing work today due to eye pain. (See MyChart message below).    Would prefer to be excused from work for the remainder of the week, if Jesus is agreeable.    Valery Escobar RN

## 2024-02-06 NOTE — TELEPHONE ENCOUNTER
Patient calling back again to check on letter.  Advised provider has not had opportunity to review and comment yet.  Discussed provider will need to be the one to advise on this message.  Discussed provider gone for the day but is back tomorrow but I am unfortunately able to promise what time this message will be addressed.  Amber Tony RN

## 2024-02-06 NOTE — TELEPHONE ENCOUNTER
Caller reporting the following red-flag symptom(s): Pt states that she has had severe (8/10) eye pain in both eyes for about 2 weeks. She also states she has inflammation of both upper and lower eyelids. Preferred location is [place].    Per the system red-flag symptom policy, patient was instructed to:  speak with a Registered Nurse    Action:  Patient warm transferred to a Registered Nurse

## 2024-02-06 NOTE — TELEPHONE ENCOUNTER
"Nurse Triage SBAR    Is this a 2nd Level Triage? YES, LICENSED PRACTITIONER REVIEW IS REQUIRED    Situation: Patient has referral for second opinion from Primary care dated 2/5/24.  She prefers [place] location but can travel to [facility]  Eye pain x 10 days, Left eye is worse than right. 8/10  She is also photophobic  Loteprednol x 5 days> still with Inflammation of eyelids     -Eye pain, photophobia,  -Ibuprofen every 4 hours  -States\" I have seen a few Ophthalmologists, but they don't know what is going on yet\".   No [facility] notes seen, reviewed that notes would generally be needed for second opinion.           Background: Labs done for Sjogren's disease and thyroid disease 1/18/24  Positive COVID 12/27/23  1/3/24 Sinus infection treated with Doxycycline> Urgent Care   1/9/24:[facility] ED: ear pain  1/12/24 UC continued acute ear pain  1/18/24  [facility]> started on prednisolone eye gtts  E-visits x 3 [facility]  2/5/24 [facility]:g. Sjogren's work up negative though not gold standard diagnostic testing. Given severity and multiple providers seen, will have consult with [place] [facility] clinic for second opinion. Of note, consider allergic conjunctivitis.     Assessment: Eye pain x 10 days L> R, up to 8/10 on scale of 1-10. Sjogren's work up( labs) negative, patient is photophobic and states she has blurry vision on/off.    Protocol Recommended Disposition:    Patient would like to be seen in [place], would travel to [facility] if needed. Referral in place    Routed to provider    Does the patient meet one of the following criteria for ADS visit consideration? No    Reason for Disposition   MODERATE eye pain or discomfort (e.g., interferes with normal activities or awakens from sleep; more than mild)   Looking at light causes MODERATE to SEVERE eye pain (i.e., photophobia)    Additional Information   Negative: Followed an eye injury   Negative: Eye pain from chemical in the eye   Negative: Eye pain from foreign " "body in eye   Negative: Has sinus pain or pressure   Negative: Eyelids are very swollen (shut or almost) and no fever   Negative: Painful rash near eye and multiple small blisters grouped together   Negative: Pain followed bright light exposure from welding    Answer Assessment - Initial Assessment Questions  1. ONSET: \"When did the pain start?\" (e.g., minutes, hours, days)      10 days  2. TIMING: \"Does the pain come and go, or has it been constant since it started?\" (e.g., constant, intermittent, fleeting)      L> R  3. SEVERITY: \"How bad is the pain?\"   (Scale 1-10; mild, moderate or severe)         - SEVERE (8-10): excruciating pain and patient unable to do normal activities     4. LOCATION: \"Where does it hurt?\"  (e.g., eyelid, eye, cheekbone)    Eyelids and eyes.  5. CAUSE: \"What do you think is causing the pain?\"      I don't know  6. VISION: \"Do you have blurred vision or changes in your vision?\"       Comes and goes, photophobic  7. EYE DISCHARGE: \"Is there any discharge (pus) from the eye(s)?\"  If Yes, ask: \"What color is it?\"    No  8. FEVER: \"Do you have a fever?\" If Yes, ask: \"What is it, how was it measured, and when did it start?\"   No  9. OTHER SYMPTOMS: \"Do you have any other symptoms?\" (e.g., headache, nasal discharge, facial rash)  NO  10. PREGNANCY: \"Is there any chance you are pregnant?\" \"When was your last menstrual period?\"  unknown    Protocols used: Eye Pain and Other Symptoms-A-[facility]    "

## 2024-02-06 NOTE — TELEPHONE ENCOUNTER
Message #1 left for patient to return call to triage     Unclear if she wants to print letter from my chart   OR   If needs placed at  to    Need to relay message below from Jesus Wolfe PAC  regarding further request for time off     Alka Waters, Registered Nurse  [facility]

## 2024-02-06 NOTE — TELEPHONE ENCOUNTER
In outbox. Of note, I am not patients pcp. For further restrictions after this, it would qualify for a chronic issue and likely fmla. Would recommend further discussion with pcp.     Jesus gresham, pac

## 2024-02-06 NOTE — TELEPHONE ENCOUNTER
Spoke to pt at 1610    Pt with dry eye symptoms for couple years and recent work up for Sjogrens.    Pt seen by several eye providers for dry eyes and getting different information    Pt would like to establish/second opinion with [facility].    Recommended general ophthalmalist Dr. Perez, Dr. Quiroz or Dr. Redd.    Pt with worsening dry eye eye symptom in past 10 days and has not reached out to previous eye providers.    Offered this Friday in open new time slot with Dr. Perez vs sooner in acute clinic.    Pt comfortable with Friday appt.    Pt aware of date/time/location/duration/hospital based clinic/parking/main clinic number.    Tavo Swanson RN 4:20 PM 02/06/24

## 2024-02-06 NOTE — LETTER
February 6, 2024      Antonia Marc  [address] [facility] [address]  [facility] [address]        To Whom It May Concern,       Patient continues to suffer from her acute medical limitation affecting her vision. It is unclear when this will resolve, but recommending no work from 2/6/24-2/9/24 to aid in hopeful recovery.           Sincerely,        Bryn Wolfe PA-C

## 2024-02-06 NOTE — TELEPHONE ENCOUNTER
Jesus- see mychart message below.  Please advise.  Amber Tony, RN    2/5/2024  [facility]    JAZZY (generalized anxiety disorder)  Worsening. No change with buspar increase. To maintain bid dose given no side effects. Increase zoloft to 50 mg. Recheck with pcp in ~2 weeks. Of note, work note given per request though she states has not worked in ~2 weeks. Will likely need STD if job allows. To discuss this with pcp if needed.   - busPIRone (BUSPAR) 10 MG tablet; Take 1 tablet (10 mg) by mouth 2 times daily  - sertraline (ZOLOFT) 50 MG tablet; Take 1 tablet (50 mg) by mouth daily  Medication use and side effects discussed with the patient. Patient is in complete understanding and agreement with plan.     Dry eyes  Ongoing. Sjogren's work up negative though not gold standard diagnostic testing. Given severity and multiple providers seen, will have consult with [place] [facility] clinic for second opinion. Of note, consider allergic conjunctivitis. May attempt otc zaditor bid but caution given if symptoms worsening due to antihistamine side effects. Of note, should avoid atarax due to this.   - Adult Eye  Referral; Future    Signed Electronically by: Bryn Wolfe PA-C

## 2024-02-09 ENCOUNTER — OFFICE VISIT (OUTPATIENT)
Dept: OPHTHALMOLOGY | Facility: CLINIC | Age: 29
End: 2024-02-09
Attending: STUDENT IN AN ORGANIZED HEALTH CARE EDUCATION/TRAINING PROGRAM
Payer: COMMERCIAL

## 2024-02-09 DIAGNOSIS — H52.203 MYOPIA OF BOTH EYES WITH ASTIGMATISM: ICD-10-CM

## 2024-02-09 DIAGNOSIS — H04.123 DRY EYES, BILATERAL: Primary | ICD-10-CM

## 2024-02-09 DIAGNOSIS — H52.13 MYOPIA OF BOTH EYES WITH ASTIGMATISM: ICD-10-CM

## 2024-02-09 PROCEDURE — 99212 OFFICE O/P EST SF 10 MIN: CPT | Mod: 25 | Performed by: STUDENT IN AN ORGANIZED HEALTH CARE EDUCATION/TRAINING PROGRAM

## 2024-02-09 PROCEDURE — 99203 OFFICE O/P NEW LOW 30 MIN: CPT | Mod: 25 | Performed by: STUDENT IN AN ORGANIZED HEALTH CARE EDUCATION/TRAINING PROGRAM

## 2024-02-09 PROCEDURE — 68761 CLOSE TEAR DUCT OPENING: CPT | Performed by: STUDENT IN AN ORGANIZED HEALTH CARE EDUCATION/TRAINING PROGRAM

## 2024-02-09 PROCEDURE — 68761 CLOSE TEAR DUCT OPENING: CPT | Mod: 50 | Performed by: STUDENT IN AN ORGANIZED HEALTH CARE EDUCATION/TRAINING PROGRAM

## 2024-02-09 ASSESSMENT — REFRACTION_WEARINGRX
OD_SPHERE: -3.50
OD_CYLINDER: +0.50
OS_SPHERE: -4.00
OS_AXIS: 073
OD_AXIS: 102
OS_CYLINDER: +0.75

## 2024-02-09 ASSESSMENT — VISUAL ACUITY
OD_CC: 20/20
METHOD: SNELLEN - LINEAR
OS_CC: 20/25
CORRECTION_TYPE: GLASSES
OS_CC+: -1

## 2024-02-09 ASSESSMENT — CONF VISUAL FIELD
OD_SUPERIOR_NASAL_RESTRICTION: 0
OS_SUPERIOR_TEMPORAL_RESTRICTION: 0
OD_INFERIOR_NASAL_RESTRICTION: 0
OS_INFERIOR_NASAL_RESTRICTION: 0
OS_SUPERIOR_NASAL_RESTRICTION: 0
OS_NORMAL: 1
OS_INFERIOR_TEMPORAL_RESTRICTION: 0
OD_INFERIOR_TEMPORAL_RESTRICTION: 0
OD_NORMAL: 1
OD_SUPERIOR_TEMPORAL_RESTRICTION: 0

## 2024-02-09 ASSESSMENT — EXTERNAL EXAM - LEFT EYE: OS_EXAM: NORMAL

## 2024-02-09 ASSESSMENT — TONOMETRY
OS_IOP_MMHG: 16
IOP_METHOD: ICARE
OD_IOP_MMHG: 14

## 2024-02-09 ASSESSMENT — EXTERNAL EXAM - RIGHT EYE: OD_EXAM: NORMAL

## 2024-02-09 NOTE — PROGRESS NOTES
HPI       Eye Pain Both Eyes      In both eyes.  Characterized as aching, foreign body sensation, burning, pressure, sharp pain, throbbing and pain with eye movement.  Pain was noted as 4/10.  Occurring constantly.  It is worse throughout the day.  Duration of 2 weeks.  Since onset it is gradually improving.  Associated symptoms include foreign body sensation, itching and photophobia.  Negative for discharge, redness and tearing.  Treatments tried include artificial tears and warm compresses. Additional comments: Pain range 1-10 x 2 weeks  Systane 6x day BE  Was on lotrprendnol for 5 day last taken 2024  Sara Crystal COA 8:15 AM 2024             Last edited by Sara Crystal on 2024  8:15 AM.          Review of systems for the eyes was negative other than the pertinent positives/negatives listed in the HPI.    Ocular Meds: Systane PF 6-10x a day, Loteprednol (completed 5 day course), Refresh Omega-3 drops prn     Ocular Hx: dry eyes OU; refractive error OU    FOHx: No known family history of glaucoma or blindness    PMHx:   Past Medical History:   Diagnosis Date    CARDIOVASCULAR SCREENING; LDL GOAL LESS THAN 160     Closed fracture of unspecified part of radius with ulna(813.83)     Other diseases of trachea and bronchus, not elsewhere classified     tracheomalacia as a           Assessment & Plan      Antonia Marc is a 28 year old female with the following diagnoses:    Dry Eyes OU  - mostly fbs, occasional itching and photophobia  - Sjogren's work up (anti-Ro/SSA and anti-La/SSB) negative; denies dry mouth or dental caries, or other systemic concerns except had episode of rhinitis/sinusitis and subsequently felt that dry eye developed; also taking prescription medications for anxiety/depression that can contribute to dry eyes  - ocular surface appears good except does have rapid tear break up time with decreased tear meniscus  - has trialed steroid eye drops, frequently lubricates  eyes, and was Rx'ed restasis (has not started)  - also was using Tyrvaya as a trial, tolerated well, but insurance will not cover and too expensive for patient  - continue PFATs 4-6x/day OU  - start artificial tear gel or ointment at bedtime OU  - can use anti-allergy medication such as pataday, patanol, zaditor, or lastacaft; sample of lastacaft provided to patient  - advised to start restasis BID OU  - r/b/a collagen punctal plugs reviewed with patient, patient expressed understanding and would like to proceed; RLL/LLL punctal plugs placed without incident, see procedure note  - will recheck in 4-6 weeks, sooner changes    Myopia of both eyes with astigmatism  - happy with current Rx    Counseled return/RD precautions  Letter to referring provider    Patient disposition:   Return for Follow Up, 4-6 weeks, VT, or sooner changes.    Jackelyn Quezada MD  Resident Physician  [facility]    Attending Physician Attestation:  Complete documentation of historical and exam elements from today's encounter can be found in the full encounter summary report (not reduplicated in this progress note).  I personally obtained the chief complaint(s) and history of present illness.  I confirmed and edited as necessary the review of systems, past medical/surgical history, family history, social history, and examination findings as documented by others; and I examined the patient myself.  I personally reviewed the relevant tests, images, and reports as documented above.  I formulated and edited as necessary the assessment and plan and discussed the findings and management plan with the patient and family. . - Shannon Perez MD

## 2024-02-09 NOTE — NURSING NOTE
Chief Complaints and History of Present Illnesses   Patient presents with    Eye Pain Both Eyes     Pain range 1-10 x 2 weeks  Systane 6x day BE  Was on lotrprendnol for 5 day last taken 2/2/2024  Sara APONTE 8:15 AM February 9, 2024        Chief Complaint(s) and History of Present Illness(es)       Eye Pain Both Eyes              Laterality: In both eyes    Quality: aching, foreign body sensation, burning, pressure, sharp pain, throbbing and pain with eye movement    Pain scale: 4/10    Frequency: constantly    Timing: throughout the day    Duration: 2 weeks    Course: gradually improving    Associated symptoms: foreign body sensation, itching and photophobia.  Negative for discharge, redness and tearing    Treatments tried: artificial tears and warm compresses    Comments: Pain range 1-10 x 2 weeks  Systane 6x day BE  Was on lotrprendnol for 5 day last taken 2/2/2024  Sara APONTE 8:15 AM February 9, 2024

## 2024-02-11 ENCOUNTER — MYC MEDICAL ADVICE (OUTPATIENT)
Dept: OPHTHALMOLOGY | Facility: CLINIC | Age: 29
End: 2024-02-11
Payer: COMMERCIAL

## 2024-02-11 NOTE — LETTER
February 13, 2024      Re: Antonia Marc   1995    To Whom It May Concern:    This is to confirm that the above patient was seen on 2/09/2024.  Antonia Marc is able to return to work in one week.    Thank you for your cooperation in this matter.  Please do not hesitate to contact me if you have any further questions.    Sincerely,      Shannon Perez MD

## 2024-02-12 ENCOUNTER — MYC MEDICAL ADVICE (OUTPATIENT)
Dept: OPHTHALMOLOGY | Facility: CLINIC | Age: 29
End: 2024-02-12

## 2024-02-12 ENCOUNTER — VIRTUAL VISIT (OUTPATIENT)
Dept: FAMILY MEDICINE | Facility: CLINIC | Age: 29
End: 2024-02-12
Payer: COMMERCIAL

## 2024-02-12 DIAGNOSIS — H57.13 PAIN OF BOTH EYES: ICD-10-CM

## 2024-02-12 DIAGNOSIS — R11.0 NAUSEA: ICD-10-CM

## 2024-02-12 DIAGNOSIS — K29.00 ACUTE GASTRITIS WITHOUT HEMORRHAGE, UNSPECIFIED GASTRITIS TYPE: ICD-10-CM

## 2024-02-12 DIAGNOSIS — F41.1 GAD (GENERALIZED ANXIETY DISORDER): Primary | ICD-10-CM

## 2024-02-12 PROCEDURE — 99213 OFFICE O/P EST LOW 20 MIN: CPT | Mod: 95 | Performed by: FAMILY MEDICINE

## 2024-02-12 PROCEDURE — 96127 BRIEF EMOTIONAL/BEHAV ASSMT: CPT | Mod: 95 | Performed by: FAMILY MEDICINE

## 2024-02-12 RX ORDER — FAMOTIDINE 40 MG/1
40 TABLET, FILM COATED ORAL DAILY
Qty: 30 TABLET | Refills: 0 | Status: SHIPPED | OUTPATIENT
Start: 2024-02-12 | End: 2024-02-14

## 2024-02-12 NOTE — PROGRESS NOTES
Telehealth Visit      Provider discussed the limitations of the telehealth visit and patient would like to proceed with the encounter.  Both patient and provider agree that a telehealth visit would be appropriate to address patient's concerns today.    Location of patient: [place], [place]  Location of provider: [place], [place]    Time at start of telehealth visit: 5:36 PM  Time at start of telehealth visit: 5:51 PM  Time spent in direct cares for telehealth visit: 15 minutes        Assessment/Plan:     Patient Instructions:    -Check in with the eye doctor about a reasonable medication to treat the eye pain.   -Eat some food with the ibuprofen. Try to limit use of the ibuprofen.   -Continue to use the tylenol.   -Bivalent stomach upset you are noticing could be related to the cocaine usage, though can also be a side effect of starting the sertraline.  If this is a side effect of the sertraline medication, symptoms were improving over the next 2-3 months.  If symptoms are due to taking ibuprofen, eating before taking ibuprofen can be helpful.  -Dr. Glez did send for a medication called famotidine to the pharmacy.  This can help reduce stomach acid/upset symptoms.  -Continue taking the sertraline and the buspirone as prescribed.  These are less likely to be causing the dry symptoms you are noticing.  If you would like to discontinue the medications, please connect with your primary care doctor first.      Please seek immediate medical attention (go to the emergency room or urgent care) for the following reasons: worsening symptoms, bloody/black tarry stools, vomiting, fevers, chills, severe headaches, or any concerning changes.      Antonia was seen today for gastrointestinal problem, eye problem and anxiety.  Diagnoses and all orders for this visit:    JAZZY (generalized anxiety disorder)  Pain of both eyes  Acute gastritis without hemorrhage, unspecified gastritis type  Nausea: See recommendations above.  Initiate  famotidine as below.  No red flag signs noted.  -     famotidine (PEPCID) 40 MG tablet; Take 1 tablet (40 mg) by mouth daily        Return if symptoms worsen or fail to improve.    The diagnoses, treatment options, risk, benefits, and recommendations were reviewed with patient/guardian.  Questions were answered to patient's/guardian satisfaction.  Red flag signs were reviewed.  Patient/guardian is in agreement with above plan.      Subjective: 28 year old female with history of JAZZY, depression who presents to clinic for the following complaints:   Patient presents with:    Gastrointestinal Problem: Patient thinks she has gastritis.  She has something similar in 2018.  She ended up getting an endoscopy completed at that time.  Recently, she thinks she started having gastritis symptoms because she has been taking ibuprofen for weeks now.  She reports that she has been taking ibuprofen due to the discomforts that she has noticed. She thinks the symptoms are better. She has been taking peptobismol and that has helped. Denies any fevers, chills, diarrhea, bloody/black tarry stools, or other changes from baseline.    Eye Problem: Eye pain noted for weeks now.  She had a procedure completed on Friday where punctal plugs were placed in the tear ducts.  She has had increased discomfort since then and has been taking Tylenol and ibuprofen on a regular basis.  Ibuprofen seems to be the only one that helps.  Sometimes she will use when she takes ibuprofen medication.  She gets some headaches coming and going. Has sensitivity to lights. Denies any vision changes, severe headaches, vomiting, weakness, halos around lights, falls/injuries, foreign body sensation, or other changes from baseline.    Discussed anticipated course of healing from surgery.  Patient will reach out to the eye doctors for pain management as she is still in the postop timeframe.    Anxiety: just started on zoloft last week and the buspar for about two  months. She is having some stomach upset. Discussed risks/benefits of medications.  Discussed that it would be unlikely for the medications to cause dry eyes.          1/18/2024     2:32 PM 2/4/2024     6:50 PM 2/5/2024     2:19 PM   JAZZY-7 SCORE   Total Score 12 (moderate anxiety) 16 (severe anxiety) 14 (moderate anxiety)   Total Score 12 16 14           8/8/2022     9:52 AM 11/17/2023     3:12 PM 1/18/2024     2:31 PM   PHQ   PHQ-9 Total Score 0 0 5   Q9: Thoughts of better off dead/self-harm past 2 weeks Not at all Not at all Not at all       The 10 point review of system is negative except as stated in the HPI.    Allergies were reviewed and updated.    Objective:   LMP 01/31/2024 (Approximate)   General: Active, alert, nontoxic-appearing.  No acute distress.  HEENT: Normocephalic, atraumatic.  Pupils are equal and round.  Sclera is clear.  EOM intact.  Normal external ears. Nares patent.  Moist mucous membranes.    Cardiac: Normal skin tone.  Respiratory/chest: Speaking in full sentences.  Breathing is not labored.  No accessory muscle usage.  Extremities: Voluntary movements intact.  Integumentary: No concerning rash or skin changes appreciated.        Tutu Glez MD  [facility] [place] [address]  Phone: [phone]  Fax: [phone]    2/12/2024  6:02 PM            Current Outpatient Medications   Medication    acetaminophen (TYLENOL) 325 MG tablet    busPIRone (BUSPAR) 10 MG tablet    sertraline (ZOLOFT) 50 MG tablet    famotidine (PEPCID) 40 MG tablet    neomycin-polymixin-dexAMETHasone (MAXITROL) 0.1 % ophthalmic suspension     No current facility-administered medications for this visit.       Allergies   Allergen Reactions    Amoxicillin Hives    Azithromycin Palpitations    Oxycodone Nausea and Vomiting    Celexa [Citalopram] Nausea       Patient Active Problem List    Diagnosis Date Noted    CARDIOVASCULAR SCREENING; LDL GOAL LESS THAN 160 01/18/2024     Priority:  Medium    Recurrent tonsillitis 02/01/2018     Priority: Medium    Recurrent major depressive disorder, in remission (H24) 05/30/2017     Priority: Medium    Anxiety 05/30/2017     Priority: Medium    JAZZY (generalized anxiety disorder) 05/30/2017     Priority: Medium    Mild episode of recurrent major depressive disorder (H24) 05/30/2017     Priority: Medium    Irregular menstrual bleeding 04/02/2012     Priority: Medium    Flat feet 09/02/2011     Priority: Medium       Family History   Problem Relation Age of Onset    Hypertension Mother     Diabetes Mother     Hypertension Father     Crohn's Disease Father     Diabetes Father     Hypertension Maternal Grandmother     Anxiety Disorder Maternal Grandmother     Hypertension Maternal Grandfather     Anxiety Disorder Maternal Grandfather     Atrial fibrillation Maternal Grandfather        Past Surgical History:   Procedure Laterality Date    ESOPHAGOSCOPY, GASTROSCOPY, DUODENOSCOPY (EGD), COMBINED N/A 10/19/2018    Procedure: COMBINED ESOPHAGOSCOPY, GASTROSCOPY, DUODENOSCOPY (EGD), BIOPSY SINGLE OR MULTIPLE;  Surgeon: Heraclio Ziegler DO;  Location: [facility]    TONSILLECTOMY ADULT Bilateral 7/23/2018    Procedure: TONSILLECTOMY ADULT;  Bilateral Tonsillectomy;  Surgeon: Galilea Good MD;  Location: [facility]        Social History     Socioeconomic History    Marital status: Single     Spouse name: SO    Number of children: 0    Years of education: 12    Highest education level: Not on file   Occupational History    Not on file   Tobacco Use    Smoking status: Never     Passive exposure: Never    Smokeless tobacco: Never    Tobacco comments:     no exporure   Vaping Use    Vaping Use: Never used   Substance and Sexual Activity    Alcohol use: No    Drug use: No    Sexual activity: Yes     Partners: Male     Birth control/protection: None   Other Topics Concern    Parent/sibling w/ CABG, MI or angioplasty before 65F 55M? Not Asked   Social History Narrative    Not on  file     Social Determinants of Health     Financial Resource Strain: Low Risk  (1/18/2024)    Financial Resource Strain     Within the past 12 months, have you or your family members you live with been unable to get utilities (heat, electricity) when it was really needed?: No   Food Insecurity: Low Risk  (1/18/2024)    Food Insecurity     Within the past 12 months, did you worry that your food would run out before you got money to buy more?: No     Within the past 12 months, did the food you bought just not last and you didn t have money to get more?: No   Transportation Needs: Low Risk  (1/18/2024)    Transportation Needs     Within the past 12 months, has lack of transportation kept you from medical appointments, getting your medicines, non-medical meetings or appointments, work, or from getting things that you need?: No   Physical Activity: Not on file   Stress: Not on file   Social Connections: Not on file   Interpersonal Safety: Low Risk  (2/5/2024)    Interpersonal Safety     Do you feel physically and emotionally safe where you currently live?: Yes     Within the past 12 months, have you been hit, slapped, kicked or otherwise physically hurt by someone?: No     Within the past 12 months, have you been humiliated or emotionally abused in other ways by your partner or ex-partner?: No   Housing Stability: Low Risk  (1/18/2024)    Housing Stability     Do you have housing? : Yes     Are you worried about losing your housing?: No

## 2024-02-12 NOTE — LETTER
February 13, 2024      Re: Antonia Marc   1995    To Whom It May Concern:    This is to confirm that the above patient was seen on 2/12/2024.  Antonia Marc is {ABLE/UNABLE:1234} to return to {SCHOOL - WORK:620828} {WHEN:[id number]}.    Thank you for your cooperation in this matter.  Please do not hesitate to contact me if you have any further questions.    Sincerely,      SHRUTHI GROSS

## 2024-02-12 NOTE — LETTER
February 12, 2024      Antonia WINSTON Monico  [address] [place] [address]        To Whom It May Concern:    Antonia Marc  was seen on 02/12/24.  Please excuse her from any missed work.        Sincerely,          Tutu Glez MD  [facility] [place] [address]  Phone: [phone]  Fax: [phone]    2/12/2024  5:53 PM

## 2024-02-13 NOTE — PATIENT INSTRUCTIONS
-Thank you for choosing the [facility].  -It was a pleasure to see you today.  -Please take a look at the information below for more specific details regarding the treatment plan and recommendations.  -In this after visit summary is a list of your medications and specific instructions.  Please review this carefully as there may be changes made to your medication list.  -If there are any particular questions or concerns, please feel free to reach out to Dr. Glez.  -If any labs have been completed, we will reach out to you about results.  If the results are normal or not concerning, a letter or MyChart message will be sent to you.  If any follow-up is needed, either Dr. Glez or the nurse will give you a call.  If you have not heard regarding results after 2 weeks, please reach out to the clinic.    Patient Instructions:    -Check in with the eye doctor about a reasonable medication to treat the eye pain.   -Eat some food with the ibuprofen. Try to limit use of the ibuprofen.   -Continue to use the tylenol.   -Bivalent stomach upset you are noticing could be related to the cocaine usage, though can also be a side effect of starting the sertraline.  If this is a side effect of the sertraline medication, symptoms were improving over the next 2-3 months.  If symptoms are due to taking ibuprofen, eating before taking ibuprofen can be helpful.  -Dr. Glez did send for a medication called famotidine to the pharmacy.  This can help reduce stomach acid/upset symptoms.  -Continue taking the sertraline and the buspirone as prescribed.  These are less likely to be causing the dry symptoms you are noticing.  If you would like to discontinue the medications, please connect with your primary care doctor first.      Please seek immediate medical attention (go to the emergency room or urgent care) for the following reasons: worsening symptoms, bloody/black tarry stools, vomiting, fevers, chills, severe headaches,  or any concerning changes.      --------------------------------------------------------------------------------------------------------------------    -We are always looking for ways to improve.  You may be selected to receive a survey regarding your visit today.  We encourage you to complete the survey and provide specific, constructive feedback to help us improve our processes.  Thank you for your time!  -Please review the contact information listed on the after visit summary and in the electronic chart.  Below is the phone number that we have on file.  If there are any changes that are needed to be made, please reach out to the clinic.  [phone] (home)

## 2024-02-14 ENCOUNTER — NURSE TRIAGE (OUTPATIENT)
Dept: NURSING | Facility: CLINIC | Age: 29
End: 2024-02-14

## 2024-02-14 ENCOUNTER — OFFICE VISIT (OUTPATIENT)
Dept: FAMILY MEDICINE | Facility: CLINIC | Age: 29
End: 2024-02-14
Attending: FAMILY MEDICINE
Payer: COMMERCIAL

## 2024-02-14 VITALS
WEIGHT: 245 LBS | HEART RATE: 68 BPM | HEIGHT: 68 IN | TEMPERATURE: 98.8 F | BODY MASS INDEX: 37.13 KG/M2 | DIASTOLIC BLOOD PRESSURE: 84 MMHG | RESPIRATION RATE: 18 BRPM | OXYGEN SATURATION: 99 % | SYSTOLIC BLOOD PRESSURE: 136 MMHG

## 2024-02-14 DIAGNOSIS — H04.123 DRY EYES: ICD-10-CM

## 2024-02-14 DIAGNOSIS — F33.1 MODERATE EPISODE OF RECURRENT MAJOR DEPRESSIVE DISORDER (H): Primary | ICD-10-CM

## 2024-02-14 DIAGNOSIS — F41.1 GAD (GENERALIZED ANXIETY DISORDER): ICD-10-CM

## 2024-02-14 PROCEDURE — 99214 OFFICE O/P EST MOD 30 MIN: CPT | Mod: 24 | Performed by: NURSE PRACTITIONER

## 2024-02-14 PROCEDURE — 96127 BRIEF EMOTIONAL/BEHAV ASSMT: CPT | Performed by: NURSE PRACTITIONER

## 2024-02-14 RX ORDER — SERTRALINE HYDROCHLORIDE 25 MG/1
25 TABLET, FILM COATED ORAL DAILY
Qty: 90 TABLET | Refills: 0 | Status: SHIPPED | OUTPATIENT
Start: 2024-02-14 | End: 2024-03-06

## 2024-02-14 ASSESSMENT — PATIENT HEALTH QUESTIONNAIRE - PHQ9
SUM OF ALL RESPONSES TO PHQ QUESTIONS 1-9: 10
10. IF YOU CHECKED OFF ANY PROBLEMS, HOW DIFFICULT HAVE THESE PROBLEMS MADE IT FOR YOU TO DO YOUR WORK, TAKE CARE OF THINGS AT HOME, OR GET ALONG WITH OTHER PEOPLE: EXTREMELY DIFFICULT
SUM OF ALL RESPONSES TO PHQ QUESTIONS 1-9: 10

## 2024-02-14 ASSESSMENT — PAIN SCALES - GENERAL: PAINLEVEL: EXTREME PAIN (8)

## 2024-02-14 NOTE — TELEPHONE ENCOUNTER
Reviewed with Dr. Perez and able to see this Friday    Spoke to pt at 1515 and scheduled Friday AM    Reviewed may use lubricating eye ointment at night instead of gel drops    Pt seemed comfortable with scheduling/plan    Tavo Swanson RN 3:31 PM 02/14/24

## 2024-02-14 NOTE — TELEPHONE ENCOUNTER
Called pt twice this afternoon and spoke to pt at 2116    Pt states right eye doing ok with punctal plug    left eye punctal plug does not seem right-- pt states feels something in corner of eye when blinking.    Pain comes and goes--noticed more with blinking    No redness.    Vision blurring intermittently    Pt using preservative free artificial tears about 5 times/day and gel drop at night    Will review information/plan with Dr. Perez and call back.    Tavo Swanson RN 3:02 PM 02/14/24

## 2024-02-14 NOTE — PROGRESS NOTES
Assessment & Plan     Moderate episode of recurrent major depressive disorder (H)  -continue Zoloft, patient tolerating 25 mg daily well, she has been taking it only for about a week  -follow up with me in 3 weeks   - PRIMARY CARE FOLLOW-UP SCHEDULING  - Adult Mental Health  Referral; Future  - sertraline (ZOLOFT) 25 MG tablet; Take 1 tablet (25 mg) by mouth daily    JAZZY (generalized anxiety disorder)  -uncontrolled  -no improvement with Buspar, recommended to stop   - Adult Mental Health  Referral; Future  - continue sertraline (ZOLOFT) 25 MG tablet; Take 1 tablet (25 mg) by mouth daily  -follow up in 3 weeks    Dry eyes  -following ophthalmologist, current off work   -advised patient that she can try to return to work next week, if needed I can write her work restrictions to work part time, or reduced hrs for up to 4-6 hrs per day with frequent breaks for about 4 weeks       Aquilino Knight is a 28 year old, presenting for the following health issues:  Anxiety (Feels like anxiety has gotten worse due to health concerns with eyes. ) and Health Maintenance (Denied vaccines today. )        2/14/2024     2:10 PM   Additional Questions   Roomed by Bernice HANDLEY   Accompanied by self         2/14/2024     2:10 PM   Patient Reported Additional Medications   Patient reports taking the following new medications no new meds       Depression and Anxiety Follow-Up  How are you doing with your depression since your last visit? Worsened a lot   How are you doing with your anxiety since your last visit?  Worsened a lot   Are you having other symptoms that might be associated with depression or anxiety? Yes:  crying a lot, panic attacks daily, heart palpations, hard time sleeping due to mind racing, irritable from no sleep  , and getting headaches daily   Have you had a significant life event? Health Concerns and OTHER: having a lot of eye problems the past month, being seen at the      Do you have any  "concerns with your use of alcohol or other drugs? No    Social History     Tobacco Use    Smoking status: Never     Passive exposure: Never    Smokeless tobacco: Never    Tobacco comments:     no exporure   Vaping Use    Vaping Use: Never used   Substance Use Topics    Alcohol use: No    Drug use: No         11/17/2023     3:12 PM 1/18/2024     2:31 PM 2/14/2024     2:01 PM   PHQ   PHQ-9 Total Score 0 5 10   Q9: Thoughts of better off dead/self-harm past 2 weeks Not at all Not at all Not at all         1/18/2024     2:32 PM 2/4/2024     6:50 PM 2/5/2024     2:19 PM   JAZZY-7 SCORE   Total Score 12 (moderate anxiety) 16 (severe anxiety) 14 (moderate anxiety)   Total Score 12 16 14     6}        Review of Systems  Constitutional, HEENT, cardiovascular, pulmonary, gi and gu systems are negative, except as otherwise noted.      Objective    /84   Pulse 68   Temp 98.8  F (37.1  C) (Tympanic)   Resp 18   Ht 1.727 m (5' 8\")   Wt 111.1 kg (245 lb)   LMP 02/12/2024 (Approximate)   SpO2 99%   BMI 37.25 kg/m    Body mass index is 37.25 kg/m .  Physical Exam   GENERAL: alert and no distress  EYES: Eyes grossly normal to inspection, PERRL and conjunctivae and sclerae normal  RESP: lungs clear to auscultation - no rales, rhonchi or wheezes  CV: regular rate and rhythm, normal S1 S2, no S3 or S4, no murmur, click or rub, no peripheral edema   NEURO: Normal strength and tone, mentation intact and speech normal  PSYCH: mentation appears normal, affect normal/bright            Signed Electronically by: ROS Hansen CNP    Answers submitted by the patient for this visit:  Patient Health Questionnaire (Submitted on 2/14/2024)  If you checked off any problems, how difficult have these problems made it for you to do your work, take care of things at home, or get along with other people?: Extremely difficult  PHQ9 TOTAL SCORE: 10    "

## 2024-02-14 NOTE — TELEPHONE ENCOUNTER
"Nurse Triage SBAR    Is this a 2nd Level Triage? YES, LICENSED PRACTITIONER REVIEW IS REQUIRED    Situation: Left eye pain with light sensitivity    Background: 2/9/24-RLL/LLL punctal plugs placed without incident, see procedure note   Patient states since placement of the plugs she has had left eye pain with light sensitivity. She feels like each time she blinks her lid is grazing the plug. Has pain above the left eye and rates it 7/10 with blinking. Also has had a headache since Friday.     Assessment: left eye pain    Protocol Recommended Disposition:   Go To Office Now    Recommendation: please call patient at [phone] today with recommendations.     Routed to provider/team  [facility]    Bianca Miles RN  [facility] Red Flag Triage/MRT       Reason for Disposition   MODERATE eye pain or discomfort (e.g., interferes with normal activities or awakens from sleep; more than mild)   Looking at light causes MODERATE to SEVERE eye pain (i.e., photophobia)    Additional Information   Negative: Followed an eye injury   Negative: Eye pain from chemical in the eye   Negative: Eye pain from foreign body in eye   Negative: Has sinus pain or pressure   Negative: SEVERE eye pain   Negative: Complete loss of vision in one or both eyes   Negative: Eyelids are very swollen (shut or almost) and fever   Negative: Eyelid (outer) is very red and fever   Negative: Foreign body sensation ('feels like something is in there') and irrigation didn't help   Negative: Vomiting   Negative: Ulcer or sore seen on the cornea (clear center part of the eye)   Negative: Recent eye surgery and increasing eye pain   Negative: Blurred vision and new or worsening   Negative: Patient sounds very sick or weak to the triager    Answer Assessment - Initial Assessment Questions  1. ONSET: \"When did the pain start?\" (e.g., minutes, hours, days)      Last Friday  2. TIMING: \"Does the pain come and go, or has it been constant since it " "started?\" (e.g., constant, intermittent, fleeting)      constant  3. SEVERITY: \"How bad is the pain?\"   (Scale 1-10; mild, moderate or severe)    - MILD (1-3): doesn't interfere with normal activities     - MODERATE (4-7): interferes with normal activities or awakens from sleep     - SEVERE (8-10): excruciating pain and patient unable to do normal activities      7/10  4. LOCATION: \"Where does it hurt?\"  (e.g., eyelid, eye, cheekbone)      Eye lid feels like it hitting something  5. CAUSE: \"What do you think is causing the pain?\"      plug  6. VISION: \"Do you have blurred vision or changes in your vision?\"       Some intermittent blurry  7. EYE DISCHARGE: \"Is there any discharge (pus) from the eye(s)?\"  If Yes, ask: \"What color is it?\"       none  8. FEVER: \"Do you have a fever?\" If Yes, ask: \"What is it, how was it measured, and when did it start?\"       no  9. OTHER SYMPTOMS: \"Do you have any other symptoms?\" (e.g., headache, nasal discharge, facial rash)      headache  10. PREGNANCY: \"Is there any chance you are pregnant?\" \"When was your last menstrual period?\"        na    Protocols used: Eye Pain and Other Symptoms-A-[place]    "

## 2024-02-16 ENCOUNTER — MYC MEDICAL ADVICE (OUTPATIENT)
Dept: FAMILY MEDICINE | Facility: CLINIC | Age: 29
End: 2024-02-16
Payer: COMMERCIAL

## 2024-02-16 NOTE — LETTER
February 16, 2024      Antonia Marc  [address] [facility] [address]  [place] [address]        To Whom It May Concern:    Antonia Marc was seen in our clinic. She may return to work on Monday Feb 19th with the following restrictions: work part time, or reduced hrs for up to 4-6 hrs per day with frequent breaks (every hours take 5-10 minutes break away from computer screen) for 4 weeks.         Sincerely,         ROS Hansen CNP

## 2024-03-06 ENCOUNTER — VIRTUAL VISIT (OUTPATIENT)
Dept: FAMILY MEDICINE | Facility: CLINIC | Age: 29
End: 2024-03-06
Payer: COMMERCIAL

## 2024-03-06 ENCOUNTER — TELEPHONE (OUTPATIENT)
Dept: OPHTHALMOLOGY | Facility: CLINIC | Age: 29
End: 2024-03-06

## 2024-03-06 DIAGNOSIS — Z86.16 PERSONAL HISTORY OF COVID-19: Primary | ICD-10-CM

## 2024-03-06 DIAGNOSIS — H69.93 DYSFUNCTION OF BOTH EUSTACHIAN TUBES: ICD-10-CM

## 2024-03-06 DIAGNOSIS — F41.1 GAD (GENERALIZED ANXIETY DISORDER): ICD-10-CM

## 2024-03-06 DIAGNOSIS — F33.1 MODERATE EPISODE OF RECURRENT MAJOR DEPRESSIVE DISORDER (H): ICD-10-CM

## 2024-03-06 PROCEDURE — 99214 OFFICE O/P EST MOD 30 MIN: CPT | Mod: 95 | Performed by: NURSE PRACTITIONER

## 2024-03-06 RX ORDER — SERTRALINE HYDROCHLORIDE 25 MG/1
25 TABLET, FILM COATED ORAL DAILY
Qty: 90 TABLET | Refills: 1 | Status: SHIPPED | OUTPATIENT
Start: 2024-03-06 | End: 2024-08-08

## 2024-03-06 ASSESSMENT — PATIENT HEALTH QUESTIONNAIRE - PHQ9
5. POOR APPETITE OR OVEREATING: MORE THAN HALF THE DAYS
SUM OF ALL RESPONSES TO PHQ QUESTIONS 1-9: 0

## 2024-03-06 ASSESSMENT — ANXIETY QUESTIONNAIRES
2. NOT BEING ABLE TO STOP OR CONTROL WORRYING: NOT AT ALL
IF YOU CHECKED OFF ANY PROBLEMS ON THIS QUESTIONNAIRE, HOW DIFFICULT HAVE THESE PROBLEMS MADE IT FOR YOU TO DO YOUR WORK, TAKE CARE OF THINGS AT HOME, OR GET ALONG WITH OTHER PEOPLE: SOMEWHAT DIFFICULT
GAD7 TOTAL SCORE: 7
3. WORRYING TOO MUCH ABOUT DIFFERENT THINGS: SEVERAL DAYS
7. FEELING AFRAID AS IF SOMETHING AWFUL MIGHT HAPPEN: NOT AT ALL
1. FEELING NERVOUS, ANXIOUS, OR ON EDGE: MORE THAN HALF THE DAYS
5. BEING SO RESTLESS THAT IT IS HARD TO SIT STILL: NOT AT ALL
6. BECOMING EASILY ANNOYED OR IRRITABLE: MORE THAN HALF THE DAYS
GAD7 TOTAL SCORE: 7

## 2024-03-06 NOTE — TELEPHONE ENCOUNTER
This pt canceled her appointment for this Friday     Do you want to add this pt to Dr. Britton schedule?     She declined next available or ok to add to acute and staff with dr. Perez     Thanks,     V

## 2024-03-06 NOTE — TELEPHONE ENCOUNTER
[facility] Call Center    Phone Message    May a detailed message be left on voicemail: yes     Reason for Call: Other: PT CALLED AND STATES THAT SHE RECENTLY WAS SEEN W/ PROVIDER AND HAS BEEN EXPERIENCING SOME FLOATERS. UNFORTUNATELY SHE HAS TO CANCEL APPT DUE TO ILLNESS AND IS REQUESTING A SOONER APPT THAN WRITER SOONEST APPT WHICH IS APRIL. PLEASE REVIEW AND CONTACT PT TO DISCUSS SCHEDULING. THANK YOU      Action Taken: Message routed to:  [facility] EYE    Travel Screening: Not Applicable

## 2024-03-06 NOTE — PROGRESS NOTES
Antonia is a 28 year old who is being evaluated via a billable video visit.      How would you like to obtain your AVS? MyChart  If the video visit is dropped, the invitation should be resent by: Text to cell phone: [phone]  Will anyone else be joining your video visit? No        Assessment & Plan     Personal history of COVID-19  -missed work at the end of December last year due to Covid for about 5 days, needs to submit FMLA papers to cover for missed work due to illness  -also missed work when she had dry eyes, she saw her ophthalmologist and missed work for few days due to sinus infection and eustachian tube dysfunction. I asked patient to submit FMLAS forms through my chart with days when she was unable to work due to illnesses     Moderate episode of recurrent major depressive disorder (H)  -improved, no side effects from Zoloft, recommended to continue   - sertraline (ZOLOFT) 25 MG tablet; Take 1 tablet (25 mg) by mouth daily    JAZZY (generalized anxiety disorder)  -improved   - continue sertraline (ZOLOFT) 25 MG tablet; Take 1 tablet (25 mg) by mouth daily    Dysfunction of both eustachian tubes  -feeling better now, but got viral URI again, has been having sinus congestion, I recommended asymptomatic treatment with Flonase, Mucinex as needed and Tylenol as needed          Subjective   Antonia is a 28 year old, presenting for the following health issues:    Chief Complaint   Patient presents with    Anxiety    Forms     FMLA FORMS        Anxiety      3/6/2024     9:59 AM   Additional Questions   Roomed by antonia   Accompanied by self         3/6/2024     9:59 AM   Patient Reported Additional Medications   Patient reports taking the following new medications none     HPI       Depression and Anxiety Follow-Up  How are you doing with your depression since your last visit? Improved   How are you doing with your anxiety since your last visit?  Improved   Are you having other symptoms that might be  associated with depression or anxiety? No  Have you had a significant life event? No   Do you have any concerns with your use of alcohol or other drugs? No    Social History     Tobacco Use    Smoking status: Never     Passive exposure: Never    Smokeless tobacco: Never    Tobacco comments:     no exporure   Vaping Use    Vaping Use: Never used   Substance Use Topics    Alcohol use: No    Drug use: No         11/17/2023     3:12 PM 1/18/2024     2:31 PM 2/14/2024     2:01 PM   PHQ   PHQ-9 Total Score 0 5 10   Q9: Thoughts of better off dead/self-harm past 2 weeks Not at all Not at all Not at all         1/18/2024     2:32 PM 2/4/2024     6:50 PM 2/5/2024     2:19 PM   JAZZY-7 SCORE   Total Score 12 (moderate anxiety) 16 (severe anxiety) 14 (moderate anxiety)   Total Score 12 16 14         3/6/2024     4:04 PM   Last PHQ-9   1.  Little interest or pleasure in doing things 0   2.  Feeling down, depressed, or hopeless 0   3.  Trouble falling or staying asleep, or sleeping too much 0   4.  Feeling tired or having little energy 0   5.  Poor appetite or overeating 0   6.  Feeling bad about yourself 0   7.  Trouble concentrating 0   8.  Moving slowly or restless 0   Q9: Thoughts of better off dead/self-harm past 2 weeks 0   PHQ-9 Total Score 0   Difficulty at work, home, or with people Not difficult at all         3/6/2024     4:04 PM   JAZZY-7    1. Feeling nervous, anxious, or on edge 2   2. Not being able to stop or control worrying 0   3. Worrying too much about different things 1   4. Trouble relaxing 2   5. Being so restless that it is hard to sit still 0   6. Becoming easily annoyed or irritable 2   7. Feeling afraid, as if something awful might happen 0   JAZZY-7 Total Score 7   If you checked any problems, how difficult have they made it for you to do your work, take care of things at home, or get along with other people? Somewhat difficult           Review of Systems  Constitutional, HEENT, cardiovascular, pulmonary,  gi and gu systems are negative, except as otherwise noted.      Objective           Vitals:  No vitals were obtained today due to virtual visit.    Physical Exam   GENERAL: alert and no distress  EYES: Eyes grossly normal to inspection.  No discharge or erythema, or obvious scleral/conjunctival abnormalities.  RESP: No audible wheeze, cough, or visible cyanosis.    SKIN: Visible skin clear. No significant rash, abnormal pigmentation or lesions.  NEURO: Cranial nerves grossly intact.  Mentation and speech appropriate for age.  PSYCH: Appropriate affect, tone, and pace of words          Video-Visit Details    Type of service:  Video Visit     Originating Location (pt. Location): Home    Distant Location (provider location):  On-site  Platform used for Video Visit: Georgie  Signed Electronically by: ROS Hansen CNP

## 2024-03-07 ENCOUNTER — MYC MEDICAL ADVICE (OUTPATIENT)
Dept: FAMILY MEDICINE | Facility: CLINIC | Age: 29
End: 2024-03-07
Payer: COMMERCIAL

## 2024-03-07 NOTE — TELEPHONE ENCOUNTER
Called and spoke to Antonia     Answered questions she had    Made an appointment in acute - ok per Dr. Perez for 3/12 @ 930 am in acute for mack Brantley Communication Facilitator on 3/7/2024 at 10:15 AM

## 2024-03-08 NOTE — TELEPHONE ENCOUNTER
"Pt's mother, Reba, calls (C2C on file). Says that pt was seen in UC yesterday for jaw pain; new orders given for voltaren and robaxin, and was also advised to use warm compresses. Reba says that pt has used all of these, and symptoms are \"ten times worse today\", says that she's having much more pain today. Reba says that she was able to open her mouth about 50% yesterday; today is less than 25%. Is unable to eat, drink, or talk. Reba is seeking recommendation from PCP; does pt need to be seen again in ED/UC, should she continue trying pain meds at home, or clinic f/u? Mother also asking if it's ok for her to drink through a straw? Can call Reba back at [phone].    Emilie Vasquez RN  [facility]  " will follow up with primary MD

## 2024-03-12 ENCOUNTER — OFFICE VISIT (OUTPATIENT)
Dept: OPHTHALMOLOGY | Facility: CLINIC | Age: 29
End: 2024-03-12
Attending: OPHTHALMOLOGY
Payer: COMMERCIAL

## 2024-03-12 DIAGNOSIS — H53.10 SUBJECTIVE VISION DISTURBANCE, BILATERAL: Primary | ICD-10-CM

## 2024-03-12 DIAGNOSIS — H04.123 DRY EYES, BILATERAL: ICD-10-CM

## 2024-03-12 PROCEDURE — 99211 OFF/OP EST MAY X REQ PHY/QHP: CPT

## 2024-03-12 PROCEDURE — 92014 COMPRE OPH EXAM EST PT 1/>: CPT | Mod: GC | Performed by: OPHTHALMOLOGY

## 2024-03-12 ASSESSMENT — CONF VISUAL FIELD
OD_INFERIOR_NASAL_RESTRICTION: 0
OS_SUPERIOR_NASAL_RESTRICTION: 0
OS_SUPERIOR_TEMPORAL_RESTRICTION: 0
METHOD: COUNTING FINGERS
OD_NORMAL: 1
OD_INFERIOR_TEMPORAL_RESTRICTION: 0
OD_SUPERIOR_NASAL_RESTRICTION: 0
OS_NORMAL: 1
OD_SUPERIOR_TEMPORAL_RESTRICTION: 0
OS_INFERIOR_TEMPORAL_RESTRICTION: 0
OS_INFERIOR_NASAL_RESTRICTION: 0

## 2024-03-12 ASSESSMENT — TONOMETRY
OS_IOP_MMHG: 12
IOP_METHOD: ICARE
OD_IOP_MMHG: 13

## 2024-03-12 ASSESSMENT — EXTERNAL EXAM - RIGHT EYE: OD_EXAM: NORMAL

## 2024-03-12 ASSESSMENT — VISUAL ACUITY
METHOD: SNELLEN - LINEAR
OD_CC+: -2
OS_CC: 20/20
OD_CC: 20/20

## 2024-03-12 ASSESSMENT — EXTERNAL EXAM - LEFT EYE: OS_EXAM: NORMAL

## 2024-03-12 NOTE — NURSING NOTE
Chief Complaints and History of Present Illnesses   Patient presents with    Floaters     Chief Complaint(s) and History of Present Illness(es)       Floaters              Laterality: both eyes    Quality: small and large    Duration: 2 weeks    Course: gradually worsening    Associated symptoms: Negative for flashes              Comments    Here for floaters x2 weeks. Varies in size and increasing in numbers. No flashes. VA doing fine.    Meng ARELLANO 9:39 AM March 12, 2024

## 2024-03-12 NOTE — PROGRESS NOTES
OPHTHALMOLOGY ACUTE CLINIC NOTE  24      Subjective:    HPI:     HPI       Floaters    In both eyes.  Characterized as small and large.  Duration of 2 weeks.  Since onset it is gradually worsening.  Associated symptoms include Negative for flashes.             Comments    Here for floaters x2 weeks. Varies in size and increasing in numbers. No flashes. VA doing fine.    Meng Alcantara COT 9:39 AM 2024             Last edited by Meng Alcantara on 3/12/2024  9:39 AM.        Describes that she's seeing floaters intermittently in both eyes.  Denies flashes, change in vision, double vision, curtain-like defects, and pain with eye movements.     Past Ocular history:   - Medical history: Dry eye syndrome  - Surgical history: None  - Glasses: Yes  - Contact Lens: No  - Current Eye Medications: Omega 3 artificial tears    PMH:   Past Medical History:   Diagnosis Date    CARDIOVASCULAR SCREENING; LDL GOAL LESS THAN 160     Closed fracture of unspecified part of radius with ulna(813.83)     Other diseases of trachea and bronchus, not elsewhere classified     tracheomalacia as a       FH:  glaucoma or AMD.     Review of systems for the eyes was negative other than the pertinent positives/negatives listed in the HPI.      Objective:    Imaging: None    Assessment & Plan      Antonia Marc is a 28 year old female who presents for    Dry Eye Syndrome, both eyes  Ongoing irritation since COVID-19 infection and subsequent sinus infection.   Coincident with the patient's uncontrolled anxiety.   Notices symptoms even after trying artificial tears, sporadic warm compresses, and punctal plugs.  Symptoms disproportionate to exam findings, with decreased tear meniscus and relatively rapid TBUT, but minimal PEE in either eye, no injection, no conj chalasis, no chemosis.   - Discussed management with quadruple therapy: warm compresses TID, artificial tears four times a day scheduled, artificial tears ointment at night,  Omega-3 fatty acid supplementation  - Discussed the importance of consistency with the above regimen  - Discussed that uncontrolled JAZZY will cause these symptoms to worsen--even if medical management is treating the underlying problem--and that continuing to pursue therapy such as CBT will be important to achieve full resolution of symptoms in addition to the above    Floaters, both eyes  No alarm symptoms or worrisome exam findings.   Vision 20/20 OU and visual fields full.  Mild syneretic change noted in anterior vitreous. No Adame ring.  Lisette negative, both retinas flat and attached with no holes/tears/detachments.  - Provided reassurance that floaters are a normal part of age-related change within the eye  - Discussed that the prominence of floaters in this patient's current presentation is likely part of an anxiety syndrome  - Continue to monitor    Patient disposition:   Return in 6 months (on 9/12/2024) for Dr. Ramos for continued cares.    Patient seen with Dr. Gabriella Townsend MD  PGY-2, Ophthalmology  [facility]  03/12/24 9:54 AM    Attending Physician Attestation:  Complete documentation of historical and exam elements from today's encounter can be found in the full encounter summary report (not reduplicated in this progress note).  I personally obtained the chief complaint(s) and history of present illness.  I confirmed and edited as necessary the review of systems, past medical/surgical history, family history, social history, and examination findings as documented by others; and I examined the patient myself.  I personally reviewed the relevant tests, images, and reports as documented above.  I formulated and edited as necessary the assessment and plan and discussed the findings and management plan with the patient and family. . - Sanjay Quiroz MD

## 2024-03-12 NOTE — LETTER
March 12, 2024      Antonia M Monico  [address] [place], [place] [id number]        To Whom It May Concern:    Antonia Marc was seen in our clinic. Please excuse from work 03/12/24 .      Sincerely,      Brian Townsend MD  [facility]  03/12/24

## 2024-03-12 NOTE — PATIENT INSTRUCTIONS
Outbound call to patient at [phone] verified , informed her 12 more weeks on the once a week Vitamin D , at the end of the 12 weeks Vitamin D level labs will be done. Patient verbalized understanding. Use artificial tears at least four times per day  Use omega three supplments daily  Use warm compresses 3x/day  Use artificial tears ointment nightly before bed

## 2024-03-14 NOTE — TELEPHONE ENCOUNTER
Form completed, signed, and My Chart note sent to patient asking what to do with completed form. Copy sent to scan and copy placed in cabinet.

## 2024-03-14 NOTE — TELEPHONE ENCOUNTER
Pt called and requested forms be placed at  for sister Stephanie to  3/14. Forms placed in cabinet at check in desk    Estefania Vasquez on 3/14/2024 at 11:40 AM

## 2024-03-15 ENCOUNTER — E-VISIT (OUTPATIENT)
Dept: FAMILY MEDICINE | Facility: CLINIC | Age: 29
End: 2024-03-15
Payer: COMMERCIAL

## 2024-03-15 DIAGNOSIS — J01.00 ACUTE NON-RECURRENT MAXILLARY SINUSITIS: Primary | ICD-10-CM

## 2024-03-15 DIAGNOSIS — R05.2 SUBACUTE COUGH: ICD-10-CM

## 2024-03-15 PROCEDURE — 99421 OL DIG E/M SVC 5-10 MIN: CPT | Performed by: NURSE PRACTITIONER

## 2024-03-15 RX ORDER — DOXYCYCLINE 100 MG/1
100 CAPSULE ORAL 2 TIMES DAILY
Qty: 14 CAPSULE | Refills: 0 | Status: SHIPPED | OUTPATIENT
Start: 2024-03-15 | End: 2024-03-22

## 2024-03-28 ENCOUNTER — OFFICE VISIT (OUTPATIENT)
Dept: FAMILY MEDICINE | Facility: CLINIC | Age: 29
End: 2024-03-28
Payer: COMMERCIAL

## 2024-03-28 VITALS
SYSTOLIC BLOOD PRESSURE: 132 MMHG | RESPIRATION RATE: 16 BRPM | BODY MASS INDEX: 38.28 KG/M2 | WEIGHT: 252.6 LBS | DIASTOLIC BLOOD PRESSURE: 88 MMHG | HEIGHT: 68 IN | TEMPERATURE: 98.6 F | HEART RATE: 83 BPM | OXYGEN SATURATION: 98 %

## 2024-03-28 DIAGNOSIS — J01.30 ACUTE NON-RECURRENT SPHENOIDAL SINUSITIS: Primary | ICD-10-CM

## 2024-03-28 DIAGNOSIS — G44.209 TENSION HEADACHE: ICD-10-CM

## 2024-03-28 PROCEDURE — 99214 OFFICE O/P EST MOD 30 MIN: CPT | Performed by: NURSE PRACTITIONER

## 2024-03-28 RX ORDER — KETOROLAC TROMETHAMINE 10 MG/1
10 TABLET, FILM COATED ORAL EVERY 6 HOURS PRN
Qty: 20 TABLET | Refills: 0 | Status: SHIPPED | OUTPATIENT
Start: 2024-03-28 | End: 2024-04-17

## 2024-03-28 RX ORDER — DOXYCYCLINE 100 MG/1
100 CAPSULE ORAL 2 TIMES DAILY
Qty: 14 CAPSULE | Refills: 0 | Status: SHIPPED | OUTPATIENT
Start: 2024-03-28 | End: 2024-04-04

## 2024-03-28 ASSESSMENT — PAIN SCALES - GENERAL: PAINLEVEL: EXTREME PAIN (9)

## 2024-03-28 NOTE — LETTER
March 28, 2024      Antonia Marc  [address]    [facility] [address]        To Whom It May Concern:    Antonia Marc was seen in our clinic. She may return to work on Monday April 1 st.     Sincerely,      ROS Hansen CNP

## 2024-03-28 NOTE — PATIENT INSTRUCTIONS
Start Doxycycline 100 mg twice daily for 7 days     Try Toradol 10 mg every 6-8 hrs as needed    Tylenol 500 mg 4 times daily as needed     Off work until Monday

## 2024-03-28 NOTE — PROGRESS NOTES
"  Assessment & Plan     Acute non-recurrent sphenoidal sinusitis  -complains of right side facial pain over frontal and sphenoid sinuses   -felt improvement when she was on Doxycycline about a months ago for sinus infection   - doxycycline hyclate (VIBRAMYCIN) 100 MG capsule; Take 1 capsule (100 mg) by mouth 2 times daily for 7 days    Tension headache  -having bilateral frontal and sinus headaches, possibly due to ongoing sinus infection, recommended Doxycycline for 7 days   -follow up if no improvement in 7-10 days   - ketorolac (TORADOL) 10 MG tablet; Take 1 tablet (10 mg) by mouth every 6 hours as needed for moderate pain      Subjective   Antonia is a 28 year old, presenting for the following health issues: headaches, sinus congestion, light sensitivity  Migraine        3/28/2024     8:13 AM   Additional Questions   Roomed by antonia   Accompanied by self         3/28/2024     8:13 AM   Patient Reported Additional Medications   Patient reports taking the following new medications Fish oil daily      History of Present Illness       Headaches:   Since the patient's last clinic visit, headaches are: worsened  The patient is getting headaches:  Everyday  She is not able to do normal daily activities when she has a migraine.  The patient is taking the following rescue/relief medications:  Ibuprofen (Advil, Motrin), Tylenol and Excedrin   Patient states \"I get only a small amount of relief\" from the rescue/relief medications.   The patient is taking the following medications to prevent migraines:  No medications to prevent migraines  In the past 4 weeks, the patient has gone to an Urgent Care or Emergency Room 0 times times due to headaches.    She eats 2-3 servings of fruits and vegetables daily.She consumes 1 sweetened beverage(s) daily.She exercises with enough effort to increase her heart rate 10 to 19 minutes per day.  She exercises with enough effort to increase her heart rate 4 days per week.   She is " "taking medications regularly.         Migraine   Since your last clinic visit, how have your headaches changed?  Worsened  How often are you getting headaches or migraines? daily   Are you able to do normal daily activities when you have a migraine? No  Are you taking rescue/relief medications? (Select all that apply) ibuprofen (Advil, Motrin), Tylenol, and Excedrin  How helpful is your rescue/relief medication?  I get only a small amount of relief  Are you taking any medications to prevent migraines? (Select all that apply)  No  In the past 4 weeks, how often have you gone to urgent care or the emergency room because of your headaches?  0    How many servings of fruits and vegetables do you eat daily?  2-3  On average, how many sweetened beverages do you drink each day (Examples: soda, juice, sweet tea, etc.  Do NOT count diet or artificially sweetened beverages)?   1  How many days per week do you exercise enough to make your heart beat faster? 4  How many minutes a day do you exercise enough to make your heart beat faster? 10 - 19  How many days per week do you miss taking your medication? 0        Review of Systems  Constitutional, HEENT, cardiovascular, pulmonary, gi and gu systems are negative, except as otherwise noted.      Objective    /88 (BP Location: Right arm, Patient Position: Sitting, Cuff Size: Adult Regular)   Pulse 83   Temp 98.6  F (37  C) (Tympanic)   Resp 16   Ht 1.727 m (5' 8\")   Wt 114.6 kg (252 lb 9.6 oz)   LMP 03/21/2024 (Approximate)   SpO2 98%   BMI 38.41 kg/m    Body mass index is 38.41 kg/m .  Physical Exam   GENERAL: alert and no distress  EYES: Eyes grossly normal to inspection, PERRL and conjunctivae and sclerae normal  HENT: ear canals and TM's normal, nose and mouth without ulcers or lesions  NECK: no adenopathy, no asymmetry, masses, or scars  CV: regular rate and rhythm, normal S1 S2, no S3 or S4, no murmur, click or rub, no peripheral edema   NEURO: Normal strength " and tone, mentation intact and speech normal  PSYCH: mentation appears normal, affect normal/bright            Signed Electronically by: ROS Hansen CNP

## 2024-04-17 ENCOUNTER — NURSE TRIAGE (OUTPATIENT)
Dept: NURSING | Facility: CLINIC | Age: 29
End: 2024-04-17

## 2024-04-17 ENCOUNTER — MYC MEDICAL ADVICE (OUTPATIENT)
Dept: FAMILY MEDICINE | Facility: CLINIC | Age: 29
End: 2024-04-17
Payer: COMMERCIAL

## 2024-04-17 ENCOUNTER — VIRTUAL VISIT (OUTPATIENT)
Dept: INTERNAL MEDICINE | Facility: CLINIC | Age: 29
End: 2024-04-17
Payer: COMMERCIAL

## 2024-04-17 DIAGNOSIS — H10.12 ALLERGIC CONJUNCTIVITIS, LEFT: Primary | ICD-10-CM

## 2024-04-17 DIAGNOSIS — J30.2 SEASONAL ALLERGIC RHINITIS, UNSPECIFIED TRIGGER: ICD-10-CM

## 2024-04-17 PROCEDURE — 99214 OFFICE O/P EST MOD 30 MIN: CPT | Mod: 95 | Performed by: NURSE PRACTITIONER

## 2024-04-17 RX ORDER — LORATADINE 10 MG/1
10 TABLET ORAL DAILY
COMMUNITY

## 2024-04-17 RX ORDER — OLOPATADINE HYDROCHLORIDE 1 MG/ML
1 SOLUTION/ DROPS OPHTHALMIC 2 TIMES DAILY
Qty: 5 ML | Refills: 1 | Status: SHIPPED | OUTPATIENT
Start: 2024-04-17 | End: 2024-05-22

## 2024-04-17 NOTE — TELEPHONE ENCOUNTER
Spoke to pt at 1724    3 day history of eye symptoms and have virtual pcp visit today    Offered tomorrow in acute clinic or Friday with Dr. Perez and pt prefers Friday    Scheduled at 1015    Encouraged frequent preservative free tears.    Pt seemed comfortable with scheduling.    Tavo Swanson RN 5:25 PM 04/17/24

## 2024-04-17 NOTE — NURSING NOTE
"Chief Complaint   Patient presents with    Allergies     Pt c/o allergies and left eye redness since Sunday.     initial LMP 04/10/2024 (Approximate)  Estimated body mass index is 38.41 kg/m  as calculated from the following:    Height as of 3/28/24: 1.727 m (5' 8\").    Weight as of 3/28/24: 114.6 kg (252 lb 9.6 oz)..  bp completed using cuff size NA (Not Taken)  ELIZABETH SMITH LPN  "

## 2024-04-17 NOTE — PROGRESS NOTES
Antonia is a 28 year old who is being evaluated via a billable video visit.    How would you like to obtain your AVS? MyChart  If the video visit is dropped, the invitation should be resent by: Text to cell phone: [phone]  Will anyone else be joining your video visit? No      Assessment & Plan     Allergic conjunctivitis, left  Discussed   - olopatadine (PATANOL) 0.1 % ophthalmic solution; Place 1 drop into both eyes 2 times daily  - Adult Allergy/Asthma  Referral; Future    Seasonal allergic rhinitis, unspecified trigger    - Adult Allergy/Asthma  Referral; Future      18 minutes spent by me on the date of the encounter doing chart review, history and exam, documentation and further activities per the note        Patient Instructions   Patanol twice daily for allergic eye     Claritin twice daily     Allergy referral     [facility], [facility]   [address] [address]   [facility] [address]   Phone: [phone]         Subjective   Antonia is a 28 year old, presenting for the following health issues:  Chief Complaint   Patient presents with    Allergies     Pt c/o allergies and left eye redness since Sunday.           4/17/2024     2:00 PM   Additional Questions   Roomed by Mone PRINCE     HPI     Left message [id number]    Sunday eye flared up   Left eye   No drainage and not stuck in am     Taking Claritin for itchy skin   Allergies but never tested     Dry eyes     Sustain eye drop for dry eye  She has had plugs in tear ducts in past     Wants to do allergy testing       Review of Systems  Constitutional, neuro, ENT, endocrine, pulmonary, cardiac, gastrointestinal, genitourinary, musculoskeletal, integument and psychiatric systems are negative, except as otherwise noted.      Objective           Vitals:  No vitals were obtained today due to virtual visit.    Physical Exam   GENERAL: alert and no distress  EYES: Eyes grossly normal to inspection and left eye with redness    RESP: No audible wheeze, cough, or visible cyanosis.    SKIN: Visible skin clear. No significant rash, abnormal pigmentation or lesions.  NEURO: Cranial nerves grossly intact.  Mentation and speech appropriate for age.  PSYCH: Appropriate affect, tone, and pace of words          Video-Visit Details    Type of service:  Video Visit   Originating Location (pt. Location): Home    Distant Location (provider location):  On-site  Platform used for Video Visit: Doximity  Signed Electronically by: ROS Jaquez CNP

## 2024-04-17 NOTE — PATIENT INSTRUCTIONS
Patanol twice daily for allergic eye     Claritin twice daily     Allergy referral     [facility], [facility]   [address] [address]   [facility] [address]   Phone: [phone]

## 2024-04-17 NOTE — LETTER
April 17, 2024      Antonia Marc  [address] [place] [id number]        To Whom It May Concern:    Antonia Marc  was seen on 4/17/2024.  She was unable to work today, related to her health.        Sincerely,        ROS Jaquez CNP

## 2024-04-17 NOTE — TELEPHONE ENCOUNTER
Nurse Triage SBAR     Is this a 2nd Level Triage?  YES, LICENSED PRACTITIONER REVIEW IS REQUIRED    CC: Right eye pain, redness    Situation: Pain and redness in R eye since Sunday, not improving. Pt think there is possibility she may have had some sand blow into her eye.     Background: Dry Eyes each eye, Myopia of both eyes with astigmatism     Last seen in clinic: 3/12/24, [place]   2/9/24: RLL/LLL punctal plugs placed     Past Ocular history:   - Medical history: Dry eye syndrome  - Surgical history: None  - Glasses: Yes  - Contact Lens: No  - Current Eye Medications: Omega 3 artificial tears, Systane preservative free gtts     Assessment:  Pain started Sunday.  No known injury but states she was outside and it is possible that some sand blew into her eye, she was not certain but used some drops prophylactially. Redness in eye also, not improving.  Pain 7/10 - burning - intermittent (few times hourly)  Using pres-free Systane and Omega 3 drops as needed - 4 to 10x/day per patient  Notices a mucous like substance over eyes occasionally that will cause brief blurring but then it will clear (some discussion about meibomian gland oils - she says she is familiar with this since last visit)  No flashes of light or vision changes.   Light sensitivity, floaters present since Jan - provider aware.  Also has been wondering if she has allergies but has not been tested yet, referral in place. Has felt more itchy than usual.  Headaches more frequent recently     Protocol Recommended Disposition:   Discuss With PCP And Callback By Nurse Today (overriding Go To Office Now)     Recommendation: Please call pt for further assessment and recommendations. Should her follow-up appt in May with Ana be moved up? Patient verbalizes understanding and is amenable to plan of care.     Routed to provider/care team     Nimo BAL RN  P Central Nursing/Red Flag Triage & Med Refill Team      Reason for Disposition   Eye pain/discomfort  and more than mild    Additional Information   Negative: Vomiting   Negative: Ulcer or sore seen on the cornea (clear center part of the eye)   Negative: Recent eye surgery and increasing eye pain   Negative: Blurred vision and new or worsening   Negative: Patient sounds very sick or weak to the triager   Negative: Severe eye pain   Negative: Complete loss of vision in one or both eyes   Negative: Eyelids are very swollen (shut or almost) and fever   Negative: Eyelid (outer) is very red and fever   Negative: Foreign body sensation ('feels like something is in there') and irrigation didn't help   Negative: Followed an eye injury   Negative: Eye pain from chemical in the eye   Negative: Eye pain from foreign body in eye   Negative: Has sinus pain or pressure    Protocols used: Eye Pain-A-[place]

## 2024-04-19 ENCOUNTER — OFFICE VISIT (OUTPATIENT)
Dept: OPHTHALMOLOGY | Facility: CLINIC | Age: 29
End: 2024-04-19
Attending: STUDENT IN AN ORGANIZED HEALTH CARE EDUCATION/TRAINING PROGRAM
Payer: COMMERCIAL

## 2024-04-19 DIAGNOSIS — S05.02XA CONJUNCTIVAL ABRASION, LEFT, INITIAL ENCOUNTER: Primary | ICD-10-CM

## 2024-04-19 PROCEDURE — 99213 OFFICE O/P EST LOW 20 MIN: CPT | Performed by: STUDENT IN AN ORGANIZED HEALTH CARE EDUCATION/TRAINING PROGRAM

## 2024-04-19 RX ORDER — BACITRACIN 500 [USP'U]/G
0.5 OINTMENT OPHTHALMIC 3 TIMES DAILY
Qty: 3.5 G | Refills: 0 | Status: SHIPPED | OUTPATIENT
Start: 2024-04-19 | End: 2024-04-26

## 2024-04-19 ASSESSMENT — EXTERNAL EXAM - LEFT EYE: OS_EXAM: NORMAL

## 2024-04-19 ASSESSMENT — TONOMETRY
IOP_METHOD: ICARE
OD_IOP_MMHG: 15
OS_IOP_MMHG: 16

## 2024-04-19 ASSESSMENT — VISUAL ACUITY
OD_CC: 20/20
METHOD: SNELLEN - LINEAR
OS_CC+: -1
OS_CC: 20/20
OD_CC+: -1

## 2024-04-19 ASSESSMENT — REFRACTION_WEARINGRX
OS_AXIS: 073
OD_CYLINDER: +0.50
OS_CYLINDER: +0.75
OS_SPHERE: -4.00
OD_SPHERE: -3.50
OD_AXIS: 102

## 2024-04-19 ASSESSMENT — EXTERNAL EXAM - RIGHT EYE: OD_EXAM: NORMAL

## 2024-04-19 NOTE — PROGRESS NOTES
HPI       Eye Pain      In left eye.  Duration of 5 years.  Associated symptoms include foreign body sensation, redness, itching, photophobia and recent URI.             Comments    Patient has been having pain in her left eye since .  The redness has been improving somewhat since onset.  Uses [place]-3 Refresh PFAT and Systane Hydration PFAT 5 times a day since last visit.  Denies history of thyroid disease.  Had bronchitis up until about 3 weeks ago.        Minal Johnson on 2024 at 10:27 AM            Last edited by Minal Johnson on 2024 10:32 AM.          Review of systems for the eyes was negative other than the pertinent positives/negatives listed in the HPI.    Ocular Meds: [place]-3 Refresh PFAT OU and Systane Hydration PFAT 5 times a day OU    Ocular Hx: dry eyes OU; refractive error OU    FOHx: No known family history of glaucoma or blindness    PMHx:   Past Medical History:   Diagnosis Date    CARDIOVASCULAR SCREENING; LDL GOAL LESS THAN 160     Closed fracture of unspecified part of radius with ulna(813.83)     Other diseases of trachea and bronchus, not elsewhere classified     tracheomalacia as a         Assessment & Plan     Antonia Marc is a 28 year old female with the following diagnoses:    Conjunctival abrasion left eye  - no signs of infection; notes recent URI, though no obvious signs of viral conjunctivitis; no trauma; may have rubbed eyes; eye discomfort resolves with proparacaine instillation  - focal area of injection with overlying fluorescein stain; no findings suggestive of scleritis/episcleritis  - start bacitracin ophthalmic ointment TID left eye x 7 days   - frequent artificial tears, see below  - recheck 10 days, sooner changes    Dry Eyes OU  - mostly fbs, occasional itching and photophobia  - Sjogren's work up (anti-Ro/SSA and anti-La/SSB) negative; denies dry mouth or dental caries, or other systemic concerns except had episode of rhinitis/sinusitis  and subsequently felt that dry eye developed; also taking prescription medications for anxiety/depression that can contribute to dry eyes  - ocular surface appears good except does have rapid tear break up time with decreased tear meniscus  - has trialed steroid eye drops, frequently lubricates eyes, and was Rx'ed restasis  - notes collagen punctal plugs have helped with symptoms significantly  - also was using Tyrvaya as a trial, tolerated well, but insurance will not cover and too expensive for patient  - PFATs 4-6x/day OU  - can use anti-allergy medication such as pataday, patanol, zaditor, or lastacaft; sample of lastacaft provided to patient previously  - restasis BID OU    Myopia of both eyes with astigmatism  - happy with current Rx    Counseled return/RD precautions    Patient disposition:   Return for Follow Up 10 days, VT, or sooner changes.    Attending Physician Attestation:  Complete documentation of historical and exam elements from today's encounter can be found in the full encounter summary report (not reduplicated in this progress note).  I personally obtained the chief complaint(s) and history of present illness.  I confirmed and edited as necessary the review of systems, past medical/surgical history, family history, social history, and examination findings as documented by others; and I examined the patient myself.  I personally reviewed the relevant tests, images, and reports as documented above.  I formulated and edited as necessary the assessment and plan and discussed the findings and management plan with the patient and family. . - Shannon Perez MD

## 2024-04-19 NOTE — LETTER
April 19, 2024      Re: Antonia Marc   1995    To Whom It May Concern:    This is to confirm that the above patient was seen on 4/19/2024.  Antonia Marc is unable to return to work today.    Thank you for your cooperation in this matter.  Please do not hesitate to contact me if you have any further questions.    Sincerely,      Shannon Perez MD  Electronically signed 4/19/2024 at 12:52 PM

## 2024-04-22 ENCOUNTER — TELEPHONE (OUTPATIENT)
Dept: OPHTHALMOLOGY | Facility: CLINIC | Age: 29
End: 2024-04-22
Payer: COMMERCIAL

## 2024-04-22 ENCOUNTER — MYC MEDICAL ADVICE (OUTPATIENT)
Dept: OPHTHALMOLOGY | Facility: CLINIC | Age: 29
End: 2024-04-22
Payer: COMMERCIAL

## 2024-04-22 DIAGNOSIS — S05.02XA CONJUNCTIVAL ABRASION, LEFT, INITIAL ENCOUNTER: Primary | ICD-10-CM

## 2024-04-22 RX ORDER — OFLOXACIN 3 MG/ML
1-2 SOLUTION/ DROPS OPHTHALMIC 4 TIMES DAILY
Qty: 5 ML | Refills: 11 | Status: SHIPPED | OUTPATIENT
Start: 2024-04-22 | End: 2024-04-29

## 2024-04-22 NOTE — TELEPHONE ENCOUNTER
[facility] Call Center    Phone Message    May a detailed message be left on voicemail: yes     Reason for Call: Medication Question or concern regarding medication   Prescription Clarification  Name of Medication: Omega 3 Drops  Prescribing Provider: Ana   Pharmacy: n/a   What on the order needs clarification? Pt states when using the the Omega 3 drops she noticed some burning and is unsure if that's supposed to happen. As well as pt would like to discuss the Systane drops, thinks those drops aren't helping her symptoms      Action Taken: Message routed to:  [facility] eye    Travel Screening: Not Applicable

## 2024-04-22 NOTE — TELEPHONE ENCOUNTER
Patient unable to obtain bacitracin ophthalmic ointment. Will Rx ofloxacin QID left eye x 7 days and can use artificial tear gel or ointment as needed for comfort to left eye

## 2024-04-22 NOTE — TELEPHONE ENCOUNTER
Patient is unable to get the Bacitracin ointment because of backorder.  She questions if computer use is bad for the healing of the eyes.  She notes burning with Omega 3 artificial tears.  Her provider will order Ofloxacin for 7 days.  She may try a thicker night time gel for comfort.  The patient will monitor her symptoms and call if symptoms worsen.

## 2024-04-26 ENCOUNTER — TELEPHONE (OUTPATIENT)
Dept: OPHTHALMOLOGY | Facility: CLINIC | Age: 29
End: 2024-04-26
Payer: COMMERCIAL

## 2024-04-26 NOTE — TELEPHONE ENCOUNTER
Spoke to pt at 1704    Pt with eye symptoms/pain today and scheduled in clinic this Friday and pt cancelled appt    Reviewed pt has appt next Friday with Dr. Perez and may follow up as scheduled then and reach out also on Monday if still having any symptoms    Pt states eye in pain and does not feel able able to go thru weekend.    (Concern of cornea scratch per appt detail note)    Reviewed ok to go to optical clinic if still open for evaluation, urgent care and reviewed [facility]/[address] [place] [address] emergency department for evaluation-- reviewed [facility] has ophthalmology consult available.    Pt verbally demonstrated understanding    Tavo Swanson, RN 5:08 PM 04/26/24

## 2024-04-26 NOTE — TELEPHONE ENCOUNTER
[facility] Call Center    Phone Message    May a detailed message be left on voicemail: yes     Reason for Call: Other: Patient needs to reschedule her acute appt today with Dr. Mathis.  Please call.  Thank you.     Action Taken: Message routed to:  [facility] Ophthalmology    Travel Screening: Not Applicable

## 2024-05-20 ENCOUNTER — OFFICE VISIT (OUTPATIENT)
Dept: URGENT CARE | Facility: URGENT CARE | Age: 29
End: 2024-05-20
Payer: COMMERCIAL

## 2024-05-20 VITALS
HEART RATE: 80 BPM | BODY MASS INDEX: 38.49 KG/M2 | DIASTOLIC BLOOD PRESSURE: 84 MMHG | WEIGHT: 254 LBS | TEMPERATURE: 98.4 F | SYSTOLIC BLOOD PRESSURE: 127 MMHG | HEIGHT: 68 IN | OXYGEN SATURATION: 99 % | RESPIRATION RATE: 16 BRPM

## 2024-05-20 DIAGNOSIS — R39.89 URINARY PROBLEM: Primary | ICD-10-CM

## 2024-05-20 DIAGNOSIS — R10.2 PELVIC PAIN: ICD-10-CM

## 2024-05-20 DIAGNOSIS — M53.3 TAIL BONE PAIN: ICD-10-CM

## 2024-05-20 LAB
ALBUMIN UR-MCNC: NEGATIVE MG/DL
APPEARANCE UR: CLEAR
BILIRUB UR QL STRIP: NEGATIVE
CLUE CELLS: ABNORMAL
COLOR UR AUTO: YELLOW
GLUCOSE UR STRIP-MCNC: NEGATIVE MG/DL
HGB UR QL STRIP: NEGATIVE
KETONES UR STRIP-MCNC: NEGATIVE MG/DL
LEUKOCYTE ESTERASE UR QL STRIP: NEGATIVE
NITRATE UR QL: NEGATIVE
PH UR STRIP: 5.5 [PH] (ref 5–7)
SP GR UR STRIP: 1.01 (ref 1–1.03)
TRICHOMONAS, WET PREP: ABNORMAL
UROBILINOGEN UR STRIP-ACNC: 0.2 E.U./DL
WBC'S/HIGH POWER FIELD, WET PREP: ABNORMAL
YEAST, WET PREP: ABNORMAL

## 2024-05-20 PROCEDURE — 99213 OFFICE O/P EST LOW 20 MIN: CPT | Performed by: FAMILY MEDICINE

## 2024-05-20 PROCEDURE — 81003 URINALYSIS AUTO W/O SCOPE: CPT | Performed by: FAMILY MEDICINE

## 2024-05-20 PROCEDURE — 87210 SMEAR WET MOUNT SALINE/INK: CPT | Performed by: FAMILY MEDICINE

## 2024-05-20 RX ORDER — METHOCARBAMOL 750 MG/1
750 TABLET, FILM COATED ORAL 3 TIMES DAILY PRN
Qty: 60 TABLET | Refills: 0 | Status: SHIPPED | OUTPATIENT
Start: 2024-05-20 | End: 2024-08-08

## 2024-05-21 ENCOUNTER — OFFICE VISIT (OUTPATIENT)
Dept: FAMILY MEDICINE | Facility: CLINIC | Age: 29
End: 2024-05-21
Payer: COMMERCIAL

## 2024-05-21 ENCOUNTER — HOSPITAL ENCOUNTER (EMERGENCY)
Facility: CLINIC | Age: 29
Discharge: HOME | End: 2024-05-21
Attending: EMERGENCY MEDICINE | Admitting: EMERGENCY MEDICINE
Payer: COMMERCIAL

## 2024-05-21 VITALS
RESPIRATION RATE: 16 BRPM | HEART RATE: 78 BPM | TEMPERATURE: 98.6 F | BODY MASS INDEX: 38.49 KG/M2 | DIASTOLIC BLOOD PRESSURE: 84 MMHG | HEIGHT: 68 IN | OXYGEN SATURATION: 97 % | WEIGHT: 254 LBS | SYSTOLIC BLOOD PRESSURE: 127 MMHG

## 2024-05-21 VITALS
HEIGHT: 68 IN | OXYGEN SATURATION: 95 % | BODY MASS INDEX: 38.62 KG/M2 | RESPIRATION RATE: 18 BRPM | TEMPERATURE: 98.6 F | HEART RATE: 55 BPM | DIASTOLIC BLOOD PRESSURE: 90 MMHG | SYSTOLIC BLOOD PRESSURE: 137 MMHG

## 2024-05-21 DIAGNOSIS — R10.2 PELVIC PAIN: ICD-10-CM

## 2024-05-21 DIAGNOSIS — R20.8 DYSESTHESIA: ICD-10-CM

## 2024-05-21 DIAGNOSIS — R10.2 PELVIC PAIN IN FEMALE: ICD-10-CM

## 2024-05-21 DIAGNOSIS — R20.0 SADDLE ANESTHESIA: Primary | ICD-10-CM

## 2024-05-21 DIAGNOSIS — M53.3 PAIN IN THE COCCYX: ICD-10-CM

## 2024-05-21 DIAGNOSIS — R35.0 URINARY FREQUENCY: ICD-10-CM

## 2024-05-21 DIAGNOSIS — K59.09 OTHER CONSTIPATION: ICD-10-CM

## 2024-05-21 PROBLEM — Z13.6 CARDIOVASCULAR SCREENING; LDL GOAL LESS THAN 160: Status: RESOLVED | Noted: 2024-01-18 | Resolved: 2024-05-21

## 2024-05-21 LAB
ALBUMIN UR-MCNC: NEGATIVE MG/DL
APPEARANCE UR: CLEAR
BACTERIA #/AREA URNS HPF: ABNORMAL /HPF
BILIRUB UR QL STRIP: NEGATIVE
COLOR UR AUTO: ABNORMAL
GLUCOSE UR STRIP-MCNC: NEGATIVE MG/DL
HCG UR QL: NEGATIVE
HGB UR QL STRIP: NEGATIVE
KETONES UR STRIP-MCNC: NEGATIVE MG/DL
LEUKOCYTE ESTERASE UR QL STRIP: NEGATIVE
MUCOUS THREADS #/AREA URNS LPF: PRESENT /LPF
NITRATE UR QL: NEGATIVE
PH UR STRIP: 5.5 [PH] (ref 5–7)
RBC URINE: 1 /HPF
SP GR UR STRIP: 1.01 (ref 1–1.03)
SQUAMOUS EPITHELIAL: 2 /HPF
UROBILINOGEN UR STRIP-MCNC: NORMAL MG/DL
WBC URINE: 3 /HPF

## 2024-05-21 PROCEDURE — 81025 URINE PREGNANCY TEST: CPT | Performed by: EMERGENCY MEDICINE

## 2024-05-21 PROCEDURE — 99214 OFFICE O/P EST MOD 30 MIN: CPT | Performed by: FAMILY MEDICINE

## 2024-05-21 PROCEDURE — 51798 US URINE CAPACITY MEASURE: CPT

## 2024-05-21 PROCEDURE — 81001 URINALYSIS AUTO W/SCOPE: CPT | Performed by: EMERGENCY MEDICINE

## 2024-05-21 PROCEDURE — 99284 EMERGENCY DEPT VISIT MOD MDM: CPT

## 2024-05-21 RX ORDER — CYCLOBENZAPRINE HCL 10 MG
10 TABLET ORAL 3 TIMES DAILY PRN
Qty: 12 TABLET | Refills: 0 | Status: SHIPPED | OUTPATIENT
Start: 2024-05-21 | End: 2024-09-18

## 2024-05-21 ASSESSMENT — COLUMBIA-SUICIDE SEVERITY RATING SCALE - C-SSRS
6. HAVE YOU EVER DONE ANYTHING, STARTED TO DO ANYTHING, OR PREPARED TO DO ANYTHING TO END YOUR LIFE?: NO
2. HAVE YOU ACTUALLY HAD ANY THOUGHTS OF KILLING YOURSELF IN THE PAST MONTH?: NO
1. IN THE PAST MONTH, HAVE YOU WISHED YOU WERE DEAD OR WISHED YOU COULD GO TO SLEEP AND NOT WAKE UP?: NO

## 2024-05-21 ASSESSMENT — ACTIVITIES OF DAILY LIVING (ADL)
ADLS_ACUITY_SCORE: 33
ADLS_ACUITY_SCORE: 35

## 2024-05-21 ASSESSMENT — PAIN SCALES - GENERAL: PAINLEVEL: EXTREME PAIN (8)

## 2024-05-21 NOTE — PROGRESS NOTES
Chief Complaint   Patient presents with    Urgent Care     Pelvic pain, numbness and tingling , frequency, and pain in coccyx area x 3 day.        Antonia was seen today for urgent care.    Diagnoses and all orders for this visit:    Urinary problem  -     UA Macroscopic with reflex to Microscopic and Culture - Lab Collect; Future  -     Wet prep - lab collect; Future  -     UA Macroscopic with reflex to Microscopic and Culture - Lab Collect  -     Wet prep - lab collect    Tail bone pain    Pelvic pain  -     methocarbamol (ROBAXIN) 750 MG tablet; Take 1 tablet (750 mg) by mouth 3 times daily as needed for muscle spasms    Differential diagnosis UTI/pyelonephritis/vaginitis/kidney stone/ovarian cyst/endometriosis    PLAN: Treatment per orders - also push fluids, no UTI noted no antibiotic needed now discussed with patient that the tailbone pain could be related to endometriosis suggested to follow-up with  GYN if pain persist.  For constipation encouraged to increase fiber intake and do daily laxatives to help with the symptoms.  For the pelvic floor pain patient was recommended doing muscle relaxant to see if that helps with the symptoms.  Call or return to clinic prn if these symptoms worsen or fail to improve as anticipated.      SUBJECTIVE: Antonia Marc is a 28 year old female who complains of urinary frequency, urgency and dysuria x 3 days, without flank pain, fever, chills, or abnormal vaginal discharge or bleeding. She denies any injury or fall   Also has been noticing pain in the tail bone area and also in the coccyx also has been noticing prolonged cycles lately has history of irregular cycles.  Did have an ultrasound done 5 months back which did show normal ultrasound.  There was some concern for endometriosis but no further workup was done.    OBJECTIVE: Appears well, in no apparent distress.  Vital signs are normal. The abdomen is soft without tenderness, guarding, mass, rebound or organomegaly. No  CVA tenderness noted. Urine dipstick shows   Results for orders placed or performed in visit on 05/20/24   UA Macroscopic with reflex to Microscopic and Culture - Lab Collect     Status: Normal    Specimen: Urine, Clean Catch   Result Value Ref Range    Color Urine Yellow Colorless, Straw, Light Yellow, Yellow    Appearance Urine Clear Clear    Glucose Urine Negative Negative mg/dL    Bilirubin Urine Negative Negative    Ketones Urine Negative Negative mg/dL    Specific Gravity Urine 1.015 1.003 - 1.035    Blood Urine Negative Negative    pH Urine 5.5 5.0 - 7.0    Protein Albumin Urine Negative Negative mg/dL    Urobilinogen Urine 0.2 0.2, 1.0 E.U./dL    Nitrite Urine Negative Negative    Leukocyte Esterase Urine Negative Negative    Narrative    Microscopic not indicated   Wet prep - lab collect     Status: Abnormal    Specimen: Vagina; Swab   Result Value Ref Range    Trichomonas Absent Absent    Yeast Absent Absent    Clue Cells Absent Absent    WBCs/high power field 1+ (A) None       Jesenia Gabriel MD

## 2024-05-21 NOTE — PROGRESS NOTES
Assessment & Plan     Saddle anesthesia  Pelvic pain in female  Pain in the coccyx  Urinary frequency  Other constipation  Based on the constellation of symptoms, patient does have several of the symptoms that could be seen with Cauda Equina Syndrome. Patient has been sent to the ED for MRI given that this is an emergent condition.  Discussed that if the MRI is normal or ED decides an MRI is not warranted, we can continue with outpatient workup.      See Patient Instructions    Aquilino Knight is a 28 year old, presenting for the following health issues:  Abdominal Pain        5/21/2024     1:18 PM   Additional Questions   Roomed by Sarah Luong     History of Present Illness       Reason for visit:  Pelvic pain, tailbone pain    She eats 0-1 servings of fruits and vegetables daily.She consumes 2 sweetened beverage(s) daily.She exercises with enough effort to increase her heart rate 20 to 29 minutes per day.    She is taking medications regularly.     Pt wanting 2nd opinion. Is not happy with treatment she got from Urgent Care 5/20/24.       Pain History:  When did you first notice your pain? 4 days   Have you seen anyone else for your pain? Yes - [facility] on 5/20/24  How has your pain affected your ability to work? Unable to work due to pain. Pt is unable to sit, stand, everything is uncomfortable.  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  Where in your body do you have pain? Abdominal/Flank Pain  Onset/Duration: 4 days  Description:   Character: Sharp, Cramping, and bloating  Location: Left lower quadrant pelvic region  Radiation: Back and Pelvic region  Intensity: severe, 9/10  Progression of Symptoms:  worsening and constant  Accompanying Signs & Symptoms:  Fever/Chills: No  Gas/Bloating: YES- bloating  Nausea: YES  Vomitting: No  Diarrhea: No  Constipation: YES  Dysuria or Hematuria: No  History:   Trauma: No  Previous similar pain: Juliette  "2023, after coughing fit  Previous tests done: none  Precipitating factors:   Does the pain change with:     Food: YES- bloating occurs    Bowel Movement: YES- pain eases some    Urination: No   Other factors:  No  Therapies tried and outcome: Ibuprofin & tylenol - not effective.  Patient's last menstrual period was 05/13/2024 (approximate).      Has a numb and tingling sensation in the vaginal area, sounds like perineum that is painful, pains shoots up buttocks, right tailbone pain, left lower abdomen, sometimes lateral abdomen, sometimes inguinal crease.  She has an urgency to urinate every few seconds, she is not getting much out. No vaginal discharge.  Uncomfortable when urinating, but does feel she empties the bladder completely when she goes.  Having problems with bowel movements.  She says she has weakness of the legs, feels very stiff when trying to walk.        Objective    /84 (BP Location: Right arm, Patient Position: Sitting, Cuff Size: Adult Large)   Pulse 78   Temp 98.6  F (37  C) (Oral)   Resp 16   Ht 1.727 m (5' 8\")   Wt 115.2 kg (254 lb)   LMP 05/13/2024 (Approximate)   SpO2 97%   BMI 38.62 kg/m    Body mass index is 38.62 kg/m .  Physical Exam   GENERAL: alert and no distress  NEURO: mentation intact and ambulating well.  Bilateral lower extremity strength feels 5/5          Signed Electronically by: Loreto Torres MD    "

## 2024-05-21 NOTE — ED TRIAGE NOTES
Pt was sent from . This is Pt's 3 visit at a healthcare facility in the last 24 hours, all [facility].     Over the last 4 days Pt has been experiencing tailbone pain that is radiating into the groin. Pt is now having a difficult time going to the bathroom due to the pressure on her tailbone. Pt also endorses weakness in her thighs.

## 2024-05-21 NOTE — PATIENT INSTRUCTIONS
Consider taking laxatives such as miralax daily to help with regular Bowel movement   Follow up with Gyn if pain persists     Jesenia Gabreil MD      Pt calling requesting referral   States needs one to neurosurgery   Seeing pain management already and has MRI scheduled    VF ok for referral?   Pt aware provider is out of office til Tuesday     Did have surgery in the past with Dr Hernandez   Would like to see him again.

## 2024-05-21 NOTE — DISCHARGE INSTRUCTIONS
Please make an appointment to follow up with [facility] [phone] in 5-7 days even if entirely better.

## 2024-05-22 ENCOUNTER — OFFICE VISIT (OUTPATIENT)
Dept: FAMILY MEDICINE | Facility: CLINIC | Age: 29
End: 2024-05-22
Payer: COMMERCIAL

## 2024-05-22 ENCOUNTER — PATIENT OUTREACH (OUTPATIENT)
Dept: FAMILY MEDICINE | Facility: CLINIC | Age: 29
End: 2024-05-22

## 2024-05-22 ENCOUNTER — MYC MEDICAL ADVICE (OUTPATIENT)
Dept: FAMILY MEDICINE | Facility: CLINIC | Age: 29
End: 2024-05-22

## 2024-05-22 VITALS
HEIGHT: 68 IN | RESPIRATION RATE: 16 BRPM | OXYGEN SATURATION: 99 % | HEART RATE: 82 BPM | DIASTOLIC BLOOD PRESSURE: 82 MMHG | WEIGHT: 250 LBS | TEMPERATURE: 98.5 F | BODY MASS INDEX: 37.89 KG/M2 | SYSTOLIC BLOOD PRESSURE: 110 MMHG

## 2024-05-22 DIAGNOSIS — R10.2 PELVIC PAIN IN FEMALE: Primary | ICD-10-CM

## 2024-05-22 PROCEDURE — 99214 OFFICE O/P EST MOD 30 MIN: CPT | Performed by: NURSE PRACTITIONER

## 2024-05-22 RX ORDER — DESOGESTREL AND ETHINYL ESTRADIOL 21-5 (28)
1 KIT ORAL DAILY
Qty: 84 TABLET | Refills: 3 | Status: SHIPPED | OUTPATIENT
Start: 2024-05-22

## 2024-05-22 RX ORDER — HYDROXYZINE PAMOATE 25 MG/1
25 CAPSULE ORAL 3 TIMES DAILY PRN
Qty: 90 CAPSULE | Refills: 1 | Status: SHIPPED | OUTPATIENT
Start: 2024-05-22

## 2024-05-22 ASSESSMENT — PAIN SCALES - GENERAL: PAINLEVEL: NO PAIN (0)

## 2024-05-22 NOTE — ED PROVIDER NOTES
History     Chief Complaint:  Pelvic Pain and Back Pain       HPI     Antonia Marc is a 28 year old female presents with pelvic discomfort.  Patient reports a history of recurrent pelvic pain.  She has been evaluated for pelvic floor dysfunction and endometriosis.  She has been previously seen by gynecology and was told to take anti-inflammatories and follow-up as needed.  She had been previously following the pelvic floor dysfunction provider but was unable to afford further follow-up.  She is noted over the last 4 days she has had an increase in pelvic discomfort.  She describes it as an abnormal sensation.  She has an urge to urinate frequently and is uncertain if she is completely emptying.  She also has decreased urge to pass stool.  She denies any urinary or stool incontinence.  She reports dysesthesias as opposed to complete loss of sensation.  She has some mild pain over her coccyx but denies howie back pain.  She has no history of fever, diabetes, dialysis, malignancy, anticoagulant use or IV drug use.  She denies vaginal bleeding or discharge.      Independent Historian:    None     Review of External Notes:  None       Medications:    cyclobenzaprine (FLEXERIL) 10 MG tablet  acetaminophen (TYLENOL) 325 MG tablet  loratadine (CLARITIN) 10 MG tablet  methocarbamol (ROBAXIN) 750 MG tablet  olopatadine (PATANOL) 0.1 % ophthalmic solution  sertraline (ZOLOFT) 25 MG tablet        Past Medical History:    Past Medical History:   Diagnosis Date    CARDIOVASCULAR SCREENING; LDL GOAL LESS THAN 160     Closed fracture of unspecified part of radius with ulna(813.83)     Other diseases of trachea and bronchus, not elsewhere classified        Past Surgical History:    Past Surgical History:   Procedure Laterality Date    ESOPHAGOSCOPY, GASTROSCOPY, DUODENOSCOPY (EGD), COMBINED N/A 10/19/2018    Procedure: COMBINED ESOPHAGOSCOPY, GASTROSCOPY, DUODENOSCOPY (EGD), BIOPSY SINGLE OR MULTIPLE;  Surgeon: Karlie  "Heraclio Long DO;  Location: [facility]    TONSILLECTOMY ADULT Bilateral 7/23/2018    Procedure: TONSILLECTOMY ADULT;  Bilateral Tonsillectomy;  Surgeon: Galilea Good MD;  Location: [facility]          Physical Exam   Patient Vitals for the past 24 hrs:   BP Temp Temp src Pulse Resp SpO2 Height   05/21/24 1715 (!) 137/90 -- -- -- -- -- --   05/21/24 1700 (!) 148/82 -- -- 55 -- 95 % --   05/21/24 1635 -- -- -- -- -- 95 % --   05/21/24 1634 (!) 142/75 -- -- 74 -- -- --   05/21/24 1512 (!) 155/86 98.6  F (37  C) Oral 90 18 99 % 1.727 m (5' 8\")        Physical Exam      HEENT:   Conjunctiva are normal and without erythema.    NECK:   Supple, no meningismus.     CV:    Regular rate and rhythm     No murmurs, rubs or gallops.      2+ dorsalis pedis pulses bilateral.  PULM:   Clear to auscultation bilateral.      No respiratory distress.  No stridor.  ABD:   Soft, non-tender, non-distendend.      No rebound or guarding.  MSK:   Patient is non-tender over the lumbar spine.      Non-tender at the lumbar paraspinal musculature.      Mild tenderness to the right buttock    No step-off to the bony spine or overlying lesions.   LYMPH:  No cervical lymphadenopathy.  NEURO:  Strength is 5/5 and sensation is intact to the lower extremities bilateral.      2+ patellar and Achilles tendon reflexes bilateral.      No clonus and down-going Babinski bilateral.  SKIN:   Warm, dry and intact.    PYSCH:   Mood is good and affect is appropriate.    Emergency Department Course       Imaging:  No orders to display       Laboratory:  Labs Ordered and Resulted from Time of ED Arrival to Time of ED Departure   ROUTINE UA WITH MICROSCOPIC REFLEX TO CULTURE - Abnormal       Result Value    Color Urine Straw      Appearance Urine Clear      Glucose Urine Negative      Bilirubin Urine Negative      Ketones Urine Negative      Specific Gravity Urine 1.006      Blood Urine Negative      pH Urine 5.5      Protein Albumin Urine Negative      Urobilinogen " Urine Normal      Nitrite Urine Negative      Leukocyte Esterase Urine Negative      Bacteria Urine Moderate (*)     Mucus Urine Present (*)     RBC Urine 1      WBC Urine 3      Squamous Epithelials Urine 2 (*)    HCG QUALITATIVE URINE - Normal    hCG Urine Qualitative Negative          Emergency Department Course & Assessments:    Interventions:  Medications - No data to display       Independent Interpretation (X-rays, CTs, rhythm strip):  None    Consultations/Discussion of Management or Tests:  None       Social Determinants of Health affecting care:  None     Disposition:  The patient was discharged.    Impression & Plan      Medical Decision Makin-year-old female with a history of pelvic floor dysfunction and suspected endometriosis presents with increased pelvic discomfort and decreased urge with urination/defecation.  She is without urinary tract infection.  She is not pregnant.  She has no features to suggest vaginitis, cervicitis or PID to warrant pelvic examination.  Outside provider recommended presentation to the ED to rule out cauda equina syndrome.  Her neurological examination is reassuring and not consistent with cauda equina.  In addition, she has no urinary/stool incontinence.  No evidence of urinary retention as postvoid residual bladder scan is 41 mL.  She has some buttock/coccygeal GL pain but do not feel that symptoms warrant MRI.  Patient agreement on avoidance of further advanced imaging.  No sinister pathology noted.      No features concerning for epidural abscess, epidural hematoma or discitis.  I agree with her that this likely represents an exacerbation of her pelvic floor dysfunction or endometriosis.  She requested a referral to outside gynecologist which was provided.  She was recently provided Robaxin but states that she had an adverse drug effect thus we will give her a trial of cyclobenzaprine.  I ordered outpatient physical therapy for suspected pelvic floor dysfunction.   Patient safe for discharge home.    Diagnosis:    ICD-10-CM    1. Pelvic pain  R10.2 Physical Therapy  Referral      2. Dysesthesia  R20.8            Discharge Medications:  Discharge Medication List as of 5/21/2024  5:07 PM        START taking these medications    Details   cyclobenzaprine (FLEXERIL) 10 MG tablet Take 1 tablet (10 mg) by mouth 3 times daily as needed for muscle spasms or other (pelvic pain), Disp-12 tablet, R-0, E-Prescribe                Antonio Snyder MD  5/21/2024              Antonio Snyder MD  05/21/24 2100

## 2024-05-22 NOTE — TELEPHONE ENCOUNTER
Dr. Mayi Torres   Please see my chart, RN pended letter     Thank you   Alka Waters, Registered Nurse  [facility]

## 2024-05-22 NOTE — LETTER
May 22, 2024      Antonia Marc  [address] [place] [id number]        To Whom It May Concern:    Antonia Marc was seen in our clinic. Please excuse patient from missing work on 5/20-5/22nd due to illness. She may return to work with the following: June 3rd      Sincerely,      Roma Ye

## 2024-05-22 NOTE — TELEPHONE ENCOUNTER
ED / Discharge Outreach Protocol    Patient Contact    Attempt # 1    Was call answered?  No.  Left message on voicemail with information to call clinic.    Nicole Ordaz RN

## 2024-05-22 NOTE — LETTER
May 22, 2024      Antonia Marc  [address] [place] [id number]        To Whom It May Concern:    Antonia Marc  was seen on 5/21/2024.      Please excuse her from work 5/20/2024 - 5/22/2024 due to illness.        Sincerely,        Loreto Torres MD  (Electronically signed)

## 2024-05-22 NOTE — PROGRESS NOTES
"  Assessment & Plan     Pelvic pain in female  -LMP 6 days ago, pregnancy screening was negative, unclear etiology, possible due to endometriosis, vs pelvic floor dysfunction  -recommended to repeat pelvic US  -she will be off work for 1 week  -will restart OCP   -follow up with OB GYN   - US Pelvic Complete with Transvaginal; Future  - Ob/Gyn  Referral; Future  - Physical Therapy  Referral; Future  - hydrOXYzine genesis (VISTARIL) 25 MG capsule; Take 1 capsule (25 mg) by mouth 3 times daily as needed for other (pelvic pain)  - desogestrel-ethinyl estradiol (KARIVA) 0.15-0.02/0.01 MG (21/5) tablet; Take 1 tablet by mouth daily        Subjective   Antonia is a 28 year old, presenting for the following health issues: chronic pelvic pain, worsened past few days, was seen in ER, had normal UA, WET prep test. States she missed 3 days of work due to pain.   Tailbone Pain (Needs a letter for missing 3 days a work )        5/22/2024     1:12 PM   Additional Questions   Roomed by antonia   Accompanied by self         5/22/2024     1:12 PM   Patient Reported Additional Medications   Patient reports taking the following new medications Flexeril     History of Present Illness       Reason for visit:  Pelvic pain, tailbone pain    She eats 0-1 servings of fruits and vegetables daily.She consumes 2 sweetened beverage(s) daily.She exercises with enough effort to increase her heart rate 20 to 29 minutes per day.    She is taking medications regularly.         ED/UC Followup:    Facility:  [facility]   Date of visit: 5/22/24   Reason for visit: Pelvic Pain   Current Status: Still has pain       Review of Systems  Constitutional, HEENT, cardiovascular, pulmonary, gi and gu systems are negative, except as otherwise noted.      Objective    /82   Pulse 82   Temp 98.5  F (36.9  C) (Tympanic)   Resp 16   Ht 1.727 m (5' 8\")   Wt 113.4 kg (250 lb)   LMP 05/13/2024 (Approximate)   SpO2 99%   BMI 38.01 kg/m    Body " mass index is 38.01 kg/m .  Physical Exam   GENERAL: alert and no distress  EYES: Eyes grossly normal to inspection, PERRL and conjunctivae and sclerae normal  SKIN: no suspicious lesions or rashes  NEURO: Normal strength and tone, mentation intact and speech normal  PSYCH: mentation appears normal, affect normal/bright            Signed Electronically by: ROS Hansen CNP

## 2024-05-24 ENCOUNTER — OFFICE VISIT (OUTPATIENT)
Dept: OBGYN | Facility: CLINIC | Age: 29
End: 2024-05-24
Payer: COMMERCIAL

## 2024-05-24 VITALS
SYSTOLIC BLOOD PRESSURE: 129 MMHG | BODY MASS INDEX: 38.19 KG/M2 | DIASTOLIC BLOOD PRESSURE: 81 MMHG | RESPIRATION RATE: 18 BRPM | TEMPERATURE: 98.5 F | WEIGHT: 252 LBS | HEART RATE: 67 BPM | HEIGHT: 68 IN

## 2024-05-24 DIAGNOSIS — N93.9 ABNORMAL UTERINE BLEEDING (AUB): ICD-10-CM

## 2024-05-24 DIAGNOSIS — R10.2 PELVIC PAIN IN FEMALE: Primary | ICD-10-CM

## 2024-05-24 DIAGNOSIS — Z11.3 SCREEN FOR STD (SEXUALLY TRANSMITTED DISEASE): ICD-10-CM

## 2024-05-24 LAB
HBA1C MFR BLD: 5.4 % (ref 0–5.6)
T PALLIDUM AB SER QL: NONREACTIVE

## 2024-05-24 PROCEDURE — 83036 HEMOGLOBIN GLYCOSYLATED A1C: CPT | Performed by: STUDENT IN AN ORGANIZED HEALTH CARE EDUCATION/TRAINING PROGRAM

## 2024-05-24 PROCEDURE — 36415 COLL VENOUS BLD VENIPUNCTURE: CPT | Performed by: STUDENT IN AN ORGANIZED HEALTH CARE EDUCATION/TRAINING PROGRAM

## 2024-05-24 PROCEDURE — G2211 COMPLEX E/M VISIT ADD ON: HCPCS | Performed by: STUDENT IN AN ORGANIZED HEALTH CARE EDUCATION/TRAINING PROGRAM

## 2024-05-24 PROCEDURE — 82627 DEHYDROEPIANDROSTERONE: CPT | Performed by: STUDENT IN AN ORGANIZED HEALTH CARE EDUCATION/TRAINING PROGRAM

## 2024-05-24 PROCEDURE — 86780 TREPONEMA PALLIDUM: CPT | Performed by: STUDENT IN AN ORGANIZED HEALTH CARE EDUCATION/TRAINING PROGRAM

## 2024-05-24 PROCEDURE — 83498 ASY HYDROXYPROGESTERONE 17-D: CPT | Performed by: STUDENT IN AN ORGANIZED HEALTH CARE EDUCATION/TRAINING PROGRAM

## 2024-05-24 PROCEDURE — 84403 ASSAY OF TOTAL TESTOSTERONE: CPT | Performed by: STUDENT IN AN ORGANIZED HEALTH CARE EDUCATION/TRAINING PROGRAM

## 2024-05-24 PROCEDURE — 83001 ASSAY OF GONADOTROPIN (FSH): CPT | Performed by: STUDENT IN AN ORGANIZED HEALTH CARE EDUCATION/TRAINING PROGRAM

## 2024-05-24 PROCEDURE — 99459 PELVIC EXAMINATION: CPT | Performed by: STUDENT IN AN ORGANIZED HEALTH CARE EDUCATION/TRAINING PROGRAM

## 2024-05-24 PROCEDURE — 82670 ASSAY OF TOTAL ESTRADIOL: CPT | Performed by: STUDENT IN AN ORGANIZED HEALTH CARE EDUCATION/TRAINING PROGRAM

## 2024-05-24 PROCEDURE — 87389 HIV-1 AG W/HIV-1&-2 AB AG IA: CPT | Performed by: STUDENT IN AN ORGANIZED HEALTH CARE EDUCATION/TRAINING PROGRAM

## 2024-05-24 PROCEDURE — 99214 OFFICE O/P EST MOD 30 MIN: CPT | Performed by: STUDENT IN AN ORGANIZED HEALTH CARE EDUCATION/TRAINING PROGRAM

## 2024-05-24 PROCEDURE — 84270 ASSAY OF SEX HORMONE GLOBUL: CPT | Performed by: STUDENT IN AN ORGANIZED HEALTH CARE EDUCATION/TRAINING PROGRAM

## 2024-05-24 PROCEDURE — 87340 HEPATITIS B SURFACE AG IA: CPT | Performed by: STUDENT IN AN ORGANIZED HEALTH CARE EDUCATION/TRAINING PROGRAM

## 2024-05-24 PROCEDURE — 84146 ASSAY OF PROLACTIN: CPT | Performed by: STUDENT IN AN ORGANIZED HEALTH CARE EDUCATION/TRAINING PROGRAM

## 2024-05-24 PROCEDURE — 86803 HEPATITIS C AB TEST: CPT | Performed by: STUDENT IN AN ORGANIZED HEALTH CARE EDUCATION/TRAINING PROGRAM

## 2024-05-24 NOTE — NURSING NOTE
"Initial /81 (BP Location: Right arm, Patient Position: Chair, Cuff Size: Adult Regular)   Pulse 67   Temp 98.5  F (36.9  C) (Tympanic)   Resp 18   Ht 1.727 m (5' 8\")   Wt 114.3 kg (252 lb)   LMP 05/13/2024 (Approximate)   BMI 38.32 kg/m   Estimated body mass index is 38.32 kg/m  as calculated from the following:    Height as of this encounter: 1.727 m (5' 8\").    Weight as of this encounter: 114.3 kg (252 lb). .    "

## 2024-05-24 NOTE — PROGRESS NOTES
[facility]  Gynecology Office Note    Assessment and Plan:   Antonia Marc, 28 year old , presented for pelvic pain since 2023, AUB, and primary infertility.    Pelvic pain  -With patient's pelvic ultrasound being normal and her pelvic exam not showing any adnexal or uterine tenderness, I don't think this pain is from a gynecologic source. With pain being constant and not associated with period, I don't think adenomyosis and endometriosis is likely at this time. I do think there is a component of pelvic floor muscle tenderness. Patient has a pelvic floor PT appointment scheduled already. Can continue flexeril, ibuprofen, tylenol.  -Recommended hydration, high fiber diet, and scheduling Miralax daily to treat and prevent constipation.    AUB  -Patient could have PCOS. Pelvic US recently did not show structural abnormalities.  Orders Placed This Encounter   Procedures    Prolactin    Estradiol    Follicle stimulating hormone    Hemoglobin A1c    DHEA sulfate    17 OH progesterone    Sex Hormone Binding Globulin    Testosterone Free and Total     STI screening  -She tested negative on chlamydia and gonorrhea on 2023.    HIV Antigen Antibody Combo Cascade    Hepatitis C Screen Reflex to HCV RNA Quant and Genotype    Hepatitis B surface antigen    Treponema Abs w Reflex to RPR and Titer     Aimee Yu MD  Obstetrics and Gynecology  [facility]   2024     -----------------------------------------------------------------------------------------------------------------------------------    HPI: Antonia Marc, 28 year old , presented for constant bilateral pelvic pain, midline pelvic numbness and tingling, and tailbone pain that started in 2023 after a prolonged period of coughing. The pain is sharp and dull at times. Ibuprofen does not help. Muscle relaxor (Flexeril), hydroxyzine, walking, and warm pack helps. Standing and sitting make the pain  worse. She feels bloated immediately after eating. Endorsed ongoing nausea and constipation (straining to poop but poop daily). No diarrhea. Endorsed urinary urgency, urinary hesitance, and increased urinary frequency. Denied dysuria.    Has not taken birth control pills for 2 years now and having sex about x10/month. Not gotten pregnant yet.  ROS: A 10 pt ROS was completed and found to be otherwise negative unless mentioned in the HPI.     OBHx:   OB History    Para Term  AB Living   0 0 0 0 0 0   SAB IAB Ectopic Multiple Live Births   0 0 0 0 0     GYN Hx:   Patient's last menstrual period was 2024 (approximate).  Menarche: 16 years old  Cycle: monthly, but irregular  Duration: last only 1 day with very light bleeding. Most recent bleeding last 5 days with heavy bleeding.  Dysmenorrhea: minimal. Denies pelvic pain with defecation or urination during menses.  Contraception: depo provera x2 doses (weight gain); pills x 5 years (hard to remember to take it daily)  Last Pap Smear: 2023 NILM  Abnormal Pap Smears: denied  Sexual Activity: currently sexually active with male. Dyspareunia with initial penertration and deep thrusting. Denies any vaginal dryness.  Denies any hx of STI or PID.     Denies any changes in vaginal discharge, vulvar itching/burning or pain.  Denies breast mass, breast lesion/rash, breast pain, or nipple discharge. Denies any facial/back/abdominal hair growth or facial/body acne. Chronic headache (x3/week). Denied visual field defects.     PMH:   Denies personal/family history of VTE, known thrombogenic mutation, ischemic heart disease, history of stroke, complicated valvular heart disease, breast cancer, migraine with aura, cirrhosis, hepatocellular adenoma or malignant hepatoma, or smoking ?15 cigarettes per day.  Past Medical History:   Diagnosis Date    CARDIOVASCULAR SCREENING; LDL GOAL LESS THAN 160     Closed fracture of unspecified part of radius with ulna(813.83)      Other diseases of trachea and bronchus, not elsewhere classified     tracheomalacia as a       Patient Active Problem List    Diagnosis Date Noted    Recurrent tonsillitis 2018     Priority: Medium    Recurrent major depressive disorder, in remission (H24) 2017     Priority: Medium    Anxiety 2017     Priority: Medium    JAZZY (generalized anxiety disorder) 2017     Priority: Medium    Mild episode of recurrent major depressive disorder (H24) 2017     Priority: Medium    Irregular menstrual bleeding 2012     Priority: Medium    Flat feet 2011     Priority: Medium     Past Surgical History:   Procedure Laterality Date    ESOPHAGOSCOPY, GASTROSCOPY, DUODENOSCOPY (EGD), COMBINED N/A 10/19/2018    Procedure: COMBINED ESOPHAGOSCOPY, GASTROSCOPY, DUODENOSCOPY (EGD), BIOPSY SINGLE OR MULTIPLE;  Surgeon: Heraclio Ziegler DO;  Location: [facility]    TONSILLECTOMY ADULT Bilateral 2018    Procedure: TONSILLECTOMY ADULT;  Bilateral Tonsillectomy;  Surgeon: Galilea Good MD;  Location: [facility]     Medications:   Current Outpatient Medications   Medication Sig Dispense Refill    acetaminophen (TYLENOL) 325 MG tablet Take 325-650 mg by mouth every 6 hours as needed for mild pain      cyclobenzaprine (FLEXERIL) 10 MG tablet Take 1 tablet (10 mg) by mouth 3 times daily as needed for muscle spasms or other (pelvic pain) 12 tablet 0    hydrOXYzine genesis (VISTARIL) 25 MG capsule Take 1 capsule (25 mg) by mouth 3 times daily as needed for other (pelvic pain) 90 capsule 1    sertraline (ZOLOFT) 25 MG tablet Take 1 tablet (25 mg) by mouth daily 90 tablet 1      Allergies   Allergen Reactions    Amoxicillin Hives    Azithromycin Palpitations    Oxycodone Nausea and Vomiting    Celexa [Citalopram] Nausea   Social History: denied smoking, alcohol use, or recreational drug use.   Family History: denied family history of endometriosis or PCOS.  Family History   Problem Relation Age of  "Onset    Hypertension Mother     Diabetes Mother     Hypertension Father     Crohn's Disease Father     Diabetes Father     Hypertension Maternal Grandmother     Anxiety Disorder Maternal Grandmother     Hypertension Maternal Grandfather     Anxiety Disorder Maternal Grandfather     Atrial fibrillation Maternal Grandfather      Physical Exam:   Vitals:    05/24/24 0803   BP: 129/81   BP Location: Right arm   Patient Position: Chair   Cuff Size: Adult Regular   Pulse: 67   Resp: 18   Temp: 98.5  F (36.9  C)   TempSrc: Tympanic   Weight: 114.3 kg (252 lb)   Height: 1.727 m (5' 8\")      Estimated body mass index is 38.32 kg/m  as calculated from the following:    Height as of this encounter: 1.727 m (5' 8\").    Weight as of this encounter: 114.3 kg (252 lb).    General appearance: well-hydrated, A&O x 3, no apparent distress  Lungs: Equal expansion bilaterally, no accessory muscle use  Heart: No heaves or thrills.   Constitutional: See vitals  Abdomen: Soft, non-tender, non-distended. No rebound, rigidity, or guarding.  Extremities: trace edema  Neuro: CN II-XII grossly intact  Genitourinary:  External genitalia: no erythema, no lesions. Pelvic floor muscle tenderness noted throughout during engagement of the muscle.  Urethral meatus appropriate location without lesions or prolapse  Urethra: No masses, tenderness, or scarring  Bladder no fullness, masses, or tenderness.  Anus and Perineum: Unremarkable, no visible lesions  Vagina: Normal, healthy pink mucosa without any lesions. Physiologic vaginal discharge.   Cervix: normal appearance, no cervical motion tenderness.   Uterus: normal size, shape and consistency.   Adnexa: no masses or tenderness bilaterally.    Labs/Imaging:   Narrative & Impression   EXAM: US PELVIS COMPLETE W TRANSVAGINAL AND DOPPLER LIMITED  LOCATION: [facility]  DATE: 11/19/2023     INDICATION: Pelvic pain  COMPARISON: None.  TECHNIQUE: Transabdominal scans were performed. " Endovaginal ultrasound was performed to better visualize the adnexa. Color flow with spectral Doppler and waveform analysis performed.     FINDINGS:     UTERUS: 6.5 x 3.3 x 4.3 cm. Normal in size and position with no masses.     ENDOMETRIUM: 9 mm. Normal smooth endometrium.     RIGHT OVARY: 4 x 2.7 x 2.4 cm. Normal with arterial and venous duplex flow identified.     LEFT OVARY: 2.8 x 1.8 x 1.9 cm. Normal with arterial and venous duplex flow identified.     No significant free fluid.                                                                      IMPRESSION:    1.  Normal pelvic ultrasound.

## 2024-05-24 NOTE — PATIENT INSTRUCTIONS
"Search \"eldon murillo md\" on [employer] for information about normal menses and ovarian cyst.    -\"What is a Normal Period?  A Fertility doctor Helps You Understand Your Period\"  https://www.[employer].com/watch?v=KrBvQWPIVmg&list=PLveZDgg3_HZ9_aFvRq8ksaosTlOPWC81z&index=14    -\"OVARIAN CYSTS: What Causes Ovarian Cysts?\"  https://www.[employer].com/watch?v=3ZUZ9P7SpGB    -\"Prenatal Vitamin: How Do You Choose? What Ingredients Should You Look For?\"  https://www.[employer].com/watch?v=vL2yAz0PLu1&list=PLveZDgg3_HZ9Uu7RnLSJ39LiCvVyjAt5E&index=5    -Cycle Tracking: How To Get Pregnant Naturally And Fast - TTC Using Apps and Fertility Tracking  https://www.[employer].com/watch?v=OQjFEuRa9Q1&list=PLveZDgg3_HZ9Uu7RnLSJ39LiCvVyjAt5E&index=12    -What Is The Best lubricant If Trying To Conceive? Which Lube Helps You Get Pregnant?  https://www.[employer].com/watch?v=ptlv5hI8Wqi&list=PLveZDgg3_HZ9Uu7RnLSJ39LiCvVyjAt5E&index=15    "

## 2024-05-25 LAB
ESTRADIOL SERPL-MCNC: 76 PG/ML
FSH SERPL IRP2-ACNC: 5.5 MIU/ML
HBV SURFACE AG SERPL QL IA: NONREACTIVE
HCV AB SERPL QL IA: NONREACTIVE
HIV 1+2 AB+HIV1 P24 AG SERPL QL IA: NONREACTIVE
PROLACTIN SERPL 3RD IS-MCNC: 12 NG/ML (ref 5–23)
SHBG SERPL-SCNC: 56 NMOL/L (ref 30–135)

## 2024-05-28 LAB — DHEA-S SERPL-MCNC: 156 UG/DL (ref 35–430)

## 2024-05-29 LAB
TESTOST FREE SERPL-MCNC: 0.34 NG/DL
TESTOST SERPL-MCNC: 27 NG/DL (ref 8–60)

## 2024-05-30 LAB — 17OHP SERPL-MCNC: 26 NG/DL

## 2024-06-01 ENCOUNTER — HEALTH MAINTENANCE LETTER (OUTPATIENT)
Age: 29
End: 2024-06-01

## 2024-06-06 ENCOUNTER — MYC REFILL (OUTPATIENT)
Dept: FAMILY MEDICINE | Facility: CLINIC | Age: 29
End: 2024-06-06
Payer: COMMERCIAL

## 2024-06-06 DIAGNOSIS — F41.1 GAD (GENERALIZED ANXIETY DISORDER): ICD-10-CM

## 2024-06-06 DIAGNOSIS — F33.1 MODERATE EPISODE OF RECURRENT MAJOR DEPRESSIVE DISORDER (H): ICD-10-CM

## 2024-06-06 RX ORDER — SERTRALINE HYDROCHLORIDE 25 MG/1
25 TABLET, FILM COATED ORAL DAILY
Qty: 90 TABLET | Refills: 1 | OUTPATIENT
Start: 2024-06-06

## 2024-06-20 ENCOUNTER — THERAPY VISIT (OUTPATIENT)
Dept: PHYSICAL THERAPY | Facility: CLINIC | Age: 29
End: 2024-06-20
Attending: EMERGENCY MEDICINE
Payer: COMMERCIAL

## 2024-06-20 DIAGNOSIS — R10.2 PELVIC PAIN: ICD-10-CM

## 2024-06-20 DIAGNOSIS — R10.2 PELVIC PAIN IN FEMALE: ICD-10-CM

## 2024-06-20 PROCEDURE — 97530 THERAPEUTIC ACTIVITIES: CPT | Mod: GP

## 2024-06-20 PROCEDURE — 97162 PT EVAL MOD COMPLEX 30 MIN: CPT | Mod: GP

## 2024-06-20 PROCEDURE — 97110 THERAPEUTIC EXERCISES: CPT | Mod: GP

## 2024-06-20 NOTE — PROGRESS NOTES
PHYSICAL THERAPY EVALUATION  Type of Visit: Evaluation     Fall Risk Screen:  Fall screen completed by: PT  Have you fallen 2 or more times in the past year?: No  Have you fallen and had an injury in the past year?: No  Timed Up and Go score (seconds): No  Is patient a fall risk?: No  Fall screen comments: No    Subjective       Presenting condition or subjective complaint: Pelvic Floor issues  Date of onset: 05/22/24    Relevant medical history:     Dates & types of surgery:      Prior diagnostic imaging/testing results: Other pelvic ultrasound   Prior therapy history for the same diagnosis, illness or injury: No      Living Environment  Social support: With a significant other or spouse   Type of home: Apartment/condo   Stairs to enter the home: No       Ramp: No   Stairs inside the home: No       Help at home: None  Equipment owned:       Employment: Yes   Hobbies/Interests:      Patient goals for therapy: go to the bathroom like normal     Objective      PELVIC EVALUATION  ADDITIONAL HISTORY:  Sex assigned at birth: Female  Gender identity: Female    Pronouns: She/Her Hers      Bladder History:  Feels bladder filling: No  Triggers for feeling of inability to wait to go to the bathroom: No    How long can you wait to urinate: shanice  Gets up at night to urinate: Yes once or twice  Can stop the flow of urine when urinating: Yes  Volume of urine usually released: Average   Other issues: Slow or hesitant urine stream  Number of bladder infections in last 12 months:    Fluid intake per day: 80 32    Medications taken for bladder: No     Activities causing urine leak: Hurrying to the bathroom due to a strong urge to urinate (pee)    Amount of urine typically leaked: small amount  Pads used to help with leaking: No        Bowel History:  Frequency of bowel movement: once a day  Consistency of stool: Hard    Ignores the urge to defecate: Sometimes  Other bowel issues: Pain when pooping;  Straining to have bowel movement  Length of time spent trying to have a bowel movement: minute or so    Sexual Function History:  Sexual orientation: Straight    Sexually active: Yes  Lubrication used: No No  Pelvic pain: Sitting; Initial penetration (rectal or vaginal); Deep penetration (rectal or vaginal)    Pain or difficulty with orgasms/erection/ejaculation: No    State of menopause: Perimenopause (have not gone through menopause yet)  Hormone medications: No      Are you currently pregnant: No  Have you been diagnosed with pelvic prolapse or abdominal separation: No  Do you get regular exercise: Yes  Have you tried pelvic floor strengthening exercises for 4 weeks: No  Do you have any history of trauma that is relevant to your care that you d like to share: No    Pelvic pain developed after severe cough. Periods irregular, but not painful.   Discussed reason for referral regarding pelvic health needs and external/internal pelvic floor muscle examination with patient/guardian.  Opportunity provided to ask questions and verbal consent for assessment and intervention was given.    PAIN: Pain Level at Rest: 0/10  Pain Level with Use: 10/10  Pain Quality: Shooting, Stabbing, and Unbearable  Pain is Worst: daytime  POSTURE: WFL  LUMBAR SCREEN: AROM WNL  HIP SCREEN:  Strength: WFL     PELVIC/SI SCREEN:  BREATHING SYMMETRY: Decreased rib cage mobility    PELVIC EXAM  External Visual Inspection:  At rest: Elevated perineal body  With voluntary pelvic floor contraction: Perineal elevation  Relaxation of PFM: Partial/delayed relaxation  With intra-abdominal pressure: Cough: No change    Integumentary:   Introitus: Unremarkable    Internal Digital Palpation:  Per Vagina:  Tenderness  Myofascial Resistance to Palpation: Firm, Taut  Digital Muscle Performance: P (Power): 2/5  E (Endurance): 2 seconds  Relaxation Post-Contraction: Partial/delayed relaxation    ABDOMINAL ASSESSMENT  Diastasis Rectus Abdominis (BIRD):    presence: No    Assessment & Plan   CLINICAL IMPRESSIONS  Medical Diagnosis: Pelvic Pain, Pelvic Pain in Female    Treatment Diagnosis: Pelvic Floor Dysfunction   Impression/Assessment: Patient is a 28 year old female with pelvic pain, and urinary urgency complaints.  The following significant findings have been identified: Pain, Decreased ROM/flexibility, Decreased joint mobility, Decreased strength, Impaired muscle performance, and Decreased activity tolerance. These impairments interfere with their ability to perform self care tasks, work tasks, recreational activities, household chores, household mobility, and community mobility as compared to previous level of function.     Clinical Decision Making (Complexity):  Clinical Presentation: Evolving/Changing  Clinical Presentation Rationale: based on medical and personal factors listed in PT evaluation  Clinical Decision Making (Complexity): Moderate complexity    PLAN OF CARE  Treatment Interventions:  Interventions: Manual Therapy, Neuromuscular Re-education, Therapeutic Activity, Therapeutic Exercise    Long Term Goals     PT Goal 1  Goal Identifier: Goal 1  Goal Description: The patient will report having 0/10 pain when voiding in order to promote continence, skin activity, and free movement in the community  Rationale: to maximize safety and independence with performance of ADLs and functional tasks;to maximize safety and independence with self cares;to maximize safety and independence within the community  Target Date: 09/12/24  PT Goal 2  Goal Identifier: Goal 2  Goal Description: The patient will be able to verbalize and demonstrate 2 effective pain management strategies in order to promote independence with self-care and full activity participation.  Rationale: to maximize safety and independence with self cares  Target Date: 09/12/24      Frequency of Treatment: 1 x per week  Duration of Treatment: 12 weeks    Education Assessment:   Learner/Method:  Patient;Pictures/Video;No Barriers to Learning;Demonstration;Listening;Reading    Risks and benefits of evaluation/treatment have been explained.   Patient/Family/caregiver agrees with Plan of Care.     Evaluation Time:           Signing Clinician: Skylar Oviedo PT

## 2024-06-27 ENCOUNTER — THERAPY VISIT (OUTPATIENT)
Dept: PHYSICAL THERAPY | Facility: CLINIC | Age: 29
End: 2024-06-27
Attending: EMERGENCY MEDICINE
Payer: COMMERCIAL

## 2024-06-27 DIAGNOSIS — R10.2 PELVIC PAIN: Primary | ICD-10-CM

## 2024-06-27 DIAGNOSIS — R10.2 PELVIC PAIN IN FEMALE: ICD-10-CM

## 2024-06-27 PROCEDURE — 97140 MANUAL THERAPY 1/> REGIONS: CPT | Mod: GP

## 2024-06-27 PROCEDURE — 97110 THERAPEUTIC EXERCISES: CPT | Mod: GP

## 2024-08-05 NOTE — PROGRESS NOTES
06/27/24 0500   Appointment Info   Signing clinician's name / credentials Skylar Oviedo PT, DPT   Total/Authorized Visits Eval and Treat   Visits Used 2   Medical Diagnosis Pelvic Pain, Pelvic Pain in Female   PT Tx Diagnosis Pelvic Floor Dysfunction   Quick Adds Pelvic Consent   Progress Note/Certification   Onset of illness/injury or Date of Surgery 05/22/24   Therapy Frequency 1 x per week   Predicted Duration 12 weeks   Progress Note Due Date 09/12/24   Progress Note Completed Date 06/20/24   GOALS   PT Goals 2   PT Goal 1   Goal Identifier Goal 1   Goal Description The patient will report having 0/10 pain when voiding in order to promote continence, skin activity, and free movement in the community   Rationale to maximize safety and independence with performance of ADLs and functional tasks;to maximize safety and independence with self cares;to maximize safety and independence within the community   Target Date 09/12/24   PT Goal 2   Goal Identifier Goal 2   Goal Description The patient will be able to verbalize and demonstrate 2 effective pain management strategies in order to promote independence with self-care and full activity participation.   Rationale to maximize safety and independence with self cares   Target Date 09/12/24   Subjective Report   Subjective Report Increased pain this past week   PT Modalities   PT Modalities Biofeedback;Cryotherapy;Hot Packs;Electrical Stimulation;Ultrasound   Treatment Interventions (PT)   Interventions Therapeutic Procedure/Exercise;Therapeutic Activity;Neuromuscular Re-education;Gait Training;Manual Therapy   Therapeutic Procedure/Exercise   Therapeutic Procedures: strength, endurance, ROM, flexibility minutes (35525) 27   Ther Proc 1 Crocodile breathing   Ther Proc 1 - Details for PNS upregulation   PTRx Ther Proc 1 Cobra   PTRx Ther Proc 1 - Details 2-5 min   PTRx Ther Proc 2 Prone Terminal Knee Extension   PTRx Ther Proc 2 - Details 1 x 20 per side    Skilled  Intervention Verbal and tactile cueing for muscle engagement, visual instruction   PTRx Ther Proc 3 Wall [place] Shoulder Abduction   PTRx Ther Proc 3 - Details 10   PTRx Ther Proc 4 Towel Gather   PTRx Ther Proc 4 - Details 1-2 min per side   Therapeutic Activity   Ther Act 1 Patient education about relevant anatomy and physiology   Ther Act 1 - Details PFM and their connections w/ abdomen, jaw, diaphragm, and hips. Role of PFM in managing pressure and voiding.   PTRx Ther Act 1 Normal Bowel Habits   PTRx Ther Act 1 - Details Patient education   PTRx Ther Act 2 Functions of Fiber   PTRx Ther Act 2 - Details Insoluable   Skilled Intervention Patient education to improve home management and PFM function   Manual Therapy   Manual Therapy: Mobilization, MFR, MLD, friction massage minutes (03706) 13   Manual Therapy 1 STM to 1st and 3rd PFM layers,   Manual Therapy 1 - Details Significant pain in posterior pelvic floor   Skilled Intervention manual therapy to decrease pain and improve function   Education   Learner/Method Patient;Pictures/Video;No Barriers to Learning;Demonstration;Listening;Reading   Plan   Home program See PTRX   Plan for next session Reassess and progress as tolerated   Total Session Time   Timed Code Treatment Minutes 40   Total Treatment Time (sum of timed and untimed services) 40         DISCHARGE  Reason for Discharge: Patient has failed to schedule further appointments.    Equipment Issued: n/a    Discharge Plan: Patient to continue home program.    Referring Provider:  Antonio Snyder

## 2024-08-08 ENCOUNTER — MYC MEDICAL ADVICE (OUTPATIENT)
Dept: FAMILY MEDICINE | Facility: CLINIC | Age: 29
End: 2024-08-08
Payer: COMMERCIAL

## 2024-08-08 DIAGNOSIS — F33.1 MODERATE EPISODE OF RECURRENT MAJOR DEPRESSIVE DISORDER (H): ICD-10-CM

## 2024-08-08 DIAGNOSIS — F41.1 GAD (GENERALIZED ANXIETY DISORDER): ICD-10-CM

## 2024-08-08 RX ORDER — ESCITALOPRAM OXALATE 5 MG/1
5 TABLET ORAL DAILY
Qty: 30 TABLET | Refills: 3 | Status: SHIPPED | OUTPATIENT
Start: 2024-08-08

## 2024-08-08 NOTE — TELEPHONE ENCOUNTER
Roma,    Please see [employer] message below and advise.     Preferred [facility]    Thank you  Mauri WINSTON RN  [facility]

## 2024-09-18 ENCOUNTER — ANCILLARY PROCEDURE (OUTPATIENT)
Dept: GENERAL RADIOLOGY | Facility: CLINIC | Age: 29
End: 2024-09-18
Attending: NURSE PRACTITIONER
Payer: COMMERCIAL

## 2024-09-18 ENCOUNTER — OFFICE VISIT (OUTPATIENT)
Dept: FAMILY MEDICINE | Facility: CLINIC | Age: 29
End: 2024-09-18
Payer: COMMERCIAL

## 2024-09-18 VITALS
SYSTOLIC BLOOD PRESSURE: 118 MMHG | RESPIRATION RATE: 16 BRPM | TEMPERATURE: 98.5 F | BODY MASS INDEX: 37.41 KG/M2 | HEIGHT: 68 IN | DIASTOLIC BLOOD PRESSURE: 80 MMHG | OXYGEN SATURATION: 99 % | WEIGHT: 246.8 LBS | HEART RATE: 77 BPM

## 2024-09-18 DIAGNOSIS — M25.461 EFFUSION OF RIGHT KNEE: ICD-10-CM

## 2024-09-18 DIAGNOSIS — M25.461 EFFUSION OF RIGHT KNEE: Primary | ICD-10-CM

## 2024-09-18 DIAGNOSIS — K64.4 EXTERNAL HEMORRHOIDS: ICD-10-CM

## 2024-09-18 PROCEDURE — 99214 OFFICE O/P EST MOD 30 MIN: CPT | Performed by: NURSE PRACTITIONER

## 2024-09-18 PROCEDURE — 73562 X-RAY EXAM OF KNEE 3: CPT | Mod: TC | Performed by: RADIOLOGY

## 2024-09-18 RX ORDER — METHOCARBAMOL 500 MG/1
500 TABLET, FILM COATED ORAL 3 TIMES DAILY PRN
Qty: 90 TABLET | Refills: 1 | Status: SHIPPED | OUTPATIENT
Start: 2024-09-18

## 2024-09-18 RX ORDER — HYDROCORTISONE 25 MG/G
CREAM TOPICAL 2 TIMES DAILY PRN
Qty: 30 G | Refills: 0 | Status: SHIPPED | OUTPATIENT
Start: 2024-09-18

## 2024-09-18 ASSESSMENT — PAIN SCALES - GENERAL: PAINLEVEL: NO PAIN (0)

## 2024-09-18 NOTE — PATIENT INSTRUCTIONS
Start Colace stool softener 100 mg twice daily as needed for constipation  Continue fiber supplement    Try Anusol cream twice daily as needed for rectal discomfort, pain due to hemorrhoid    Knee Xray and ortho consult

## 2024-09-18 NOTE — PROGRESS NOTES
Assessment & Plan     Effusion of right knee  -no recent injury or trauma, moderate knee edema, recommended Xray and ortho consult   - Orthopedic  Referral; Future  - XR Knee Right 3 Views; Future  - methocarbamol (ROBAXIN) 500 MG tablet; Take 1 tablet (500 mg) by mouth 3 times daily as needed for muscle spasms.    External hemorrhoids  -start Colace 100 mg twice daily as needed for constipation  -continue fiber supplement   - hydrocortisone, Perianal, (HYDROCORTISONE) 2.5 % cream; Place rectally 2 times daily as needed for hemorrhoids.            Subjective   Antonia is a 28 year old, presenting for the following health issues:  Knee Pain and Rectal Problem        9/18/2024     3:37 PM   Additional Questions   Roomed by antonia   Accompanied by self         9/18/2024     3:37 PM   Patient Reported Additional Medications   Patient reports taking the following new medications none     History of Present Illness       Reason for visit:  Knee pain  Symptom onset:  More than a month  Symptoms include:  Creaking cracking in my knee, throbbing sensation, heaviness in my knee, feel like i cant fully extend my knee  Symptom intensity:  Severe  Symptom progression:  Staying the same  Had these symptoms before:  No  What makes it worse:  Walking on it, bending my knee  What makes it better:  Ice   She is taking medications regularly.       Hemorrhoids  Onset/Duration: 2 months   Description:   Crystal-anal lump: YES  Pain: YES  Itching: No  Accompanying Signs & Symptoms:  Blood in stool: No  Changes in stool pattern: YES  History:   Any previous GI studies done:none  Family History of colon cancer: No  Precipitating factors:   None  Alleviating factors:  None  Therapies tried and outcome: fiber supplements      Review of Systems  Constitutional, HEENT, cardiovascular, pulmonary, gi and gu systems are negative, except as otherwise noted.      Objective    /80   Pulse 77   Temp 98.5  F (36.9  C) (Tympanic)    "Resp 16   Ht 1.727 m (5' 8\")   Wt 111.9 kg (246 lb 12.8 oz)   SpO2 99%   BMI 37.53 kg/m    Body mass index is 37.53 kg/m .  Physical Exam   GENERAL: alert and no distress  RECTAL (female): normal sphincter tone, no rectal masses, external non-thrombosed hemorrhoid   MS: right knee moderate edema, ACL intact, limited ROM due to pain  SKIN: no suspicious lesions or rashes  NEURO: Normal strength and tone, mentation intact and speech normal  PSYCH: mentation appears normal, affect normal/bright            Signed Electronically by: ROS Hansen CNP    "

## 2024-09-19 ENCOUNTER — PATIENT OUTREACH (OUTPATIENT)
Dept: CARE COORDINATION | Facility: CLINIC | Age: 29
End: 2024-09-19

## 2024-09-23 ENCOUNTER — PATIENT OUTREACH (OUTPATIENT)
Dept: CARE COORDINATION | Facility: CLINIC | Age: 29
End: 2024-09-23
Payer: COMMERCIAL

## 2024-10-31 ENCOUNTER — OFFICE VISIT (OUTPATIENT)
Dept: URGENT CARE | Facility: URGENT CARE | Age: 29
End: 2024-10-31
Payer: COMMERCIAL

## 2024-10-31 VITALS
RESPIRATION RATE: 14 BRPM | TEMPERATURE: 98.4 F | OXYGEN SATURATION: 100 % | HEIGHT: 68 IN | HEART RATE: 65 BPM | WEIGHT: 242 LBS | BODY MASS INDEX: 36.68 KG/M2 | SYSTOLIC BLOOD PRESSURE: 122 MMHG | DIASTOLIC BLOOD PRESSURE: 78 MMHG

## 2024-10-31 DIAGNOSIS — R50.9 FEBRILE ILLNESS, ACUTE: ICD-10-CM

## 2024-10-31 DIAGNOSIS — J02.9 ACUTE SORE THROAT: Primary | ICD-10-CM

## 2024-10-31 LAB
DEPRECATED S PYO AG THROAT QL EIA: NEGATIVE
FLUAV AG SPEC QL IA: NEGATIVE
FLUBV AG SPEC QL IA: NEGATIVE

## 2024-10-31 PROCEDURE — 87651 STREP A DNA AMP PROBE: CPT | Performed by: PHYSICIAN ASSISTANT

## 2024-10-31 PROCEDURE — 99213 OFFICE O/P EST LOW 20 MIN: CPT | Performed by: PHYSICIAN ASSISTANT

## 2024-10-31 PROCEDURE — 87804 INFLUENZA ASSAY W/OPTIC: CPT | Performed by: PHYSICIAN ASSISTANT

## 2024-10-31 ASSESSMENT — ENCOUNTER SYMPTOMS
SORE THROAT: 1
FEVER: 1
COUGH: 0
RHINORRHEA: 0
VOMITING: 0

## 2024-10-31 NOTE — PROGRESS NOTES
Assessment & Plan:        ICD-10-CM    1. Acute sore throat  J02.9 Streptococcus A Rapid Screen w/Reflex to PCR - Clinic Collect     Group A Streptococcus PCR Throat Swab      2. Fever  R50.9 Influenza A & B Antigen - Clinic Collect            Plan/Clinical Decision Making:    Patient with acute ST and fever. Erythema of throat, otherwise normal exam.   Strep and flu negative. Strep PCR pending.   Rest, fluids, Tylenol, ibuprofen, rest, fluids.       Return if symptoms worsen or fail to improve, for in 5-7 days.     At the end of the encounter, I discussed results, diagnosis, medications. Discussed red flags for immediate return to clinic/ER, as well as indications for follow up if no improvement. Patient understood and agreed to plan. Patient was stable for discharge.        Fidelina Cornejo PA-C on 10/31/2024 at 4:21 PM          Subjective:     HPI:    Antonia is a 28 year old female who presents to clinic today for the following health issues:  Chief Complaint   Patient presents with    Urgent Care     Sore throat x 2 days - worst sore throat she has ever had.  She has had a fever 100.3 , fatigue.  She would like to be tested for strep and flu     HPI    ST x 2 days. Has had fever.   Has had pain with talking, swallowing.   Has redness in back of throat.   Using throat drops. Patient taking Tylenol cold and flu and Theraflu to help with symptoms.     Review of Systems   Constitutional:  Positive for fever.   HENT:  Positive for sore throat. Negative for congestion and rhinorrhea.    Respiratory:  Negative for cough.    Gastrointestinal:  Negative for vomiting.         Patient Active Problem List   Diagnosis    Flat feet    Irregular menstrual bleeding    Recurrent major depressive disorder, in remission (H)    Anxiety    JAZZY (generalized anxiety disorder)    Mild episode of recurrent major depressive disorder (H)    Recurrent tonsillitis        Past Medical History:   Diagnosis Date    CARDIOVASCULAR  "SCREENING; LDL GOAL LESS THAN 160     Closed fracture of unspecified part of radius with ulna(813.83)     Other diseases of trachea and bronchus, not elsewhere classified     tracheomalacia as a         Social History     Tobacco Use    Smoking status: Never     Passive exposure: Never    Smokeless tobacco: Never    Tobacco comments:     no exporure   Substance Use Topics    Alcohol use: No             Objective:     Vitals:    10/31/24 1607   BP: 122/78   Pulse: 65   Resp: 14   Temp: 98.4  F (36.9  C)   TempSrc: Oral   SpO2: 100%   Weight: 109.8 kg (242 lb)   Height: 1.727 m (5' 8\")         Physical Exam   EXAM:   Pleasant, alert, appropriate appearance. NAD.  Head Exam: Normocephalic, atraumatic.  Eye Exam:  , non icteric/injection.    Ear Exam: TMs grey without bulging. Normal canals.  Normal pinna.  Nose Exam: Normal external nose.    OroPharynx Exam:  Moist mucous membranes. Positive erythema, pharynx without exudate or hypertrophy.  Neck/Thyroid Exam:  No LAD.   Chest/Respiratory Exam: CTAB.  Cardiovascular Exam: RRR. No murmur or rubs.      Results:  Results for orders placed or performed in visit on 10/31/24   Streptococcus A Rapid Screen w/Reflex to PCR - Clinic Collect     Status: Normal    Specimen: Throat; Swab   Result Value Ref Range    Group A Strep antigen Negative Negative   Influenza A & B Antigen - Clinic Collect     Status: Normal    Specimen: Nose; Swab   Result Value Ref Range    Influenza A antigen Negative Negative    Influenza B antigen Negative Negative    Narrative    Test results must be correlated with clinical data. If necessary, results should be confirmed by a molecular assay or viral culture.       "

## 2024-11-01 LAB — GROUP A STREP BY PCR: NOT DETECTED

## 2024-11-04 ENCOUNTER — E-VISIT (OUTPATIENT)
Dept: FAMILY MEDICINE | Facility: CLINIC | Age: 29
End: 2024-11-04
Payer: COMMERCIAL

## 2024-11-04 DIAGNOSIS — H10.021 PINK EYE DISEASE OF RIGHT EYE: Primary | ICD-10-CM

## 2024-11-04 DIAGNOSIS — H10.31 ACUTE BACTERIAL CONJUNCTIVITIS OF RIGHT EYE: ICD-10-CM

## 2024-11-04 PROCEDURE — 99207 PR NO BILLABLE SERVICE THIS VISIT: CPT | Performed by: NURSE PRACTITIONER

## 2024-11-04 RX ORDER — TOBRAMYCIN 3 MG/ML
1-2 SOLUTION/ DROPS OPHTHALMIC EVERY 4 HOURS
Qty: 5 ML | Refills: 0 | Status: SHIPPED | OUTPATIENT
Start: 2024-11-04

## 2024-11-04 NOTE — PATIENT INSTRUCTIONS
Thank you for choosing us for your care. I have placed an order for a prescription of Tobrex eye drops so that you can start treatment. View your full visit summary for details by clicking on the link below. Your pharmacist will able to address any questions you may have about the medication.     If you re not feeling better within 2-3 days, please schedule an appointment.  You can schedule an appointment right here in [facility], or call [phone]  If the visit is for the same symptoms as your eVisit, we ll refund the cost of your eVisit if seen within seven days.    Pinkeye: Care Instructions  Overview     Pinkeye is redness and swelling of the eye surface and the conjunctiva (the lining of the eyelid and the covering of the white part of the eye). Pinkeye is also called conjunctivitis. Pinkeye is often caused by infection with bacteria or a virus. Dry air, allergies, smoke, and chemicals are other common causes.  Pinkeye often gets better on its own in 7 to 10 days. Antibiotics only help if the pinkeye is caused by bacteria. Pinkeye caused by infection spreads easily. If an allergy or chemical is causing pinkeye, it will not go away unless you can avoid whatever is causing it.  Follow-up care is a key part of your treatment and safety. Be sure to make and go to all appointments, and call your doctor if you are having problems. It's also a good idea to know your test results and keep a list of the medicines you take.  How can you care for yourself at home?  Wash your hands often. Always wash them before and after you treat pinkeye or touch your eyes or face.  Use moist cotton or a clean, wet cloth to remove crust. Wipe from the inside corner of the eye to the outside. Use a clean part of the cloth for each wipe.  Put cold or warm wet cloths on your eye a few times a day if the eye hurts.  Do not wear contact lenses or eye makeup until the pinkeye is gone. Throw away any eye makeup you were using when you got  "pinkeye. Clean your contacts and storage case. If you wear disposable contacts, use a new pair when your eye has cleared and it is safe to wear contacts again.  If the doctor gave you antibiotic ointment or eyedrops, use them as directed. Use the medicine for as long as instructed, even if your eye starts looking better soon. Keep the bottle tip clean, and do not let it touch the eye area.  To put in eyedrops or ointment:  Tilt your head back, and pull your lower eyelid down with one finger.  Drop or squirt the medicine inside the lower lid.  Close your eye for 30 to 60 seconds to let the drops or ointment move around.  Do not touch the ointment or dropper tip to your eyelashes or any other surface.  Do not share towels, pillows, or washcloths while you have pinkeye.  When should you call for help?   Call your doctor now or seek immediate medical care if:    You have pain in your eye, not just irritation on the surface.     You have a change in vision or loss of vision.     You have an increase in discharge from the eye.     Your eye has not started to improve or begins to get worse within 48 hours after you start using antibiotics.     Pinkeye lasts longer than 7 days.   Watch closely for changes in your health, and be sure to contact your doctor if you have any problems.  Where can you learn more?  Go to https://www.[employer].net/patiented  Enter Y392 in the search box to learn more about \"Pinkeye: Care Instructions.\"  Current as of: June 5, 2023  Content Version: 14.2 2024 [facility] [employer].   Care instructions adapted under license by your healthcare professional. If you have questions about a medical condition or this instruction, always ask your healthcare professional. [employer] disclaims any warranty or liability for your use of this information.    "

## 2024-12-18 ENCOUNTER — TELEPHONE (OUTPATIENT)
Dept: OBGYN | Facility: CLINIC | Age: 29
End: 2024-12-18
Payer: COMMERCIAL

## 2024-12-18 NOTE — TELEPHONE ENCOUNTER
Called and left voicemail with callback number.     Karina Oliveira RN  [facility]

## 2024-12-18 NOTE — TELEPHONE ENCOUNTER
[facility] Call Center    Phone Message    May a detailed message be left on voicemail: yes     Reason for Call: Appointment Intake    Referring Provider Name: NA  Diagnosis and/or Symptoms: First prenatal care  Pt is unsure of LMP.. thinks mid November but not positive.      Action Taken: Message routed to:  Other: RENARD BAUTISTA    Travel Screening: Not Applicable     Date of Service:

## 2024-12-19 NOTE — TELEPHONE ENCOUNTER
Spoke with patient:    LMP around 11/14 but not certain on exact date.    Pt has short cycles (2-3 days) and irregular lengths.     Pt reports she did have period in October.     Positive HPT on 12/17.     NPN appointments scheduled.     MERCEDEZ Madrigal

## 2025-01-07 ENCOUNTER — VIRTUAL VISIT (OUTPATIENT)
Dept: OBGYN | Facility: CLINIC | Age: 30
End: 2025-01-07
Payer: COMMERCIAL

## 2025-01-07 VITALS — WEIGHT: 240 LBS | HEIGHT: 69 IN | BODY MASS INDEX: 35.55 KG/M2

## 2025-01-07 DIAGNOSIS — Z34.01 ENCOUNTER FOR SUPERVISION OF NORMAL FIRST PREGNANCY IN FIRST TRIMESTER: Primary | ICD-10-CM

## 2025-01-07 LAB
ABO + RH BLD: NORMAL
BLD GP AB SCN SERPL QL: NEGATIVE
SPECIMEN EXP DATE BLD: NORMAL

## 2025-01-07 PROCEDURE — 99207 PR NO CHARGE NURSE ONLY: CPT | Mod: 93

## 2025-01-07 NOTE — PATIENT INSTRUCTIONS
Learning About Pregnancy  Your Care Instructions     Your health in the early weeks of your pregnancy is particularly important for your baby's health. Take good care of yourself. Anything you do that harms your body can also harm your baby.  Make sure to go to all of your doctor appointments. Regular checkups will help keep you and your baby healthy.  How can you care for yourself at home?  Diet    Choose healthy foods like fruits, vegetables, whole grains, lean proteins, and healthy fats.     Choose foods that are good sources of calcium, iron, and folate. You can try dairy products, dark leafy greens, fortified orange juice and cereals, almonds, broccoli, dried fruit, and beans.     Do not skip meals or go for many hours without eating. If you are nauseated, try to eat a small, healthy snack every 2 to 3 hours.     Avoid fish that are high in mercury. These include shark, swordfish, lacy mackerel, marlin, orange roughy, and bigeye tuna, as well as tilefish from the [facility].     It's okay to eat up to 8 to 12 ounces a week of fish that are low in mercury or up to 4 ounces a week of fish that have medium levels of mercury. Some fish that are low in mercury are salmon, shrimp, canned light tuna, cod, and tilapia. Some fish that have medium levels of mercury are halibut and white albacore tuna.     Drink plenty of fluids. If you have kidney, heart, or liver disease and have to limit fluids, talk with your doctor before you increase the amount of fluids you drink.     Limit caffeine to about 200 to 300 mg per day. On average, a cup of brewed coffee has around 80 to 100 mg of caffeine.     Do not drink alcohol, such as beer, wine, or hard liquor.     Take a multivitamin that contains at least 400 micrograms (mcg) of folic acid to help prevent birth defects. Fortified cereal and whole wheat bread are good additional sources of folic acid.     Increase the calcium in your diet. Try to drink a quart of skim milk  each day. You may also take calcium supplements and choose foods such as cheese and yogurt.   Lifestyle    Make sure you go to your follow-up appointments.     Get plenty of rest. You may be unusually tired while you are pregnant.     Get at least 30 minutes of exercise on most days of the week. Walking is a good choice. If you have not exercised in the past, start out slowly. Take several short walks each day.     Do not smoke. If you need help quitting, talk to your doctor about stop-smoking programs. These can increase your chances of quitting for good.     Do not touch cat feces or litter boxes. Also, wash your hands after you handle raw meat, and fully cook all meat before you eat it. Wear gloves when you work in the yard or garden, and wash your hands well when you are done. Cat feces, raw or undercooked meat, and contaminated dirt can cause an infection that may harm your baby or lead to a miscarriage.     Avoid things that can make your body too hot and may be harmful to your baby, such as a hot tub or sauna. Or talk with your doctor before doing anything that raises your body temperature. Your doctor can tell you if it's safe.     Avoid chemical fumes, paint fumes, or poisons.     Do not use illegal drugs, marijuana, or alcohol.   Medicines    Review all of your medicines with your doctor. Some of your routine medicines may need to be changed to protect your baby.     Use acetaminophen (Tylenol) to relieve minor problems, such as a mild headache or backache or a mild fever with cold symptoms. Do not use nonsteroidal anti-inflammatory drugs (NSAIDs), such as ibuprofen (Advil, Motrin) or naproxen (Aleve), unless your doctor says it is okay.     Do not take two or more pain medicines at the same time unless the doctor told you to. Many pain medicines have acetaminophen, which is Tylenol. Too much acetaminophen (Tylenol) can be harmful.     Take your medicines exactly as prescribed. Call your doctor if you  "think you are having a problem with your medicine.   To manage morning sickness    Keep food in your stomach, but not too much at once. Try eating five or six small meals a day instead of three large meals.     For nausea when you wake up, eat a small snack, such as a couple of crackers or pretzels, before rising. Allow a few minutes for your stomach to settle before you slowly get up.     Try to avoid smells and foods that make you feel nauseated. High-fat or greasy foods, milk, and coffee may make nausea worse. Some foods that may be easier to tolerate include cold, spicy, sour, and salty foods.     Drink enough fluids. Water and other caffeine-free drinks are good choices.     Take your prenatal vitamins at night on a full stomach.     Try foods and drinks made with estee. Estee may help with nausea.     Get lots of rest. Morning sickness may be worse when you are tired.     Talk to your doctor about over-the-counter products, such as vitamin B6 or doxylamine, to help relieve symptoms.     Try a P6 acupressure wrist band. These anti-nausea wristbands help some people.   Follow-up care is a key part of your treatment and safety. Be sure to make and go to all appointments, and call your doctor if you are having problems. It's also a good idea to know your test results and keep a list of the medicines you take.  Where can you learn more?  Go to https://www.[employer].net/patiented  Enter E868 in the search box to learn more about \"Learning About Pregnancy.\"  Current as of: April 30, 2024  Content Version: 14.3    2024 [employer].   Care instructions adapted under license by your healthcare professional. If you have questions about a medical condition or this instruction, always ask your healthcare professional. [employer] disclaims any warranty or liability for your use of this information.    Weeks 6 to 10 of Your Pregnancy: Care Instructions  During these weeks of pregnancy, your body " "goes through many changes. You may start to feel different, both in your body and your emotions. Each pregnancy is different, so there's no \"right\" way to feel. These early weeks are a time to make healthy choices for you and your pregnancy.    Take a daily prenatal vitamin. Choose one with folic acid in it.   Avoid alcohol, tobacco, and drugs (including marijuana). If you need help quitting, talk to your doctor.     Drink plenty of liquids.  Be sure to drink enough water. And limit sodas, other sweetened drinks, and caffeine.     Choose foods that are good sources of calcium, iron, and folate.  You can try dairy products, dark leafy greens, fortified orange juice and cereals, almonds, broccoli, dried fruit, and beans.     Avoid foods that may be harmful.  Don't eat raw meat, deli meat, raw seafood, or raw eggs. Avoid soft cheese and unpasteurized dairy, like Brie and blue cheese. And don't eat fish that contains a lot of mercury, like shark and swordfish.     Don't touch merrick litter or cat poop.  They can cause an infection that could be harmful during pregnancy.     Avoid things that can make your body too hot.  For example, avoid hot tubs and saunas.     Soothe morning sickness.  Try eating 5 or 6 small meals a day, getting some fresh air, or using leonila to control symptoms.     Ask your doctor about flu and COVID-19 shots.  Getting them can help protect against infection.   Follow-up care is a key part of your treatment and safety. Be sure to make and go to all appointments, and call your doctor if you are having problems. It's also a good idea to know your test results and keep a list of the medicines you take.  Where can you learn more?  Go to [email or web address]://[email or web address]/[email or web address]  Enter G112 in the search box to learn more about \"Weeks 6 to 10 of Your Pregnancy: Care Instructions.\"  Current as of: April 30, 2024  Content Version: 14.3    2024 [facility] [employer].   Care instructions adapted under license " by your healthcare professional. If you have questions about a medical condition or this instruction, always ask your healthcare professional. [employer] ([employer]) disclaims any warranty or liability for your use of this information.       Pregnancy: Managing Morning Sickness (01:48)  Your health professional recommends that you watch this short online health video.  Learn how to manage morning sickness during pregnancy.   Purpose: Learn how to manage morning sickness during pregnancy.  Goal: Learn how to manage morning sickness during pregnancy.    Watch: Scan the QR code or visit the link to view video       [email or web address]  Current as of: April 30, 2024  Content Version: 14.3    2024 [employer] ([employer]).   Care instructions adapted under license by your healthcare professional. If you have questions about a medical condition or this instruction, always ask your healthcare professional. [employer] ([employer]) disclaims any warranty or liability for your use of this information.    Pregnancy and Heartburn: Care Instructions  Overview     Heartburn is a common problem during pregnancy.  Heartburn happens when stomach acid backs up into the tube that carries food to the stomach. This tube is called the esophagus. Early in pregnancy, heartburn is caused by hormone changes that slow down digestion. Later on, it's also caused by the large uterus pushing up on the stomach.  Even though you can't fix the cause, there are things you can do to get relief. Treating heartburn during pregnancy focuses first on making lifestyle changes, like changing what and how you eat, and on taking medicines.  Heartburn usually improves or goes away after childbirth.  Follow-up care is a key part of your treatment and safety. Be sure to make and go to all appointments, and call your doctor if you are having problems. It's also a good idea to know your test results and keep a list of the medicines you take.  How can you  "care for yourself at home?  Eat small, frequent meals.  Avoid foods that make your symptoms worse, such as chocolate, peppermint, and spicy foods. Avoid drinks with caffeine, such as coffee, tea, and sodas.  Avoid bending over or lying down after meals.  Take a short walk after you eat.  If heartburn is a problem at night, do not eat for 2 hours before bedtime.  Take antacids like Mylanta, Maalox, Rolaids, or Tums. Do not take antacids that have sodium bicarbonate, magnesium trisilicate, or aspirin. Be careful when you take over-the-counter antacid medicines. Many of these medicines have aspirin in them. While you are pregnant, do not take aspirin or medicines that contain aspirin unless your doctor says it is okay.  If you're not getting relief, talk to your doctor. You may be able to take a stronger acid-reducing medicine.  When should you call for help?   Call your doctor now or seek immediate medical care if:    You have new or worse belly pain.     You are vomiting.   Watch closely for changes in your health, and be sure to contact your doctor if:    You have new or worse symptoms of reflux.     You are losing weight.     You have trouble or pain swallowing.     You do not get better as expected.   Where can you learn more?  Go to [email or web address]://[email or web address]/[email or web address]  Enter U946 in the search box to learn more about \"Pregnancy and Heartburn: Care Instructions.\"  Current as of: April 30, 2024  Content Version: 14.3    2024 [employer].   Care instructions adapted under license by your healthcare professional. If you have questions about a medical condition or this instruction, always ask your healthcare professional. [employer] disclaims any warranty or liability for your use of this information.    Constipation: Care Instructions  Overview     Constipation means that you have a hard time passing stools (bowel movements). People pass stools from 3 times a day to once every 3 days. What is " normal for you may be different. Constipation may occur with pain in the rectum and cramping. The pain may get worse when you try to pass stools. Sometimes there are small amounts of bright red blood on toilet paper or the surface of stools. This is because of enlarged veins near the rectum (hemorrhoids).  A few changes in your diet and lifestyle may help you avoid ongoing constipation. Your doctor may also prescribe medicine to help loosen your stool.  Some medicines can cause constipation. These include pain medicines and antidepressants. Tell your doctor about all the medicines you take. Your doctor may want to make a medicine change to ease your symptoms.  Follow-up care is a key part of your treatment and safety. Be sure to make and go to all appointments, and call your doctor if you are having problems. It's also a good idea to know your test results and keep a list of the medicines you take.  How can you care for yourself at home?  Drink plenty of fluids. If you have kidney, heart, or liver disease and have to limit fluids, talk with your doctor before you increase the amount of fluids you drink.  Include high-fiber foods in your diet each day. These include fruits, vegetables, beans, and whole grains.  Get at least 30 minutes of exercise on most days of the week. Walking is a good choice. You also may want to do other activities, such as running, swimming, cycling, or playing tennis or team sports.  Take a fiber supplement, such as Citrucel or Metamucil, every day. Read and follow all instructions on the label.  Schedule time each day for a bowel movement. A daily routine may help. Take your time having a bowel movement, but don't sit for more than 10 minutes at a time. And don't strain too much.  Support your feet with a small step stool when you sit on the toilet. This helps flex your hips and places your pelvis in a squatting position.  Your doctor may recommend an over-the-counter laxative to relieve  "your constipation. Examples are Milk of Magnesia and MiraLax. Read and follow all instructions on the label. Do not use laxatives on a long-term basis.  When should you call for help?   Call your doctor now or seek immediate medical care if:    You have new or worse belly pain.     You have new or worse nausea or vomiting.     You have blood in your stools.   Watch closely for changes in your health, and be sure to contact your doctor if:    Your constipation is getting worse.     You do not get better as expected.   Where can you learn more?  Go to https://www.[employer].net/patiented  Enter P343 in the search box to learn more about \"Constipation: Care Instructions.\"  Current as of: October 19, 2023  Content Version: 14.3    2024 [employer].   Care instructions adapted under license by your healthcare professional. If you have questions about a medical condition or this instruction, always ask your healthcare professional. [employer] disclaims any warranty or liability for your use of this information.    Learning About High-Iron Foods  What foods are high in iron?     The foods you eat contain nutrients, such as vitamins and minerals. Iron is a nutrient. Your body needs the right amount to stay healthy and work as it should. You can use the list below to help you make choices about which foods to eat.  Here are some foods that contain iron. They have 1 to 2 milligrams of iron per serving.  Fruits  Figs (dried), 5 figs  Vegetables  Asparagus (canned), 6 wilson  Jose, beet, Swiss chard, or turnip greens, 1 cup  Dried peas, cooked,   cup  Seaweed, spirulina (dried),   cup  Spinach, (cooked)   cup or (raw) 1 cup  Grains  Cereals, fortified with iron, 1 cup  Grits (instant, cooked), fortified with iron,   cup  Meats and other protein foods  Beans (kidney, lima, navy, white), canned or cooked,   cup  Beef or lamb, 3 oz  Chicken giblets, 3 oz  Chickpeas (garbanzo beans),   cup  Liver of beef, " "lamb, or pork, 3 oz  Oysters (cooked), 3 oz  Sardines (canned), 3 oz  Soybeans (boiled),   cup  Tofu (firm),   cup  Work with your doctor to find out how much of this nutrient you need. Depending on your health, you may need more or less of it in your diet.  Where can you learn more?  Go to https://www.[employer].net/patiented  Enter R005 in the search box to learn more about \"Learning About High-Iron Foods.\"  Current as of: September 20, 2023  Content Version: 14.3    2024 [employer].   Care instructions adapted under license by your healthcare professional. If you have questions about a medical condition or this instruction, always ask your healthcare professional. [employer] disclaims any warranty or liability for your use of this information.    Learning About Fetal Ultrasound Results  What is a fetal ultrasound?     Fetal ultrasound is a test that lets your doctor see an image of your baby. Your doctor learns information about your baby from this picture. You may find out, for example, if you are having a boy or a girl. But the main reason you have this test is to get information about your baby's health.  (You may hear your baby called a fetus. This is a common medical term for a baby that's growing in the mother's uterus.)  What kind of information can you learn from this test?  The findings of an ultrasound fall into two categories, normal and abnormal.  Normal  The fetus is the right size for its age.  The placenta is the expected size and does not cover the cervix.  There is enough amniotic fluid in the uterus.  No birth defects can be seen.  Abnormal  The fetus is small or large for its age.  The placenta covers the cervix.  There is too much or too little amniotic fluid in the uterus.  The fetus may have a birth defect.  What does an abnormal result mean?  Abnormal seems to imply that something is wrong with your baby. But what it means is that the test has shown something the " doctor wants to take a closer look at.  And that's what happens next. Your doctor will talk to you about what further test or tests you may need.  What do the results mean?  Some of the things your doctor may see on an abnormal ultrasound include:  Echogenic bowel.  The bowel looks very bright on the screen. This could mean that there's blood in the bowel. Or it could mean that something is blocking the small bowel.  Increased nuchal translucency.  The ultrasound measures the thickness at the back of the baby's neck. An increase in thickness is sometimes an early sign of Down syndrome.  Increased or decreased amniotic fluid.  The doctor will look for a reason for the level of amniotic fluid and will watch the pregnancy closely as it progresses.  Large ventricles.  Ventricles in the brain look larger than they should. Your doctor may take a closer look at the brain.  Renal pyelectasis/hydronephrosis.  The ultrasound measures the fluid around the kidney. If there is more fluid than expected, there is a chance of urinary tract or kidney problems.  Short long bones.  The ultrasound measures certain arm and leg bones. A long bone (humerus or femur) that is shorter than average could be a sign of Down syndrome.  Subchorionic hemorrhage.  An ultrasound can show bleeding under one of the membranes that surrounds the fetus. Some women don't have symptoms of bleeding. The ultrasound can find this problem when women are not bleeding from their vagina. Women who have this condition have a slightly higher chance of miscarriage.  What do you do now?  Take a deep breath, and let it out. Keep in mind that an abnormal finding on an ultrasound, after it's coupled with more information, may:  Turn out to be nothing.  Turn out to be something mild that won't affect the baby.  Turn out to be something more serious. But if this happens, early diagnosis helps you and your doctor plan treatment options sooner rather than later.  Your  "medical team is there for you. So are your family and friends. Ask questions, and get the help and support you need.  Follow-up care is a key part of your treatment and safety. Be sure to make and go to all appointments, and call your doctor if you are having problems. It's also a good idea to know your test results and keep a list of the medicines you take.  Where can you learn more?  Go to https://www.[employer].net/patiented  Enter K451 in the search box to learn more about \"Learning About Fetal Ultrasound Results.\"  Current as of: April 30, 2024  Content Version: 14.3    2024 [employer].   Care instructions adapted under license by your healthcare professional. If you have questions about a medical condition or this instruction, always ask your healthcare professional. [employer] disclaims any warranty or liability for your use of this information.    Learning About Prenatal Visits  Overview     Regular prenatal visits are very important during any pregnancy. These quick office visits may seem simple and routine. But they can help you have a safe and healthy pregnancy. Your doctor is watching for problems that can only be found through regular checkups. The visits also give you and your doctor time to build a good relationship.  After your first visit, you will most likely start on a schedule of monthly visits. In your third trimester, the visits will get more frequent. Based on your health, your age, and if you've had a normal, full-term pregnancy before, your doctor may want to see you more or less often.  At different times in your pregnancy, you will have exams and tests. Some are routine. Others are done only when there is a chance of a problem. Everything healthy you do for your body helps you have a healthy pregnancy. Rest when you need it. Eat well, drink plenty of water, and exercise regularly.  What happens during a prenatal visit?  You will have blood pressure checks, along " with urine tests. You also may have blood tests. If you need to go to the bathroom while waiting for the doctor, tell the nurse. You will be given a sample cup so your urine can be tested.  You will be weighed and have your belly measured.  Your doctor may listen to the fetal heartbeat with a special device.  At about 24 weeks, and possibly earlier in your pregnancy, your doctor will check your blood sugar (glucose tolerance test) for diabetes that can occur during pregnancy. This is gestational diabetes, which can be harmful.  You will have tests to check for infections that could harm your . These include group B streptococcus and hepatitis B.  Your doctor may do ultrasounds to check for problems. This also checks the position of the fetus. An ultrasound uses sound waves to produce a picture of the fetus.  You may get your vaccines updated.  Your doctor may ask you questions to check for signs of anxiety or depression. Tell your doctor if you feel sad, anxious, or hopeless for more than a few days.  You may have other tests at any time during your pregnancy.  Use your visits to discuss with your doctor any concerns you have.  How can you care for yourself at home?  Get plenty of rest.  Try to exercise every day, if your doctor says it is okay. If you have not exercised in the past, start out slowly. For example, you can take short walks each day.  Choose healthy foods, such as fruits, vegetables, whole grains, lean proteins, low-fat dairy, and healthy fats.  Drink plenty of fluids. Cut down on drinks with caffeine, such as coffee, tea, and cola. If you have kidney, heart, or liver disease and have to limit fluids, talk with your doctor before you increase the amount of fluids you drink.  Try to avoid chemical fumes, paint fumes, and poisons.  If you smoke, vape, or use alcohol, marijuana, or other drugs, quit or cut back as much as you can. Talk to your doctor if you need help quitting.  Review all of your  "medicines, including over-the-counter medicines and supplements, with your doctor. Some of your routine medicines may need to be changed. Do not stop or start taking any medicines without talking to your doctor first.  Follow-up care is a key part of your treatment and safety. Be sure to make and go to all appointments, and call your doctor if you are having problems. It's also a good idea to know your test results and keep a list of the medicines you take.  Where can you learn more?  Go to https://www.[employer].net/patiented  Enter J502 in the search box to learn more about \"Learning About Prenatal Visits.\"  Current as of: April 30, 2024  Content Version: 14.3    2024 [employer].   Care instructions adapted under license by your healthcare professional. If you have questions about a medical condition or this instruction, always ask your healthcare professional. [employer] disclaims any warranty or liability for your use of this information.    Intimate Partner Violence: Care Instructions  Overview     If you want to save this information but don't think it is safe to take it home, see if a trusted friend can keep it for you. Plan ahead. Know who you can call for help, and memorize the phone number.   Be careful online too. Your online activity may be seen by others. Do not use your personal computer or device to read about this topic. Use a safe computer, such as one at work, a friend's home, or a library.    Intimate partner violence--a type of domestic abuse--is different from an argument now and then. It is a pattern of abuse that one person may use to control another person's behavior. It may start with threats and name-calling. Then, it may lead to more serious acts, like pushing and slapping. The abuse also may occur in other areas. For example, the abuser may withhold money or spend a partner's money without their knowledge.  Abuse can cause serious harm. You are more likely to have " a long-term health problem from the injuries and stress of living in a violent relationship. People who are sexually abused by their partners have more sexually transmitted infections and unplanned pregnancies. Anyone who is abused also faces emotional pain. Anyone can be abused in relationships. In some relationships, both people use abusive behavior.  If you are pregnant, abuse can cause problems such as poor weight gain, infections, and bleeding. Abuse during this time may increase your baby's risk of low birth weight, premature birth, and death.  Follow-up care is a key part of your treatment and safety. Be sure to make and go to all appointments, and call your doctor if you are having problems. It's also a good idea to know your test results and keep a list of the medicines you take.  How can you care for yourself at home?  If you do not have a safe place to stay, discuss this with your doctor before you leave.  Have a plan for where to go, how to leave your home, and where to stay in case of an emergency. Do not tell your partner about your plan. Contact:  The National Domestic Violence Hotline toll-free at 1-497.816.6618. They can help you find resources in your area.  Your local police department, hospital, or clinic for information about shelters and safe homes near you.  Talk to a trusted friend or neighbor, a counselor, or a veronika leader. Do not feel that you have to hide what happened.  Teach your children how to call for help in an emergency.  Be alert to warning signs, such as threats, heavy alcohol use, or drug use. This can help you avoid danger.  If you can, make sure that there are no guns or other weapons in your home.  When should you call for help?   Call 911 anytime you think you may need emergency care. For example, call if:    You or someone else has just been abused.     You think you or someone else is in danger of being abused.   Watch closely for changes in your health, and be sure to  "contact your doctor if you have any problems.  Where can you learn more?  Go to https://www.[employer].net/patiented  Enter G282 in the search box to learn more about \"Intimate Partner Violence: Care Instructions.\"  Current as of: July 31, 2024  Content Version: 14.3    2024 [employer].   Care instructions adapted under license by your healthcare professional. If you have questions about a medical condition or this instruction, always ask your healthcare professional. [employer] disclaims any warranty or liability for your use of this information.    Vaginal Bleeding During Pregnancy: Care Instructions  Overview     It's common to have some vaginal spotting when you are pregnant. In some cases, the bleeding isn't serious. And there aren't any more problems with the pregnancy.  But sometimes bleeding is a sign of a more serious problem. This is more common if the bleeding is heavy or painful. Examples of more serious problems include miscarriage, an ectopic pregnancy, and a problem with the placenta.  You may have to see your doctor again to be sure everything is okay. You may also need more tests to find the cause of the bleeding.  Home treatment may be all you need. But it depends on what is causing the bleeding. Be sure to tell your doctor if you have any new symptoms or if your symptoms get worse.  The doctor has checked you carefully, but problems can develop later. If you notice any problems or new symptoms, get medical treatment right away.  Follow-up care is a key part of your treatment and safety. Be sure to make and go to all appointments, and call your doctor if you are having problems. It's also a good idea to know your test results and keep a list of the medicines you take.  How can you care for yourself at home?  If your doctor prescribed medicines, take them exactly as directed. Call your doctor if you think you are having a problem with your medicine.  Do not have vaginal sex " until your doctor says it's okay.  Do not put anything in your vagina until your doctor says it's okay.  Ask your doctor about other activities you can or can't do.  Get a lot of rest. Being pregnant can make you tired.  Do not use nonsteroidal anti-inflammatory drugs (NSAIDs), such as ibuprofen (Advil, Motrin), naproxen (Aleve), or aspirin, unless your doctor says it is okay.  When should you call for help?   Call 911 anytime you think you may need emergency care. For example, call if:    You passed out (lost consciousness).     You have severe vaginal bleeding. This means you are soaking through a pad each hour for 2 or more hours.     You have sudden, severe pain in your belly or pelvis.   Call your doctor now or seek immediate medical care if:    You have new or worse vaginal bleeding.     You are dizzy or lightheaded, or you feel like you may faint.     You have pain in your belly, pelvis, or lower back.     You think that you are in labor.     You have a sudden release of fluid from your vagina.     You've been having regular contractions for an hour. This means that you've had at least 8 contractions within 1 hour or at least 4 contractions within 20 minutes, even after you change your position and drink fluids.     You notice that your baby has stopped moving or is moving much less than normal.   Watch closely for changes in your health, and be sure to contact your doctor if you have any problems.  Current as of: April 30, 2024  Content Version: 14.3    2024 [employer].   Care instructions adapted under license by your healthcare professional. If you have questions about a medical condition or this instruction, always ask your healthcare professional. [employer] disclaims any warranty or liability for your use of this information.

## 2025-01-07 NOTE — PROGRESS NOTES
NPN nurse visit done over the phone. Pt will be given NPN folder and book at her upcoming appt.  Discussed optional screening available to assess chromosomal anomalies. Questions answered. Informed pt of the clinic structure (on-call does deliveries for the day, may be male or female doctor). Pt advised to call the clinic if she has any questions or concerns related to her pregnancy. Prenatal labs will be obtained at her upcoming appt. New prenatal visit scheduled on 25 with Dr Scott.    7w5d        Menstrual cycles: irregular cycles  Date of positive pregnancy test: 24  Medications stopped upon pos HPT: none    Last pap: 23 NILM        Patient supplied answers from flow sheet for:  Prenatal OB Questionnaire.  Past Medical History  Have you ever recieved care for your mental health? : (Patient-Rptd) No  Have you ever been in a major accident or suffered serious trauma?: (Patient-Rptd) No  Within the last year, has anyone hit, slapped, kicked or otherwise hurt you?: (Patient-Rptd) No  In the last year, has anyone forced you to have sex when you didn't want to?: (Patient-Rptd) No    Past Medical History 2   Have you ever received a blood transfusion?: (Patient-Rptd) No  Would you accept a blood transfusion if was medically recommended?: (Patient-Rptd) Yes  Does anyone in your home smoke?: (Patient-Rptd) No   Is your blood type Rh negative?: (Patient-Rptd) No  Have you ever ?: (Patient-Rptd) No  Have you been hospitalized for a nonsurgical reason excluding normal delivery?: (Patient-Rptd) No  Have you ever had an abnormal pap smear?: (Patient-Rptd) No    Past Medical History (Continued)  Do you have a history of abnormalities of the uterus?: (Patient-Rptd) No  Did your mother take MADDIE or any other hormones when she was pregnant with you?: (Patient-Rptd) No  Do you have any other problems we have not asked about which you feel may be important to this pregnancy?: (Patient-Rptd) No        Fidelina FIELDS RN  [facility] [place]

## 2025-01-08 ENCOUNTER — ANCILLARY PROCEDURE (OUTPATIENT)
Dept: ULTRASOUND IMAGING | Facility: CLINIC | Age: 30
End: 2025-01-08
Payer: COMMERCIAL

## 2025-01-08 ENCOUNTER — LAB (OUTPATIENT)
Dept: LAB | Facility: CLINIC | Age: 30
End: 2025-01-08
Payer: COMMERCIAL

## 2025-01-08 DIAGNOSIS — Z34.01 ENCOUNTER FOR SUPERVISION OF NORMAL FIRST PREGNANCY IN FIRST TRIMESTER: ICD-10-CM

## 2025-01-08 LAB
ERYTHROCYTE [DISTWIDTH] IN BLOOD BY AUTOMATED COUNT: 12.4 % (ref 10–15)
EST. AVERAGE GLUCOSE BLD GHB EST-MCNC: 103 MG/DL
HBA1C MFR BLD: 5.2 % (ref 0–5.6)
HBV SURFACE AG SERPL QL IA: NONREACTIVE
HCT VFR BLD AUTO: 39.4 % (ref 35–47)
HCV AB SERPL QL IA: NONREACTIVE
HGB BLD-MCNC: 14 G/DL (ref 11.7–15.7)
HIV 1+2 AB+HIV1 P24 AG SERPL QL IA: NONREACTIVE
MCH RBC QN AUTO: 28.7 PG (ref 26.5–33)
MCHC RBC AUTO-ENTMCNC: 35.5 G/DL (ref 31.5–36.5)
MCV RBC AUTO: 81 FL (ref 78–100)
PLATELET # BLD AUTO: 287 10E3/UL (ref 150–450)
RBC # BLD AUTO: 4.87 10E6/UL (ref 3.8–5.2)
RUBV IGG SERPL QL IA: 3.93 INDEX
RUBV IGG SERPL QL IA: POSITIVE
T PALLIDUM AB SER QL: NONREACTIVE
WBC # BLD AUTO: 10.2 10E3/UL (ref 4–11)

## 2025-01-08 PROCEDURE — 86901 BLOOD TYPING SEROLOGIC RH(D): CPT

## 2025-01-08 PROCEDURE — 86762 RUBELLA ANTIBODY: CPT

## 2025-01-08 PROCEDURE — 87340 HEPATITIS B SURFACE AG IA: CPT

## 2025-01-08 PROCEDURE — 76801 OB US < 14 WKS SINGLE FETUS: CPT | Performed by: OBSTETRICS & GYNECOLOGY

## 2025-01-08 PROCEDURE — 76817 TRANSVAGINAL US OBSTETRIC: CPT | Performed by: OBSTETRICS & GYNECOLOGY

## 2025-01-08 PROCEDURE — 86900 BLOOD TYPING SEROLOGIC ABO: CPT

## 2025-01-08 PROCEDURE — 83036 HEMOGLOBIN GLYCOSYLATED A1C: CPT

## 2025-01-08 PROCEDURE — 36415 COLL VENOUS BLD VENIPUNCTURE: CPT

## 2025-01-08 PROCEDURE — 86803 HEPATITIS C AB TEST: CPT

## 2025-01-08 PROCEDURE — 85027 COMPLETE CBC AUTOMATED: CPT

## 2025-01-08 PROCEDURE — 86780 TREPONEMA PALLIDUM: CPT

## 2025-01-08 PROCEDURE — 87389 HIV-1 AG W/HIV-1&-2 AB AG IA: CPT

## 2025-01-08 PROCEDURE — 86850 RBC ANTIBODY SCREEN: CPT

## 2025-01-28 ENCOUNTER — PRENATAL OFFICE VISIT (OUTPATIENT)
Dept: OBGYN | Facility: CLINIC | Age: 30
End: 2025-01-28
Payer: COMMERCIAL

## 2025-01-28 VITALS
SYSTOLIC BLOOD PRESSURE: 128 MMHG | DIASTOLIC BLOOD PRESSURE: 78 MMHG | HEIGHT: 68 IN | BODY MASS INDEX: 37.39 KG/M2 | WEIGHT: 246.7 LBS

## 2025-01-28 DIAGNOSIS — Z34.00 SUPERVISION OF NORMAL FIRST PREGNANCY, ANTEPARTUM: Primary | ICD-10-CM

## 2025-01-28 PROCEDURE — 36415 COLL VENOUS BLD VENIPUNCTURE: CPT | Performed by: OBSTETRICS & GYNECOLOGY

## 2025-01-28 PROCEDURE — 87591 N.GONORRHOEAE DNA AMP PROB: CPT | Performed by: OBSTETRICS & GYNECOLOGY

## 2025-01-28 PROCEDURE — 87491 CHLMYD TRACH DNA AMP PROBE: CPT | Performed by: OBSTETRICS & GYNECOLOGY

## 2025-01-28 PROCEDURE — 99213 OFFICE O/P EST LOW 20 MIN: CPT | Performed by: OBSTETRICS & GYNECOLOGY

## 2025-01-28 NOTE — PROGRESS NOTES
This is a 29 year old female patient,   who presents for her first obstetrical visit. This pregnancy is Unplanned, Desired.    EDC Aug 7, 2025 by Previous US which makes her 12w5d  today.  Her cycles are irregular.  Since her LMP, she has experienced  nausea and fatigue.  She denies emesis and vaginal bleeding. Ultrasound in the 1st trimester showed EDC inconsistent with dates by LMP.     OB History    Para Term  AB Living   1 0 0 0 0 0   SAB IAB Ectopic Multiple Live Births   0 0 0 0 0      # Outcome Date GA Lbr C[place]/2nd Weight Sex Type Anes PTL Lv   1 Current                History of GDM: No,  PTL : No,  History of HTN in pregnancy: No,  Thrombocytopenia: No,  Shoulder dystocia: No,  Vacuum Extraction: No  PPH: No   3rd of 4th degree laceration: No.   Other complications: history pelvic floor tension myalgia, treated with physical therapy. Concerned about implications for delivery.    Risk factors for preeclampsia (baby ASA recommended for 2 or more moderate or 1 or more high risk factors)     High risk: previous pregnancy with preeclampsia, multifetal gestation, chronic htn, diabetes, chronic kidney disease, autoimmune disorder     Medium risk: nulliparity, BMI >30, family hx preeclampsia, age >/= 35,  race, low SES, personal risk factors (hx low birth weight, hx stillbirth, >10yrs between pregnancies).   Teenage pregnancy.       ASA 8 mg daily is recommended.    Since her last LMP she denies use of alcohol, tobacco and street drugs.    HISTORY:  Past Medical History:   Diagnosis Date    Anxiety     Closed fracture of unspecified part of radius with ulna(813.83)     Other diseases of trachea and bronchus, not elsewhere classified     tracheomalacia as a      Ventricular tachycardia (H)     in      Past Surgical History:   Procedure Laterality Date    ESOPHAGOSCOPY, GASTROSCOPY, DUODENOSCOPY (EGD), COMBINED N/A 10/19/2018    Procedure: COMBINED ESOPHAGOSCOPY,  GASTROSCOPY, DUODENOSCOPY (EGD), BIOPSY SINGLE OR MULTIPLE;  Surgeon: Heraclio Ziegler DO;  Location: [facility]    TONSILLECTOMY ADULT Bilateral 07/23/2018    Procedure: TONSILLECTOMY ADULT;  Bilateral Tonsillectomy;  Surgeon: Galilea Good MD;  Location: [facility]    WISDOM TOOTH EXTRACTION      2020     Family History   Problem Relation Age of Onset    Hypertension Mother     Diabetes Mother     Hypertension Father     Crohn's Disease Father     Diabetes Father     Hypertension Maternal Grandmother     Anxiety Disorder Maternal Grandmother     Hypertension Maternal Grandfather     Anxiety Disorder Maternal Grandfather     Atrial fibrillation Maternal Grandfather      Social History     Socioeconomic History    Marital status: Single     Spouse name: Miah Moreau boyfrienmiguel a    Number of children: 0    Years of education: 12    Highest education level: None   Occupational History    Occupation:    Tobacco Use    Smoking status: Never     Passive exposure: Never    Smokeless tobacco: Never    Tobacco comments:     no exporure   Vaping Use    Vaping status: Never Used   Substance and Sexual Activity    Alcohol use: No    Drug use: No    Sexual activity: Yes     Partners: Male     Birth control/protection: None     Social Drivers of Health     Financial Resource Strain: Low Risk  (1/18/2024)    Financial Resource Strain     Within the past 12 months, have you or your family members you live with been unable to get utilities (heat, electricity) when it was really needed?: No   Food Insecurity: Low Risk  (1/18/2024)    Food Insecurity     Within the past 12 months, did you worry that your food would run out before you got money to buy more?: No     Within the past 12 months, did the food you bought just not last and you didn t have money to get more?: No   Transportation Needs: Low Risk  (1/18/2024)    Transportation Needs     Within the past 12 months, has lack of transportation kept you from  "medical appointments, getting your medicines, non-medical meetings or appointments, work, or from getting things that you need?: No   Interpersonal Safety: Low Risk  (3/28/2024)    Interpersonal Safety     Do you feel physically and emotionally safe where you currently live?: Yes     Within the past 12 months, have you been hit, slapped, kicked or otherwise physically hurt by someone?: No     Within the past 12 months, have you been humiliated or emotionally abused in other ways by your partner or ex-partner?: No   Housing Stability: Low Risk  (1/18/2024)    Housing Stability     Do you have housing? : Yes     Are you worried about losing your housing?: No     Current Outpatient Medications   Medication Sig Dispense Refill    docusate sodium (COLACE) 50 MG capsule Take 50 mg by mouth 2 times daily.      Prenatal MV & Min w/FA-DHA (PRENATAL GUMMIES PO) Take 1 chew tab by mouth daily.      acetaminophen (TYLENOL) 325 MG tablet Take 325-650 mg by mouth every 6 hours as needed for mild pain. (Patient not taking: Reported on 1/7/2025)       No current facility-administered medications for this visit.     Allergies   Allergen Reactions    Amoxicillin Hives    Azithromycin Palpitations    Oxycodone Nausea and Vomiting    Celexa [Citalopram] Nausea       Past medical, surgical, social and family history were reviewed and updated in EPIC.    ROS:   12 point review of systems negative other than symptoms noted below.    EXAM:  /78   Ht 1.727 m (5' 8\")   Wt 111.9 kg (246 lb 11.2 oz)   LMP 11/14/2024 (Approximate)   BMI 37.51 kg/m     BMI: Body mass index is 37.51 kg/m .    EXAM:  Constitutional: Appearance: Well nourished, well developed alert, in no acute distress  Chest:  Respiratory Effort:  Breathing unlabored  Cardiovascular:Heart    Auscultation:  Regular rate, normal rhythm, no murmurs present  Gastrointestinal:  Abdominal Examination:  Abdomen nontender to palpation, tone normal without     rigidity or " guarding, no masses present, umbilicus without lesions    Liver and speen:  No hepatomegaly present, liver nontender to palpation    Hernias:  No hernias present    FHTs auscultated at 160s.  Skin:  General Inspection:  No rashes present, no lesions present, no areas of  discoloration.  Neurologic/Psychiatric:    Mental Status:  Oriented X3       Pelvis: not needed today  FHTs obtained and normal.    ASSESSMENT:      ICD-10-CM    1. Encounter for supervision of normal first pregnancy in first trimester  Z34.01 [facility] Non-Invasive Prenatal Screening-Prequel          PLAN:    Prenatal labs reviewed. She has no questions.    Discussed options for screening for and diagnosis of chromosomal anomalies, including first trimester screen, noninvasive prenatal testing/cell-free fetal DNA testing, CVS/amniocentesis, quad screen, and ultrasound or comprehensive Level II US at 18-20 weeks. She is electing noninvasive prenatal testing and ultrasound at 18-20 weeks.    Reviewed early pregnancy education, diet, exercise, prenatal vitamins, intercourse. Reviewed the call schedule, labor and delivery, and the schedule of prenatal visits.    Follow up in 4 weeks. She is encouraged to call sooner with questions or concerns.      Allyssa Scott MD

## 2025-01-28 NOTE — NURSING NOTE
"12w5d    Chief Complaint   Patient presents with    Prenatal Care     Pt very fatigued     initial /78   Ht 1.727 m (5' 8\")   Wt 111.9 kg (246 lb 11.2 oz)   LMP 11/14/2024 (Approximate)   BMI 37.51 kg/m   Estimated body mass index is 37.51 kg/m  as calculated from the following:    Height as of this encounter: 1.727 m (5' 8\").    Weight as of this encounter: 111.9 kg (246 lb 11.2 oz).  BP completed using cuff size regular linda Guido CMA on 1/28/2025 at 1:05 PM    "

## 2025-01-29 LAB
C TRACH DNA SPEC QL NAA+PROBE: NEGATIVE
N GONORRHOEA DNA SPEC QL NAA+PROBE: NEGATIVE
SPECIMEN TYPE: NORMAL
SPECIMEN TYPE: NORMAL

## 2025-02-06 LAB — SCANNED LAB RESULT: NORMAL

## (undated) DEVICE — VASELINE PETROLEUM JELLY 5GM 8884433200

## (undated) DEVICE — Device

## (undated) DEVICE — SUCTION TIP YANKAUER STR K87

## (undated) DEVICE — DRSG GAUZE 4X4" 3033

## (undated) DEVICE — ESU CORD BIPOLAR 12' E0509

## (undated) DEVICE — LABEL MEDICATION SYSTEM  3304

## (undated) DEVICE — ESU PENCIL W/COATED BLADE E2450H

## (undated) DEVICE — ANTIFOG SOLUTION W/FOAM PAD CF-1001

## (undated) DEVICE — SYR EAR BULB 2OZ

## (undated) DEVICE — GLOVE PROTEXIS W/NEU-THERA 8.0  2D73TE80

## (undated) DEVICE — BASIN SET MINOR DISP

## (undated) DEVICE — ESU ELEC BLADE 2.75" COATED/INSULATED E1455

## (undated) DEVICE — SYR 50ML CATH TIP W/O NDL 309620

## (undated) DEVICE — SOL NACL 0.9% IRRIG 1000ML BOTTLE 07138-09

## (undated) DEVICE — TUBING SUCTION 12"X1/4" N612

## (undated) DEVICE — ESU SUCTION CAUTERY 10FR FOOT CONTROL E2505-10FR

## (undated) DEVICE — PACK BASIC 9103

## (undated) RX ORDER — ONDANSETRON 2 MG/ML
INJECTION INTRAMUSCULAR; INTRAVENOUS
Status: DISPENSED
Start: 2018-07-23

## (undated) RX ORDER — HYDROMORPHONE HYDROCHLORIDE 1 MG/ML
INJECTION, SOLUTION INTRAMUSCULAR; INTRAVENOUS; SUBCUTANEOUS
Status: DISPENSED
Start: 2018-07-23

## (undated) RX ORDER — NEOSTIGMINE METHYLSULFATE 1 MG/ML
VIAL (ML) INJECTION
Status: DISPENSED
Start: 2018-07-23

## (undated) RX ORDER — GLYCOPYRROLATE 0.2 MG/ML
INJECTION, SOLUTION INTRAMUSCULAR; INTRAVENOUS
Status: DISPENSED
Start: 2018-07-23

## (undated) RX ORDER — PROPOFOL 10 MG/ML
INJECTION, EMULSION INTRAVENOUS
Status: DISPENSED
Start: 2018-07-23

## (undated) RX ORDER — FENTANYL CITRATE 50 UG/ML
INJECTION, SOLUTION INTRAMUSCULAR; INTRAVENOUS
Status: DISPENSED
Start: 2018-07-23

## (undated) RX ORDER — DEXAMETHASONE SODIUM PHOSPHATE 4 MG/ML
INJECTION, SOLUTION INTRA-ARTICULAR; INTRALESIONAL; INTRAMUSCULAR; INTRAVENOUS; SOFT TISSUE
Status: DISPENSED
Start: 2018-07-23